# Patient Record
Sex: MALE | Race: WHITE | NOT HISPANIC OR LATINO | Employment: OTHER | URBAN - METROPOLITAN AREA
[De-identification: names, ages, dates, MRNs, and addresses within clinical notes are randomized per-mention and may not be internally consistent; named-entity substitution may affect disease eponyms.]

---

## 2017-03-17 ENCOUNTER — GENERIC CONVERSION - ENCOUNTER (OUTPATIENT)
Dept: OTHER | Facility: OTHER | Age: 67
End: 2017-03-17

## 2018-02-27 ENCOUNTER — OFFICE VISIT (OUTPATIENT)
Dept: SURGERY | Facility: CLINIC | Age: 68
End: 2018-02-27
Payer: MEDICARE

## 2018-02-27 VITALS
HEIGHT: 70 IN | HEART RATE: 78 BPM | SYSTOLIC BLOOD PRESSURE: 118 MMHG | DIASTOLIC BLOOD PRESSURE: 78 MMHG | RESPIRATION RATE: 16 BRPM | WEIGHT: 184.04 LBS | BODY MASS INDEX: 26.35 KG/M2 | TEMPERATURE: 97.7 F

## 2018-02-27 DIAGNOSIS — K40.90 LEFT INGUINAL HERNIA: Primary | ICD-10-CM

## 2018-02-27 PROCEDURE — 99203 OFFICE O/P NEW LOW 30 MIN: CPT | Performed by: SURGERY

## 2018-02-27 RX ORDER — LEVOFLOXACIN 750 MG/1
TABLET ORAL
Refills: 0 | COMMUNITY
Start: 2018-01-24 | End: 2018-03-12 | Stop reason: ALTCHOICE

## 2018-02-27 RX ORDER — SODIUM CHLORIDE, SODIUM LACTATE, POTASSIUM CHLORIDE, CALCIUM CHLORIDE 600; 310; 30; 20 MG/100ML; MG/100ML; MG/100ML; MG/100ML
125 INJECTION, SOLUTION INTRAVENOUS CONTINUOUS
Qty: 250 ML | Refills: 0 | Status: SHIPPED | OUTPATIENT
Start: 2018-02-27 | End: 2018-03-12

## 2018-02-27 RX ORDER — ALBUTEROL SULFATE 2.5 MG/3ML
SOLUTION RESPIRATORY (INHALATION)
Refills: 0 | COMMUNITY
Start: 2018-01-29 | End: 2018-03-13 | Stop reason: SDUPTHER

## 2018-02-27 RX ORDER — FLUTICASONE FUROATE AND VILANTEROL TRIFENATATE 100; 25 UG/1; UG/1
1 POWDER RESPIRATORY (INHALATION) DAILY
Refills: 0 | COMMUNITY
Start: 2018-02-14 | End: 2018-03-13 | Stop reason: SDUPTHER

## 2018-02-27 RX ORDER — PREDNISONE 10 MG/1
10 TABLET ORAL DAILY
Refills: 0 | COMMUNITY
Start: 2018-01-24 | End: 2018-03-12 | Stop reason: ALTCHOICE

## 2018-02-27 RX ORDER — OMEPRAZOLE 20 MG/1
20 TABLET, DELAYED RELEASE ORAL
COMMUNITY
End: 2018-03-12 | Stop reason: SDUPTHER

## 2018-02-27 NOTE — PROGRESS NOTES
Consult- General Surgery   Amilcar Wheeler 74 y o  male MRN: [id number]  Unit/Bed#:  Encounter: [id number]    Assessment/Plan     Assessment:  Left inguinal hernia, symptomatic  COPD on O2 at home    Plan:  In light of the symptomatology and the size of the left inguinal hernia I advised the patient to undergo open repair of left inguinal hernia under local and mac anesthesia in the near future  I discussed the operative procedure, risks, benefits and alternatives with the patient, he understood and agreed to proceed  The patient will require medical and pulmonary clearance before surgery  History of Present Illness     HPI:  I had the pleasure of seeing Amilcar Wheeler in the office today accompanied by his wife for evaluation of left inguinal hernia  The patient is a pleasant 74 y o  male who noted a bulge from the left groin for the past 3 months, increasing in size, causing discomfort with walking  Denies having any fever, chills, nausea, vomiting, diarrhea, constipation or urinary symptoms  He does not recall any single event the provoked the hernia  The patient just moved from [place] to the [facility] and he is stellate chin doctors  He uses O2 at home for COPD  Review of Systems   Constitutional: Negative for chills and fever  HENT: Negative for nosebleeds and sore throat  Eyes: Negative for pain and discharge  Respiratory: Positive for cough, shortness of breath and wheezing  Cardiovascular: Negative for chest pain and palpitations  Gastrointestinal:        As per history of present illness  Endocrine: Negative for cold intolerance and heat intolerance  Genitourinary: Negative for dysuria and hematuria  Neurological: Negative for seizures and headaches  Hematological: Negative for adenopathy  Does not bruise/bleed easily  Psychiatric/Behavioral: Negative for confusion  The patient is not nervous/anxious          Historical Information   No past medical history on file   No past surgical history on file  Social History   History   Alcohol Use No     History   Drug Use No     History   Smoking Status    Former Smoker    Years: 50 00    Types: Cigarettes    Quit date: 2/27/2013   Smokeless Tobacco    Never Used     Family History: No family history on file  Meds/Allergies   PTA meds:   Cannot display prior to admission medications because the patient has not been admitted in this contact  Allergies   Allergen Reactions    Penicillins Hives     Passed out       Objective   First Vitals:   @VSFIRST2(5,8,6,7,9,11,14,10:FIRST)@    Current Vitals:   Blood Pressure: 118/78 (02/27/18 1330)  Pulse: 78 (02/27/18 1330)  Temperature: 97 7 °F (36 5 °C) (02/27/18 1330)  Temp Source: Oral (02/27/18 1330)  Respirations: 16 (oxygen air level 3) (02/27/18 1330)  Height: 5' 10" (177 8 cm) (02/27/18 1330)  Weight - Scale: 83 5 kg (184 lb 0 6 oz) (02/27/18 1330)    [unfilled]    Invasive Devices          No matching active lines, drains, or airways          Physical Exam   Constitutional: He is oriented to person, place, and time  He appears well-developed and well-nourished  No distress  HENT:   Head: Normocephalic  Mouth/Throat: No oropharyngeal exudate  Eyes: Pupils are equal, round, and reactive to light  No scleral icterus  Neck: Normal range of motion  Neck supple  No thyromegaly present  Cardiovascular: Normal rate and regular rhythm  No murmur heard  Pulmonary/Chest:   Decreased breathing sounds both lungs, anterior wheezing and no rhonchi  Abdominal: Soft  He exhibits no mass  There is no tenderness  There is a visible left inguinal hernia, easily reducible, somewhat tender to palpation  There is no right inguinal hernia  Musculoskeletal: He exhibits no edema or tenderness  Neurological: He is alert and oriented to person, place, and time  No cranial nerve deficit  Skin: Skin is warm and dry  No erythema     Psychiatric: He has a normal mood and affect   His behavior is normal

## 2018-03-12 ENCOUNTER — OFFICE VISIT (OUTPATIENT)
Dept: INTERNAL MEDICINE CLINIC | Facility: CLINIC | Age: 68
End: 2018-03-12
Payer: MEDICARE

## 2018-03-12 VITALS
HEIGHT: 70 IN | WEIGHT: 187.4 LBS | RESPIRATION RATE: 20 BRPM | BODY MASS INDEX: 26.83 KG/M2 | SYSTOLIC BLOOD PRESSURE: 152 MMHG | DIASTOLIC BLOOD PRESSURE: 72 MMHG | HEART RATE: 78 BPM

## 2018-03-12 DIAGNOSIS — F41.9 ANXIETY: ICD-10-CM

## 2018-03-12 DIAGNOSIS — K40.90 LEFT INGUINAL HERNIA: ICD-10-CM

## 2018-03-12 DIAGNOSIS — J43.9 PULMONARY EMPHYSEMA, UNSPECIFIED EMPHYSEMA TYPE (HCC): Primary | ICD-10-CM

## 2018-03-12 DIAGNOSIS — K21.9 GASTROESOPHAGEAL REFLUX DISEASE WITHOUT ESOPHAGITIS: ICD-10-CM

## 2018-03-12 DIAGNOSIS — K63.5 POLYP OF COLON, UNSPECIFIED PART OF COLON, UNSPECIFIED TYPE: ICD-10-CM

## 2018-03-12 PROCEDURE — 99204 OFFICE O/P NEW MOD 45 MIN: CPT | Performed by: INTERNAL MEDICINE

## 2018-03-12 RX ORDER — SODIUM CHLORIDE, SODIUM LACTATE, POTASSIUM CHLORIDE, CALCIUM CHLORIDE 600; 310; 30; 20 MG/100ML; MG/100ML; MG/100ML; MG/100ML
125 INJECTION, SOLUTION INTRAVENOUS CONTINUOUS
Qty: 250 ML | Refills: 0 | Status: SHIPPED | OUTPATIENT
Start: 2018-03-12 | End: 2018-05-01 | Stop reason: ALTCHOICE

## 2018-03-12 RX ORDER — OMEPRAZOLE 20 MG/1
20 TABLET, DELAYED RELEASE ORAL DAILY
Qty: 30 TABLET | Refills: 5 | Status: SHIPPED | OUTPATIENT
Start: 2018-03-12 | End: 2018-04-26

## 2018-03-12 NOTE — PROGRESS NOTES
Assessment/Plan:     Appears stable at this point  There are some questions that will need to be answered from his old records  For now, he has appointment with Pulmonary tomorrow  Referral given to Cardiology  Continue current medications  After his surgery, we can refer to GI to consider EGD and he may possibly be due for repeat colonoscopy  Also, given the pending surgery, I told him to stay on the Zoloft for now  After the surgery is settled, we can consider tapering down to a lower dose  His clearance for the surgery is dependent on final clearance from Cardiology and Pulmonary given his underlying medical problems  Followup scheduled per orders  No problem-specific Assessment & Plan notes found for this encounter  Diagnoses and all orders for this visit:    Pulmonary emphysema, unspecified emphysema type ([facility] )  -     Ambulatory referral to Cardiology    Gastroesophageal reflux disease without esophagitis  -     omeprazole (PRILOSEC OTC) 20 MG tablet; Take 1 tablet (20 mg total) by mouth daily    Anxiety  -     sertraline (ZOLOFT) 50 mg tablet; Take 1 tablet (50 mg total) by mouth daily    Left inguinal hernia  -     lactated ringers; Infuse 125 mL/hr into a venous catheter continuous  -     enoxaparin (LOVENOX) 40 mg/0 4 mL; Inject 0 4 mL (40 mg total) under the skin on call to OR for 1 dose  -     Ambulatory referral to Cardiology    Polyp of colon, unspecified part of colon, unspecified type          Subjective:      Patient ID: Jessica Sanders is a 74 y o  male  Patient is a 82-year-old white male with COPD/emphysema who comes in today for 1st time visit  He has moved to this area to live with his daughter who was my patient  Both he and his wife have severe COPD and are on oxygen  He was most recently found to have a left inguinal hernia that is going to require surgical repair  However, he 1st needs clearance    They already have an appointment with Pulmonary tomorrow but his daughter also reports he has a history of a silent MI  He will need Cardiology clearance as well  We do not have his old records as of yet  In addition to his lung problem, he does have associated anxiety and was placed on Zoloft for this  He wanted to try stopping it but his family told him to resume  He also has a history of acid reflux and possibly an ulcer but he denies ever having EGD  He does remember having colonoscopy a few years ago and did have a colon polyp  ALLERGIES:  Allergies   Allergen Reactions    Penicillins Hives     Passed out       CURRENT MEDICATIONS:    Current Outpatient Prescriptions:     albuterol (2 5 mg/3 mL) 0 083 % nebulizer solution, inhale contents of 1 vial in nebulizer every 4 hours if needed, Disp: , Rfl: 0    BREO ELLIPTA, Inhale 1 puff daily, Disp: , Rfl: 0    omeprazole (PRILOSEC OTC) 20 MG tablet, Take 1 tablet (20 mg total) by mouth daily, Disp: 30 tablet, Rfl: 5    PROAIR  (90 Base) MCG/ACT inhaler, inhale 1 puff by mouth every 6 hours if needed for wheezing, Disp: , Rfl: 0    sertraline (ZOLOFT) 50 mg tablet, Take 1 tablet (50 mg total) by mouth daily, Disp: 30 tablet, Rfl: 5    enoxaparin (LOVENOX) 40 mg/0 4 mL, Inject 0 4 mL (40 mg total) under the skin on call to OR for 1 dose, Disp: 1 Syringe, Rfl: 0    lactated ringers, Infuse 125 mL/hr into a venous catheter continuous, Disp: 250 mL, Rfl: 0    ACTIVE PROBLEM LIST:  Patient Active Problem List   Diagnosis    Pulmonary emphysema (HCC)    Gastroesophageal reflux disease without esophagitis    Anxiety    Left inguinal hernia       PAST MEDICAL HISTORY:  Past Medical History:   Diagnosis Date    Pneumonia        PAST SURGICAL HISTORY:  Past Surgical History:   Procedure Laterality Date    CHOLECYSTECTOMY         FAMILY HISTORY:  History reviewed  No pertinent family history      SOCIAL HISTORY:  Social History     Social History    Marital status: /Civil Union     Spouse name: janet     Number of children: 11    Years of education: N/A     Occupational History    retired       Social History Main Topics    Smoking status: Former Smoker     Years: 50 00     Types: Cigarettes     Quit date: 2/27/2013    Smokeless tobacco: Never Used    Alcohol use No    Drug use: No    Sexual activity: No     Other Topics Concern    Not on file     Social History Narrative    No narrative on file       Review of Systems   Constitutional: Negative for fever  HENT: Negative for congestion  Eyes: Negative for visual disturbance  Respiratory: Positive for shortness of breath  Negative for wheezing  Cardiovascular: Negative for chest pain  Gastrointestinal: Negative for abdominal pain  Genitourinary: Negative for frequency  Musculoskeletal: Negative for arthralgias  Skin: Negative for rash  Neurological: Negative for headaches  Psychiatric/Behavioral: Negative for dysphoric mood  The patient is nervous/anxious  Objective:  Vitals:    03/12/18 1411   BP: 152/72   BP Location: Left arm   Patient Position: Sitting   Cuff Size: Adult   Pulse: 78   Resp: 20   Weight: 85 kg (187 lb 6 4 oz)   Height: 5' 10" (1 778 m)        Physical Exam   Constitutional: He is oriented to person, place, and time  He appears well-developed and well-nourished  HENT:   Head: Atraumatic  Right Ear: External ear normal    Left Ear: External ear normal    Mouth/Throat: Oropharynx is clear and moist  No oropharyngeal exudate  Eyes: Conjunctivae and EOM are normal  Pupils are equal, round, and reactive to light  Neck: Normal range of motion  Neck supple  Cardiovascular: Normal rate and regular rhythm  Pulmonary/Chest: Effort normal and breath sounds normal    Abdominal: Soft  There is no tenderness  Left inguinal hernia   Musculoskeletal: Normal range of motion  He exhibits no edema  Lymphadenopathy:     He has no cervical adenopathy     Neurological: He is alert and oriented to person, place, and time  Skin: Skin is warm and dry  No rash noted  Psychiatric: He has a normal mood and affect  RESULTS:    No results found for this or any previous visit (from the past 1008 hour(s))

## 2018-03-13 ENCOUNTER — TELEPHONE (OUTPATIENT)
Dept: CARDIOLOGY CLINIC | Facility: CLINIC | Age: 68
End: 2018-03-13

## 2018-03-13 ENCOUNTER — OFFICE VISIT (OUTPATIENT)
Dept: PULMONOLOGY | Facility: CLINIC | Age: 68
End: 2018-03-13
Payer: MEDICARE

## 2018-03-13 VITALS
OXYGEN SATURATION: 95 % | HEART RATE: 87 BPM | BODY MASS INDEX: 26.48 KG/M2 | WEIGHT: 185 LBS | SYSTOLIC BLOOD PRESSURE: 124 MMHG | DIASTOLIC BLOOD PRESSURE: 84 MMHG | HEIGHT: 70 IN

## 2018-03-13 DIAGNOSIS — J41.0 SIMPLE CHRONIC BRONCHITIS (HCC): Primary | ICD-10-CM

## 2018-03-13 DIAGNOSIS — Z87.891 FORMER SMOKER: ICD-10-CM

## 2018-03-13 DIAGNOSIS — J96.11 CHRONIC RESPIRATORY FAILURE WITH HYPOXIA (HCC): ICD-10-CM

## 2018-03-13 PROCEDURE — 99204 OFFICE O/P NEW MOD 45 MIN: CPT | Performed by: INTERNAL MEDICINE

## 2018-03-13 RX ORDER — ALBUTEROL SULFATE 2.5 MG/3ML
2.5 SOLUTION RESPIRATORY (INHALATION) EVERY 6 HOURS PRN
Qty: 1080 ML | Refills: 3 | Status: SHIPPED | OUTPATIENT
Start: 2018-03-13 | End: 2018-05-01 | Stop reason: SDUPTHER

## 2018-03-13 RX ORDER — FLUTICASONE FUROATE AND VILANTEROL TRIFENATATE 100; 25 UG/1; UG/1
1 POWDER RESPIRATORY (INHALATION) DAILY
Qty: 1 EACH | Refills: 5 | Status: SHIPPED | OUTPATIENT
Start: 2018-03-13 | End: 2018-08-03 | Stop reason: ALTCHOICE

## 2018-03-13 NOTE — TELEPHONE ENCOUNTER
PT WAS REFERRED BY DR SIMMS  PT IS NEW AND SCHED APPT ON 04/25 WITH HIS WIFE  PT NEEDS CARDIAC CLEARANCE FOR HERNIA SURGERY BY DR Jessee Austin  CAN DR GONZALES SEE PT EARLIER FOR CLEARANCE SO PT CAN SCHEDULE SURGERY

## 2018-03-13 NOTE — TELEPHONE ENCOUNTER
teresa what Dr German Walker April schedule looks like - dose he have any openings sooner than that date in April   Patient can also be added to cancellation list

## 2018-03-13 NOTE — PROGRESS NOTES
Assessment/Plan:     Diagnoses and all orders for this visit:    Simple chronic bronchitis ([address] )  -     Complete pulmonary function test; Future  -     CT lung screening program; Future  -     albuterol (2 5 mg/3 mL) 0 083 % nebulizer solution; Take 3 mL (2 5 mg total) by nebulization every 6 (six) hours as needed for wheezing  -     PROAIR  (90 Base) MCG/ACT inhaler; Inhale 2 puffs every 6 (six) hours as needed for wheezing  -     BREO ELLIPTA; Inhale 1 puff daily  -     Umeclidinium Bromide (INCRUSE ELLIPTA) 62 5 MCG/INH AEPB; Inhale 1 puff daily    Chronic respiratory failure with hypoxia (HCC)  -     Complete pulmonary function test; Future  -     CT lung screening program; Future  -     albuterol (2 5 mg/3 mL) 0 083 % nebulizer solution; Take 3 mL (2 5 mg total) by nebulization every 6 (six) hours as needed for wheezing  -     PROAIR  (90 Base) MCG/ACT inhaler; Inhale 2 puffs every 6 (six) hours as needed for wheezing  -     BREO ELLIPTA; Inhale 1 puff daily  -     Umeclidinium Bromide (INCRUSE ELLIPTA) 62 5 MCG/INH AEPB; Inhale 1 puff daily    Former smoker  -     CT lung screening program; Future      Chronic bronchitis/COPD with chronic respiratory failure in a former smoker with significant smoking history on continuous oxygen 3 L per minute for the past year  PFTs with bronchodilators lung volumes and DLCO  Discussed with the patient to continue with breo one puff daily  He was also on incruse one puff daily did not have the prescription for the past month, we'll send it in  Pro air MDI 2 puffs every 6 hourly when necessary  Also has a nebulizer machine at home with albuterol using it 2 or 3 times a day as needed only    He will continue with his oxygen 3 L per minute continuous  He will also get a CT of the chest for lung cancer screening  He is going for a hernia surgery, no set date yet, also awaiting cardiology clearance, in the meantime he will get the PFTs as well as a CT of the chest for lung cancer screening and I will give the clearance  Vaccinations up-to-date  The above plan was discussed with the patient as well as the patient's daughter at this office visit  Return in about 4 weeks (around 4/10/2018)  All questions are answered to the patient's satisfaction and understanding  He verbalizes understanding  He is encouraged to call with any further questions or concerns  Portions of the record may have been created with voice recognition software  Occasional wrong word or "sound a like" substitutions may have occurred due to the inherent limitations of voice recognition software  Read the chart carefully and recognize, using context, where substitutions have occurred  ______________________________________________________________________    Chief Complaint:   Chief Complaint   Patient presents with    COPD        Patient ID: Troy Rodriguez is a 74 y o  y o  male has a past medical history of COPD (chronic obstructive pulmonary disease) ([facility] ) and Pneumonia  3/13/2018  Patient is a 62-year-old gentleman, with 50-pack-year smoking history who quit 5 years ago, with history of COPD on bronchodilators as well as on home oxygen continuous 3 L per minute for the past year, during the last year he states he has been having some episodes of chronic bronchitis with flared up at least 2-3 in the past year, never admitted in the hospital for definitive, used to follow-up with the pulmonologist at [place], currently moved to [place] recently to live with his daughter wants to establish here  He is also planning to get a when his hernia surgery done no date set yet, also needs cardiology clearance,he states  Mother had history of lung cancer      Occupational/Exposure history:  Travel history:  Review of Systems   Constitutional: Positive for fatigue  Negative for activity change, appetite change, chills, diaphoresis, fever and unexpected weight change     HENT: Negative for congestion, dental problem, drooling, nosebleeds, postnasal drip, rhinorrhea, sinus pressure, sore throat and voice change  Eyes: Negative for discharge, itching and visual disturbance  Respiratory: Positive for cough (clear sputum, no hemoptysis), shortness of breath and wheezing  Cardiovascular: Negative for chest pain, palpitations and leg swelling  Endocrine: Negative for cold intolerance and heat intolerance  Allergic/Immunologic: Negative for food allergies and immunocompromised state  Neurological: Negative for dizziness, facial asymmetry, speech difficulty and weakness  Hematological: Negative for adenopathy  Psychiatric/Behavioral: Negative for agitation, confusion and sleep disturbance  The patient is not nervous/anxious  Social history: He reports that he quit smoking about 5 years ago  His smoking use included Cigarettes  He quit after 50 00 years of use  He has never used smokeless tobacco  He reports that he drinks about 0 6 oz of alcohol per week   He reports that he does not use drugs  Past surgical history:   Past Surgical History:   Procedure Laterality Date    CHOLECYSTECTOMY      EYE SURGERY       Family history:   Family History   Problem Relation Age of Onset    Diabetes Mother     Hypertension Mother     Hyperlipidemia Mother          There is no immunization history on file for this patient    Current Outpatient Prescriptions   Medication Sig Dispense Refill    albuterol (2 5 mg/3 mL) 0 083 % nebulizer solution Take 3 mL (2 5 mg total) by nebulization every 6 (six) hours as needed for wheezing 1080 mL 3    BREO ELLIPTA Inhale 1 puff daily 1 each 5    omeprazole (PRILOSEC OTC) 20 MG tablet Take 1 tablet (20 mg total) by mouth daily 30 tablet 5    PROAIR  (90 Base) MCG/ACT inhaler Inhale 2 puffs every 6 (six) hours as needed for wheezing 1 Inhaler 3    sertraline (ZOLOFT) 50 mg tablet Take 1 tablet (50 mg total) by mouth daily 30 tablet 5    enoxaparin (LOVENOX) 40 mg/0 4 mL Inject 0 4 mL (40 mg total) under the skin on call to OR for 1 dose 1 Syringe 0    lactated ringers Infuse 125 mL/hr into a venous catheter continuous 250 mL 0    Umeclidinium Bromide (INCRUSE ELLIPTA) 62 5 MCG/INH AEPB Inhale 1 puff daily 1 each 5     No current facility-administered medications for this visit  Allergies: Penicillins    Objective:  Vitals:    03/13/18 1203   BP: 124/84   BP Location: Left arm   Patient Position: Sitting   Pulse: 87   SpO2: 95%   Weight: 83 9 kg (185 lb)   Height: 5' 10" (1 778 m)   Oxygen Therapy  SpO2: 95 % (with 3lts of oxygen)    Wt Readings from Last 3 Encounters:   03/13/18 83 9 kg (185 lb)   03/12/18 85 kg (187 lb 6 4 oz)   02/27/18 83 5 kg (184 lb 0 6 oz)     Body mass index is 26 54 kg/m²  Physical Exam   Constitutional: He is oriented to person, place, and time  He appears well-developed and well-nourished  HENT:   Head: Normocephalic and atraumatic  Eyes: Conjunctivae are normal  Pupils are equal, round, and reactive to light  Neck: Normal range of motion  Neck supple  No JVD present  No thyromegaly present  Cardiovascular: Normal rate, regular rhythm and normal heart sounds  Exam reveals no gallop and no friction rub  No murmur heard  Pulmonary/Chest: Effort normal and breath sounds normal  No respiratory distress  He has no wheezes  He has no rales  He exhibits no tenderness  Abdominal: Soft  Bowel sounds are normal    Musculoskeletal: Normal range of motion  He exhibits no edema, tenderness or deformity  Lymphadenopathy:     He has no cervical adenopathy  Neurological: He is alert and oriented to person, place, and time  Skin: Skin is warm and dry  Psychiatric: He has a normal mood and affect  Nursing note and vitals reviewed

## 2018-03-20 ENCOUNTER — TELEPHONE (OUTPATIENT)
Dept: SURGERY | Facility: CLINIC | Age: 68
End: 2018-03-20

## 2018-03-20 ENCOUNTER — TELEPHONE (OUTPATIENT)
Dept: PULMONOLOGY | Facility: CLINIC | Age: 68
End: 2018-03-20

## 2018-03-20 ENCOUNTER — HOSPITAL ENCOUNTER (OUTPATIENT)
Dept: CT IMAGING | Facility: HOSPITAL | Age: 68
Discharge: HOME | End: 2018-03-20
Attending: INTERNAL MEDICINE
Payer: COMMERCIAL

## 2018-03-20 DIAGNOSIS — J41.0 SIMPLE CHRONIC BRONCHITIS (HCC): ICD-10-CM

## 2018-03-20 DIAGNOSIS — J96.11 CHRONIC RESPIRATORY FAILURE WITH HYPOXIA (HCC): ICD-10-CM

## 2018-03-20 DIAGNOSIS — Z87.891 FORMER SMOKER: ICD-10-CM

## 2018-03-20 NOTE — TELEPHONE ENCOUNTER
Yeimy Landry was calling regarding the surgical clearance of the hernia  He said he sent a request on 3/13/18 and never received the clearance      Please send it via fax [phone]

## 2018-03-20 NOTE — TELEPHONE ENCOUNTER
Called both pulmonology and internal medicine offices for the pt's surgical clearance, per hua at internal medicine clearance is dependent on pulm and cardiology clearances  pulm is still working on clearance per cynthia  I called the pt and lmom to inform him of this   Per internal medicine pt is still not scheduled to with cardiology

## 2018-03-23 ENCOUNTER — HOSPITAL ENCOUNTER (OUTPATIENT)
Dept: PULMONOLOGY | Facility: HOSPITAL | Age: 68
Discharge: HOME | End: 2018-03-23
Attending: INTERNAL MEDICINE
Payer: MEDICARE

## 2018-03-23 DIAGNOSIS — J96.11 CHRONIC RESPIRATORY FAILURE WITH HYPOXIA (HCC): ICD-10-CM

## 2018-03-23 DIAGNOSIS — J41.0 SIMPLE CHRONIC BRONCHITIS (HCC): ICD-10-CM

## 2018-03-23 PROCEDURE — 94760 N-INVAS EAR/PLS OXIMETRY 1: CPT

## 2018-03-23 PROCEDURE — 94060 EVALUATION OF WHEEZING: CPT | Performed by: INTERNAL MEDICINE

## 2018-03-23 PROCEDURE — 94727 GAS DIL/WSHOT DETER LNG VOL: CPT

## 2018-03-23 PROCEDURE — 94726 PLETHYSMOGRAPHY LUNG VOLUMES: CPT | Performed by: INTERNAL MEDICINE

## 2018-03-23 PROCEDURE — 94060 EVALUATION OF WHEEZING: CPT

## 2018-03-23 PROCEDURE — 94729 DIFFUSING CAPACITY: CPT | Performed by: INTERNAL MEDICINE

## 2018-03-23 PROCEDURE — 94729 DIFFUSING CAPACITY: CPT

## 2018-03-23 RX ORDER — ALBUTEROL SULFATE 2.5 MG/3ML
2.5 SOLUTION RESPIRATORY (INHALATION) ONCE AS NEEDED
Status: COMPLETED | OUTPATIENT
Start: 2018-03-23 | End: 2018-03-23

## 2018-03-23 RX ADMIN — ALBUTEROL SULFATE 2.5 MG: 2.5 SOLUTION RESPIRATORY (INHALATION) at 14:14

## 2018-04-06 ENCOUNTER — OFFICE VISIT (OUTPATIENT)
Dept: PULMONOLOGY | Facility: CLINIC | Age: 68
End: 2018-04-06
Payer: MEDICARE

## 2018-04-06 VITALS
HEART RATE: 82 BPM | HEIGHT: 70 IN | TEMPERATURE: 98.2 F | OXYGEN SATURATION: 95 % | BODY MASS INDEX: 26.92 KG/M2 | SYSTOLIC BLOOD PRESSURE: 124 MMHG | WEIGHT: 188 LBS | DIASTOLIC BLOOD PRESSURE: 84 MMHG

## 2018-04-06 DIAGNOSIS — J43.2 CENTRILOBULAR EMPHYSEMA (HCC): ICD-10-CM

## 2018-04-06 DIAGNOSIS — J41.0 SIMPLE CHRONIC BRONCHITIS (HCC): Primary | ICD-10-CM

## 2018-04-06 DIAGNOSIS — J96.11 CHRONIC RESPIRATORY FAILURE WITH HYPOXIA (HCC): ICD-10-CM

## 2018-04-06 PROCEDURE — 99214 OFFICE O/P EST MOD 30 MIN: CPT | Performed by: INTERNAL MEDICINE

## 2018-04-06 RX ORDER — LEVOFLOXACIN 500 MG/1
500 TABLET, FILM COATED ORAL EVERY 24 HOURS
Qty: 7 TABLET | Refills: 0 | Status: SHIPPED | OUTPATIENT
Start: 2018-04-06 | End: 2018-04-13

## 2018-04-06 RX ORDER — PREDNISONE 20 MG/1
TABLET ORAL
Qty: 18 TABLET | Refills: 0 | Status: SHIPPED | OUTPATIENT
Start: 2018-04-06 | End: 2018-05-01 | Stop reason: ALTCHOICE

## 2018-04-06 NOTE — PROGRESS NOTES
Assessment/Plan:   Diagnoses and all orders for this visit:    Simple chronic bronchitis (HCC)  -     levofloxacin (LEVAQUIN) 500 mg tablet; Take 1 tablet (500 mg total) by mouth every 24 hours for 7 days  -     predniSONE 20 mg tablet; Use 2 tab for 3 days and then 1 tab for 3 days and half tab for 3 days    Chronic respiratory failure with hypoxia (HCC)    Centrilobular emphysema (HCC)     chronic simple bronchitis with mild acute exacerbation  Is currently not requiring increased use of his oxygen currently at baseline requirement of 3 L  Discussed with the patient to continue with his nebulizer treatments with albuterol 4 times daily  Continue with breo one puff daily  incruse one puff daily  We will add levofloxacin 500 mg one tablet daily for 1 week  Prednisone tapering dose  Call if any worsening symptoms or go to the ER  I'll see him back in 3 weeks  Or when necessary earlier as needed  Also have reviewed his recent CT of the chest for lung cancer screening with bilateral moderate to severe diffuse emphysema with bullous disease no evidence of any lung nodules although there is a 1-2 mm lung nodule calcified, we'll repeat CT of the chest in one year lung cancer screening  PFTs with very severe obstructive ventilatory limitation with an FEV1 of 20%  With no appreciable response to the bronchodilator and moderately decreased DLCO  Return in about 3 weeks (around 4/27/2018)  All questions are answered to the patient's satisfaction and understanding  He verbalizes understanding  He is encouraged to call with any further questions or concerns  Portions of the record may have been created with voice recognition software  Occasional wrong word or "sound a like" substitutions may have occurred due to the inherent limitations of voice recognition software    Read the chart carefully and recognize, using context, where substitutions have occurred  ______________________________________________________________________    Chief Complaint:   Chief Complaint   Patient presents with    Cough    Wheezing       Patient ID: Granville Saint is a 74 y o  y o  male has a past medical history of COPD (chronic obstructive pulmonary disease) ([address] ) and Pneumonia  4/6/2018  3/13/2018  Patient is a 71-year-old gentleman, with 50-pack-year smoking history who quit 5 years ago, with history of COPD on bronchodilators as well as on home oxygen continuous 3 L per minute for the past year, during the last year he states he has been having some episodes of chronic bronchitis with flared up at least 2-3 in the past year, never admitted in the hospital for definitive, used to follow-up with the pulmonologist at [place], currently moved to [place] recently to live with his daughter wants to establish here  He is also planning to get a when his hernia surgery done no date set yet, also needs cardiology clearance,he states  Mother had history of lung cancer  For the past 3-4 days has been having increasing cough with green to brown sputum yesterday, no hemoptysis, no fevers  Having increased use of the rescue medication currently using 3-4 times his nebulizer with albuterol with all his long-acting medications  Still short of breath he states waking up in the middle of the night with shortness of breath and wheezing  Cough   Associated symptoms include shortness of breath and wheezing  Pertinent negatives include no chest pain, chills, fever, postnasal drip, rhinorrhea or sore throat  Wheezing    Associated symptoms include coughing and shortness of breath  Pertinent negatives include no chest pain, chills, fever, rhinorrhea or sore throat  Review of Systems   Constitutional: Positive for fatigue  Negative for activity change, appetite change, chills, diaphoresis, fever and unexpected weight change     HENT: Negative for congestion, dental problem, drooling, nosebleeds, postnasal drip, rhinorrhea, sinus pressure, sore throat and voice change  Eyes: Negative for discharge, itching and visual disturbance  Respiratory: Positive for cough, shortness of breath and wheezing  Cardiovascular: Negative for chest pain, palpitations and leg swelling  Endocrine: Negative for cold intolerance and heat intolerance  Allergic/Immunologic: Negative for food allergies and immunocompromised state  Neurological: Negative for dizziness, facial asymmetry, speech difficulty and weakness  Hematological: Negative for adenopathy  Psychiatric/Behavioral: Negative for agitation, confusion and sleep disturbance  The patient is not nervous/anxious  Smoking history: He reports that he quit smoking about 5 years ago  His smoking use included Cigarettes  He quit after 50 00 years of use  He has never used smokeless tobacco     The following portions of the patient's history were reviewed and updated as appropriate: allergies, current medications, past family history, past medical history, past social history, past surgical history and problem list       There is no immunization history on file for this patient    Current Outpatient Prescriptions   Medication Sig Dispense Refill    albuterol (2 5 mg/3 mL) 0 083 % nebulizer solution Take 3 mL (2 5 mg total) by nebulization every 6 (six) hours as needed for wheezing 1080 mL 3    BREO ELLIPTA Inhale 1 puff daily 1 each 5    enoxaparin (LOVENOX) 40 mg/0 4 mL Inject 0 4 mL (40 mg total) under the skin on call to OR for 1 dose 1 Syringe 0    lactated ringers Infuse 125 mL/hr into a venous catheter continuous 250 mL 0    levofloxacin (LEVAQUIN) 500 mg tablet Take 1 tablet (500 mg total) by mouth every 24 hours for 7 days 7 tablet 0    omeprazole (PRILOSEC OTC) 20 MG tablet Take 1 tablet (20 mg total) by mouth daily 30 tablet 5    predniSONE 20 mg tablet Use 2 tab for 3 days and then 1 tab for 3 days and half tab for 3 days 18 tablet 0    PROAIR  (90 Base) MCG/ACT inhaler Inhale 2 puffs every 6 (six) hours as needed for wheezing 1 Inhaler 3    sertraline (ZOLOFT) 50 mg tablet Take 1 tablet (50 mg total) by mouth daily 30 tablet 5    Umeclidinium Bromide (INCRUSE ELLIPTA) 62 5 MCG/INH AEPB Inhale 1 puff daily 1 each 5     No current facility-administered medications for this visit  Allergies: Penicillins    Objective:  Vitals:    04/06/18 1007   BP: 124/84   Pulse: 82   Temp: 98 2 °F (36 8 °C)   SpO2: 95%   Weight: 85 3 kg (188 lb)   Height: 5' 10" (1 778 m)   Oxygen Therapy  SpO2: 95 % (with 3 lts of oxygen)    Wt Readings from Last 3 Encounters:   04/06/18 85 3 kg (188 lb)   03/13/18 83 9 kg (185 lb)   03/12/18 85 kg (187 lb 6 4 oz)     Body mass index is 26 98 kg/m²  Physical Exam   Constitutional: He is oriented to person, place, and time  He appears well-developed and well-nourished  HENT:   Head: Normocephalic and atraumatic  Eyes: Conjunctivae are normal  Pupils are equal, round, and reactive to light  Neck: Normal range of motion  Neck supple  No JVD present  No thyromegaly present  Cardiovascular: Normal rate, regular rhythm and normal heart sounds  Exam reveals no gallop and no friction rub  No murmur heard  Pulmonary/Chest: Effort normal  No respiratory distress  He has wheezes (bilateral expiratory wheeze)  He has no rales  He exhibits no tenderness  Abdominal: Soft  Bowel sounds are normal    Musculoskeletal: Normal range of motion  He exhibits no edema, tenderness or deformity  Lymphadenopathy:     He has no cervical adenopathy  Neurological: He is alert and oriented to person, place, and time  Skin: Skin is warm and dry  Psychiatric: He has a normal mood and affect  Nursing note and vitals reviewed         Ct Lung Screening Program    Result Date: 3/22/2018  Narrative: CT CHEST LUNG CANCER SCREENING WITHOUT IV CONTRAST INDICATION:   J41 0: Simple chronic bronchitis J96 11: Chronic respiratory failure with hypoxia Z87 891: Personal history of nicotine dependence  COMPARISON: None  TECHNIQUE:  Unenhanced CT examination of the chest was performed utilizing a low dose protocol  Reformatted images were created in axial, sagittal, and coronal planes  Radiation dose length product (DLP) for this visit:  167 29 mGy-cm   This examination, like all CT scans performed in the [facility], was performed utilizing techniques to minimize radiation dose exposure, including the use of iterative  reconstruction and automated exposure control  FINDINGS: LUNGS:  There is moderate to severe diffuse emphysematous lung changes with extensive bullous disease at the right upper lobe  No acute infiltrate or pleural effusion  There is a 1 to 2 mm calcified pleural-based left lower lobe pulmonary nodule (series 3, image 52)   There is a 2 mm left fissural nodule (series 603, image 95) most compatible with a tiny intrapulmonary lymph node  There is mild bibasilar atelectasis  There is no tracheal or endobronchial lesion  There is linear scarring versus atelectasis at the right lung base  PLEURA:  Unremarkable  HEART/GREAT VESSELS:  Unremarkable for patient's age  MEDIASTINUM AND MYRA:  Unremarkable  CHEST WALL AND LOWER NECK: Normal  VISUALIZED STRUCTURES IN THE UPPER ABDOMEN:  The gallbladder is surgically absent  There is a 10 mm exophytic right upper pole cyst  OSSEOUS STRUCTURES:  No acute fracture or destructive osseous lesion  Impression: No lung nodule or focal consolidation  Lung-RADS:  Lung-RADS1, negative  Continued annual screening with LDCT in 12 months  Moderate to severe diffuse emphysematous lung changes with extensive bullous disease at the right upper lobe   Workstation performed: [id number]

## 2018-04-09 ENCOUNTER — TELEPHONE (OUTPATIENT)
Dept: PULMONOLOGY | Facility: CLINIC | Age: 68
End: 2018-04-09

## 2018-04-09 ENCOUNTER — TELEPHONE (OUTPATIENT)
Dept: OTHER | Facility: HOSPITAL | Age: 68
End: 2018-04-09

## 2018-04-09 DIAGNOSIS — J44.9 COPD (CHRONIC OBSTRUCTIVE PULMONARY DISEASE) WITH CHRONIC BRONCHITIS (HCC): Primary | ICD-10-CM

## 2018-04-09 RX ORDER — ALBUTEROL SULFATE 2.5 MG/3ML
2.5 SOLUTION RESPIRATORY (INHALATION) EVERY 4 HOURS PRN
Qty: 1080 ML | Refills: 3 | Status: SHIPPED | OUTPATIENT
Start: 2018-04-09 | End: 2019-04-23 | Stop reason: ALTCHOICE

## 2018-04-25 ENCOUNTER — OFFICE VISIT (OUTPATIENT)
Dept: CARDIOLOGY CLINIC | Facility: CLINIC | Age: 68
End: 2018-04-25
Payer: MEDICARE

## 2018-04-25 VITALS
DIASTOLIC BLOOD PRESSURE: 82 MMHG | SYSTOLIC BLOOD PRESSURE: 124 MMHG | HEART RATE: 87 BPM | WEIGHT: 185 LBS | BODY MASS INDEX: 26.48 KG/M2 | HEIGHT: 70 IN | OXYGEN SATURATION: 94 %

## 2018-04-25 DIAGNOSIS — K40.90 LEFT INGUINAL HERNIA: ICD-10-CM

## 2018-04-25 DIAGNOSIS — Z01.810 PREOP CARDIOVASCULAR EXAM: Primary | ICD-10-CM

## 2018-04-25 DIAGNOSIS — R94.31 ABNORMAL ECG: ICD-10-CM

## 2018-04-25 DIAGNOSIS — R06.02 SHORTNESS OF BREATH ON EXERTION: ICD-10-CM

## 2018-04-25 DIAGNOSIS — J43.9 PULMONARY EMPHYSEMA, UNSPECIFIED EMPHYSEMA TYPE (HCC): ICD-10-CM

## 2018-04-25 DIAGNOSIS — I49.3 PVCS (PREMATURE VENTRICULAR CONTRACTIONS): ICD-10-CM

## 2018-04-25 PROCEDURE — 93000 ELECTROCARDIOGRAM COMPLETE: CPT | Performed by: INTERNAL MEDICINE

## 2018-04-25 PROCEDURE — 99204 OFFICE O/P NEW MOD 45 MIN: CPT | Performed by: INTERNAL MEDICINE

## 2018-04-25 NOTE — PROGRESS NOTES
CARDIOLOGY OFFICE VISIT  [facility]  [address], [facility], [address], [place], [facility] [place]  Tel: [phone]      NAME: Angle Yen  AGE: 74 y o  SEX: male  : 1950   MRN: [id number]      Chief Complaint:  Chief Complaint   Patient presents with   [facility]      ref by Dr Jason Velasco, also need pre-op clearance         History of Present Illness:   Patient referred by Dr Jason Velasco for preop cardiac evaluation prior to left inguinal hernia surgery by Dr Michael Hough  Patient has severe COPD and is on home oxygen  Feels SOB with minimal activity  Denies chest pain,  Palpitations, lightheadedness, syncope, swelling feet, PND       Has an extensive smoking history but he quit 5 years ago     denies history of CAD, CHF, CKD, DM, TIA, CVA      Past Medical History:  Past Medical History:   Diagnosis Date    COPD (chronic obstructive pulmonary disease) ([address] )     Pneumonia          Past Surgical History:  Past Surgical History:   Procedure Laterality Date    CHOLECYSTECTOMY      EYE SURGERY           Family History:  Family History   Problem Relation Age of Onset    Diabetes Mother     Hypertension Mother     Hyperlipidemia Mother          Social History:  Social History     Social History    Marital status: /Civil Union     Spouse name: janet     Number of children: 11    Years of education: N/A     Occupational History    retired       Social History Main Topics    Smoking status: Former Smoker     Years: 50 00     Types: Cigarettes     Quit date: 2013    Smokeless tobacco: Never Used    Alcohol use 0 6 oz/week     1 Glasses of wine per week      Comment: socialy    Drug use: No    Sexual activity: No     Other Topics Concern    Not on file     Social History Narrative        Retired         Active Problems:  Patient Active Problem List   Diagnosis    Pulmonary emphysema ([address] )    Gastroesophageal reflux disease without esophagitis    Anxiety    Left inguinal hernia    Simple chronic bronchitis (HCC)    Preop cardiovascular exam    Shortness of breath on exertion    Abnormal ECG    PVCs (premature ventricular contractions)         The following portions of the patient's history were reviewed and updated as appropriate: past medical history, past surgical history, past family history,  past social history, current medications, allergies and problem list       Review of Systems:  Constitutional: Denies fever, chills, fatigue  Eyes: Denies eye redness, eye discharge, double vision  ENT: Denies hearing loss, tinnitus, sneezing, nasal discharge, sore throat   Respiratory: Denies cough, expectoration, hemoptysis  +shortness of breath  Cardiovascular: Denies chest pain, palpitations, PND, lower extremity swelling  Gastrointestinal: Denies abdominal pain, nausea, vomiting, hematemesis, diarrhea, bloody stools  Genito-Urinary: Denies dysuria, incontinence  Musculoskeletal: Denies back pain, joint pain, muscle pain  Neurologic: Denies confusion, lightheadedness, syncope, headache, focal weakness, sensory changes, seizures  Endocrine: Denies polyuria, polydipsia, temperature intolerance  Allergy and Immunology: Denies hives, insect bite sensitivity  Hematological and Lymphatic: Denies bleeding problems, swollen glands   Psychological: Denies depression, suicidal ideation, anxiety, panic  Dermatological: Denies pruritus, rash, skin lesion changes      Vitals:  Vitals:    04/25/18 0945   BP: 124/82   Pulse: 87   SpO2: 94%       Body mass index is 26 54 kg/m²  Weight (last 2 days)     Date/Time   Weight    04/25/18 0945  83 9 (185)                Physical Examination:  General: Patient is not in acute distress  Awake, alert, oriented in time, place and person  Responding to commands  Head: Normocephalic  Atraumatic  Eyes: Both pupils normal sized, round and reactive to light  Nonicteric  ENT: Normal external ear canals  Nares normal, no drainage  Lips and oral mucosa normal  Neck: Supple  JVP not raised  Trachea central  No thyromegaly  Lungs: Bilateral bronchovascular breath sounds with no crackles or rhonchi  Chest wall: No tenderness  Cardiovascular: RRR  S1 and S2 normal  No murmur, rub or gallop  Gastrointestinal: Abdomen soft, nontender  No guarding or rigidity  Liver and spleen not palpable  Bowel sounds present  Neurologic: Patient is awake, alert, oriented in time, place and person  Responding to command  Moving all extremities  Integumentary:  No skin rash  Lymphatic: No cervical lymphadenopathy  Back: Symmetric   No CVA tenderness  Extremities: No clubbing, cyanosis or edema      Medications:    Current Outpatient Prescriptions:     albuterol (2 5 mg/3 mL) 0 083 % nebulizer solution, Take 3 mL (2 5 mg total) by nebulization every 6 (six) hours as needed for wheezing, Disp: 1080 mL, Rfl: 3    albuterol (2 5 mg/3 mL) 0 083 % nebulizer solution, Take 3 mL (2 5 mg total) by nebulization every 4 (four) hours as needed for wheezing, Disp: 1080 mL, Rfl: 3    BREO ELLIPTA, Inhale 1 puff daily, Disp: 1 each, Rfl: 5    enoxaparin (LOVENOX) 40 mg/0 4 mL, Inject 0 4 mL (40 mg total) under the skin on call to OR for 1 dose, Disp: 1 Syringe, Rfl: 0    lactated ringers, Infuse 125 mL/hr into a venous catheter continuous, Disp: 250 mL, Rfl: 0    omeprazole (PRILOSEC OTC) 20 MG tablet, Take 1 tablet (20 mg total) by mouth daily, Disp: 30 tablet, Rfl: 5    predniSONE 20 mg tablet, Use 2 tab for 3 days and then 1 tab for 3 days and half tab for 3 days, Disp: 18 tablet, Rfl: 0    PROAIR  (90 Base) MCG/ACT inhaler, Inhale 2 puffs every 6 (six) hours as needed for wheezing, Disp: 1 Inhaler, Rfl: 3    sertraline (ZOLOFT) 50 mg tablet, Take 1 tablet (50 mg total) by mouth daily, Disp: 30 tablet, Rfl: 5    Umeclidinium Bromide (INCRUSE ELLIPTA) 62 5 MCG/INH AEPB, Inhale 1 puff daily, Disp: 1 each, Rfl: 5      Allergies: Allergies   Allergen Reactions    Penicillins Hives     Passed out     ECG:  Sinus rhythm with frequent PVCs    Assessment and Plan:  1  Left inguinal hernia  Surgery being planned by Dr Adriana Adams    2  Preop cardiovascular exam   patient does not ambulate much so exertional symptoms are difficult to evaluate  EKG done in the clinic showed frequent PVCs   2D echocardiogram ordered for evaluation of EF and RWMA   pharmacological nuclear stress test ordered for evaluation of underlying CAD as a cause of his dyspnea and PVCs    3  Shortness of breath on exertion   2D echocardiogram ordered for evaluation of EF and RWMA   pharmacological nuclear stress test ordered for evaluation of underlying CAD as a cause of his dyspnea and PVCs    4  PVCs (premature ventricular contractions)  ordered  - Echo complete with contrast if indicated; Future  - NM myocardial perfusion spect (rx stress and/or rest); Future    5  Abnormal ECG   EKG showed sinus rhythm with frequent PVCs    Recommend aggressive risk factor modification and therapeutic lifestyle changes  Low-salt, low-calorie, low-fat, low-cholesterol diet and to optimize weight  I will defer the ordering and monitoring of necessity lab studies to you, but I am available and happy to review and manage any of the data at your request in the future  Discussed concepts of atherosclerosis, including signs and symptoms of cardiac disease  Previous studies were reviewed  Safety measures were reviewed  Questions were entertained and answered  Patient was advised to report any problems requiring medical attention  Follow-up with PCP and appropriate specialist and lab work as discussed  Return for follow up visit as scheduled or earlier, if needed  Further recommendations will be forthcoming after the above testing is performed and results available  Thank you for allowing me to participate in the care and evaluation of your patient    Should you have any questions, please feel free to contact me  Waleska Menendez MD  4/25/2018,10:28 AM      ADDENDUM 5/8/18-  Patient has no active cardiac conditions (such as unstable cardiac syndrome, decompensated heart failure, significant arrhythmias, severe valvular disease) and no clinical risk factors (such as CAD, compensated or prior heart failure, diabetes mellitus, renal insufficiency, CVA)  Patient is a moderate cardiac risk patient going in for a hernia surgery  No further cardiac workup is needed at this time  No cardiac contraindications for surgery  PATIENT HAS SEVERE PULMONARY HYPERTENSION    WILL BENEFIT FROM PULMONARY EVALUATION PRIOR TO SURGERY

## 2018-04-26 ENCOUNTER — OFFICE VISIT (OUTPATIENT)
Dept: INTERNAL MEDICINE CLINIC | Facility: CLINIC | Age: 68
End: 2018-04-26
Payer: MEDICARE

## 2018-04-26 VITALS
DIASTOLIC BLOOD PRESSURE: 60 MMHG | BODY MASS INDEX: 26.37 KG/M2 | SYSTOLIC BLOOD PRESSURE: 122 MMHG | WEIGHT: 184.2 LBS | OXYGEN SATURATION: 97 % | HEIGHT: 70 IN | HEART RATE: 76 BPM

## 2018-04-26 DIAGNOSIS — J43.9 PULMONARY EMPHYSEMA, UNSPECIFIED EMPHYSEMA TYPE (HCC): Primary | ICD-10-CM

## 2018-04-26 DIAGNOSIS — K40.90 LEFT INGUINAL HERNIA: ICD-10-CM

## 2018-04-26 DIAGNOSIS — K21.9 GASTROESOPHAGEAL REFLUX DISEASE WITHOUT ESOPHAGITIS: ICD-10-CM

## 2018-04-26 PROCEDURE — 99214 OFFICE O/P EST MOD 30 MIN: CPT | Performed by: INTERNAL MEDICINE

## 2018-04-26 NOTE — PROGRESS NOTES
Assessment/Plan:     Lungs appear stable  Follow-up with cardiac testing for final clearance for his surgery  Will try and switch his PPI to see if that helps his GI symptoms better  May need to see Gastroenterology after his surgery if GI symptoms are not improving with the medicine switch  No other medication changes at this point  Followup scheduled per orders  No problem-specific Assessment & Plan notes found for this encounter  Diagnoses and all orders for this visit:    Pulmonary emphysema, unspecified emphysema type ([address] )    Gastroesophageal reflux disease without esophagitis  -     esomeprazole (NexIUM) 20 mg capsule; Take 1 capsule (20 mg total) by mouth daily    Left inguinal hernia          Subjective:      Patient ID: Marguerite Rosario is a 74 y o  male  Patient comes in today for follow-up with his wife  His lung status is stable  He did see Cardiology and has an echo and stress test pending for final clearance for his hernia surgery  That is still scheduled  He does report that he is getting continued GI symptoms despite taking the omeprazole  He has never seen a GI doctor for this and reports he has never had an EGD          ALLERGIES:  Allergies   Allergen Reactions    Penicillins Hives     Passed out       CURRENT MEDICATIONS:    Current Outpatient Prescriptions:     albuterol (2 5 mg/3 mL) 0 083 % nebulizer solution, Take 3 mL (2 5 mg total) by nebulization every 6 (six) hours as needed for wheezing, Disp: 1080 mL, Rfl: 3    albuterol (2 5 mg/3 mL) 0 083 % nebulizer solution, Take 3 mL (2 5 mg total) by nebulization every 4 (four) hours as needed for wheezing, Disp: 1080 mL, Rfl: 3    BREO ELLIPTA, Inhale 1 puff daily, Disp: 1 each, Rfl: 5    lactated ringers, Infuse 125 mL/hr into a venous catheter continuous, Disp: 250 mL, Rfl: 0    predniSONE 20 mg tablet, Use 2 tab for 3 days and then 1 tab for 3 days and half tab for 3 days, Disp: 18 tablet, Rfl: 0    PROAIR HFA 108 (90 Base) MCG/ACT inhaler, Inhale 2 puffs every 6 (six) hours as needed for wheezing, Disp: 1 Inhaler, Rfl: 3    sertraline (ZOLOFT) 50 mg tablet, Take 1 tablet (50 mg total) by mouth daily, Disp: 30 tablet, Rfl: 5    Umeclidinium Bromide (INCRUSE ELLIPTA) 62 5 MCG/INH AEPB, Inhale 1 puff daily, Disp: 1 each, Rfl: 5    enoxaparin (LOVENOX) 40 mg/0 4 mL, Inject 0 4 mL (40 mg total) under the skin on call to OR for 1 dose, Disp: 1 Syringe, Rfl: 0    esomeprazole (NexIUM) 20 mg capsule, Take 1 capsule (20 mg total) by mouth daily, Disp: 30 capsule, Rfl: 5    ACTIVE PROBLEM LIST:  Patient Active Problem List   Diagnosis    [address]monary [address] [address]    Gastroesophageal reflux disease without esophagitis    Anxiety    Left inguinal hernia    Simple chronic bronchitis (HCC)    Preop cardiovascular exam    Shortness of breath on exertion    Abnormal ECG    PVCs (premature ventricular contractions)       PAST MEDICAL HISTORY:  Past Medical History:   Diagnosis Date    COPD (chronic obstructive pulmonary disease) (HCC)     Pneumonia        PAST SURGICAL HISTORY:  Past Surgical History:   Procedure Laterality Date    CHOLECYSTECTOMY      EYE SURGERY         FAMILY HISTORY:  Family History   Problem Relation Age of Onset    Diabetes Mother     Hypertension Mother     Hyperlipidemia Mother        SOCIAL HISTORY:  Social History     Social History    Marital status: /Civil Union     Spouse name: janet     Number of children: 11    Years of education: N/A     Occupational History    retired       Social History Main Topics    Smoking status: Former Smoker     Years: 50 00     Types: Cigarettes     Quit date: 2/27/2013    Smokeless tobacco: Never Used    Alcohol use 0 6 oz/week     1 Glasses of wine per week      Comment: socialy    Drug use: No    Sexual activity: No     Other Topics Concern    Not on file     Social History Narrative        Retired       Review of Systems Respiratory: Negative for shortness of breath  Cardiovascular: Negative for chest pain  Gastrointestinal: Negative for abdominal pain and vomiting  Objective:  Vitals:    04/26/18 1101   BP: 122/60   BP Location: Right arm   Patient Position: Sitting   Pulse: 76   SpO2: 97%   Weight: 83 6 kg (184 lb 3 2 oz)   Height: 5' 10" (1 778 m)        Physical Exam   Constitutional: He is oriented to person, place, and time  He appears well-developed and well-nourished  Cardiovascular: Normal rate, regular rhythm and normal heart sounds  Pulmonary/Chest: Effort normal and breath sounds normal    Abdominal: Soft  Bowel sounds are normal    Musculoskeletal: He exhibits no edema  Neurological: He is alert and oriented to person, place, and time  Nursing note and vitals reviewed  RESULTS:    Recent Results (from the past 1008 hour(s))   Complete pulmonary function test    Collection Time: 03/23/18  2:27 PM   Result Value Ref Range    FEV1  liters    FVC  liters    FEV1/FVC  %    TLC  liters    DLCO  ml/mmHg sec       This note was created with voice recognition software  Phonic, grammatical and spelling errors may be present within the note as a result

## 2018-04-30 ENCOUNTER — HOSPITAL ENCOUNTER (OUTPATIENT)
Dept: NON INVASIVE DIAGNOSTICS | Facility: CLINIC | Age: 68
Discharge: HOME | End: 2018-04-30
Payer: MEDICARE

## 2018-04-30 DIAGNOSIS — I49.3 PVCS (PREMATURE VENTRICULAR CONTRACTIONS): ICD-10-CM

## 2018-04-30 DIAGNOSIS — Z01.810 PREOP CARDIOVASCULAR EXAM: ICD-10-CM

## 2018-04-30 DIAGNOSIS — R06.02 SHORTNESS OF BREATH ON EXERTION: ICD-10-CM

## 2018-04-30 PROCEDURE — A9502 TC99M TETROFOSMIN: HCPCS

## 2018-04-30 PROCEDURE — 93017 CV STRESS TEST TRACING ONLY: CPT

## 2018-04-30 PROCEDURE — 78452 HT MUSCLE IMAGE SPECT MULT: CPT

## 2018-04-30 RX ORDER — AMINOPHYLLINE DIHYDRATE 25 MG/ML
50 INJECTION, SOLUTION INTRAVENOUS ONCE
Status: CANCELLED | OUTPATIENT
Start: 2018-04-30 | End: 2018-04-30

## 2018-04-30 RX ADMIN — REGADENOSON 0.4 MG: 0.08 INJECTION, SOLUTION INTRAVENOUS at 09:29

## 2018-05-01 ENCOUNTER — OFFICE VISIT (OUTPATIENT)
Dept: PULMONOLOGY | Facility: CLINIC | Age: 68
End: 2018-05-01
Payer: MEDICARE

## 2018-05-01 ENCOUNTER — HOSPITAL ENCOUNTER (OUTPATIENT)
Dept: NON INVASIVE DIAGNOSTICS | Facility: CLINIC | Age: 68
Discharge: HOME | End: 2018-05-01
Payer: MEDICARE

## 2018-05-01 VITALS
DIASTOLIC BLOOD PRESSURE: 62 MMHG | OXYGEN SATURATION: 95 % | WEIGHT: 183 LBS | HEART RATE: 84 BPM | SYSTOLIC BLOOD PRESSURE: 110 MMHG | HEIGHT: 70 IN | BODY MASS INDEX: 26.2 KG/M2

## 2018-05-01 DIAGNOSIS — J41.0 SIMPLE CHRONIC BRONCHITIS (HCC): ICD-10-CM

## 2018-05-01 DIAGNOSIS — I49.3 PVCS (PREMATURE VENTRICULAR CONTRACTIONS): ICD-10-CM

## 2018-05-01 DIAGNOSIS — Z87.891 FORMER SMOKER: ICD-10-CM

## 2018-05-01 DIAGNOSIS — J43.2 CENTRILOBULAR EMPHYSEMA (HCC): ICD-10-CM

## 2018-05-01 DIAGNOSIS — R06.02 SHORTNESS OF BREATH: Primary | ICD-10-CM

## 2018-05-01 DIAGNOSIS — J96.11 CHRONIC RESPIRATORY FAILURE WITH HYPOXIA (HCC): ICD-10-CM

## 2018-05-01 DIAGNOSIS — R06.02 SHORTNESS OF BREATH ON EXERTION: ICD-10-CM

## 2018-05-01 DIAGNOSIS — Z01.810 PREOP CARDIOVASCULAR EXAM: ICD-10-CM

## 2018-05-01 PROCEDURE — 93306 TTE W/DOPPLER COMPLETE: CPT | Performed by: INTERNAL MEDICINE

## 2018-05-01 PROCEDURE — 99214 OFFICE O/P EST MOD 30 MIN: CPT | Performed by: INTERNAL MEDICINE

## 2018-05-01 PROCEDURE — 93306 TTE W/DOPPLER COMPLETE: CPT

## 2018-05-01 RX ORDER — ALBUTEROL SULFATE 90 UG/1
2 AEROSOL, METERED RESPIRATORY (INHALATION) EVERY 6 HOURS PRN
COMMUNITY
End: 2019-05-02

## 2018-05-02 NOTE — PROGRESS NOTES
Pl call patient to come back for surgical clearance  At [facility] a week to 10 days prior to surgery

## 2018-05-04 ENCOUNTER — TELEPHONE (OUTPATIENT)
Dept: CARDIOLOGY CLINIC | Facility: CLINIC | Age: 68
End: 2018-05-04

## 2018-05-04 NOTE — TELEPHONE ENCOUNTER
Spoke with patient's wife letting her know the echo showed pulmonary hypertension and explained what that was to her  She states she has made the appt for clearance with Dr Boone Benny

## 2018-05-07 ENCOUNTER — TELEPHONE (OUTPATIENT)
Dept: PULMONOLOGY | Facility: CLINIC | Age: 68
End: 2018-05-07

## 2018-05-07 NOTE — TELEPHONE ENCOUNTER
Pt's wife would like to know if you got info from cardiologist and if he has beed cleared ?  Please advise

## 2018-05-07 NOTE — TELEPHONE ENCOUNTER
From prior message on 5/1 - Dr Cleone Boeck stated that he needed to come in for clearance appointment for clearance, 7-10 days prior to surgery date

## 2018-05-08 ENCOUNTER — TELEPHONE (OUTPATIENT)
Dept: CARDIOLOGY CLINIC | Facility: CLINIC | Age: 68
End: 2018-05-08

## 2018-05-08 NOTE — TELEPHONE ENCOUNTER
Please advise clearance addendum? I don't see in the 4/25 note that he was cleared, you were awaiting test results first

## 2018-05-08 NOTE — TELEPHONE ENCOUNTER
PT HAD APPT ON 04/25 W/SE6  PT NEEDS CLEARANCE FOR HERNIA PROCEDURE ON 05/23 BY DR Jorge Gee  PLEASE FAX [phone] ATTN MARILYN Valenzuela

## 2018-05-08 NOTE — PROGRESS NOTES
Assessment/Plan:   Diagnoses and all orders for this visit:    Shortness of breath    Simple chronic bronchitis (HCC)    Centrilobular emphysema ([facility] [address] )    Chronic respiratory failure with hypoxia ([facility] [address] )    Former smoker    Other orders  -     albuterol (PROVENTIL HFA,VENTOLIN HFA) 90 mcg/act inhaler; Inhale 2 puffs every 6 (six) hours as needed for wheezing      Patient with severe COPD, recent PFTs with severe obstructive ventilatory limitation increased lung volumes air trapping as well as severely decreased DLCO on continuous home oxygen 3 L/m  Status post recent flare up of COPD completed antibiotic as well as prednisone  Here for mobility evaluation  Patient could walk 30 feet and had to stop for shortness of breath   Patient has chronic fatigue and cannot use a manual wheelchair  Patient would benefit from a power scooter for better mobility  He will continue with his current nebulizer treatments along visits long-acting inhalers  And his continuous use of oxygen  Vaccinations up-to-date  I have also discussed with him regarding referral to [facility] for evaluation given severe COPD they will make an appointment and we will send in all the paperwork to [facility]  Return in about 3 months (around 8/1/2018)  All questions are answered to the patient's satisfaction and understanding  He verbalizes understanding  He is encouraged to call with any further questions or concerns  Portions of the record may have been created with voice recognition software  Occasional wrong word or "sound a like" substitutions may have occurred due to the inherent limitations of voice recognition software  Read the chart carefully and recognize, using context, where substitutions have occurred  ______________________________________________________________________    Chief Complaint: No chief complaint on file        Patient ID: Yadira Forbes is a 74 y o  y o  male has a past medical history of COPD (chronic obstructive pulmonary disease) (HCC) and Pneumonia  5/1/2018  Patient is here for mobility evaluation  Patient is a 29-year-old gentleman, with 50-pack-year smoking history who quit 5 years ago, with history of COPD on bronchodilators as well as on home oxygen continuous 3 L per minute for the past year, during the last year he states he has been having some episodes of chronic bronchitis with flared up at least 2-3 in the past year, never admitted in the hospital for definitive, used to follow-up with the pulmonologist at [place], currently moved to [address] [place] [address] recently to live with his daughter   He recently had a flare up of COPD for which he was on antibiotic as well as steroids  At baseline he states has shortness of breath walking from one room to another, also states has shortness of breath when he walks to the bathroom and comes back  Currently not using the cane or a walker at home  No history of prior falls at home  Review of Systems   Constitutional: Positive for fatigue  Negative for activity change, appetite change, chills, diaphoresis, fever and unexpected weight change  HENT: Negative for congestion, dental problem, drooling, nosebleeds, postnasal drip, rhinorrhea, sinus pressure, sore throat and voice change  Eyes: Negative for discharge, itching and visual disturbance  Respiratory: Positive for cough (clear sputum, no hemoptysis), shortness of breath and wheezing  Cardiovascular: Negative for chest pain, palpitations and leg swelling  Endocrine: Negative for cold intolerance and heat intolerance  Allergic/Immunologic: Negative for food allergies and immunocompromised state  Neurological: Negative for dizziness, facial asymmetry, speech difficulty and weakness  Hematological: Negative for adenopathy  Psychiatric/Behavioral: Negative for agitation, confusion and sleep disturbance  The patient is not nervous/anxious          Smoking history: He reports that he quit smoking about 5 years ago  His smoking use included Cigarettes  He quit after 50 00 years of use  He has never used smokeless tobacco     The following portions of the patient's history were reviewed and updated as appropriate: allergies, current medications, past family history, past medical history, past social history, past surgical history and problem list       There is no immunization history on file for this patient  Current Outpatient Prescriptions   Medication Sig Dispense Refill    albuterol (2 5 mg/3 mL) 0 083 % nebulizer solution Take 3 mL (2 5 mg total) by nebulization every 4 (four) hours as needed for wheezing 1080 mL 3    albuterol (PROVENTIL HFA,VENTOLIN HFA) 90 mcg/act inhaler Inhale 2 puffs every 6 (six) hours as needed for wheezing      BREO ELLIPTA Inhale 1 puff daily 1 each 5    esomeprazole (NexIUM) 20 mg capsule Take 1 capsule (20 mg total) by mouth daily 30 capsule 5    sertraline (ZOLOFT) 50 mg tablet Take 1 tablet (50 mg total) by mouth daily 30 tablet 5    Umeclidinium Bromide (INCRUSE ELLIPTA) 62 5 MCG/INH AEPB Inhale 1 puff daily 1 each 5    enoxaparin (LOVENOX) 40 mg/0 4 mL Inject 0 4 mL (40 mg total) under the skin on call to OR for 1 dose 1 Syringe 0     No current facility-administered medications for this visit  Allergies: Penicillins    Objective:  Vitals:    05/01/18 1201 05/01/18 1202   BP: 110/62    Pulse: 84    SpO2:  95%   Weight: 83 kg (183 lb)    Height: 5' 10" (1 778 m)    Oxygen Therapy  SpO2: 95 %  Oxygen Therapy: Supplemental oxygen  O2 Delivery Method: Nasal cannula  O2 Flow Rate (L/min): 3 L/min    Wt Readings from Last 3 Encounters:   05/01/18 83 kg (183 lb)   04/26/18 83 6 kg (184 lb 3 2 oz)   04/25/18 83 9 kg (185 lb)     Body mass index is 26 26 kg/m²  Physical Exam   Constitutional: He is oriented to person, place, and time  He appears well-developed and well-nourished  HENT:   Head: Normocephalic and atraumatic     Eyes: Conjunctivae are normal  Pupils are equal, round, and reactive to light  Neck: Normal range of motion  Neck supple  No JVD present  No thyromegaly present  Cardiovascular: Normal rate, regular rhythm and normal heart sounds  Exam reveals no gallop and no friction rub  No murmur heard  Pulmonary/Chest: Effort normal  No respiratory distress  He has no wheezes  He has no rales  He exhibits no tenderness  Diminished breath sounds bilaterally no rhonchi   Abdominal: Soft  Bowel sounds are normal    Musculoskeletal: Normal range of motion  He exhibits no edema, tenderness or deformity  Upper extremity strength 3/5  Lower extremity strength 3/5  Upper extremity and lower extremity range of motion normal      Lymphadenopathy:     He has no cervical adenopathy  Neurological: He is alert and oriented to person, place, and time  Skin: Skin is warm and dry  Psychiatric: He has a normal mood and affect  Nursing note and vitals reviewed

## 2018-05-11 NOTE — PRE-PROCEDURE INSTRUCTIONS
Pre-Surgery Instructions:   Medication Instructions    albuterol (2 5 mg/3 mL) 0 083 % nebulizer solution Patient was instructed by Physician and understands  Nita MAC Patient was instructed by Physician and understands   sertraline (ZOLOFT) 50 mg tablet Patient was instructed by Physician and understands   Umeclidinium Bromide (INCRUSE ELLIPTA) 62 5 MCG/INH AEPB Patient was instructed by Physician and understands

## 2018-05-14 ENCOUNTER — APPOINTMENT (OUTPATIENT)
Dept: LAB | Facility: CLINIC | Age: 68
End: 2018-05-14
Payer: MEDICARE

## 2018-05-14 ENCOUNTER — OFFICE VISIT (OUTPATIENT)
Dept: PULMONOLOGY | Facility: CLINIC | Age: 68
End: 2018-05-14
Payer: MEDICARE

## 2018-05-14 VITALS
BODY MASS INDEX: 26.2 KG/M2 | OXYGEN SATURATION: 90 % | HEIGHT: 70 IN | DIASTOLIC BLOOD PRESSURE: 70 MMHG | HEART RATE: 76 BPM | SYSTOLIC BLOOD PRESSURE: 120 MMHG | WEIGHT: 183 LBS

## 2018-05-14 DIAGNOSIS — J41.0 SIMPLE CHRONIC BRONCHITIS (HCC): ICD-10-CM

## 2018-05-14 DIAGNOSIS — I27.20 PULMONARY HYPERTENSION (HCC): ICD-10-CM

## 2018-05-14 DIAGNOSIS — J43.2 CENTRILOBULAR EMPHYSEMA (HCC): ICD-10-CM

## 2018-05-14 DIAGNOSIS — Z01.811 PREOPERATIVE RESPIRATORY EXAMINATION: Primary | ICD-10-CM

## 2018-05-14 DIAGNOSIS — K40.90 LEFT INGUINAL HERNIA: ICD-10-CM

## 2018-05-14 DIAGNOSIS — J96.11 CHRONIC RESPIRATORY FAILURE WITH HYPOXIA (HCC): ICD-10-CM

## 2018-05-14 DIAGNOSIS — Z87.891 FORMER SMOKER: ICD-10-CM

## 2018-05-14 DIAGNOSIS — R06.02 SHORTNESS OF BREATH: ICD-10-CM

## 2018-05-14 LAB
ALBUMIN SERPL BCP-MCNC: 3.7 G/DL (ref 3.5–5)
ALP SERPL-CCNC: 87 U/L (ref 46–116)
ALT SERPL W P-5'-P-CCNC: 23 U/L (ref 12–78)
ANION GAP SERPL CALCULATED.3IONS-SCNC: 5 MMOL/L (ref 4–13)
AST SERPL W P-5'-P-CCNC: 22 U/L (ref 5–45)
BASOPHILS # BLD AUTO: 0.04 THOUSANDS/ΜL (ref 0–0.1)
BASOPHILS NFR BLD AUTO: 1 % (ref 0–1)
BILIRUB SERPL-MCNC: 0.59 MG/DL (ref 0.2–1)
BUN SERPL-MCNC: 26 MG/DL (ref 5–25)
CALCIUM SERPL-MCNC: 8.7 MG/DL (ref 8.3–10.1)
CHLORIDE SERPL-SCNC: 102 MMOL/L (ref 100–108)
CO2 SERPL-SCNC: 30 MMOL/L (ref 21–32)
CREAT SERPL-MCNC: 0.82 MG/DL (ref 0.6–1.3)
EOSINOPHIL # BLD AUTO: 0.31 THOUSAND/ΜL (ref 0–0.61)
EOSINOPHIL NFR BLD AUTO: 4 % (ref 0–6)
ERYTHROCYTE [DISTWIDTH] IN BLOOD BY AUTOMATED COUNT: 13.9 % (ref 11.6–15.1)
GFR SERPL CREATININE-BSD FRML MDRD: 93 ML/MIN/1.73SQ M
GLUCOSE P FAST SERPL-MCNC: 94 MG/DL (ref 65–99)
HCT VFR BLD AUTO: 41.7 % (ref 36.5–49.3)
HGB BLD-MCNC: 14 G/DL (ref 12–17)
LYMPHOCYTES # BLD AUTO: 2.95 THOUSANDS/ΜL (ref 0.6–4.47)
LYMPHOCYTES NFR BLD AUTO: 37 % (ref 14–44)
MCH RBC QN AUTO: 30.8 PG (ref 26.8–34.3)
MCHC RBC AUTO-ENTMCNC: 33.6 G/DL (ref 31.4–37.4)
MCV RBC AUTO: 92 FL (ref 82–98)
MONOCYTES # BLD AUTO: 0.63 THOUSAND/ΜL (ref 0.17–1.22)
MONOCYTES NFR BLD AUTO: 8 % (ref 4–12)
NEUTROPHILS # BLD AUTO: 4.08 THOUSANDS/ΜL (ref 1.85–7.62)
NEUTS SEG NFR BLD AUTO: 50 % (ref 43–75)
NRBC BLD AUTO-RTO: 0 /100 WBCS
PLATELET # BLD AUTO: 258 THOUSANDS/UL (ref 149–390)
PMV BLD AUTO: 10.4 FL (ref 8.9–12.7)
POTASSIUM SERPL-SCNC: 4.2 MMOL/L (ref 3.5–5.3)
PROT SERPL-MCNC: 7.5 G/DL (ref 6.4–8.2)
RBC # BLD AUTO: 4.54 MILLION/UL (ref 3.88–5.62)
SODIUM SERPL-SCNC: 137 MMOL/L (ref 136–145)
WBC # BLD AUTO: 8.03 THOUSAND/UL (ref 4.31–10.16)

## 2018-05-14 PROCEDURE — 85025 COMPLETE CBC W/AUTO DIFF WBC: CPT

## 2018-05-14 PROCEDURE — 36415 COLL VENOUS BLD VENIPUNCTURE: CPT

## 2018-05-14 PROCEDURE — 99214 OFFICE O/P EST MOD 30 MIN: CPT | Performed by: INTERNAL MEDICINE

## 2018-05-14 PROCEDURE — 80053 COMPREHEN METABOLIC PANEL: CPT

## 2018-05-14 NOTE — PROGRESS NOTES
Assessment/Plan:   Diagnoses and all orders for this visit:    Preoperative respiratory examination    Chronic respiratory failure with hypoxia (HCC)    Shortness of breath    Simple chronic bronchitis (HCC)    Centrilobular emphysema (HCC)    Former smoker    Pulmonary hypertension ([address] )      patient with severe COPD, with an FEV1 of 25%, increased lung volumes and residual volumes and severely decreased DLCO  On home oxygen continuous  Patient going in for a hernia surgery, under twillight anesthesia  Patient will continue with his current nebulizer treatments with albuterol 4 times daily  Continue with breo and includes daily  He will continue with his oxygen 3 liters/minute continuous  He was recently diagnosed with moderate to severe pulmonary hypertension on the echocardiogram, would need a right heart catheterization to confirm the pressures  Discussed with the patient after the hernia surgery and his recovery we can plan for right heart catheterization  There is no pulmonary contraindication for the surgery intended  He is at moderate risk for pulmonary complications for the surgery intended,   He will see me back in 3 months p r n  earlier as needed  Call with any questions at [phone], to dr Everardo Multani    Return in about 3 months (around 8/14/2018)  All questions are answered to the patient's satisfaction and understanding  He verbalizes understanding  He is encouraged to call with any further questions or concerns  Portions of the record may have been created with voice recognition software  Occasional wrong word or "sound a like" substitutions may have occurred due to the inherent limitations of voice recognition software  Read the chart carefully and recognize, using context, where substitutions have occurred  ______________________________________________________________________    Chief Complaint: No chief complaint on file        Patient ID: Viktor Anthony is a 74 y o  y o  male has a past medical history of COPD (chronic obstructive pulmonary disease) (HCC) and Pneumonia  5/14/2018  Patient is here for mobility evaluation  Patient is a 79-year-old gentleman, with 50-pack-year smoking history who quit 5 years ago, with history of COPD on bronchodilators as well as on home oxygen continuous 3 L per minute for the past year, during the last year he states he has been having some episodes of chronic bronchitis with flared up at least 2-3 in the past year, never admitted in the hospital for definitive, used to follow-up with the pulmonologist at [place], currently moved to [place] recently to live with his daughter   He recently had a flare up of COPD for which he was on antibiotic as well as steroids  His currently better with his coughing and wheezing  Also recently had an echocardiogram and was found to have moderate to severe pulmonary hypertension on the echocardiogram         Review of Systems   Constitutional: Positive for fatigue  Negative for activity change, appetite change, chills, diaphoresis, fever and unexpected weight change  HENT: Negative for congestion, dental problem, drooling, nosebleeds, postnasal drip, rhinorrhea, sinus pressure, sore throat and voice change  Eyes: Negative for discharge, itching and visual disturbance  Respiratory: Positive for cough (clear sputum, no hemoptysis), shortness of breath and wheezing  Cardiovascular: Negative for chest pain, palpitations and leg swelling  Endocrine: Negative for cold intolerance and heat intolerance  Allergic/Immunologic: Negative for food allergies and immunocompromised state  Neurological: Negative for dizziness, facial asymmetry, speech difficulty and weakness  Hematological: Negative for adenopathy  Psychiatric/Behavioral: Negative for agitation, confusion and sleep disturbance  The patient is not nervous/anxious  Smoking history: He reports that he quit smoking about 5 years ago   His smoking use included Cigarettes  He quit after 50 00 years of use  He has never used smokeless tobacco     The following portions of the patient's history were reviewed and updated as appropriate: allergies, current medications, past family history, past medical history, past social history, past surgical history and problem list       There is no immunization history on file for this patient  Current Outpatient Prescriptions   Medication Sig Dispense Refill    albuterol (2 5 mg/3 mL) 0 083 % nebulizer solution Take 3 mL (2 5 mg total) by nebulization every 4 (four) hours as needed for wheezing 1080 mL 3    albuterol (PROVENTIL HFA,VENTOLIN HFA) 90 mcg/act inhaler Inhale 2 puffs every 6 (six) hours as needed for wheezing      BREO ELLIPTA Inhale 1 puff daily 1 each 5    esomeprazole (NexIUM) 20 mg capsule Take 1 capsule (20 mg total) by mouth daily 30 capsule 5    sertraline (ZOLOFT) 50 mg tablet Take 1 tablet (50 mg total) by mouth daily 30 tablet 5    Umeclidinium Bromide (INCRUSE ELLIPTA) 62 5 MCG/INH AEPB Inhale 1 puff daily 1 each 5    enoxaparin (LOVENOX) 40 mg/0 4 mL Inject 0 4 mL (40 mg total) under the skin on call to OR for 1 dose 1 Syringe 0     No current facility-administered medications for this visit  Allergies: Penicillins    Objective:  Vitals:    05/14/18 1200   BP: 120/70   Pulse: 76   SpO2: 90%   Weight: 83 kg (183 lb)   Height: 5' 10" (1 778 m)   Oxygen Therapy  SpO2: 90 %    Wt Readings from Last 3 Encounters:   05/14/18 83 kg (183 lb)   05/01/18 83 kg (183 lb)   04/26/18 83 6 kg (184 lb 3 2 oz)     Body mass index is 26 26 kg/m²  Physical Exam   Constitutional: He is oriented to person, place, and time  He appears well-developed and well-nourished  HENT:   Head: Normocephalic and atraumatic  Eyes: Conjunctivae are normal  Pupils are equal, round, and reactive to light  Neck: Normal range of motion  Neck supple  No JVD present  No thyromegaly present     Cardiovascular: Normal rate, regular rhythm and normal heart sounds  Exam reveals no gallop and no friction rub  No murmur heard  Pulmonary/Chest: Effort normal  No respiratory distress  He has no wheezes  He has no rales  He exhibits no tenderness  Diminished breath sounds bilaterally no rhonchi  Abdominal: Soft  Bowel sounds are normal    Musculoskeletal: Normal range of motion  He exhibits no edema, tenderness or deformity  Lymphadenopathy:     He has no cervical adenopathy  Neurological: He is alert and oriented to person, place, and time  Skin: Skin is warm and dry  Psychiatric: He has a normal mood and affect  Nursing note and vitals reviewed

## 2018-05-15 ENCOUNTER — OFFICE VISIT (OUTPATIENT)
Dept: SURGERY | Facility: CLINIC | Age: 68
End: 2018-05-15
Payer: MEDICARE

## 2018-05-15 VITALS
WEIGHT: 185.04 LBS | DIASTOLIC BLOOD PRESSURE: 62 MMHG | SYSTOLIC BLOOD PRESSURE: 112 MMHG | HEIGHT: 70 IN | HEART RATE: 78 BPM | RESPIRATION RATE: 17 BRPM | BODY MASS INDEX: 26.49 KG/M2 | TEMPERATURE: 97.4 F

## 2018-05-15 DIAGNOSIS — K40.90 NON-RECURRENT UNILATERAL INGUINAL HERNIA WITHOUT OBSTRUCTION OR GANGRENE: Primary | ICD-10-CM

## 2018-05-15 PROCEDURE — 99213 OFFICE O/P EST LOW 20 MIN: CPT | Performed by: SURGERY

## 2018-05-15 RX ORDER — SODIUM CHLORIDE, SODIUM LACTATE, POTASSIUM CHLORIDE, CALCIUM CHLORIDE 600; 310; 30; 20 MG/100ML; MG/100ML; MG/100ML; MG/100ML
125 INJECTION, SOLUTION INTRAVENOUS
Status: CANCELLED | OUTPATIENT
Start: 2018-05-16 | End: 2018-05-17

## 2018-05-15 RX ORDER — HEPARIN SODIUM 5000 [USP'U]/ML
5000 INJECTION, SOLUTION INTRAVENOUS; SUBCUTANEOUS
Status: CANCELLED | OUTPATIENT
Start: 2018-05-16 | End: 2018-05-17

## 2018-05-17 ENCOUNTER — ANESTHESIA EVENT (OUTPATIENT)
Dept: PERIOP | Facility: HOSPITAL | Age: 68
End: 2018-05-17
Payer: MEDICARE

## 2018-05-23 ENCOUNTER — ANESTHESIA (OUTPATIENT)
Dept: PERIOP | Facility: HOSPITAL | Age: 68
End: 2018-05-23
Payer: MEDICARE

## 2018-05-23 ENCOUNTER — HOSPITAL ENCOUNTER (OUTPATIENT)
Facility: HOSPITAL | Age: 68
Setting detail: OUTPATIENT SURGERY
Discharge: HOME | End: 2018-05-23
Attending: SURGERY | Admitting: SURGERY
Payer: MEDICARE

## 2018-05-23 VITALS
TEMPERATURE: 97.6 F | HEART RATE: 69 BPM | SYSTOLIC BLOOD PRESSURE: 103 MMHG | OXYGEN SATURATION: 95 % | RESPIRATION RATE: 17 BRPM | HEIGHT: 70 IN | BODY MASS INDEX: 25.97 KG/M2 | DIASTOLIC BLOOD PRESSURE: 62 MMHG | WEIGHT: 181.4 LBS

## 2018-05-23 DIAGNOSIS — K40.90 LEFT INGUINAL HERNIA: ICD-10-CM

## 2018-05-23 DIAGNOSIS — K40.90 NON-RECURRENT UNILATERAL INGUINAL HERNIA WITHOUT OBSTRUCTION OR GANGRENE: ICD-10-CM

## 2018-05-23 PROCEDURE — 49505 PRP I/HERN INIT REDUC >5 YR: CPT | Performed by: SURGERY

## 2018-05-23 PROCEDURE — C1781 MESH (IMPLANTABLE): HCPCS | Performed by: SURGERY

## 2018-05-23 PROCEDURE — 49505 PRP I/HERN INIT REDUC >5 YR: CPT | Performed by: PHYSICIAN ASSISTANT

## 2018-05-23 DEVICE — BARD MESH PERFIX PLUG, LARGE
Type: IMPLANTABLE DEVICE | Site: INGUINAL | Status: FUNCTIONAL
Brand: BARD MESH PERFIX PLUG

## 2018-05-23 RX ORDER — ACETAMINOPHEN AND CODEINE PHOSPHATE 300; 30 MG/1; MG/1
1 TABLET ORAL EVERY 6 HOURS PRN
Qty: 20 TABLET | Refills: 0 | Status: SHIPPED | OUTPATIENT
Start: 2018-05-23 | End: 2018-06-02

## 2018-05-23 RX ORDER — MAGNESIUM HYDROXIDE 1200 MG/15ML
LIQUID ORAL AS NEEDED
Status: DISCONTINUED | OUTPATIENT
Start: 2018-05-23 | End: 2018-05-23 | Stop reason: HOSPADM

## 2018-05-23 RX ORDER — MIDAZOLAM HYDROCHLORIDE 1 MG/ML
INJECTION INTRAMUSCULAR; INTRAVENOUS AS NEEDED
Status: DISCONTINUED | OUTPATIENT
Start: 2018-05-23 | End: 2018-05-23 | Stop reason: SURG

## 2018-05-23 RX ORDER — OXYCODONE HYDROCHLORIDE AND ACETAMINOPHEN 5; 325 MG/1; MG/1
1 TABLET ORAL EVERY 4 HOURS PRN
Status: DISCONTINUED | OUTPATIENT
Start: 2018-05-23 | End: 2018-05-23 | Stop reason: HOSPADM

## 2018-05-23 RX ORDER — KETAMINE HYDROCHLORIDE 50 MG/ML
INJECTION, SOLUTION, CONCENTRATE INTRAMUSCULAR; INTRAVENOUS AS NEEDED
Status: DISCONTINUED | OUTPATIENT
Start: 2018-05-23 | End: 2018-05-23 | Stop reason: SURG

## 2018-05-23 RX ORDER — SODIUM CHLORIDE, SODIUM LACTATE, POTASSIUM CHLORIDE, CALCIUM CHLORIDE 600; 310; 30; 20 MG/100ML; MG/100ML; MG/100ML; MG/100ML
20 INJECTION, SOLUTION INTRAVENOUS CONTINUOUS
Status: DISCONTINUED | OUTPATIENT
Start: 2018-05-23 | End: 2018-05-23 | Stop reason: HOSPADM

## 2018-05-23 RX ORDER — SODIUM CHLORIDE, SODIUM LACTATE, POTASSIUM CHLORIDE, CALCIUM CHLORIDE 600; 310; 30; 20 MG/100ML; MG/100ML; MG/100ML; MG/100ML
125 INJECTION, SOLUTION INTRAVENOUS
Status: COMPLETED | OUTPATIENT
Start: 2018-05-23 | End: 2018-05-23

## 2018-05-23 RX ORDER — FENTANYL CITRATE/PF 50 MCG/ML
50 SYRINGE (ML) INJECTION
Status: DISCONTINUED | OUTPATIENT
Start: 2018-05-23 | End: 2018-05-23 | Stop reason: HOSPADM

## 2018-05-23 RX ORDER — DEXMEDETOMIDINE HYDROCHLORIDE 100 UG/ML
INJECTION, SOLUTION INTRAVENOUS AS NEEDED
Status: DISCONTINUED | OUTPATIENT
Start: 2018-05-23 | End: 2018-05-23 | Stop reason: SURG

## 2018-05-23 RX ORDER — CLINDAMYCIN PHOSPHATE 900 MG/50ML
900 INJECTION INTRAVENOUS
Status: COMPLETED | OUTPATIENT
Start: 2018-05-23 | End: 2018-05-23

## 2018-05-23 RX ORDER — ONDANSETRON 2 MG/ML
INJECTION INTRAMUSCULAR; INTRAVENOUS AS NEEDED
Status: DISCONTINUED | OUTPATIENT
Start: 2018-05-23 | End: 2018-05-23 | Stop reason: SURG

## 2018-05-23 RX ORDER — ONDANSETRON 2 MG/ML
4 INJECTION INTRAMUSCULAR; INTRAVENOUS EVERY 6 HOURS PRN
Status: DISCONTINUED | OUTPATIENT
Start: 2018-05-23 | End: 2018-05-23 | Stop reason: HOSPADM

## 2018-05-23 RX ORDER — PROPOFOL 10 MG/ML
INJECTION, EMULSION INTRAVENOUS AS NEEDED
Status: DISCONTINUED | OUTPATIENT
Start: 2018-05-23 | End: 2018-05-23 | Stop reason: SURG

## 2018-05-23 RX ORDER — BUPIVACAINE HYDROCHLORIDE 2.5 MG/ML
INJECTION, SOLUTION INFILTRATION; PERINEURAL AS NEEDED
Status: DISCONTINUED | OUTPATIENT
Start: 2018-05-23 | End: 2018-05-23 | Stop reason: HOSPADM

## 2018-05-23 RX ORDER — MORPHINE SULFATE 2 MG/ML
2 INJECTION, SOLUTION INTRAMUSCULAR; INTRAVENOUS EVERY 2 HOUR PRN
Status: DISCONTINUED | OUTPATIENT
Start: 2018-05-23 | End: 2018-05-23 | Stop reason: HOSPADM

## 2018-05-23 RX ORDER — LORATADINE 10 MG/1
10 TABLET ORAL DAILY
COMMUNITY

## 2018-05-23 RX ORDER — HEPARIN SODIUM 5000 [USP'U]/ML
5000 INJECTION, SOLUTION INTRAVENOUS; SUBCUTANEOUS
Status: COMPLETED | OUTPATIENT
Start: 2018-05-23 | End: 2018-05-23

## 2018-05-23 RX ORDER — PROPOFOL 10 MG/ML
INJECTION, EMULSION INTRAVENOUS CONTINUOUS PRN
Status: DISCONTINUED | OUTPATIENT
Start: 2018-05-23 | End: 2018-05-23 | Stop reason: SURG

## 2018-05-23 RX ORDER — LIDOCAINE HYDROCHLORIDE 10 MG/ML
INJECTION, SOLUTION INFILTRATION; PERINEURAL AS NEEDED
Status: DISCONTINUED | OUTPATIENT
Start: 2018-05-23 | End: 2018-05-23 | Stop reason: SURG

## 2018-05-23 RX ADMIN — PROPOFOL 40 MG: 10 INJECTION, EMULSION INTRAVENOUS at 10:17

## 2018-05-23 RX ADMIN — DEXMEDETOMIDINE 4 MCG: 100 INJECTION, SOLUTION, CONCENTRATE INTRAVENOUS at 10:28

## 2018-05-23 RX ADMIN — ONDANSETRON 4 MG: 2 INJECTION INTRAMUSCULAR; INTRAVENOUS at 10:17

## 2018-05-23 RX ADMIN — LIDOCAINE HYDROCHLORIDE 100 MG: 10 INJECTION, SOLUTION INFILTRATION; PERINEURAL at 10:17

## 2018-05-23 RX ADMIN — MIDAZOLAM HYDROCHLORIDE 1 MG: 1 INJECTION, SOLUTION INTRAMUSCULAR; INTRAVENOUS at 10:12

## 2018-05-23 RX ADMIN — KETAMINE HYDROCHLORIDE 20 MG: 50 INJECTION INTRAMUSCULAR; INTRAVENOUS at 10:18

## 2018-05-23 RX ADMIN — Medication 0.2 MCG/KG/HR: at 10:19

## 2018-05-23 RX ADMIN — IPRATROPIUM BROMIDE 0.5 MG: 0.5 SOLUTION RESPIRATORY (INHALATION) at 10:40

## 2018-05-23 RX ADMIN — HEPARIN SODIUM 5000 UNITS: 5000 INJECTION, SOLUTION INTRAVENOUS; SUBCUTANEOUS at 09:54

## 2018-05-23 RX ADMIN — SODIUM CHLORIDE, SODIUM LACTATE, POTASSIUM CHLORIDE, AND CALCIUM CHLORIDE: .6; .31; .03; .02 INJECTION, SOLUTION INTRAVENOUS at 09:45

## 2018-05-23 RX ADMIN — PROPOFOL 50 MCG/KG/MIN: 10 INJECTION, EMULSION INTRAVENOUS at 10:18

## 2018-05-23 RX ADMIN — CLINDAMYCIN PHOSPHATE 900 MG: 18 INJECTION, SOLUTION INTRAMUSCULAR; INTRAVENOUS at 10:14

## 2018-05-23 NOTE — OP NOTE
OPERATIVE REPORT  PATIENT NAME: Willy Lopez    :  1950  MRN: [id number]  Pt Location: [facility] OR ROOM 03    SURGERY DATE: 2018    Surgeon(s) and Role:     * Alex Obrien MD - Primary     * Kae Culver PA-C - Assisting    Preop Diagnosis:  Left inguinal hernia [K40 90]    Post-Op Diagnosis Codes:     * Left inguinal hernia [K40 90]    Procedure(s) (LRB):  OPEN INGUINAL HERNIA REPAIR WITH MESH (Left)    Specimen(s):  * No specimens in log *    Estimated Blood Loss:   Minimal    Drains:       Anesthesia Type:   IV Sedation with Anesthesia    Operative Indications:  Left inguinal hernia [K40 90]  74 year male who had a large left inguinal hernia, increasing in size  Patient was advised to undergo open repair with mesh  Operative Findings:  The patient had a large direct inguinal hernia  There was no left indirect inguinal hernia  Complications:   None    Procedure and Technique:  Patient was identified he was placed in the operating table in a supine position  After adequate anesthesia induction and satisfactory LMA intubation the left groin was prepped and draped in a sterile usual fashion with ChloraPrep  Time-out was called the patient was identified as well as surgical site  Curvilinear incision was made over the left groin with a scalpel, taken down through the subcutaneous tissue with cautery  Michael's fascia was opened with cautery  The external oblique fascia was identified and opened with cautery along its fibers  The external ring was also opened  At this point the canal contents were dissected until the hernia sac was identified located the floor canal   At this point all the attachments were dissected using cautery and the direct hernia was inverted using 2 0 Vicryl in a continuous fashion  At this point proceeded to identified the cord structures, dissected and the Penrose drain was passed around it  At this point a large plug mesh was placed into the internal ring  Onlay patch was placed on the floor of the canal and sutured to the Scott's ligament in an interrupted fashion overlying the symphysis pubis for approximately 2 cm  One side of the mesh was sutured to the conjoint tendon and the other side to the shelving edge of the ilioinguinal ligament with 2 0 Vicryl in a continuous fashion  The arms of the onlay patch were passed around the internal ring and closed with 2 0 Vicryl in an interrupted fashion  The wound was copiously irrigated with saline solution  The Penrose drain was removed and the cord structures were allowed to go back into the canal   The external oblique fascia was closed with 2 0 Vicryl in a continuous fashion  Michael's fascia was approximated with 3 0 Vicryl in an interrupted fashion and the skin was closed with 4 O Vicryl in a continuous subcuticular fashion  Sterile dressing were applied  At the end of the case instrument, needles, and sponges counts were correct  The patient tolerated the procedure well     I was present for the entire procedure, A qualified resident physician was not available and A physician assistant was required during the procedure for retraction tissue handling,dissection and suturing    Patient Disposition:  PACU     SIGNATURE: Vera Rivera MD  DATE: May 23, 2018  TIME: 11:10 AM

## 2018-05-23 NOTE — DISCHARGE INSTRUCTIONS
No diet restriction for this surgery  May shower every day  Remove dressings in 3 days  Remove strips in 7 days  Call office to make an appointment in 2 weeks [phone]  Call office with any issues regarding the surgery  No driving, heavy lifting or strenuous exercise for one week  Resume home medications  Apply ice to the incisions  May take Tylenol 3, regular Tylenol or ibuprofen for pain  Alternating narcotics with ibuprofen or Advil leave will have better pain control    May take Colace for constipation  If you experience urinary retention, please contact your Urologist or go to the emergency room to be treated

## 2018-05-23 NOTE — H&P (VIEW-ONLY)
Follow-up- General Surgery   Hunter Bourgeois 74 y o  male MRN: [id number]  Unit/Bed#:  Encounter: [id number]    Assessment/Plan     Assessment:  Large left inguinal hernia  COPD on oxygen at home  Plan:  I advised the patient to undergo open repair of left inguinal hernia under IV sedation  The patient was recommended to take his usual COPD medication with a sip of water the morning of surgery  I discussed the operative procedure, risks, benefits and alternatives with the patient, he understood and agreed to proceed  History of Present Illness     HPI:  I had the pleasure of seeing Hunter Bourgeois in the office today for follow-up  The patient is a pleasant 74 y o  male who was seen in my office in February of this year for symptomatic left inguinal hernia  The patient had medical and pulmonary clearance  The patient still symptomatic from left inguinal hernia with pain and discomfort and the hernia is increasing in size  Review of Systems: The rest of the review of system total of 10 were negative except for the HPI      Historical Information   Past Medical History:   Diagnosis Date    COPD [facility]chronic obstructive pulmonary disease) ([facility] [address] )     Pneumonia      Past Surgical History:   Procedure Laterality Date    CHOLECYSTECTOMY      EYE SURGERY       Social History   History   Alcohol Use No     Comment: socialy     History   Drug Use No     History   Smoking Status    Former Smoker    Years: 50 00    Types: Cigarettes    Quit date: 2/27/2013   Smokeless Tobacco    Never Used     Family History: non-contributory    Meds/Allergies   all medications and allergies reviewed  Allergies   Allergen Reactions    Penicillins Hives     Passed out     Prescriptions and Patient-Reported    BREO ELLIPTA            Dose, Route, Frequency: 1 puff, Inhalation, Daily       Umeclidinium Bromide (INCRUSE ELLIPTA) 62 5 MCG/INH AEPB            Dose, Route, Frequency: 1 puff, Inhalation, Daily       albuterol (PROVENTIL HFA,VENTOLIN HFA) 90 mcg/act inhaler            Dose, Route, Frequency: 2 puff, Inhalation, Every 6 hours PRN       albuterol (2 5 mg/3 mL) 0 083 % nebulizer solution            Dose, Route, Frequency: 2 5 mg, Nebulization, Every 4 hours PRN       esomeprazole (NexIUM) 20 mg capsule            Dose, Route, Frequency: 20 mg, Oral, Daily       enoxaparin (LOVENOX) 40 mg/0 4 mL ()            Dose, Route, Frequency: 40 mg, Subcutaneous, On call to O R        sertraline (ZOLOFT) 50 mg tablet            Dose, Route, Frequency: 50 mg, Oral, Daily            Objective     Current Vitals:   Blood Pressure: 112/62 (05/15/18 1334)  Pulse: 78 (05/15/18 1334)  Temperature: (!) 97 4 °F (36 3 °C) (05/15/18 133)  Temp Source: Oral (05/15/18 133)  Respirations: 17 (OXYGEN LEVEL 3) (05/15/18 1334)  Height: 5' 10" (177 8 cm) (05/15/18 1334)  Weight - Scale: 83 9 kg (185 lb 0 6 oz) (05/15/18 133)      Physical Exam   Constitutional: He is oriented to person, place, and time  He appears well-developed and well-nourished  No distress  HENT:   Head: Normocephalic  Mouth/Throat: No oropharyngeal exudate  Eyes: Pupils are equal, round, and reactive to light  No scleral icterus  Neck: Normal range of motion  Cardiovascular: Normal rate and regular rhythm  No murmur heard  Pulmonary/Chest: Effort normal and breath sounds normal  No respiratory distress  Abdominal: Soft  He exhibits no mass  There is no tenderness  Genitourinary:   Genitourinary Comments: Detailed examination of the right and left inguinal canal revealed the following: There is a moderate size left inguinal hernia  There is no right inguinal hernia  Musculoskeletal: He exhibits no edema or tenderness  Lymphadenopathy:     He has no cervical adenopathy  Neurological: He is alert and oriented to person, place, and time  No cranial nerve deficit  Skin: No rash noted  No erythema  Psychiatric: He has a normal mood and affect   His behavior is normal

## 2018-05-23 NOTE — ANESTHESIA POSTPROCEDURE EVALUATION
Post-Op Assessment Note      CV Status:  Stable    Post-procedure mental status: sleeping      Hydration Status:  Stable    PONV Controlled:  None    Airway Patency:  Patent    Post Op Vitals Reviewed: Yes          Staff: CRNA           BP   99/64   Temp  97 4   Pulse  74   Resp   17   SpO2   99% on 6LFM   Postop VS in PACU noted above, SV non-obstructed on 6LFM

## 2018-05-23 NOTE — ANESTHESIA PREPROCEDURE EVALUATION
SUMMARY:  -  Stress results: There was no chest pain during stress  -  ECG conclusions: The stress ECG was negative for ischemia and normal  Arrhythmia during stress: isolated premature ventricular beats  -  Perfusion imaging: There were no perfusion defects  A fixed perfusion defect in the inferior wall was not seen in the prone stress images indicating that it was likely a diaphragmatic attenuation artifact   -  Gated SPECT: The calculated left ventricular ejection fraction was 57 %  Left ventricular ejection fraction was within normal limits by visual estimate  There was no diagnostic evidence for left ventricular regional abnormality other  than paradoxical septal motion  IMPRESSIONS: Normal study after pharmacologic stress  Myocardial perfusion imaging was normal at rest and with stress  Left ventricular systolic function was normal      Prepared and signed by     Elaine Calhoun MD  Signed 05/01/2018 14:05:21  Study date:  01-May-2018     P   Indications: Dyspnea  Diagnoses: R06 09 - Other forms of dyspnea       SUMMARY     PROCEDURE INFORMATION:  This was a technically difficult study  Echocardiographic views were limited due to low windows and lung interference  LEFT VENTRICLE:  Systolic function was normal  Ejection fraction was estimated to be 60 %  There were no regional wall motion abnormalities  Doppler parameters were consistent with abnormal left ventricular relaxation (grade 1 diastolic dysfunction)  RIGHT VENTRICLE:  The size was normal   Systolic function was normal      MITRAL VALVE:  There was trace regurgitation  TRICUSPID VALVE:  There was mild regurgitation  Estimated peak PA pressure was 58 mmHg  The findings suggest severe pulmonary hypertension  IVC, HEPATIC VEINS:  The inferior vena cava was dilated  The respirophasic change in diameter was more than 50%         Review of Systems/Medical History  Patient summary reviewed  Chart reviewed  No history of anesthetic complications     Cardiovascular  EKG reviewed, Exercise tolerance (METS): >4,    Pulmonary hypertension Pulmonary  Pneumonia, COPD severe- O2 dependent , Shortness of breath,   Comment: Chronic bronchitis     GI/Hepatic    GERD ,             Endo/Other     GYN       Hematology   Musculoskeletal       Neurology   Psychology   Anxiety,              Physical Exam    Airway    Mallampati score: II  TM Distance: >3 FB  Neck ROM: full     Dental   No notable dental hx     Cardiovascular  Cardiovascular exam normal    Pulmonary  Pulmonary exam normal Breath sounds clear to auscultation,     Other Findings        Anesthesia Plan  ASA Score- 4     Anesthesia Type- IV sedation with anesthesia with ASA Monitors  Additional Monitors:   Airway Plan:     Comment: Plan for IV sedation with local anesthetic by surgeon, with LMA/GETA back up  Plan Factors- Patient instructed to abstain from smoking on day of procedure  Patient did not smoke on day of surgery  Induction- intravenous  Postoperative Plan- Plan for postoperative opioid use  Informed Consent- Anesthetic plan and risks discussed with patient  I personally reviewed this patient with the CRNA  Discussed and agreed on the Anesthesia Plan with the CRNA         Lab Results   Component Value Date    WBC 8 03 05/14/2018    HGB 14 0 05/14/2018    HCT 41 7 05/14/2018    MCV 92 05/14/2018     05/14/2018     Lab Results   Component Value Date    CALCIUM 8 7 05/14/2018     05/14/2018    K 4 2 05/14/2018    CO2 30 05/14/2018     05/14/2018    BUN 26 (H) 05/14/2018    CREATININE 0 82 05/14/2018         I, Dr Gina mSith, the attending physician, have personally seen and evaluated the patient prior to anesthetic care  I have reviewed the pre-anesthetic record, and other medical records if appropriate to the anesthetic care  If a CRNA is involved in the case, I have reviewed the CRNA assessment, if present, and agree   The patient is in a suitable condition to proceed with my formulated anesthetic plan

## 2018-05-29 ENCOUNTER — TELEPHONE (OUTPATIENT)
Dept: SURGERY | Facility: CLINIC | Age: 68
End: 2018-05-29

## 2018-05-29 NOTE — TELEPHONE ENCOUNTER
Pt's wife called stating that the pt has some swelling around the incision area and when he coughs its painful  Is this normal he wants to know  Please advise

## 2018-05-29 NOTE — TELEPHONE ENCOUNTER
I called the wife and the patient will see me in the office next week as scheduled or sooner should the need be

## 2018-05-31 ENCOUNTER — APPOINTMENT (EMERGENCY)
Dept: CT IMAGING | Facility: HOSPITAL | Age: 68
End: 2018-05-31
Payer: MEDICARE

## 2018-05-31 ENCOUNTER — HOSPITAL ENCOUNTER (EMERGENCY)
Facility: HOSPITAL | Age: 68
Discharge: HOME | End: 2018-05-31
Attending: EMERGENCY MEDICINE | Admitting: EMERGENCY MEDICINE
Payer: MEDICARE

## 2018-05-31 VITALS
WEIGHT: 185 LBS | SYSTOLIC BLOOD PRESSURE: 188 MMHG | HEART RATE: 86 BPM | BODY MASS INDEX: 26.48 KG/M2 | OXYGEN SATURATION: 96 % | RESPIRATION RATE: 20 BRPM | TEMPERATURE: 98.1 F | DIASTOLIC BLOOD PRESSURE: 85 MMHG | HEIGHT: 70 IN

## 2018-05-31 DIAGNOSIS — J44.1 ACUTE EXACERBATION OF CHRONIC OBSTRUCTIVE PULMONARY DISEASE (COPD) (HCC): Primary | ICD-10-CM

## 2018-05-31 LAB
ALBUMIN SERPL BCP-MCNC: 3.5 G/DL (ref 3.5–5)
ALP SERPL-CCNC: 74 U/L (ref 46–116)
ALT SERPL W P-5'-P-CCNC: 27 U/L (ref 12–78)
ANION GAP SERPL CALCULATED.3IONS-SCNC: 6 MMOL/L (ref 4–13)
AST SERPL W P-5'-P-CCNC: 25 U/L (ref 5–45)
ATRIAL RATE: 77 BPM
BASOPHILS # BLD AUTO: 0.05 THOUSANDS/ΜL (ref 0–0.1)
BASOPHILS NFR BLD AUTO: 1 % (ref 0–1)
BILIRUB SERPL-MCNC: 0.5 MG/DL (ref 0.2–1)
BUN SERPL-MCNC: 18 MG/DL (ref 5–25)
CALCIUM SERPL-MCNC: 8.4 MG/DL (ref 8.3–10.1)
CHLORIDE SERPL-SCNC: 101 MMOL/L (ref 100–108)
CO2 SERPL-SCNC: 29 MMOL/L (ref 21–32)
CREAT SERPL-MCNC: 0.79 MG/DL (ref 0.6–1.3)
EOSINOPHIL # BLD AUTO: 0.32 THOUSAND/ΜL (ref 0–0.61)
EOSINOPHIL NFR BLD AUTO: 3 % (ref 0–6)
ERYTHROCYTE [DISTWIDTH] IN BLOOD BY AUTOMATED COUNT: 13.6 % (ref 11.6–15.1)
GFR SERPL CREATININE-BSD FRML MDRD: 94 ML/MIN/1.73SQ M
GLUCOSE SERPL-MCNC: 107 MG/DL (ref 65–140)
HCT VFR BLD AUTO: 39.5 % (ref 36.5–49.3)
HGB BLD-MCNC: 12.9 G/DL (ref 12–17)
IMM GRANULOCYTES # BLD AUTO: 0.04 THOUSAND/UL (ref 0–0.2)
IMM GRANULOCYTES NFR BLD AUTO: 0 % (ref 0–2)
LYMPHOCYTES # BLD AUTO: 1.33 THOUSANDS/ΜL (ref 0.6–4.47)
LYMPHOCYTES NFR BLD AUTO: 13 % (ref 14–44)
MCH RBC QN AUTO: 30 PG (ref 26.8–34.3)
MCHC RBC AUTO-ENTMCNC: 32.7 G/DL (ref 31.4–37.4)
MCV RBC AUTO: 92 FL (ref 82–98)
MONOCYTES # BLD AUTO: 0.65 THOUSAND/ΜL (ref 0.17–1.22)
MONOCYTES NFR BLD AUTO: 6 % (ref 4–12)
NEUTROPHILS # BLD AUTO: 8.27 THOUSANDS/ΜL (ref 1.85–7.62)
NEUTS SEG NFR BLD AUTO: 77 % (ref 43–75)
NRBC BLD AUTO-RTO: 0 /100 WBCS
NT-PROBNP SERPL-MCNC: 52 PG/ML
P AXIS: 75 DEGREES
PLATELET # BLD AUTO: 230 THOUSANDS/UL (ref 149–390)
PMV BLD AUTO: 9.3 FL (ref 8.9–12.7)
POTASSIUM SERPL-SCNC: 4.4 MMOL/L (ref 3.5–5.3)
PR INTERVAL: 186 MS
PROT SERPL-MCNC: 7.3 G/DL (ref 6.4–8.2)
QRS AXIS: 85 DEGREES
QRSD INTERVAL: 106 MS
QT INTERVAL: 404 MS
QTC INTERVAL: 457 MS
RBC # BLD AUTO: 4.3 MILLION/UL (ref 3.88–5.62)
SODIUM SERPL-SCNC: 136 MMOL/L (ref 136–145)
T WAVE AXIS: 20 DEGREES
TROPONIN I SERPL-MCNC: <0.02 NG/ML
VENTRICULAR RATE: 77 BPM
WBC # BLD AUTO: 10.66 THOUSAND/UL (ref 4.31–10.16)

## 2018-05-31 PROCEDURE — 93010 ELECTROCARDIOGRAM REPORT: CPT | Performed by: INTERNAL MEDICINE

## 2018-05-31 PROCEDURE — 84484 ASSAY OF TROPONIN QUANT: CPT | Performed by: EMERGENCY MEDICINE

## 2018-05-31 PROCEDURE — 94640 AIRWAY INHALATION TREATMENT: CPT

## 2018-05-31 PROCEDURE — 71275 CT ANGIOGRAPHY CHEST: CPT

## 2018-05-31 PROCEDURE — 83880 ASSAY OF NATRIURETIC PEPTIDE: CPT | Performed by: EMERGENCY MEDICINE

## 2018-05-31 PROCEDURE — 93005 ELECTROCARDIOGRAM TRACING: CPT

## 2018-05-31 PROCEDURE — 96365 THER/PROPH/DIAG IV INF INIT: CPT

## 2018-05-31 PROCEDURE — 96375 TX/PRO/DX INJ NEW DRUG ADDON: CPT

## 2018-05-31 PROCEDURE — 80053 COMPREHEN METABOLIC PANEL: CPT | Performed by: EMERGENCY MEDICINE

## 2018-05-31 PROCEDURE — 99285 EMERGENCY DEPT VISIT HI MDM: CPT

## 2018-05-31 PROCEDURE — 85025 COMPLETE CBC W/AUTO DIFF WBC: CPT | Performed by: EMERGENCY MEDICINE

## 2018-05-31 PROCEDURE — 36415 COLL VENOUS BLD VENIPUNCTURE: CPT | Performed by: EMERGENCY MEDICINE

## 2018-05-31 RX ORDER — AZITHROMYCIN 250 MG/1
500 TABLET, FILM COATED ORAL ONCE
Status: COMPLETED | OUTPATIENT
Start: 2018-05-31 | End: 2018-05-31

## 2018-05-31 RX ORDER — MAGNESIUM SULFATE HEPTAHYDRATE 40 MG/ML
2 INJECTION, SOLUTION INTRAVENOUS ONCE
Status: COMPLETED | OUTPATIENT
Start: 2018-05-31 | End: 2018-05-31

## 2018-05-31 RX ORDER — METHYLPREDNISOLONE SODIUM SUCCINATE 125 MG/2ML
125 INJECTION, POWDER, LYOPHILIZED, FOR SOLUTION INTRAMUSCULAR; INTRAVENOUS ONCE
Status: COMPLETED | OUTPATIENT
Start: 2018-05-31 | End: 2018-05-31

## 2018-05-31 RX ORDER — ALBUTEROL SULFATE 2.5 MG/3ML
5 SOLUTION RESPIRATORY (INHALATION) ONCE
Status: COMPLETED | OUTPATIENT
Start: 2018-05-31 | End: 2018-05-31

## 2018-05-31 RX ORDER — ALBUTEROL SULFATE 2.5 MG/3ML
2.5 SOLUTION RESPIRATORY (INHALATION) EVERY 4 HOURS PRN
Qty: 75 ML | Refills: 0 | Status: SHIPPED | OUTPATIENT
Start: 2018-05-31 | End: 2018-06-07

## 2018-05-31 RX ORDER — PREDNISONE 20 MG/1
60 TABLET ORAL DAILY
Qty: 15 TABLET | Refills: 0 | Status: SHIPPED | OUTPATIENT
Start: 2018-05-31 | End: 2018-06-05

## 2018-05-31 RX ORDER — AZITHROMYCIN 250 MG/1
TABLET, FILM COATED ORAL
Qty: 4 TABLET | Refills: 0 | Status: SHIPPED | OUTPATIENT
Start: 2018-05-31 | End: 2018-06-02 | Stop reason: ALTCHOICE

## 2018-05-31 RX ADMIN — AZITHROMYCIN 500 MG: 250 TABLET, FILM COATED ORAL at 14:14

## 2018-05-31 RX ADMIN — ALBUTEROL SULFATE 5 MG: 2.5 SOLUTION RESPIRATORY (INHALATION) at 12:04

## 2018-05-31 RX ADMIN — IPRATROPIUM BROMIDE 0.5 MG: 0.5 SOLUTION RESPIRATORY (INHALATION) at 12:04

## 2018-05-31 RX ADMIN — METHYLPREDNISOLONE SODIUM SUCCINATE 125 MG: 125 INJECTION, POWDER, FOR SOLUTION INTRAMUSCULAR; INTRAVENOUS at 12:08

## 2018-05-31 RX ADMIN — IOHEXOL 85 ML: 350 INJECTION, SOLUTION INTRAVENOUS at 13:18

## 2018-05-31 RX ADMIN — MAGNESIUM SULFATE HEPTAHYDRATE 2 G: 40 INJECTION, SOLUTION INTRAVENOUS at 12:09

## 2018-05-31 NOTE — ED PROVIDER NOTES
History  Chief Complaint   Patient presents with    Shortness of Breath     pt with shortness of breath from COPD      74-year-old male with a history of COPD recent left inguinal hernia repair presenting with his wife with chief complaint of shortness of breath  Patient had hernia repair approximately a week ago, from that standpoint he states he is much improved wound is clean dry intact over the last 3 days he has had increasing dry cough congestion wheezing, he was at the eye doctor this morning it suddenly got worse, he feels better at rest on his home oxygen and is improved at this time  He otherwise denies fevers chills headache chest pain production or hemoptysis nausea vomiting anorexia abdominal pain change in bowels or bladder joint pain swelling rashes or other associated symptoms denies cardiac history denies other risk factors signs symptoms of DVT or PE  Complete review systems otherwise negative as noted            Prior to Admission Medications   Prescriptions Last Dose Informant Patient Reported? Taking?    BREO ELLIPTA  Self No No   Sig: Inhale 1 puff daily   Umeclidinium Bromide (INCRUSE ELLIPTA) 62 5 MCG/INH AEPB  Self No No   Sig: Inhale 1 puff daily   acetaminophen-codeine (TYLENOL #3) 300-30 mg per tablet   No No   Sig: Take 1 tablet by mouth every 6 (six) hours as needed for moderate pain for up to 10 days   albuterol (2 5 mg/3 mL) 0 083 % nebulizer solution  Self No No   Sig: Take 3 mL (2 5 mg total) by nebulization every 4 (four) hours as needed for wheezing   albuterol (PROVENTIL HFA,VENTOLIN HFA) 90 mcg/act inhaler  Self Yes No   Sig: Inhale 2 puffs every 6 (six) hours as needed for wheezing   enoxaparin (LOVENOX) 40 mg/0 4 mL   No No   Sig: Inject 0 4 mL (40 mg total) under the skin on call to OR for 1 dose   esomeprazole (NexIUM) 20 mg capsule  Self No No   Sig: Take 1 capsule (20 mg total) by mouth daily   loratadine (CLARITIN) 10 mg tablet   Yes No   Sig: Take 10 mg by mouth daily   sertraline (ZOLOFT) 50 mg tablet  Self No No   Sig: Take 1 tablet (50 mg total) by mouth daily      Facility-Administered Medications: None       Past Medical History:   Diagnosis Date    [facility]rilobular [facility]sema ([facility] )     [facility]     Hypoglycemia     On home oxygen therapy     Pneumonia     Pulmonary hypertension ([facility] )     Respiratory failure (HCC)     SOB (shortness of breath)        Past Surgical History:   Procedure Laterality Date    CATARACT EXTRACTION      CHOLECYSTECTOMY      EYE SURGERY      PA REPAIR ING HERNIA,5+Y/O,REDUCIBL Left 5/23/2018    Procedure: OPEN INGUINAL HERNIA REPAIR WITH MESH;  Surgeon: Aida Ballard MD;  Location: [facility] OR;  Service: General       Family History   Problem Relation Age of Onset    Diabetes Mother     Hypertension Mother     Hyperlipidemia Mother      I have reviewed and agree with the history as documented  Social History   Substance Use Topics    Smoking status: Former Smoker     Years: 50 00     Types: Cigarettes     Quit date: 2/27/2013    Smokeless tobacco: Never Used    Alcohol use 0 6 oz/week     1 Glasses of wine per week      Comment: socialy        Review of Systems   Constitutional: Negative for activity change, appetite change, chills and fever  HENT: Negative for rhinorrhea and sore throat  Eyes: Negative for photophobia, pain and visual disturbance  Respiratory: Positive for cough, shortness of breath and wheezing  Cardiovascular: Negative for chest pain, palpitations and leg swelling  Gastrointestinal: Negative for abdominal pain, diarrhea, nausea and vomiting  Genitourinary: Negative for dysuria, frequency and urgency  Musculoskeletal: Negative for arthralgias, back pain and myalgias  Skin: Negative for color change and rash  Neurological: Negative for dizziness, weakness, light-headedness and headaches  Hematological: Negative for adenopathy   Does not bruise/bleed easily  Psychiatric/Behavioral: Negative for agitation and behavioral problems  All other systems reviewed and are negative  Physical Exam  Physical Exam   Constitutional: He is oriented to person, place, and time  He appears well-developed and well-nourished  No distress  Very well-appearing for stated age sitting upright conversational no acute distress, wife at bedside   HENT:   Head: Normocephalic and atraumatic  Eyes: EOM are normal  Pupils are equal, round, and reactive to light  Neck: Normal range of motion  Neck supple  No tracheal deviation present  Cardiovascular: Normal rate, regular rhythm and normal heart sounds  Exam reveals no gallop and no friction rub  No murmur heard  Pulmonary/Chest: Effort normal  He has wheezes  He has no rales  Patient has normal work of breathing scattered expiratory wheezes no rales or rhonchi mild intermittent cough   Abdominal: Soft  Bowel sounds are normal  He exhibits no distension  There is no tenderness  There is no rebound and no guarding  Patient's left inguinal incision is clean dry and intact very well here without evidence of secondary infection   Musculoskeletal: Normal range of motion  He exhibits no edema or tenderness  No peripheral edema or calf tenderness   Neurological: He is alert and oriented to person, place, and time  No cranial nerve deficit  He exhibits normal muscle tone  Coordination normal    Skin: Skin is warm and dry  No rash noted  Psychiatric: He has a normal mood and affect  His behavior is normal    Nursing note and vitals reviewed        Vital Signs  ED Triage Vitals [05/31/18 1043]   Temperature Pulse Respirations Blood Pressure SpO2   98 1 °F (36 7 °C) 86 20 (!) 188/85 92 %      Temp Source Heart Rate Source Patient Position - Orthostatic VS BP Location FiO2 (%)   Oral Monitor Sitting Right arm --      Pain Score       No Pain           Vitals:    05/31/18 1043   BP: (!) 188/85   Pulse: 86   Patient Position - Orthostatic VS: Sitting       Visual Acuity      ED Medications  Medications   albuterol inhalation solution 5 mg (5 mg Nebulization Given 5/31/18 1204)   ipratropium (ATROVENT) 0 02 % inhalation solution 0 5 mg (0 5 mg Nebulization Given 5/31/18 1204)   methylPREDNISolone sodium succinate (Solu-MEDROL) injection 125 mg (125 mg Intravenous Given 5/31/18 1208)   magnesium sulfate 2 g/50 mL IVPB (premix) 2 g (0 g Intravenous Stopped 5/31/18 1251)   iohexol (OMNIPAQUE) 350 MG/ML injection (MULTI-DOSE) 85 mL (85 mL Intravenous Given 5/31/18 1318)   azithromycin (ZITHROMAX) tablet 500 mg (500 mg Oral Given 5/31/18 1414)       Diagnostic Studies  Results Reviewed     Procedure Component Value Units Date/Time    B-type natriuretic peptide [[id number]]  (Normal) Collected:  05/31/18 1203    Lab Status:  Final result Specimen:  Blood from Arm, Right Updated:  05/31/18 1237     NT-proBNP 52 pg/mL     Comprehensive metabolic panel [[id number]] Collected:  05/31/18 1203    Lab Status:  Final result Specimen:  Blood from Arm, Right Updated:  05/31/18 1234     Sodium 136 mmol/L      Potassium 4 4 mmol/L      Chloride 101 mmol/L      CO2 29 mmol/L      Anion Gap 6 mmol/L      BUN 18 mg/dL      Creatinine 0 79 mg/dL      Glucose 107 mg/dL      Calcium 8 4 mg/dL      AST 25 U/L      ALT 27 U/L      Alkaline Phosphatase 74 U/L      Total Protein 7 3 g/dL      Albumin 3 5 g/dL      Total Bilirubin 0 50 mg/dL      eGFR 94 ml/min/1 73sq m     Narrative:         National Kidney Disease Education Program recommendations are as follows:  GFR calculation is accurate only with a steady state creatinine  Chronic Kidney disease less than 60 ml/min/1 73 sq  meters  Kidney failure less than 15 ml/min/1 73 sq  meters      Troponin I [[id number]]  (Normal) Collected:  05/31/18 1203    Lab Status:  Final result Specimen:  Blood from Arm, Right Updated:  05/31/18 1234     Troponin I <0 02 ng/mL     Narrative:         [employer] Chemistry analyzer 99% cutoff is > 0 04 ng/mL in network labs    o cTnI 99% cutoff is useful only when applied to patients in the clinical setting of myocardial ischemia  o cTnI 99% cutoff should be interpreted in the context of clinical history, ECG findings and possibly cardiac imaging to establish correct diagnosis  o cTnI 99% cutoff may be suggestive but clearly not indicative of a coronary event without the clinical setting of myocardial ischemia  CBC and differential [[id number]]  (Abnormal) Collected:  05/31/18 1203    Lab Status:  Final result Specimen:  Blood from Arm, Right Updated:  05/31/18 1213     WBC 10 66 (H) Thousand/uL      RBC 4 30 Million/uL      Hemoglobin 12 9 g/dL      Hematocrit 39 5 %      MCV 92 fL      MCH 30 0 pg      MCHC 32 7 g/dL      RDW 13 6 %      MPV 9 3 fL      Platelets 516 Thousands/uL      nRBC 0 /100 WBCs      Neutrophils Relative 77 (H) %      Immat GRANS % 0 %      Lymphocytes Relative 13 (L) %      Monocytes Relative 6 %      Eosinophils Relative 3 %      Basophils Relative 1 %      Neutrophils Absolute 8 27 (H) Thousands/µL      Immature Grans Absolute 0 04 Thousand/uL      Lymphocytes Absolute 1 33 Thousands/µL      Monocytes Absolute 0 65 Thousand/µL      Eosinophils Absolute 0 32 Thousand/µL      Basophils Absolute 0 05 Thousands/µL                  CTA ED chest PE study   Final Result by Sergo Chino MD (05/31 1342)      No pulmonary embolus  Extensive centrilobular and paraseptal emphysematous changes most severe in the lung apices  Large bulla noted in the posterior right lung apex  Bibasilar atelectasis  No pulmonary mass  No focal airspace opacity to suggest pneumonia  Pulmonary    parenchymal findings are unchanged from March 20, 2018                    Workstation performed: [id number]                    Procedures  Procedures       Phone Contacts  ED Phone Contact    ED Course  ED Course as of Jun 01 2325   Thu May 31, 2018   1154 EKG reviewed 77 beats per minute normal sinus rhythm, nonspecific T-wave abnormality, no previous EKGs    1356 Patient seen and re-evaluated his oxygen at baseline his breathing is at baseline, he has normal vital signs he is improved will attempt treatment as an outpatient, very close return follow-up instructions patient and family agreeable plan                                MDM  Number of Diagnoses or Management Options  Acute exacerbation of chronic obstructive pulmonary disease (COPD) ([facility] ):   Diagnosis management comments: 42-year-old male COPD oxygen dependent with 3 days of cough congestion, worse today while at the eye doctor's, notes recent surgery for left inguinal hernia repair within in the last week, no fevers her significant production or hemoptysis no chest pain, no new leg swelling calf pain or tenderness on exam here he is afebrile normal vital signs he is clinically well appearing he has no shortness of breath currently arrest he does have some scattered wheezes abdomen is soft nontender his wound is clean dry and intact without evidence of secondary infection is no peripheral edema or calf tenderness given his advanced age, oxygen dependence recent surgery warm labs including BNP, will image chest to rule out pulmonary embolism with recent surgery, with sudden worsening of his shortness of breath, will treat symptomatically and reassess, disposition pending further evaluation recess    CritCare Time    Disposition  Final diagnoses:   Acute exacerbation of chronic obstructive pulmonary disease (COPD) ([facility] )     Time reflects when diagnosis was documented in both MDM as applicable and the Disposition within this note     Time User Action Codes Description Comment    5/31/2018  2:01 PM Ashwini Kay Add [J44 1] Acute exacerbation of chronic obstructive pulmonary disease (COPD) [facility])       ED Disposition     ED Disposition Condition Comment    Discharge  Festus Young discharge to home/self care      Condition at discharge: Good        Follow-up Information     Follow up With Specialties Details Why Contact Info Additional Information    [address] [facility] Emergency Department Emergency Medicine  If symptoms worsen [address] [facility] [address]  [phone] [place] ED, [address], [place], [address], MD Internal Medicine In 3 days  [address]  [address] [place] [address]  [phone]             Discharge Medication List as of 5/31/2018  2:03 PM      START taking these medications    Details   !! albuterol (2 5 mg/3 mL) 0 083 % nebulizer solution Take 3 mL (2 5 mg total) by nebulization every 4 (four) hours as needed for wheezing for up to 7 days, Starting Thu 5/31/2018, Until Thu 6/7/2018, Print      azithromycin (ZITHROMAX) 250 mg tablet One tab p o  daily for 4 days, Print      predniSONE 20 mg tablet Take 3 tablets (60 mg total) by mouth daily for 5 days, Starting Thu 5/31/2018, Until Tue 6/5/2018, Print       !! - Potential duplicate medications found  Please discuss with provider        CONTINUE these medications which have NOT CHANGED    Details   acetaminophen-codeine (TYLENOL #3) 300-30 mg per tablet Take 1 tablet by mouth every 6 (six) hours as needed for moderate pain for up to 10 days, Starting Wed 5/23/2018, Until Sat 6/2/2018, Print      !! albuterol (2 5 mg/3 mL) 0 083 % nebulizer solution Take 3 mL (2 5 mg total) by nebulization every 4 (four) hours as needed for wheezing, Starting Mon 4/9/2018, Normal      albuterol (PROVENTIL HFA,VENTOLIN HFA) 90 mcg/act inhaler Inhale 2 puffs every 6 (six) hours as needed for wheezing, Historical Med      BREO ELLIPTA Inhale 1 puff daily, Starting Tue 3/13/2018, Normal      enoxaparin (LOVENOX) 40 mg/0 4 mL Inject 0 4 mL (40 mg total) under the skin on call to OR for 1 dose, Starting Mon 3/12/2018, Until Wed 4/25/2018, Print      esomeprazole (NexIUM) 20 mg capsule Take 1 capsule (20 mg total) by mouth daily, Starting Thu 4/26/2018, Normal      loratadine (CLARITIN) 10 mg tablet Take 10 mg by mouth daily, Historical Med      sertraline (ZOLOFT) 50 mg tablet Take 1 tablet (50 mg total) by mouth daily, Starting Mon 3/12/2018, Normal      Umeclidinium Bromide (INCRUSE ELLIPTA) 62 5 MCG/INH AEPB Inhale 1 puff daily, Starting Tue 3/13/2018, Normal       !! - Potential duplicate medications found  Please discuss with provider  No discharge procedures on file      ED Provider  Electronically Signed by           Irene Robert DO  06/01/18 3684

## 2018-05-31 NOTE — ED NOTES
Pt and wife report, pt experienced severe SOB episode with minimal ambulation today  Normal baseline is 87-88%, uses oxygen all of the time 3L nc        Jackie Solitario, RN  05/31/18 1100

## 2018-06-07 ENCOUNTER — OFFICE VISIT (OUTPATIENT)
Dept: SURGERY | Facility: CLINIC | Age: 68
End: 2018-06-07

## 2018-06-07 VITALS
DIASTOLIC BLOOD PRESSURE: 68 MMHG | BODY MASS INDEX: 26.48 KG/M2 | HEART RATE: 82 BPM | WEIGHT: 185 LBS | HEIGHT: 70 IN | RESPIRATION RATE: 16 BRPM | TEMPERATURE: 98.3 F | SYSTOLIC BLOOD PRESSURE: 106 MMHG

## 2018-06-07 DIAGNOSIS — Z48.89 POSTOPERATIVE VISIT: Primary | ICD-10-CM

## 2018-06-07 PROCEDURE — 99024 POSTOP FOLLOW-UP VISIT: CPT | Performed by: SURGERY

## 2018-06-07 NOTE — PROGRESS NOTES
Post-Op Follow Up- General Surgery   Mesfin Jaimes 74 y o  male MRN: [id number]  Unit/Bed#:  Encounter: [id number]    Assessment/Plan     Assessment:  Status post open repair of left inguinal hernia with mesh, improving  Plan:  The patient will return to my office in 1 month for routine follow-up  History of Present Illness     HPI:  I had the pleasure of seeing Mesfin Jaimes in the office today for 1st postop follow-up after open repair of left inguinal hernia with mesh  The patient stated doing well, tolerating diet having regular bowel movement  He denies having any fever, chills, nausea, vomiting or urinary symptoms          Historical Information   Past Medical History:   Diagnosis Date    [facility] )     [facility])     Hypoglycemia     On home oxygen therapy     Pneumonia     [facility]     Respiratory failure ([facility])     SOB (shortness of breath)      Past Surgical History:   Procedure Laterality Date    CATARACT EXTRACTION      CHOLECYSTECTOMY      EYE SURGERY      CT REPAIR ING HERNIA,5+Y/O,REDUCIBL Left 5/23/2018    Procedure: OPEN INGUINAL HERNIA REPAIR WITH MESH;  Surgeon: Axel Bassett MD;  Location: [facility];  Service: General     Social History   History   Alcohol Use    0 6 oz/week    1 Glasses of wine per week     Comment: socialy     History   Drug Use No     History   Smoking Status    Former Smoker    Years: 50 00    Types: Cigarettes    Quit date: 2/27/2013   Smokeless Tobacco    Never Used     Family History: non-contributory    Meds/Allergies   all medications and allergies reviewed     Current Outpatient Prescriptions:     albuterol (2 5 mg/3 mL) 0 083 % nebulizer solution, Take 3 mL (2 5 mg total) by nebulization every 4 (four) hours as needed for wheezing, Disp: 1080 mL, Rfl: 3    albuterol (2 5 mg/3 mL) 0 083 % nebulizer solution, Take 3 mL (2 5 mg total) by nebulization every 4 (four) hours as needed for wheezing for up to 7 days, Disp: 75 mL, Rfl: 0    albuterol (PROVENTIL HFA,VENTOLIN HFA) 90 mcg/act inhaler, Inhale 2 puffs every 6 (six) hours as needed for wheezing, Disp: , Rfl:     BREO ELLIPTA, Inhale 1 puff daily, Disp: 1 each, Rfl: 5    enoxaparin (LOVENOX) 40 mg/0 4 mL, Inject 0 4 mL (40 mg total) under the skin on call to OR for 1 dose, Disp: 1 Syringe, Rfl: 0    esomeprazole (NexIUM) 20 mg capsule, Take 1 capsule (20 mg total) by mouth daily, Disp: 30 capsule, Rfl: 5    loratadine (CLARITIN) 10 mg tablet, Take 10 mg by mouth daily, Disp: , Rfl:     sertraline (ZOLOFT) 50 mg tablet, Take 1 tablet (50 mg total) by mouth daily, Disp: 30 tablet, Rfl: 5    Umeclidinium Bromide (INCRUSE ELLIPTA) 62 5 MCG/INH AEPB, Inhale 1 puff daily, Disp: 1 each, Rfl: 5  Allergies   Allergen Reactions    Penicillins Hives     Passed out       Objective     Current Vitals:   Blood Pressure: 106/68 (06/07/18 1114)  Pulse: 82 (06/07/18 1114)  Temperature: 98 3 °F (36 8 °C) (06/07/18 1114)  Temp Source: Oral (06/07/18 1114)  Respirations: 16 (oxygen on level 3) (06/07/18 1114)  Height: 5' 10" (177 8 cm) (06/07/18 1114)  Weight - Scale: 83 9 kg (185 lb) (06/07/18 1114)      Invasive Devices          No matching active lines, drains, or airways          Physical Exam:  The abdomen is soft, nondistended and nontender  Incision is well-healed without evidence of recurrence of the hernia

## 2018-06-08 ENCOUNTER — OFFICE VISIT (OUTPATIENT)
Dept: INTERNAL MEDICINE CLINIC | Facility: CLINIC | Age: 68
End: 2018-06-08
Payer: MEDICARE

## 2018-06-08 VITALS
DIASTOLIC BLOOD PRESSURE: 62 MMHG | WEIGHT: 184 LBS | OXYGEN SATURATION: 92 % | RESPIRATION RATE: 18 BRPM | BODY MASS INDEX: 26.34 KG/M2 | HEIGHT: 70 IN | SYSTOLIC BLOOD PRESSURE: 114 MMHG | HEART RATE: 84 BPM

## 2018-06-08 DIAGNOSIS — J44.1 CHRONIC OBSTRUCTIVE PULMONARY DISEASE WITH ACUTE EXACERBATION (HCC): Primary | ICD-10-CM

## 2018-06-08 DIAGNOSIS — J43.9 PULMONARY EMPHYSEMA, UNSPECIFIED EMPHYSEMA TYPE (HCC): ICD-10-CM

## 2018-06-08 PROBLEM — K40.90 LEFT INGUINAL HERNIA: Status: RESOLVED | Noted: 2018-03-12 | Resolved: 2018-06-08

## 2018-06-08 PROBLEM — Z01.810 PREOP CARDIOVASCULAR EXAM: Status: RESOLVED | Noted: 2018-04-25 | Resolved: 2018-06-08

## 2018-06-08 PROCEDURE — 99213 OFFICE O/P EST LOW 20 MIN: CPT | Performed by: PHYSICIAN ASSISTANT

## 2018-06-08 RX ORDER — PREDNISONE 10 MG/1
TABLET ORAL
Qty: 20 TABLET | Refills: 0 | Status: SHIPPED | OUTPATIENT
Start: 2018-06-08 | End: 2018-07-09 | Stop reason: ALTCHOICE

## 2018-06-08 NOTE — PROGRESS NOTES
Assessment/Plan:   Patient Instructions     Will start longer steroid taper  Continue other medications  Follow-up as scheduled  Return for Next scheduled follow up  Diagnoses and all orders for this visit:    Chronic obstructive pulmonary disease with acute exacerbation (HCC)  -     predniSONE 10 mg tablet; 4 tabs daily for 2 days then 3 tabs daily x 2 days then 2 tabs daily x 2 days the one tab daily x 2 days then stop    Pulmonary emphysema, unspecified emphysema type (HCC)          Subjective:      Patient ID: Anish Raya is a 74 y o  male  Follow-up    COPD:  Patient reports he was seen in the emergency room on 05/31/2018 for an exacerbation of COPD  Patient reports he was treated with 3 days of steroids which he had finished over week ago  He continues to complain of shortness of breath on minimal exertion, for example walking across the room  He is not having any chest pain  He continues to have a cough that is productive of yellow to green sputum  ER visit labs and imaging studies have been reviewed          ALLERGIES:  Allergies   Allergen Reactions    Penicillins Hives     Passed out       CURRENT MEDICATIONS:    Current Outpatient Prescriptions:     albuterol (2 5 mg/3 mL) 0 083 % nebulizer solution, Take 3 mL (2 5 mg total) by nebulization every 4 (four) hours as needed for wheezing, Disp: 1080 mL, Rfl: 3    albuterol (PROVENTIL HFA,VENTOLIN HFA) 90 mcg/act inhaler, Inhale 2 puffs every 6 (six) hours as needed for wheezing, Disp: , Rfl:     BREO ELLIPTA, Inhale 1 puff daily, Disp: 1 each, Rfl: 5    esomeprazole (NexIUM) 20 mg capsule, Take 1 capsule (20 mg total) by mouth daily, Disp: 30 capsule, Rfl: 5    loratadine (CLARITIN) 10 mg tablet, Take 10 mg by mouth daily, Disp: , Rfl:     sertraline (ZOLOFT) 50 mg tablet, Take 1 tablet (50 mg total) by mouth daily, Disp: 30 tablet, Rfl: 5    Umeclidinium Bromide (INCRUSE ELLIPTA) 62 5 MCG/INH AEPB, Inhale 1 puff daily, Disp: 1 each, Rfl: 5    predniSONE 10 mg tablet, 4 tabs daily for 2 days then 3 tabs daily x 2 days then 2 tabs daily x 2 days the one tab daily x 2 days then stop, Disp: 20 tablet, Rfl: 0    ACTIVE PROBLEM LIST:  Patient Active Problem List   Diagnosis    [facility]sema ([facility] )    Gastroesophageal reflux disease without esophagitis    Anxiety    Simple chronic bronchitis (HCC)    Shortness of breath on exertion    Abnormal ECG    PVCs (premature ventricular contractions)       PAST MEDICAL HISTORY:  Past Medical History:   Diagnosis Date    Centrilobular emphysema ([place] [address] )     [facility]     Hypoglycemia     On home oxygen therapy     Pneumonia     Pulmonary hypertension ([place] [address] )     Respiratory failure ([facility])     SOB (shortness of breath)        PAST SURGICAL HISTORY:  Past Surgical History:   Procedure Laterality Date    CATARACT EXTRACTION      CHOLECYSTECTOMY      EYE SURGERY      [place] REPAIR ING HERNIA,5+Y/O,REDUCIBL Left 5/23/2018    Procedure: OPEN INGUINAL HERNIA REPAIR WITH MESH;  Surgeon: Mandie Boateng MD;  Location: [facility];  Service: General       FAMILY HISTORY:  Family History   Problem Relation Age of Onset    Diabetes Mother     Hypertension Mother     Hyperlipidemia Mother        SOCIAL HISTORY:  Social History     Social History    Marital status: /Civil Union     Spouse name: janet     Number of children: 11    Years of education: N/A     Occupational History    retired       Social History Main Topics    Smoking status: Former Smoker     Years: 50 00     Types: Cigarettes     Quit date: 2/27/2013    Smokeless tobacco: Never Used    Alcohol use 0 6 oz/week     1 Glasses of wine per week      Comment: socialy    Drug use: No    Sexual activity: No     Other Topics Concern    Not on file     Social History Narrative        Retired       Review of Systems   Constitutional: Negative for activity change, chills, fatigue and fever  HENT: Negative for congestion  Eyes: Negative for discharge  Respiratory: Positive for cough, shortness of breath and wheezing  Negative for chest tightness  Cardiovascular: Negative for chest pain, palpitations and leg swelling  Gastrointestinal: Negative for abdominal pain  Genitourinary: Negative for difficulty urinating  Musculoskeletal: Negative for arthralgias and myalgias  Skin: Negative for rash  Allergic/Immunologic: Negative for immunocompromised state  Neurological: Negative for dizziness, syncope, weakness, light-headedness and headaches  Hematological: Negative for adenopathy  Does not bruise/bleed easily  Psychiatric/Behavioral: Negative for dysphoric mood  The patient is not nervous/anxious  Objective:  Vitals:    06/08/18 1102   BP: 114/62   BP Location: Left arm   Patient Position: Sitting   Cuff Size: Adult   Pulse: 84   Resp: 18   SpO2: 92%   Weight: 83 5 kg (184 lb)   Height: 5' 10" (1 778 m)        Physical Exam   Constitutional: He is oriented to person, place, and time  He appears well-developed and well-nourished  No distress  Looks comfortable, on chronic oxygen   HENT:   HEENT-unremarkable   Neck: Neck supple  No JVD present  Cardiovascular: Normal rate, regular rhythm and normal heart sounds  Pulmonary/Chest: Effort normal    Inspiratory breath sounds are clear in all fields, there is forced expiratory wheezing throughout all lung fields, prolonged expiratory phase   Abdominal: Soft  Bowel sounds are normal    Musculoskeletal: He exhibits no edema  Lymphadenopathy:     He has no cervical adenopathy  Neurological: He is alert and oriented to person, place, and time  Skin: Skin is warm and dry  No rash noted  Psychiatric: He has a normal mood and affect  His behavior is normal    Nursing note and vitals reviewed          RESULTS:    Recent Results (from the past 1008 hour(s))   Comprehensive metabolic panel Collection Time: 05/14/18 11:20 AM   Result Value Ref Range    Sodium 137 136 - 145 mmol/L    Potassium 4 2 3 5 - 5 3 mmol/L    Chloride 102 100 - 108 mmol/L    CO2 30 21 - 32 mmol/L    Anion Gap 5 4 - 13 mmol/L    BUN 26 (H) 5 - 25 mg/dL    Creatinine 0 82 0 60 - 1 30 mg/dL    Glucose, Fasting 94 65 - 99 mg/dL    Calcium 8 7 8 3 - 10 1 mg/dL    AST 22 5 - 45 U/L    ALT 23 12 - 78 U/L    Alkaline Phosphatase 87 46 - 116 U/L    Total Protein 7 5 6 4 - 8 2 g/dL    Albumin 3 7 3 5 - 5 0 g/dL    Total Bilirubin 0 59 0 20 - 1 00 mg/dL    eGFR 93 ml/min/1 73sq m   CBC and differential    Collection Time: 05/14/18 11:20 AM   Result Value Ref Range    WBC 8 03 4 31 - 10 16 Thousand/uL    RBC 4 54 3 88 - 5 62 Million/uL    Hemoglobin 14 0 12 0 - 17 0 g/dL    Hematocrit 41 7 36 5 - 49 3 %    MCV 92 82 - 98 fL    MCH 30 8 26 8 - 34 3 pg    MCHC 33 6 31 4 - 37 4 g/dL    RDW 13 9 11 6 - 15 1 %    MPV 10 4 8 9 - 12 7 fL    Platelets [phone] - 844 Thousands/uL    nRBC 0 /100 WBCs    Neutrophils Relative 50 43 - 75 %    Lymphocytes Relative 37 14 - 44 %    Monocytes Relative 8 4 - 12 %    Eosinophils Relative 4 0 - 6 %    Basophils Relative 1 0 - 1 %    Neutrophils Absolute 4 08 1 85 - 7 62 Thousands/µL    Lymphocytes Absolute 2 95 0 60 - 4 47 Thousands/µL    Monocytes Absolute 0 63 0 17 - 1 22 Thousand/µL    Eosinophils Absolute 0 31 0 00 - 0 61 Thousand/µL    Basophils Absolute 0 04 0 00 - 0 10 Thousands/µL   ECG 12 lead    Collection Time: 05/31/18 11:52 AM   Result Value Ref Range    Ventricular Rate 77 BPM    Atrial Rate 77 BPM    RI Interval 186 ms    QRSD Interval 106 ms    QT Interval 404 ms    QTC Interval 457 ms    P Axis 75 degrees    QRS Axis 85 degrees    T Wave Axis 20 degrees   CBC and differential    Collection Time: 05/31/18 12:03 PM   Result Value Ref Range    WBC 10 66 (H) 4 31 - 10 16 Thousand/uL    RBC 4 30 3 88 - 5 62 Million/uL    Hemoglobin 12 9 12 0 - 17 0 g/dL    Hematocrit 39 5 36 5 - 49 3 %    MCV 92 82 - 98 fL    MCH 30 0 26 8 - 34 3 pg    MCHC 32 7 31 4 - 37 4 g/dL    RDW 13 6 11 6 - 15 1 %    MPV 9 3 8 9 - 12 7 fL    Platelets 668 530 - 248 Thousands/uL    nRBC 0 /100 WBCs    Neutrophils Relative 77 (H) 43 - 75 %    Immat GRANS % 0 0 - 2 %    Lymphocytes Relative 13 (L) 14 - 44 %    Monocytes Relative 6 4 - 12 %    Eosinophils Relative 3 0 - 6 %    Basophils Relative 1 0 - 1 %    Neutrophils Absolute 8 27 (H) 1 85 - 7 62 Thousands/µL    Immature Grans Absolute 0 04 0 00 - 0 20 Thousand/uL    Lymphocytes Absolute 1 33 0 60 - 4 47 Thousands/µL    Monocytes Absolute 0 65 0 17 - 1 22 Thousand/µL    Eosinophils Absolute 0 32 0 00 - 0 61 Thousand/µL    Basophils Absolute 0 05 0 00 - 0 10 Thousands/µL   Comprehensive metabolic panel    Collection Time: 05/31/18 12:03 PM   Result Value Ref Range    Sodium 136 136 - 145 mmol/L    Potassium 4 4 3 5 - 5 3 mmol/L    Chloride 101 100 - 108 mmol/L    CO2 29 21 - 32 mmol/L    Anion Gap 6 4 - 13 mmol/L    BUN 18 5 - 25 mg/dL    Creatinine 0 79 0 60 - 1 30 mg/dL    Glucose 107 65 - 140 mg/dL    Calcium 8 4 8 3 - 10 1 mg/dL    AST 25 5 - 45 U/L    ALT 27 12 - 78 U/L    Alkaline Phosphatase 74 46 - 116 U/L    Total Protein 7 3 6 4 - 8 2 g/dL    Albumin 3 5 3 5 - 5 0 g/dL    Total Bilirubin 0 50 0 20 - 1 00 mg/dL    eGFR 94 ml/min/1 73sq m   Troponin I    Collection Time: 05/31/18 12:03 PM   Result Value Ref Range    Troponin I <0 02 <=0 04 ng/mL   B-type natriuretic peptide    Collection Time: 05/31/18 12:03 PM   Result Value Ref Range    NT-proBNP 52 <125 pg/mL       This note was created with voice recognition software  Phonic, grammatical and spelling errors may be present within the note as a result

## 2018-07-09 ENCOUNTER — OFFICE VISIT (OUTPATIENT)
Dept: INTERNAL MEDICINE CLINIC | Facility: CLINIC | Age: 68
End: 2018-07-09
Payer: MEDICARE

## 2018-07-09 VITALS
RESPIRATION RATE: 18 BRPM | SYSTOLIC BLOOD PRESSURE: 138 MMHG | BODY MASS INDEX: 27.52 KG/M2 | HEART RATE: 80 BPM | WEIGHT: 192.2 LBS | DIASTOLIC BLOOD PRESSURE: 78 MMHG | HEIGHT: 70 IN | TEMPERATURE: 97.6 F | OXYGEN SATURATION: 93 %

## 2018-07-09 DIAGNOSIS — J43.9 ACUTE EXACERBATION OF EMPHYSEMA (HCC): Primary | ICD-10-CM

## 2018-07-09 PROCEDURE — 99213 OFFICE O/P EST LOW 20 MIN: CPT | Performed by: INTERNAL MEDICINE

## 2018-07-09 RX ORDER — LEVOFLOXACIN 500 MG/1
500 TABLET, FILM COATED ORAL EVERY 24 HOURS
Qty: 10 TABLET | Refills: 0 | Status: SHIPPED | OUTPATIENT
Start: 2018-07-09 | End: 2018-07-19

## 2018-07-09 RX ORDER — METHYLPREDNISOLONE 4 MG/1
TABLET ORAL
Qty: 21 TABLET | Refills: 0 | Status: SHIPPED | OUTPATIENT
Start: 2018-07-09 | End: 2018-07-16

## 2018-07-09 NOTE — PROGRESS NOTES
Assessment/Plan:      Exacerbation of chronic symptoms  Will place him on antibiotics and steroids  His wife reports that he usually does well with Levaquin so I started that  Return if symptoms worsen or fail to improve  No problem-specific Assessment & Plan notes found for this encounter  Diagnoses and all orders for this visit:    Acute exacerbation of emphysema ([facility] [address] )  -     levofloxacin (LEVAQUIN) 500 mg tablet; Take 1 tablet (500 mg total) by mouth every 24 hours for 10 days  -     Methylprednisolone 4 MG TBPK; Use as directed on package          Subjective:      Patient ID: Melanie Ball is a 79 y o  male  Patient comes in today complaining of a few days of worsening breathing symptoms  He states they were at his other daughters in [place] for about 3 weeks and there was a CT there  He thought his allergies were acting up but then he started with severe wheezing  Starting to have productive cough  No fevers  Using his haler is more  More short of breath with exertion          ALLERGIES:  Allergies   Allergen Reactions    Penicillins Hives     Passed out       CURRENT MEDICATIONS:    Current Outpatient Prescriptions:     albuterol (2 5 mg/3 mL) 0 083 % nebulizer solution, Take 3 mL (2 5 mg total) by nebulization every 4 (four) hours as needed for wheezing, Disp: 1080 mL, Rfl: 3    albuterol (PROVENTIL HFA,VENTOLIN HFA) 90 mcg/act inhaler, Inhale 2 puffs every 6 (six) hours as needed for wheezing, Disp: , Rfl:     BREO ELLIPTA, Inhale 1 puff daily, Disp: 1 each, Rfl: 5    esomeprazole (NexIUM) 20 mg capsule, Take 1 capsule (20 mg total) by mouth daily, Disp: 30 capsule, Rfl: 5    loratadine (CLARITIN) 10 mg tablet, Take 10 mg by mouth daily, Disp: , Rfl:     sertraline (ZOLOFT) 50 mg tablet, Take 1 tablet (50 mg total) by mouth daily, Disp: 30 tablet, Rfl: 5    Umeclidinium Bromide (INCRUSE ELLIPTA) 62 5 MCG/INH AEPB, Inhale 1 puff daily, Disp: 1 each, Rfl: 5   levofloxacin (LEVAQUIN) 500 mg tablet, Take 1 tablet (500 mg total) by mouth every 24 hours for 10 days, Disp: 10 tablet, Rfl: 0    Methylprednisolone 4 MG TBPK, Use as directed on package, Disp: 21 tablet, Rfl: 0    ACTIVE PROBLEM LIST:  Patient Active Problem List   Diagnosis    [facility]ma ([facility] )    Gastroesophageal reflux disease without esophagitis    Anxiety    Simple chronic bronchitis (HCC)    Shortness of breath on exertion    Abnormal ECG    PVCs (premature ventricular contractions)       PAST MEDICAL HISTORY:  Past Medical History:   Diagnosis Date    Centrilobular emphysema ([place] [address] )     [facility])     Hypoglycemia     On home oxygen therapy     Pneumonia     Pulmonary hypertension ([place] [address] )     Respiratory failure ([facility])     SOB (shortness of breath)        PAST SURGICAL HISTORY:  Past Surgical History:   Procedure Laterality Date    CATARACT EXTRACTION      CHOLECYSTECTOMY      EYE SURGERY      [place] REPAIR ING HERNIA,5+Y/O,REDUCIBL Left 5/23/2018    Procedure: OPEN INGUINAL HERNIA REPAIR WITH MESH;  Surgeon: Tia Peter MD;  Location: [facility];  Service: General       FAMILY HISTORY:  Family History   Problem Relation Age of Onset    Diabetes Mother     Hypertension Mother     Hyperlipidemia Mother        SOCIAL HISTORY:  Social History     Social History    Marital status: /Civil Union     Spouse name: janet     Number of children: 11    Years of education: N/A     Occupational History    retired       Social History Main Topics    Smoking status: Former Smoker     Years: 50 00     Types: Cigarettes     Quit date: 2/27/2013    Smokeless tobacco: Never Used    Alcohol use 0 6 oz/week     1 Glasses of wine per week      Comment: socialy    Drug use: No    Sexual activity: No     Other Topics Concern    Not on file     Social History Narrative        Retired       Review of Systems   Constitutional: Negative for fever    Respiratory: Positive for shortness of breath and wheezing  Cardiovascular: Negative for chest pain  Gastrointestinal: Negative for abdominal pain  Objective:  Vitals:    07/09/18 1304   BP: 138/78   BP Location: Left arm   Patient Position: Sitting   Cuff Size: Adult   Pulse: 80   Resp: 18   Temp: 97 6 °F (36 4 °C)   TempSrc: Temporal   SpO2: 93%   Weight: 87 2 kg (192 lb 3 2 oz)   Height: 5' 10" (1 778 m)        Physical Exam   Constitutional: He is oriented to person, place, and time  He appears well-developed and well-nourished  Cardiovascular: Normal rate, regular rhythm and normal heart sounds  Pulmonary/Chest: Effort normal  No respiratory distress  He has no wheezes  He has no rales  Decreased breath sounds  Abdominal: Soft  Bowel sounds are normal    Musculoskeletal: He exhibits no edema  Neurological: He is alert and oriented to person, place, and time  Nursing note and vitals reviewed          RESULTS:    Recent Results (from the past 1008 hour(s))   ECG 12 lead    Collection Time: 05/31/18 11:52 AM   Result Value Ref Range    Ventricular Rate 77 BPM    Atrial Rate 77 BPM    KS Interval 186 ms    QRSD Interval 106 ms    QT Interval 404 ms    QTC Interval 457 ms    P Axis 75 degrees    QRS Axis 85 degrees    T Wave Axis 20 degrees   CBC and differential    Collection Time: 05/31/18 12:03 PM   Result Value Ref Range    WBC 10 66 (H) 4 31 - 10 16 Thousand/uL    RBC 4 30 3 88 - 5 62 Million/uL    Hemoglobin 12 9 12 0 - 17 0 g/dL    Hematocrit 39 5 36 5 - 49 3 %    MCV 92 82 - 98 fL    MCH 30 0 26 8 - 34 3 pg    MCHC 32 7 31 4 - 37 4 g/dL    RDW 13 6 11 6 - 15 1 %    MPV 9 3 8 9 - 12 7 fL    Platelets 523 886 - 748 Thousands/uL    nRBC 0 /100 WBCs    Neutrophils Relative 77 (H) 43 - 75 %    Immat GRANS % 0 0 - 2 %    Lymphocytes Relative 13 (L) 14 - 44 %    Monocytes Relative 6 4 - 12 %    Eosinophils Relative 3 0 - 6 %    Basophils Relative 1 0 - 1 %    Neutrophils Absolute 8 27 (H) 1 85 - 7 62 Thousands/µL    Immature Grans Absolute 0 04 0 00 - 0 20 Thousand/uL    Lymphocytes Absolute 1 33 0 60 - 4 47 Thousands/µL    Monocytes Absolute 0 65 0 17 - 1 22 Thousand/µL    Eosinophils Absolute 0 32 0 00 - 0 61 Thousand/µL    Basophils Absolute 0 05 0 00 - 0 10 Thousands/µL   Comprehensive metabolic panel    Collection Time: 05/31/18 12:03 PM   Result Value Ref Range    Sodium 136 136 - 145 mmol/L    Potassium 4 4 3 5 - 5 3 mmol/L    Chloride 101 100 - 108 mmol/L    CO2 29 21 - 32 mmol/L    Anion Gap 6 4 - 13 mmol/L    BUN 18 5 - 25 mg/dL    Creatinine 0 79 0 60 - 1 30 mg/dL    Glucose 107 65 - 140 mg/dL    Calcium 8 4 8 3 - 10 1 mg/dL    AST 25 5 - 45 U/L    ALT 27 12 - 78 U/L    Alkaline Phosphatase 74 46 - 116 U/L    Total Protein 7 3 6 4 - 8 2 g/dL    Albumin 3 5 3 5 - 5 0 g/dL    Total Bilirubin 0 50 0 20 - 1 00 mg/dL    eGFR 94 ml/min/1 73sq m   Troponin I    Collection Time: 05/31/18 12:03 PM   Result Value Ref Range    Troponin I <0 02 <=0 04 ng/mL   B-type natriuretic peptide    Collection Time: 05/31/18 12:03 PM   Result Value Ref Range    NT-proBNP 52 <125 pg/mL       This note was created with voice recognition software  Phonic, grammatical and spelling errors may be present within the note as a result

## 2018-07-17 ENCOUNTER — OFFICE VISIT (OUTPATIENT)
Dept: SURGERY | Facility: CLINIC | Age: 68
End: 2018-07-17

## 2018-07-17 VITALS
DIASTOLIC BLOOD PRESSURE: 78 MMHG | RESPIRATION RATE: 17 BRPM | HEIGHT: 70 IN | WEIGHT: 189 LBS | HEART RATE: 86 BPM | TEMPERATURE: 98.4 F | SYSTOLIC BLOOD PRESSURE: 140 MMHG | BODY MASS INDEX: 27.06 KG/M2

## 2018-07-17 DIAGNOSIS — Z48.89 POSTOPERATIVE VISIT: Primary | ICD-10-CM

## 2018-07-17 PROCEDURE — 99024 POSTOP FOLLOW-UP VISIT: CPT | Performed by: SURGERY

## 2018-07-17 NOTE — PROGRESS NOTES
Post-Op Follow Up- General Surgery   Eric Duckworth 79 y o  male MRN: [id number]  Unit/Bed#:  Encounter: [id number]    Assessment/Plan     Assessment:  The patient is status post open repair of left inguinal hernia with mesh, improved  Plan:  The patient is discharged from my care and I will be glad to see him if any problem arises in the future  History of Present Illness     HPI:  Eric Duckworth is a 79 y o  male who presents my office for 2nd postop follow-up after open repair of left inguinal hernia with mesh  He offers no complaints at this time        Historical Information   Past Medical History:   Diagnosis Date    [facility]rilobular emph[facility]sema ([facility] [address] )     COPD (chronic obstructive pulmonary disease) (HCC)     Hypoglycemia     On home oxygen therapy     Pneumonia     Pulmonary hypertension ([facility] [address] )     Respiratory failure (HCC)     SOB (shortness of breath)      Past Surgical History:   Procedure Laterality Date    CATARACT EXTRACTION      CHOLECYSTECTOMY      EYE SURGERY      [place] REPAIR ING HERNIA,5+Y/O,REDUCIBL Left 5/23/2018    Procedure: OPEN INGUINAL HERNIA REPAIR WITH MESH;  Surgeon: Roopa Liz MD;  Location: [facility];  Service: General     Social History   History   Alcohol Use    0 6 oz/week    1 Glasses of wine per week     Comment: socialy     History   Drug Use No     History   Smoking Status    Former Smoker    Years: 50 00    Types: Cigarettes    Quit date: 2/27/2013   Smokeless Tobacco    Never Used     Family History: non-contributory    Meds/Allergies   all medications and allergies reviewed     Current Outpatient Prescriptions:     albuterol (2 5 mg/3 mL) 0 083 % nebulizer solution, Take 3 mL (2 5 mg total) by nebulization every 4 (four) hours as needed for wheezing, Disp: 1080 mL, Rfl: 3    albuterol (PROVENTIL HFA,VENTOLIN HFA) 90 mcg/act inhaler, Inhale 2 puffs every 6 (six) hours as needed for wheezing, Disp: , Rfl:     BREO ELLIPTA, Inhale 1 puff daily, Disp: 1 each, Rfl: 5    esomeprazole (NexIUM) 20 mg capsule, Take 1 capsule (20 mg total) by mouth daily, Disp: 30 capsule, Rfl: 5    levofloxacin (LEVAQUIN) 500 mg tablet, Take 1 tablet (500 mg total) by mouth every 24 hours for 10 days, Disp: 10 tablet, Rfl: 0    loratadine (CLARITIN) 10 mg tablet, Take 10 mg by mouth daily, Disp: , Rfl:     sertraline (ZOLOFT) 50 mg tablet, Take 1 tablet (50 mg total) by mouth daily, Disp: 30 tablet, Rfl: 5    Umeclidinium Bromide (INCRUSE ELLIPTA) 62 5 MCG/INH AEPB, Inhale 1 puff daily, Disp: 1 each, Rfl: 5  Allergies   Allergen Reactions    Penicillins Hives     Passed out       Objective     Current Vitals:   Blood Pressure: 140/78 (07/17/18 1447)  Pulse: 86 (07/17/18 1447)  Temperature: 98 4 °F (36 9 °C) (07/17/18 1447)  Temp Source: Oral (07/17/18 1447)  Respirations: 17 (oxygen air level 3) (07/17/18 1447)  Height: 5' 10" (177 8 cm) (07/17/18 1447)  Weight - Scale: 85 7 kg (189 lb) (07/17/18 1447)      Invasive Devices          No matching active lines, drains, or airways          Physical Exam:  The abdomen is soft, nondistended and nontender  Left groin incision is well-healed without evidence of infection  There is no evidence of recurrence of the hernia  The wound is indurated specially in the medial aspect of the incision  Again there is no evidence of infection or recurrence of the hernia

## 2018-07-18 DIAGNOSIS — K21.9 GASTROESOPHAGEAL REFLUX DISEASE WITHOUT ESOPHAGITIS: ICD-10-CM

## 2018-07-18 RX ORDER — ESOMEPRAZOLE MAGNESIUM 40 MG/1
40 CAPSULE, DELAYED RELEASE ORAL DAILY
Qty: 30 CAPSULE | Refills: 5 | Status: SHIPPED | OUTPATIENT
Start: 2018-07-18 | End: 2018-07-26 | Stop reason: SDUPTHER

## 2018-07-18 NOTE — TELEPHONE ENCOUNTER
Patient was on 20mg and started two pills for a total of 40mg because he didn't feel the 20mg was working  The 40mg is working  So I just wanted to let you know the patient wants 40mg now

## 2018-07-26 ENCOUNTER — OFFICE VISIT (OUTPATIENT)
Dept: INTERNAL MEDICINE CLINIC | Facility: CLINIC | Age: 68
End: 2018-07-26
Payer: MEDICARE

## 2018-07-26 ENCOUNTER — OFFICE VISIT (OUTPATIENT)
Dept: GASTROENTEROLOGY | Facility: CLINIC | Age: 68
End: 2018-07-26
Payer: MEDICARE

## 2018-07-26 VITALS
DIASTOLIC BLOOD PRESSURE: 88 MMHG | HEIGHT: 70 IN | TEMPERATURE: 97.4 F | BODY MASS INDEX: 27.2 KG/M2 | SYSTOLIC BLOOD PRESSURE: 138 MMHG | HEART RATE: 73 BPM | WEIGHT: 190 LBS | OXYGEN SATURATION: 93 % | RESPIRATION RATE: 20 BRPM

## 2018-07-26 VITALS
DIASTOLIC BLOOD PRESSURE: 76 MMHG | HEART RATE: 85 BPM | WEIGHT: 191.13 LBS | BODY MASS INDEX: 27.42 KG/M2 | SYSTOLIC BLOOD PRESSURE: 142 MMHG

## 2018-07-26 DIAGNOSIS — J43.9 PULMONARY EMPHYSEMA, UNSPECIFIED EMPHYSEMA TYPE (HCC): Primary | ICD-10-CM

## 2018-07-26 DIAGNOSIS — K59.1 FUNCTIONAL DIARRHEA: ICD-10-CM

## 2018-07-26 DIAGNOSIS — K21.9 GASTROESOPHAGEAL REFLUX DISEASE WITHOUT ESOPHAGITIS: Primary | ICD-10-CM

## 2018-07-26 DIAGNOSIS — F41.9 ANXIETY: ICD-10-CM

## 2018-07-26 DIAGNOSIS — Z12.11 SCREENING FOR COLON CANCER: ICD-10-CM

## 2018-07-26 DIAGNOSIS — K21.9 GASTROESOPHAGEAL REFLUX DISEASE WITHOUT ESOPHAGITIS: ICD-10-CM

## 2018-07-26 DIAGNOSIS — J30.9 ALLERGIC RHINITIS, UNSPECIFIED SEASONALITY, UNSPECIFIED TRIGGER: ICD-10-CM

## 2018-07-26 DIAGNOSIS — Z13.6 SCREENING FOR CARDIOVASCULAR CONDITION: ICD-10-CM

## 2018-07-26 PROCEDURE — 99214 OFFICE O/P EST MOD 30 MIN: CPT | Performed by: INTERNAL MEDICINE

## 2018-07-26 PROCEDURE — 99204 OFFICE O/P NEW MOD 45 MIN: CPT | Performed by: INTERNAL MEDICINE

## 2018-07-26 RX ORDER — ESOMEPRAZOLE MAGNESIUM 40 MG/1
40 CAPSULE, DELAYED RELEASE ORAL
Qty: 60 CAPSULE | Refills: 2 | Status: SHIPPED | OUTPATIENT
Start: 2018-07-26 | End: 2018-08-14 | Stop reason: SDUPTHER

## 2018-07-26 NOTE — LETTER
July 26, 2018     Kerwin De La Cruz MD  [address]  [address] [address]    Patient: Jody Moreau   YOB: 1950   Date of Visit: 7/26/2018       Dear Dr Loralee Curling: Thank you for referring Jody Moreau to me for evaluation  Below are my notes for this consultation  If you have questions, please do not hesitate to call me  I look forward to following your patient along with you  Sincerely,        Adan Stuart PA-C        CC: No Recipients  Erick Mcghee  7/26/2018  9:32 PM  Sign at close encounter  [facility]  Jody Moreau 79 y o  male MRN: [id number]       CC: New patient to establish for GERD    HPI: Mr Nelly Grimm is a 79year old male with history of COPD on supplemental O2 and chronic GERD who presents today to establish with gastroenterology  He is accompanied by his daughter today  They have recently moved from [place]  Patient is here by referral of his primary care physician for long term reflux  Patient reports mostly heart burn symptoms when he drinks coffee or eats spicy foods  In the past, his daughter reports he was trialed on Protonix and Prilosec at some point  He was recently switched to Nexium once daily  He denies recent weight loss, dysphagia, odynophagia, melena or hematochezia  When asked about other lower GI symptoms, he does report diarrhea with drinking coffee or milk  Otherwise, no issues with bowel movements  Per his daughter, his last colonoscopy was 4 years ago in [place]  He had a polyp removed  He is likely due for colonoscopy next year  His daughter believes he has also had a EGD, because her father has history of ulcers  He denies NSAID use  He is not on [facility]  Review of Systems:    CONSTITUTIONAL: Denies any fever, chills, or rigors  Good appetite, and no recent weight loss  HEENT: No earache or tinnitus  Denies hearing loss or visual disturbances    CARDIOVASCULAR: No chest pain or palpitations  RESPIRATORY: Denies any cough, hemoptysis, shortness of breath or dyspnea on exertion  GASTROINTESTINAL: As noted in the History of Present Illness  GENITOURINARY: No problems with urination  Denies any hematuria or dysuria  NEUROLOGIC: No dizziness or vertigo, denies headaches  MUSCULOSKELETAL: Denies any muscle or joint pain  SKIN: Denies skin rashes or itching  ENDOCRINE: Denies excessive thirst  Denies intolerance to heat or cold  PSYCHOSOCIAL: Denies depression or anxiety  Denies any recent memory loss  Current Outpatient Prescriptions   Medication Sig Dispense Refill    albuterol (2 5 mg/3 mL) 0 083 % nebulizer solution Take 3 mL (2 5 mg total) by nebulization every 4 (four) hours as needed for wheezing 1080 mL 3    albuterol (PROVENTIL HFA,VENTOLIN HFA) 90 mcg/act inhaler Inhale 2 puffs every 6 (six) hours as needed for wheezing      BREO ELLIPTA Inhale 1 puff daily 1 each 5    esomeprazole (NexIUM) 40 MG capsule Take 1 capsule (40 mg total) by mouth 2 (two) times a day before meals for 30 days 60 capsule 2    loratadine (CLARITIN) 10 mg tablet Take 10 mg by mouth daily      sertraline (ZOLOFT) 50 mg tablet Take 1 tablet (50 mg total) by mouth daily 30 tablet 5    Umeclidinium Bromide (INCRUSE ELLIPTA) 62 5 MCG/INH AEPB Inhale 1 puff daily 1 each 5     No current facility-administered medications for this visit        Past Medical History:   Diagnosis Date    [facility]     COPD (chronic obstructive pulmonary disease) (HCC)     Hypoglycemia     On home oxygen therapy     Pneumonia     Pulmonary [facility]     Respiratory failure (HCC)     SOB (shortness of breath)      Past Surgical History:   Procedure Laterality Date    CATARACT EXTRACTION      CHOLECYSTECTOMY      EYE SURGERY      AK REPAIR ING HERNIA,5+Y/O,REDUCIBL Left 5/23/2018    Procedure: OPEN INGUINAL HERNIA REPAIR WITH MESH;  Surgeon: Jordan Frero MD;  Location: [facility];  Service: General     Social History     Social History    Marital status: /Civil Union     Spouse name: janet     Number of children: 11    Years of education: N/A     Occupational History    retired       Social History Main Topics    Smoking status: Former Smoker     Years: 50 00     Types: Cigarettes     Quit date: 2/27/2013    Smokeless tobacco: Never Used    Alcohol use 0 6 oz/week     1 Glasses of wine per week      Comment: socialy    Drug use: No    Sexual activity: No     Other Topics Concern    None     Social History Narrative        Retired     Family History   Problem Relation Age of Onset    Diabetes Mother     Hypertension Mother     Hyperlipidemia Mother             PHYSICAL EXAM:    Vitals:    07/26/18 1528   BP: 142/76   Pulse: 85   Weight: 86 7 kg (191 lb 2 oz)     General Appearance:   Alert and oriented x 3  Cooperative, and in no respiratory distress  On nasal cannula      HEENT:   Normocephalic, atraumatic, anicteric      Neck:  Supple, symmetrical, trachea midline   Lungs:   Clear to auscultation bilaterally    Heart[de-identified]   Regular rate and rhythm   Abdomen:   Soft, non-tender, non-distended; normal bowel sounds; no masses, no organomegaly    Genitalia:   Deferred    Rectal:   Deferred    Extremities:  No cyanosis, clubbing or edema    Pulses:  2+ and symmetric all extremities    Skin:  Skin color, texture, turgor normal, no rashes or lesions    Lymph nodes:  No palpable cervical or supraclavicular lymphadenopathy        Lab Results:     Results from last 6 Months  Lab Units 05/31/18  1203   WBC Thousand/uL 10 66*   HEMOGLOBIN g/dL 12 9   HEMATOCRIT % 39 5   PLATELETS Thousands/uL 230   NEUTROS PCT % 77*   LYMPHS PCT % 13*   MONOS PCT % 6   EOS PCT % 3       Results from last 6 Months  Lab Units 05/31/18  1203   SODIUM mmol/L 136   POTASSIUM mmol/L 4 4   CHLORIDE mmol/L 101   CO2 mmol/L 29   BUN mg/dL 18   CREATININE mg/dL 0 79   CALCIUM mg/dL 8 4   TOTAL PROTEIN g/dL 7 3   BILIRUBIN TOTAL mg/dL 0 50   ALK PHOS U/L 74   ALT U/L 27   AST U/L 25   GLUCOSE RANDOM mg/dL 107               Imaging Studies: I have personally reviewed pertinent imaging studies  Cta Ed Chest Pe Study    Result Date: 5/31/2018  Impression: No pulmonary embolus  Extensive centrilobular and paraseptal emphysematous changes most severe in the lung apices  Large bulla noted in the posterior right lung apex  Bibasilar atelectasis  No pulmonary mass  No focal airspace opacity to suggest pneumonia  Pulmonary parenchymal findings are unchanged from March 20, 2018  Workstation performed: [id number]       ASSESSMENT and PLAN:      1) Chronic GERD - Patient recently was switched to Nexium by his PCP  He has trailed Protonix and Prilosec at some point  He currently does not have any alarm symptoms   - Increase Nexium to BID for 8 weeks  - Consider EGD if he were to develop worsening of his symptoms   - Patient and his daughter will follow-up with Pulmonary this month, they will ask for clearance just in case EGD will be planned     2) Diarrhea - This is occasional when he drinks coffee or milk  He is due for colonoscopy next year  Likely secondary to mild IBS  - Acquire records   - Colonoscopy next year   - Limit diary and caffeine         Follow up after seeing Pulmonary

## 2018-07-26 NOTE — PROGRESS NOTES
Assessment/Plan:      Chronic problems are stable  Continue present medications  Continue diet and exercise  Ordered labs for next visit  Follow-up with GI as planned this afternoon  Return in about 4 months (around 11/26/2018)  No problem-specific Assessment & Plan notes found for this encounter  Diagnoses and all orders for this visit:    Pulmonary emphysema, unspecified emphysema type ([facility] )  -     CBC and differential; Future  -     Comprehensive metabolic panel; Future    Gastroesophageal reflux disease without esophagitis    Anxiety    Allergic rhinitis, unspecified seasonality, unspecified trigger    Screening for cardiovascular condition  -     Lipid panel; Future          Subjective:      Patient ID: Td Barraza is a 79 y o  male  Patient comes in today for follow-up with his wife  His breathing is doing okay but he starting to notice a runny nose in the morning  His wife noted that he has not been taking his allergy medicine every day  Nerves are under control  He is seeing GI this afternoon for his reflux  But he is also concerned about his prior ulcer history  He is taking his Nexium as directed          ALLERGIES:  Allergies   Allergen Reactions    Penicillins Hives     Passed out       CURRENT MEDICATIONS:    Current Outpatient Prescriptions:     albuterol (2 5 mg/3 mL) 0 083 % nebulizer solution, Take 3 mL (2 5 mg total) by nebulization every 4 (four) hours as needed for wheezing, Disp: 1080 mL, Rfl: 3    albuterol (PROVENTIL HFA,VENTOLIN HFA) 90 mcg/act inhaler, Inhale 2 puffs every 6 (six) hours as needed for wheezing, Disp: , Rfl:     BREO ELLIPTA, Inhale 1 puff daily, Disp: 1 each, Rfl: 5    esomeprazole (NexIUM) 40 MG capsule, Take 1 capsule (40 mg total) by mouth daily, Disp: 30 capsule, Rfl: 5    loratadine (CLARITIN) 10 mg tablet, Take 10 mg by mouth daily, Disp: , Rfl:     sertraline (ZOLOFT) 50 mg tablet, Take 1 tablet (50 mg total) by mouth daily, Disp: 30 tablet, Rfl: 5    Umeclidinium Bromide (INCRUSE ELLIPTA) 62 5 MCG/INH AEPB, Inhale 1 puff daily, Disp: 1 each, Rfl: 5    ACTIVE PROBLEM LIST:  Patient Active Problem List   Diagnosis    Pulmonary emphysema (HCC)    Gastroesophageal reflux disease without esophagitis    Anxiety    Simple chronic bronchitis (HCC)    Shortness of breath on exertion    Abnormal ECG    PVCs (premature ventricular contractions)    Allergic rhinitis       PAST MEDICAL HISTORY:  Past Medical History:   Diagnosis Date    Centrilobular emphysema ([address] )     COPD (chronic obstructive pulmonary disease) (HCC)     Hypoglycemia     On home oxygen therapy     Pneumonia     Pulmonary hypertension (HCC)     Respiratory failure (HCC)     SOB (shortness of breath)        PAST SURGICAL HISTORY:  Past Surgical History:   Procedure Laterality Date    CATARACT EXTRACTION      CHOLECYSTECTOMY      EYE SURGERY      GA REPAIR ING HERNIA,5+Y/O,REDUCIBL Left 5/23/2018    Procedure: OPEN INGUINAL HERNIA REPAIR WITH MESH;  Surgeon: Breezy Crawford MD;  Location: [facility] OR;  Service: General       FAMILY HISTORY:  Family History   Problem Relation Age of Onset    Diabetes Mother     Hypertension Mother     Hyperlipidemia Mother        SOCIAL HISTORY:  Social History     Social History    Marital status: /Civil Union     Spouse name: janet     Number of children: 11    Years of education: N/A     Occupational History    retired       Social History Main Topics    Smoking status: Former Smoker     Years: 50 00     Types: Cigarettes     Quit date: 2/27/2013    Smokeless tobacco: Never Used    Alcohol use 0 6 oz/week     1 Glasses of wine per week      Comment: socialy    Drug use: No    Sexual activity: No     Other Topics Concern    Not on file     Social History Narrative        Retired       Review of Systems   Respiratory: Positive for shortness of breath  Negative for wheezing      Cardiovascular: Negative for chest pain  Gastrointestinal: Negative for abdominal pain  Objective:  Vitals:    07/26/18 1035   BP: 138/88   BP Location: Left arm   Patient Position: Sitting   Cuff Size: Adult   Pulse: 73   Resp: 20   Temp: (!) 97 4 °F (36 3 °C)   TempSrc: Temporal   SpO2: 93%   Weight: 86 2 kg (190 lb)   Height: 5' 10" (1 778 m)        Physical Exam   Constitutional: He is oriented to person, place, and time  He appears well-developed and well-nourished  Cardiovascular: Normal rate, regular rhythm and normal heart sounds  Pulmonary/Chest: Effort normal  He has no wheezes  Decreased breath sounds but clear   Abdominal: Soft  Bowel sounds are normal    Musculoskeletal: He exhibits no edema  Neurological: He is alert and oriented to person, place, and time  Nursing note and vitals reviewed  RESULTS:    No results found for this or any previous visit (from the past 1008 hour(s))  This note was created with voice recognition software  Phonic, grammatical and spelling errors may be present within the note as a result

## 2018-07-26 NOTE — LETTER
July 26, 2018     No Recipients    Patient: Hunter Bourgeois   YOB: 1950   Date of Visit: 7/26/2018       Dear Dr Leif Lu Recipients: Thank you for referring Hunter Bourgeois to me for evaluation  Below are my notes for this consultation  If you have questions, please do not hesitate to call me  I look forward to following your patient along with you  Sincerely,        Felipe Cox PA-C        CC: No Recipients  Magtray Severino  7/26/2018  9:32 PM  Sign at close encounter  [facility]  Hunter Bourgeois 79 y o  male MRN: [id number]       CC: New patient to establish for GERD    HPI: Mr Radha Angel is a 79year old male with history of COPD on supplemental O2 and chronic GERD who presents today to establish with gastroenterology  He is accompanied by his daughter today  They have recently moved from [place]  Patient is here by referral of his primary care physician for long term reflux  Patient reports mostly heart burn symptoms when he drinks coffee or eats spicy foods  In the past, his daughter reports he was trialed on Protonix and Prilosec at some point  He was recently switched to Nexium once daily  He denies recent weight loss, dysphagia, odynophagia, melena or hematochezia  When asked about other lower GI symptoms, he does report diarrhea with drinking coffee or milk  Otherwise, no issues with bowel movements  Per his daughter, his last colonoscopy was 4 years ago in [place]  He had a polyp removed  He is likely due for colonoscopy next year  His daughter believes he has also had a EGD, because her father has history of ulcers  He denies NSAID use  He is not on [facility]  Review of Systems:    CONSTITUTIONAL: Denies any fever, chills, or rigors  Good appetite, and no recent weight loss  HEENT: No earache or tinnitus  Denies hearing loss or visual disturbances  CARDIOVASCULAR: No chest pain or palpitations     RESPIRATORY: Denies any cough, hemoptysis, shortness of breath or dyspnea on exertion  GASTROINTESTINAL: As noted in the History of Present Illness  GENITOURINARY: No problems with urination  Denies any hematuria or dysuria  NEUROLOGIC: No dizziness or vertigo, denies headaches  MUSCULOSKELETAL: Denies any muscle or joint pain  SKIN: Denies skin rashes or itching  ENDOCRINE: Denies excessive thirst  Denies intolerance to heat or cold  PSYCHOSOCIAL: Denies depression or anxiety  Denies any recent memory loss  Current Outpatient Prescriptions   Medication Sig Dispense Refill    albuterol (2 5 mg/3 mL) 0 083 % nebulizer solution Take 3 mL (2 5 mg total) by nebulization every 4 (four) hours as needed for wheezing 1080 mL 3    albuterol (PROVENTIL HFA,VENTOLIN HFA) 90 mcg/act inhaler Inhale 2 puffs every 6 (six) hours as needed for wheezing      BREO ELLIPTA Inhale 1 puff daily 1 each 5    esomeprazole (NexIUM) 40 MG capsule Take 1 capsule (40 mg total) by mouth 2 (two) times a day before meals for 30 days 60 capsule 2    loratadine (CLARITIN) 10 mg tablet Take 10 mg by mouth daily      sertraline (ZOLOFT) 50 mg tablet Take 1 tablet (50 mg total) by mouth daily 30 tablet 5    Umeclidinium Bromide (INCRUSE ELLIPTA) 62 5 MCG/INH AEPB Inhale 1 puff daily 1 each 5     No current facility-administered medications for this visit        Past Medical History:   Diagnosis Date    Centrilobular emphysema ([address] )     COPD (chronic obstructive pulmonary disease) (HCC)     Hypoglycemia     On home oxygen therapy     Pneumonia     Pulmonary hypertension ([address]     Respiratory failure (HCC)     SOB (shortness of breath)      Past Surgical History:   Procedure Laterality Date    CATARACT EXTRACTION      CHOLECYSTECTOMY      EYE SURGERY      AR REPAIR ING HERNIA,5+Y/O,REDUCIBL Left 5/23/2018    Procedure: OPEN INGUINAL HERNIA REPAIR WITH MESH;  Surgeon: Alex Obrien MD;  Location: [facility];  Service: General     Social History Social History    Marital status: /Civil Union     Spouse name: janet     Number of children: 11    Years of education: N/A     Occupational History    retired       Social History Main Topics    Smoking status: Former Smoker     Years: 50 00     Types: Cigarettes     Quit date: 2/27/2013    Smokeless tobacco: Never Used    Alcohol use 0 6 oz/week     1 Glasses of wine per week      Comment: socialy    Drug use: No    Sexual activity: No     Other Topics Concern    None     Social History Narrative        Retired     Family History   Problem Relation Age of Onset    Diabetes Mother     Hypertension Mother     Hyperlipidemia Mother             PHYSICAL EXAM:    Vitals:    07/26/18 1528   BP: 142/76   Pulse: 85   Weight: 86 7 kg (191 lb 2 oz)     General Appearance:   Alert and oriented x 3  Cooperative, and in no respiratory distress  On nasal cannula      HEENT:   Normocephalic, atraumatic, anicteric      Neck:  Supple, symmetrical, trachea midline   Lungs:   Clear to auscultation bilaterally    Heart[de-identified]   Regular rate and rhythm   Abdomen:   Soft, non-tender, non-distended; normal bowel sounds; no masses, no organomegaly    Genitalia:   Deferred    Rectal:   Deferred    Extremities:  No cyanosis, clubbing or edema    Pulses:  2+ and symmetric all extremities    Skin:  Skin color, texture, turgor normal, no rashes or lesions    Lymph nodes:  No palpable cervical or supraclavicular lymphadenopathy        Lab Results:     Results from last 6 Months  Lab Units 05/31/18  1203   WBC Thousand/uL 10 66*   HEMOGLOBIN g/dL 12 9   HEMATOCRIT % 39 5   PLATELETS Thousands/uL 230   NEUTROS PCT % 77*   LYMPHS PCT % 13*   MONOS PCT % 6   EOS PCT % 3       Results from last 6 Months  Lab Units 05/31/18  1203   SODIUM mmol/L 136   POTASSIUM mmol/L 4 4   CHLORIDE mmol/L 101   CO2 mmol/L 29   BUN mg/dL 18   CREATININE mg/dL 0 79   CALCIUM mg/dL 8 4   TOTAL PROTEIN g/dL 7 3   BILIRUBIN TOTAL mg/dL 0 50 ALK PHOS U/L 74   ALT U/L 27   AST U/L 25   GLUCOSE RANDOM mg/dL 107               Imaging Studies: I have personally reviewed pertinent imaging studies  Cta Ed Chest Pe Study    Result Date: 5/31/2018  Impression: No pulmonary embolus  Extensive centrilobular and paraseptal emphysematous changes most severe in the lung apices  Large bulla noted in the posterior right lung apex  Bibasilar atelectasis  No pulmonary mass  No focal airspace opacity to suggest pneumonia  Pulmonary parenchymal findings are unchanged from March 20, 2018  Workstation performed: [id number]       ASSESSMENT and PLAN:      1) Chronic GERD - Patient recently was switched to Nexium by his PCP  He has trailed Protonix and Prilosec at some point  He currently does not have any alarm symptoms   - Increase Nexium to BID for 8 weeks  - Consider EGD if he were to develop worsening of his symptoms   - Patient and his daughter will follow-up with Pulmonary this month, they will ask for clearance just in case EGD will be planned     2) Diarrhea - This is occasional when he drinks coffee or milk  He is due for colonoscopy next year  Likely secondary to mild IBS  - Acquire records   - Colonoscopy next year   - Limit diary and caffeine         Follow up after seeing Pulmonary

## 2018-07-27 NOTE — PROGRESS NOTES
[facility]  Mesfin Fiona 79 y o  male MRN: [id number]       CC: New patient to establish for GERD    HPI: Mr Fercho Little is a 79year old male with history of COPD on supplemental O2 and chronic GERD who presents today to establish with gastroenterology  He is accompanied by his daughter today  They have recently moved from [place]  Patient is here by referral of his primary care physician for long term reflux  Patient reports mostly heart burn symptoms when he drinks coffee or eats spicy foods  In the past, his daughter reports he was trialed on Protonix and Prilosec at some point  He was recently switched to Nexium once daily  He denies recent weight loss, dysphagia, odynophagia, melena or hematochezia  When asked about other lower GI symptoms, he does report diarrhea with drinking coffee or milk  Otherwise, no issues with bowel movements  Per his daughter, his last colonoscopy was 4 years ago in [place]  He had a polyp removed  He is likely due for colonoscopy next year  His daughter believes he has also had a EGD, because her father has history of ulcers  He denies NSAID use  He is not on [facility]  Review of Systems:    CONSTITUTIONAL: Denies any fever, chills, or rigors  Good appetite, and no recent weight loss  HEENT: No earache or tinnitus  Denies hearing loss or visual disturbances  CARDIOVASCULAR: No chest pain or palpitations  RESPIRATORY: Denies any cough, hemoptysis, shortness of breath or dyspnea on exertion  GASTROINTESTINAL: As noted in the History of Present Illness  GENITOURINARY: No problems with urination  Denies any hematuria or dysuria  NEUROLOGIC: No dizziness or vertigo, denies headaches  MUSCULOSKELETAL: Denies any muscle or joint pain  SKIN: Denies skin rashes or itching  ENDOCRINE: Denies excessive thirst  Denies intolerance to heat or cold  PSYCHOSOCIAL: Denies depression or anxiety  Denies any recent memory loss         Current Outpatient Prescriptions   Medication Sig Dispense Refill    albuterol (2 5 mg/3 mL) 0 083 % nebulizer solution Take 3 mL (2 5 mg total) by nebulization every 4 (four) hours as needed for wheezing 1080 mL 3    albuterol (PROVENTIL HFA,VENTOLIN HFA) 90 mcg/act inhaler Inhale 2 puffs every 6 (six) hours as needed for wheezing      BREO ELLIPTA Inhale 1 puff daily 1 each 5    esomeprazole (NexIUM) 40 MG capsule Take 1 capsule (40 mg total) by mouth 2 (two) times a day before meals for 30 days 60 capsule 2    loratadine (CLARITIN) 10 mg tablet Take 10 mg by mouth daily      sertraline (ZOLOFT) 50 mg tablet Take 1 tablet (50 mg total) by mouth daily 30 tablet 5    Umeclidinium Bromide (INCRUSE ELLIPTA) 62 5 MCG/INH AEPB Inhale 1 puff daily 1 each 5     No current facility-administered medications for this visit        Past Medical History:   Diagnosis Date    Centrilobular emphysema (HCC)     COPD (chronic obstructive pulmonary disease) (HCC)     Hypoglycemia     On home oxygen therapy     Pneumonia     Pulmonary hypertension (HCC)     Respiratory failure (HCC)     SOB (shortness of breath)      Past Surgical History:   Procedure Laterality Date    CATARACT EXTRACTION      CHOLECYSTECTOMY      EYE SURGERY      [place] REPAIR ING HERNIA,5+Y/O,REDUCIBL Left 5/23/2018    Procedure: OPEN INGUINAL HERNIA REPAIR WITH MESH;  Surgeon: Meghan Armstrong MD;  Location: [facility];  Service: General     Social History     Social History    Marital status: /Civil Union     Spouse name: janet     Number of children: 11    Years of education: N/A     Occupational History    retired       Social History Main Topics    Smoking status: Former Smoker     Years: 50 00     Types: Cigarettes     Quit date: 2/27/2013    Smokeless tobacco: Never Used    Alcohol use 0 6 oz/week     1 Glasses of wine per week      Comment: socialy    Drug use: No    Sexual activity: No     Other Topics Concern    None     Social History Narrative        Retired     Family History   Problem Relation Age of Onset    Diabetes Mother     Hypertension Mother     Hyperlipidemia Mother             PHYSICAL EXAM:    Vitals:    07/26/18 1528   BP: 142/76   Pulse: 85   Weight: 86 7 kg (191 lb 2 oz)     General Appearance:   Alert and oriented x 3  Cooperative, and in no respiratory distress  On nasal cannula  HEENT:   Normocephalic, atraumatic, anicteric      Neck:  Supple, symmetrical, trachea midline   Lungs:   Clear to auscultation bilaterally    Heart[de-identified]   Regular rate and rhythm   Abdomen:   Soft, non-tender, non-distended; normal bowel sounds; no masses, no organomegaly    Genitalia:   Deferred    Rectal:   Deferred    Extremities:  No cyanosis, clubbing or edema    Pulses:  2+ and symmetric all extremities    Skin:  Skin color, texture, turgor normal, no rashes or lesions    Lymph nodes:  No palpable cervical or supraclavicular lymphadenopathy        Lab Results:     Results from last 6 Months  Lab Units 05/31/18  1203   WBC Thousand/uL 10 66*   HEMOGLOBIN g/dL 12 9   HEMATOCRIT % 39 5   PLATELETS Thousands/uL 230   NEUTROS PCT % 77*   LYMPHS PCT % 13*   MONOS PCT % 6   EOS PCT % 3       Results from last 6 Months  Lab Units 05/31/18  1203   SODIUM mmol/L 136   POTASSIUM mmol/L 4 4   CHLORIDE mmol/L 101   CO2 mmol/L 29   BUN mg/dL 18   CREATININE mg/dL 0 79   CALCIUM mg/dL 8 4   TOTAL PROTEIN g/dL 7 3   BILIRUBIN TOTAL mg/dL 0 50   ALK PHOS U/L 74   ALT U/L 27   AST U/L 25   GLUCOSE RANDOM mg/dL 107               Imaging Studies: I have personally reviewed pertinent imaging studies  Cta Ed Chest Pe Study    Result Date: 5/31/2018  Impression: No pulmonary embolus  Extensive centrilobular and paraseptal emphysematous changes most severe in the lung apices  Large bulla noted in the posterior right lung apex  Bibasilar atelectasis  No pulmonary mass  No focal airspace opacity to suggest pneumonia    Pulmonary parenchymal findings are unchanged from March 20, 2018  Workstation performed: [id number]       ASSESSMENT and PLAN:      1) Chronic GERD - Patient recently was switched to Nexium by his PCP  He has trailed Protonix and Prilosec at some point  He currently does not have any alarm symptoms   - Increase Nexium to BID for 8 weeks  - Consider EGD if he were to develop worsening of his symptoms   - Patient and his daughter will follow-up with Pulmonary this month, they will ask for clearance just in case EGD will be planned     2) Diarrhea - This is occasional when he drinks coffee or milk  He is due for colonoscopy next year  Likely secondary to mild IBS  - Acquire records   - Colonoscopy next year   - Limit diary and caffeine         Follow up after seeing Pulmonary

## 2018-07-31 ENCOUNTER — OFFICE VISIT (OUTPATIENT)
Dept: CARDIOLOGY CLINIC | Facility: CLINIC | Age: 68
End: 2018-07-31
Payer: MEDICARE

## 2018-07-31 VITALS
WEIGHT: 189 LBS | SYSTOLIC BLOOD PRESSURE: 120 MMHG | BODY MASS INDEX: 27.06 KG/M2 | HEIGHT: 70 IN | DIASTOLIC BLOOD PRESSURE: 78 MMHG | OXYGEN SATURATION: 97 % | HEART RATE: 74 BPM

## 2018-07-31 DIAGNOSIS — I49.3 PVCS (PREMATURE VENTRICULAR CONTRACTIONS): ICD-10-CM

## 2018-07-31 DIAGNOSIS — I51.89 DIASTOLIC DYSFUNCTION: ICD-10-CM

## 2018-07-31 DIAGNOSIS — R06.02 SHORTNESS OF BREATH ON EXERTION: Primary | ICD-10-CM

## 2018-07-31 DIAGNOSIS — J43.9 PULMONARY EMPHYSEMA, UNSPECIFIED EMPHYSEMA TYPE (HCC): ICD-10-CM

## 2018-07-31 DIAGNOSIS — I27.20 PULMONARY HYPERTENSION (HCC): ICD-10-CM

## 2018-07-31 PROCEDURE — 99214 OFFICE O/P EST MOD 30 MIN: CPT | Performed by: INTERNAL MEDICINE

## 2018-07-31 NOTE — PROGRESS NOTES
CARDIOLOGY OFFICE VISIT  [facility]  [address], [facility] [place]  Tel: [phone]      NAME: Jacqueline Guerin  AGE: 79 y o  SEX: male  : 1950   MRN: [id number]      Chief Complaint:  Chief Complaint   Patient presents with    Follow-up     PVCs         History of Present Illness:   Patient comes for follow up  States his hernia surgery went well  He continues to be SOB from his severe COPD as at baseline  On home oxygen therapy  Denies chest pain / pressure, palpitations, lightheadedness, syncope, swelling feet, orthopnea, PND, claudication      Pt has an extensive smoking history but he quit 5 years ago    Recent 2D echocardiogram found severe pulmonary hypertension      Past Medical History:  Past Medical History:   Diagnosis Date    Centrilobular emphysema ([address] )     COPD (chronic obstructive pulmonary disease) (HCC)     Hypoglycemia     On home oxygen therapy     Pneumonia     Pulmonary hypertension (HCC)     Respiratory failure (HCC)     SOB (shortness of breath)          Past Surgical History:  Past Surgical History:   Procedure Laterality Date    CATARACT EXTRACTION      CHOLECYSTECTOMY      EYE SURGERY      [place] REPAIR ING HERNIA,5+Y/O,REDUCIBL Left 2018    Procedure: OPEN INGUINAL HERNIA REPAIR WITH MESH;  Surgeon: Bailey Encarnacion MD;  Location: [facility];  Service: General         Family History:  Family History   Problem Relation Age of Onset    Diabetes Mother     Hypertension Mother     Hyperlipidemia Mother          Social History:  Social History     Social History    Marital status: /Civil Union     Spouse name: janet     Number of children: 11    Years of education: N/A     Occupational History    retired       Social History Main Topics    Smoking status: Former Smoker     Years: 50 00     Types: Cigarettes     Quit date: 2013    Smokeless tobacco: Never Used  Alcohol use 0 6 oz/week     1 Glasses of wine per week      Comment: socialy    Drug use: No    Sexual activity: No     Other Topics Concern    Not on file     Social History Narrative        Retired         Active Problems:  Patient Active Problem List   Diagnosis    Pulmonary emphysema ([address] )    Gastroesophageal reflux disease without esophagitis    Anxiety    Simple chronic bronchitis (HCC)    Shortness of breath on exertion    Abnormal ECG    PVCs (premature ventricular contractions)    Allergic rhinitis    Functional diarrhea    Diastolic dysfunction    Pulmonary hypertension (HCC)         The following portions of the patient's history were reviewed and updated as appropriate: past medical history, past surgical history, past family history,  past social history, current medications, allergies and problem list       Review of Systems:  Constitutional: Denies fever, chills, fatigue  Eyes: Denies eye redness, eye discharge, double vision  ENT: Denies hearing loss, tinnitus, sneezing, nasal discharge, sore throat   Respiratory: Denies cough, expectoration, hemoptysis   +shortness of breath  Cardiovascular: Denies chest pain, palpitations, orthopnea, PND, lower extremity swelling  Gastrointestinal: Denies abdominal pain, nausea, vomiting, hematemesis, diarrhea, bloody stools  Genito-Urinary: Denies dysuria, incontinence  Musculoskeletal: Denies back pain, joint pain, muscle pain  Neurologic: Denies confusion, lightheadedness, syncope, headache, focal weakness, sensory changes, seizures  Endocrine: Denies polyuria, polydipsia, temperature intolerance  Allergy and Immunology: Denies hives, insect bite sensitivity  Hematological and Lymphatic: Denies bleeding problems, swollen glands   Psychological: Denies depression, suicidal ideation, anxiety, panic  Dermatological: Denies pruritus, rash, skin lesion changes      Vitals:  Vitals:    07/31/18 1048   BP: 120/78   Pulse: 74   SpO2: 97% Body mass index is 27 12 kg/m²  Weight (last 2 days)     Date/Time   Weight    07/31/18 1048  85 7 (189)                Physical Examination:  General: On oxygen via nasal cannula  Awake, alert, oriented in time, place and person  Responding to commands  Head: Normocephalic  Atraumatic  Eyes: Both pupils normal sized, round and reactive to light  Nonicteric  ENT: Normal external ear canals  Nares normal, no drainage  Lips and oral mucosa normal  Neck: Supple  JVP not raised  Trachea central  No thyromegaly  Lungs: Bilateral decreased air entry  Chest wall: No tenderness  Cardiovascular: RRR  S1 and S2 normal  No murmur, rub or gallop  Gastrointestinal: Abdomen soft, nontender  No guarding or rigidity  Liver and spleen not palpable  Bowel sounds present  Neurologic: Patient is awake, alert, oriented in time, place and person  Responding to command  Moving all extremities  Integumentary:  No skin rash  Lymphatic: No cervical lymphadenopathy  Back: Symmetric   No CVA tenderness  Extremities: No clubbing, cyanosis or edema      Laboratory Results:  CBC with diff:   Lab Results   Component Value Date    WBC 10 66 (H) 05/31/2018    RBC 4 30 05/31/2018    HGB 12 9 05/31/2018    HCT 39 5 05/31/2018    MCV 92 05/31/2018    MCH 30 0 05/31/2018    RDW 13 6 05/31/2018     05/31/2018       CMP:  Lab Results   Component Value Date    CREATININE 0 79 05/31/2018    BUN 18 05/31/2018     05/31/2018    K 4 4 05/31/2018     05/31/2018    CO2 29 05/31/2018    GLUCOSE 107 05/31/2018    PROT 7 3 05/31/2018    ALKPHOS 74 05/31/2018    ALT 27 05/31/2018    AST 25 05/31/2018         Lab Results   Component Value Date    TROPONINI <0 02 05/31/2018       Cardiac testing:   Results for orders placed during the hospital encounter of 05/01/18   Echo complete with contrast if indicated    Narrative [facility], [address] [facility]  [phone]    Transthoracic Echocardiogram  2D, M-mode, Doppler, and Color Doppler    Study date:  01-May-2018    Patient: Rabia Jay  MR number: [id number]  Account number: [de-identified]  : 1950  Age: 74 years  Gender: Male  Status: Outpatient  [facility]: [facility]  Height: 70 in  Weight: 184 lb  BP: 124/ 82 mmHg    Indications: Dyspnea  Diagnoses: R06 09 - Other forms of dyspnea    Sonographer:  Mock RCS  Interpreting Physician:  Gagandeep Parnell MD  Primary Physician:  Laura Camp MD  Referring Physician:  Gagandeep Parnell MD  Group:  [facility]    SUMMARY    PROCEDURE INFORMATION:  This was a technically difficult study  Echocardiographic views were limited due to low windows and lung interference  LEFT VENTRICLE:  Systolic function was normal  Ejection fraction was estimated to be 60 %  There were no regional wall motion abnormalities  Doppler parameters were consistent with abnormal left ventricular relaxation (grade 1 diastolic dysfunction)  RIGHT VENTRICLE:  The size was normal   Systolic function was normal     MITRAL VALVE:  There was trace regurgitation  TRICUSPID VALVE:  There was mild regurgitation  Estimated peak PA pressure was 58 mmHg  The findings suggest severe pulmonary hypertension  IVC, HEPATIC VEINS:  The inferior vena cava was dilated  The respirophasic change in diameter was more than 50%  HISTORY: PRIOR HISTORY: Ventricular arrhythmia  Chronic lung disease (on home oxygen)  Abnormal ECG  PROCEDURE: The study was performed in the [address] [place] [address]  This was a routine study  The transthoracic approach was used  The study included complete 2D imaging, M-mode, complete spectral Doppler, and color Doppler  The  heart rate was 84 bpm, at the start of the study  Images were obtained from the parasternal, apical, subcostal, and suprasternal notch acoustic windows   Echocardiographic views were limited due to low windows and lung interference  This  was a technically difficult study  LEFT VENTRICLE: Size was normal  Systolic function was normal  Ejection fraction was estimated to be 60 %  There were no regional wall motion abnormalities  Wall thickness was normal  No evidence of apical thrombus  DOPPLER: Doppler  parameters were consistent with abnormal left ventricular relaxation (grade 1 diastolic dysfunction)  RIGHT VENTRICLE: The size was normal  Systolic function was normal  Wall thickness was normal     LEFT ATRIUM: Size was normal     RIGHT ATRIUM: Size was normal     MITRAL VALVE: Valve structure was normal  There was normal leaflet separation  DOPPLER: The transmitral velocity was within the normal range  There was no evidence for stenosis  There was trace regurgitation  AORTIC VALVE: The valve was trileaflet  Leaflets exhibited normal thickness and normal cuspal separation  DOPPLER: Transaortic velocity was within the normal range  There was no evidence for stenosis  There was no significant  regurgitation  TRICUSPID VALVE: The valve structure was normal  There was normal leaflet separation  DOPPLER: There was mild regurgitation  Estimated peak PA pressure was 58 mmHg  The findings suggest severe pulmonary hypertension  PULMONIC VALVE: Not well visualized  DOPPLER: The transpulmonic velocity was within the normal range  There was no significant regurgitation  PERICARDIUM: There was no pericardial effusion  The pericardium was normal in appearance  AORTA: The root exhibited normal size  SYSTEMIC VEINS: IVC: The inferior vena cava was dilated  The respirophasic change in diameter was more than 50%  SYSTEM MEASUREMENT TABLES    Apical four chamber  4 chamber Left Atrium Volume Index; Planimetry; End Systole; Apical four chamber;: 19 35 cm2  Left Ventricular Ejection Fraction; Method of Disks, Single Plane;  Apical four chamber;: 63 8 %  Left Ventricular End Diastolic Volume; Method of Disks, Single Plane; Apical four chamber;: 74 9 ml  Left Ventricular End Systolic Volume; Method of Disks, Single Plane; Apical four chamber;: 27 1 ml  Right Atrium Systolic Area; Planimetry; End Systole; Apical four chamber;: 15 66 cm2  Right Ventricular Internal Diastolic Dimension; End Diastole; Apical four chamber;: 59 9 mm  TAPSE: 31 mm    Unspecified Scan Mode  Aortic Root Diameter; End Systole;: 36 4 mm  Gradient Pressure, Peak; Simplified Bernoulli; Antegrade Flow; Systole;: 5 6 mm[Hg]  Gradient pressure, average; Simplified Bernoulli; Antegrade Flow; Systole;: 3 4 mm[Hg]  Left atrial diameter; End Diastole;: 36 3 mm  Cardiac Output; Teichholz; Systole;: 3 36 L/min  Interventricular Septum Diastolic Thickness; Teichholz; End Diastole;: 11 8 mm  Left Ventricle Internal End Diastolic Dimension; Teichholz;: 40 2 mm  Left Ventricle Internal Systolic Dimension; Teichholz; End Systole;: 28 8 mm  Left Ventricle Posterior Wall Diastolic Thickness; Teichholz; End Diastole;: 10 8 mm  Left Ventricular Ejection Fraction; Teichholz;: 55 2 %  Stroke volume;  Teichholz; Systole;: 39 1 ml  Mitral Valve Area; Area by Pressure Half-Time; Systole;: 3 24 cm2  Mitral Valve E to A Ratio; Systole;: 0 75  Maximum Tricuspid valve regurgitation pressure gradient; Regurgitant Flow; Systole;: 49 6 mm[Hg]    [facility] Accredited Echocardiography Laboratory    Prepared and electronically signed by    Eveline Pleitez MD  Signed 01-May-2018 18:08:39           Medications:    Current Outpatient Prescriptions:     albuterol (2 5 mg/3 mL) 0 083 % nebulizer solution, Take 3 mL (2 5 mg total) by nebulization every 4 (four) hours as needed for wheezing, Disp: 1080 mL, Rfl: 3    albuterol (PROVENTIL HFA,VENTOLIN HFA) 90 mcg/act inhaler, Inhale 2 puffs every 6 (six) hours as needed for wheezing, Disp: , Rfl:     BREO ELLIPTA, Inhale 1 puff daily, Disp: 1 each, Rfl: 5    esomeprazole (NexIUM) 40 MG capsule, Take 1 capsule (40 mg total) by mouth 2 (two) times a day before meals for 30 days, Disp: 60 capsule, Rfl: 2    loratadine (CLARITIN) 10 mg tablet, Take 10 mg by mouth daily, Disp: , Rfl:     sertraline (ZOLOFT) 50 mg tablet, Take 1 tablet (50 mg total) by mouth daily, Disp: 30 tablet, Rfl: 5    Umeclidinium Bromide (INCRUSE ELLIPTA) 62 5 MCG/INH AEPB, Inhale 1 puff daily, Disp: 1 each, Rfl: 5      Allergies: Allergies   Allergen Reactions    Penicillins Hives     Passed out         Assessment and Plan:  1  Shortness of breath on exertion   secondary to severe pulmonary emphysema with severe pulmonary hypertension  Continue to follow up with pulmonology  Continue home oxygen  Will set up an appointment with Dr Lela Monae for his severe pulmonary hypertension    2  Pulmonary emphysema, unspecified emphysema type ([facility] )   continue inhalers and home oxygen  Follow-up with pulmonology    3  PVCs (premature ventricular contractions)   likely exacerbated by his occasional hypoxia and use of beta-1 agonist inhalers    4  Diastolic dysfunction    5  Pulmonary [facility] ([facility] )   referred to Dr Caterina Bueno    Recommend aggressive risk factor modification and therapeutic lifestyle changes  Low-salt, low-calorie, low-fat, low-cholesterol diet with regular exercise and to optimize weight  I will defer the ordering and monitoring of necessity lab studies to you, but I am available and happy to review and manage any of the data at your request in the future  Discussed concepts of atherosclerosis, including signs and symptoms of cardiac disease  Previous studies were reviewed  Safety measures were reviewed  Questions were entertained and answered  Patient was advised to report any problems requiring medical attention  Follow-up with PCP and appropriate specialist and lab work as discussed  Return for follow up visit as scheduled or earlier, if needed  Thank you for allowing me to participate in the care and evaluation of your patient    Should you have any questions, please feel free to contact me        Claudetta Reichmann, MD  2/[id number]/6271,67:24 PM

## 2018-08-03 ENCOUNTER — TELEPHONE (OUTPATIENT)
Dept: PULMONOLOGY | Facility: CLINIC | Age: 68
End: 2018-08-03

## 2018-08-03 ENCOUNTER — OFFICE VISIT (OUTPATIENT)
Dept: PULMONOLOGY | Facility: CLINIC | Age: 68
End: 2018-08-03
Payer: MEDICARE

## 2018-08-03 VITALS
WEIGHT: 187 LBS | BODY MASS INDEX: 26.77 KG/M2 | OXYGEN SATURATION: 98 % | DIASTOLIC BLOOD PRESSURE: 80 MMHG | SYSTOLIC BLOOD PRESSURE: 130 MMHG | HEIGHT: 70 IN | HEART RATE: 90 BPM

## 2018-08-03 DIAGNOSIS — J41.0 SIMPLE CHRONIC BRONCHITIS (HCC): ICD-10-CM

## 2018-08-03 DIAGNOSIS — I27.20 PULMONARY HYPERTENSION (HCC): ICD-10-CM

## 2018-08-03 DIAGNOSIS — R06.02 SHORTNESS OF BREATH: Primary | ICD-10-CM

## 2018-08-03 DIAGNOSIS — J96.11 CHRONIC RESPIRATORY FAILURE WITH HYPOXIA (HCC): ICD-10-CM

## 2018-08-03 DIAGNOSIS — J43.2 CENTRILOBULAR EMPHYSEMA (HCC): ICD-10-CM

## 2018-08-03 PROCEDURE — 99214 OFFICE O/P EST MOD 30 MIN: CPT | Performed by: INTERNAL MEDICINE

## 2018-08-03 RX ORDER — BUDESONIDE 0.5 MG/2ML
0.5 INHALANT ORAL
Status: DISCONTINUED | OUTPATIENT
Start: 2018-08-03 | End: 2018-08-03

## 2018-08-03 RX ORDER — BUDESONIDE 0.5 MG/2ML
0.5 INHALANT ORAL 2 TIMES DAILY
Qty: 120 ML | Refills: 5 | Status: ON HOLD | OUTPATIENT
Start: 2018-08-03 | End: 2018-09-19 | Stop reason: ALTCHOICE

## 2018-08-03 RX ORDER — ARFORMOTEROL TARTRATE 15 UG/2ML
15 SOLUTION RESPIRATORY (INHALATION) 2 TIMES DAILY
Qty: 120 ML | Refills: 5 | Status: SHIPPED | OUTPATIENT
Start: 2018-08-03 | End: 2018-09-10

## 2018-08-03 NOTE — PROGRESS NOTES
Assessment/Plan:   Diagnoses and all orders for this visit:    Shortness of breath    Chronic respiratory failure with hypoxia (HCC)    Centrilobular emphysema (HCC)  -     arformoterol (BROVANA) 15 mcg/2 mL nebulizer solution; Take 1 vial (15 mcg total) by nebulization 2 (two) times a day  -     Discontinue: budesonide (PULMICORT) inhalation solution 0 5 mg; Take 2 mL (0 5 mg total) by nebulization every 12 (twelve) hours   -     budesonide (PULMICORT) 0 5 mg/2 mL nebulizer solution; Take 1 vial (0 5 mg total) by nebulization 2 (two) times a day Rinse mouth after use  Simple chronic bronchitis ([facility] [address] )    Pulmonary hypertension ([facility] [address] )        Patient with severe COPD, with an FEV1 of 25%, increased lung volumes and residual volumes and severely decreased DLCO  On home oxygen continuous 3 L/m to continue  He is currently on breo and incruse  Given his poor inspiratory capacity will add long-acting bronchodilators brovana and budesonide via the nebulizer twice daily  DC breo  Albuterol via the nebulizer once daily as needed  Pulmonary hypertension moderate to severe, would need right heart catheter and for treatment  Also discussed with the patient regarding following up at [facility], wants to follow-up after changing with the medications and he wants to check how he is doing with the Brovana and budesonide  Vaccinations up-to-date  Also discussed regarding CT of the chest for lung cancer screening annually, last CT of the chest done in May 2018 with significant evidence of for emphysema, no evidence of any lung nodules  Follow-up in 3 months or when necessary earlier as needed  Return in about 3 months (around 11/3/2018)  All questions are answered to the patient's satisfaction and understanding  He verbalizes understanding  He is encouraged to call with any further questions or concerns  Portions of the record may have been created with voice recognition software    Occasional wrong word or "sound a like" substitutions may have occurred due to the inherent limitations of voice recognition software  Read the chart carefully and recognize, using context, where substitutions have occurred  Electronically Signed by Evelyn Nair MD    ______________________________________________________________________    Chief Complaint: No chief complaint on file  Patient ID: Choco Lindsey is a 79 y o  y o  male has a past medical history of Centrilobular emphysema ([facility] [address] ); COPD (chronic obstructive pulmonary disease) ([facility] [address] ); Hypoglycemia; On home oxygen therapy; Pneumonia; Pulmonary hypertension ([facility] [address] ); Respiratory failure ([facility] [address] ); and SOB (shortness of breath)  8/3/2018  Patient is a 71-year-old gentleman, with 50-pack-year smoking history who quit 5 years ago, with history of COPD on bronchodilators as well as on home oxygen continuous 3 L per minute for the past year, during the last year he states he has been having some episodes of chronic bronchitis with flared up at least 2-3 in the past year, never admitted in the hospital for definitive, used to follow-up with the pulmonologist at [place], currently moved to [place] recently to live with his daughter   He recently had a flare up of COPD for which he was on antibiotic as well as steroids  His currently better with his coughing and wheezing  Also recently had an echocardiogram and was found to have moderate to severe pulmonary hypertension on the echocardiogram     Kole Hutson states he has been using his rescue medication at least 2-3 times a day along with his maintenance medications, still having the cough and the wheezing and shortness of breath      Review of Systems   Constitutional: Positive for fatigue  Negative for activity change, appetite change, chills, diaphoresis, fever and unexpected weight change  HENT: Negative for congestion, dental problem, drooling, nosebleeds, postnasal drip, rhinorrhea, sinus pressure, sore throat and voice change  Eyes: Negative for discharge, itching and visual disturbance  Respiratory: Positive for cough (clear sputum, no hemoptysis), shortness of breath and wheezing  Cardiovascular: Negative for chest pain, palpitations and leg swelling  Endocrine: Negative for cold intolerance and heat intolerance  Allergic/Immunologic: Negative for food allergies and immunocompromised state  Neurological: Negative for dizziness, facial asymmetry, speech difficulty and weakness  Hematological: Negative for adenopathy  Psychiatric/Behavioral: Negative for agitation, confusion and sleep disturbance  The patient is not nervous/anxious  Smoking history: He reports that he quit smoking about 5 years ago  His smoking use included Cigarettes  He quit after 50 00 years of use  He has never used smokeless tobacco     The following portions of the patient's history were reviewed and updated as appropriate: allergies, current medications, past family history, past medical history, past social history, past surgical history and problem list       There is no immunization history on file for this patient    Current Outpatient Prescriptions   Medication Sig Dispense Refill    albuterol (2 5 mg/3 mL) 0 083 % nebulizer solution Take 3 mL (2 5 mg total) by nebulization every 4 (four) hours as needed for wheezing 1080 mL 3    albuterol (PROVENTIL HFA,VENTOLIN HFA) 90 mcg/act inhaler Inhale 2 puffs every 6 (six) hours as needed for wheezing      esomeprazole (NexIUM) 40 MG capsule Take 1 capsule (40 mg total) by mouth 2 (two) times a day before meals for 30 days 60 capsule 2    loratadine (CLARITIN) 10 mg tablet Take 10 mg by mouth daily      sertraline (ZOLOFT) 50 mg tablet Take 1 tablet (50 mg total) by mouth daily 30 tablet 5    Umeclidinium Bromide (INCRUSE ELLIPTA) 62 5 MCG/INH AEPB Inhale 1 puff daily 1 each 5    arformoterol (BROVANA) 15 mcg/2 mL nebulizer solution Take 1 vial (15 mcg total) by nebulization 2 (two) times a day 120 mL 5    budesonide (PULMICORT) 0 5 mg/2 mL nebulizer solution Take 1 vial (0 5 mg total) by nebulization 2 (two) times a day Rinse mouth after use  120 mL 5     No current facility-administered medications for this visit  Allergies: Penicillins    Objective:  Vitals:    08/03/18 1100   BP: 130/80   Pulse: 90   SpO2: 98%   Weight: 84 8 kg (187 lb)   Height: 5' 10" (1 778 m)   Oxygen Therapy  SpO2: 98 %  Oxygen Therapy: Supplemental oxygen  O2 Delivery Method: Nasal cannula  O2 Flow Rate (L/min): 3 L/min    Wt Readings from Last 3 Encounters:   08/03/18 84 8 kg (187 lb)   07/31/18 85 7 kg (189 lb)   07/26/18 86 7 kg (191 lb 2 oz)     Body mass index is 26 83 kg/m²  Physical Exam   Constitutional: He is oriented to person, place, and time  He appears well-developed and well-nourished  HENT:   Head: Normocephalic and atraumatic  Eyes: EOM are normal  Pupils are equal, round, and reactive to light  Neck: Normal range of motion  Neck supple  Cardiovascular: Normal rate and regular rhythm  Pulmonary/Chest: Effort normal  He has wheezes (occ expiratory rhonchi)  He has no rales  He exhibits no tenderness  Abdominal: Soft  Bowel sounds are normal    Musculoskeletal: Normal range of motion  Neurological: He is alert and oriented to person, place, and time  Skin: Skin is warm and dry  Psychiatric: He has a normal mood and affect   His behavior is normal

## 2018-08-14 ENCOUNTER — OFFICE VISIT (OUTPATIENT)
Dept: INTERNAL MEDICINE CLINIC | Facility: CLINIC | Age: 68
End: 2018-08-14
Payer: MEDICARE

## 2018-08-14 VITALS
WEIGHT: 189 LBS | BODY MASS INDEX: 27.06 KG/M2 | DIASTOLIC BLOOD PRESSURE: 62 MMHG | OXYGEN SATURATION: 94 % | HEIGHT: 70 IN | SYSTOLIC BLOOD PRESSURE: 128 MMHG | HEART RATE: 84 BPM | RESPIRATION RATE: 22 BRPM

## 2018-08-14 DIAGNOSIS — J30.9 ALLERGIC RHINITIS, UNSPECIFIED SEASONALITY, UNSPECIFIED TRIGGER: ICD-10-CM

## 2018-08-14 DIAGNOSIS — J44.1 ACUTE EXACERBATION OF CHRONIC OBSTRUCTIVE PULMONARY DISEASE (COPD) (HCC): Primary | ICD-10-CM

## 2018-08-14 DIAGNOSIS — K21.9 GASTROESOPHAGEAL REFLUX DISEASE WITHOUT ESOPHAGITIS: ICD-10-CM

## 2018-08-14 PROCEDURE — 99213 OFFICE O/P EST LOW 20 MIN: CPT | Performed by: PHYSICIAN ASSISTANT

## 2018-08-14 RX ORDER — ESOMEPRAZOLE MAGNESIUM 40 MG/1
40 CAPSULE, DELAYED RELEASE ORAL DAILY
Qty: 30 CAPSULE | Refills: 3 | Status: SHIPPED | OUTPATIENT
Start: 2018-08-14 | End: 2018-12-26 | Stop reason: HOSPADM

## 2018-08-14 RX ORDER — METHYLPREDNISOLONE 4 MG/1
TABLET ORAL
Qty: 21 TABLET | Refills: 0 | Status: SHIPPED | OUTPATIENT
Start: 2018-08-14 | End: 2018-09-10 | Stop reason: ALTCHOICE

## 2018-08-14 RX ORDER — RANITIDINE 150 MG/1
150 TABLET ORAL
Qty: 30 TABLET | Refills: 5 | Status: SHIPPED | OUTPATIENT
Start: 2018-08-14 | End: 2019-02-02 | Stop reason: SDUPTHER

## 2018-08-14 NOTE — PATIENT INSTRUCTIONS
Will start patient on Medrol pack  Patient did take all 6 tablets today when he gets them filled  Will change his Nexium to taking only in the morning, and add ranitidine 150 mg to take at bedtime  No other medication changes  Patient to let me know if sputum changes color

## 2018-08-14 NOTE — PROGRESS NOTES
Assessment/Plan:   Patient Instructions   Will start patient on Medrol pack  Patient did take all 6 tablets today when he gets them filled  Will change his Nexium to taking only in the morning, and add ranitidine 150 mg to take at bedtime  No other medication changes  Return for Next scheduled follow up  Diagnoses and all orders for this visit:    Acute exacerbation of chronic obstructive pulmonary disease (COPD) ([facility])  -     Methylprednisolone 4 MG TBPK; Use as directed on package    Gastroesophageal reflux disease without esophagitis  -     esomeprazole (NexIUM) 40 MG capsule; Take 1 capsule (40 mg total) by mouth daily for 30 days  -     ranitidine (ZANTAC) 150 mg tablet; Take 1 tablet (150 mg total) by mouth daily at bedtime    Allergic rhinitis, unspecified seasonality, unspecified trigger          Subjective:      Patient ID: Td Barraza is a 79 y o  male  Acute visit    Patient with known COPD presents today with increased shortness of breath and cough  He states he is only producing white sputum but he has noted an increase in his wheezing and shortness of breath  His baseline exercise tolerance has been reduced  He does admit to runny nose, itchy watery eyes, sneezing, itchy ears and postnasal drip  He did admit that he has been outside trying to we the garden and do outside activities  He is known also to have seasonal allergies          ALLERGIES:  Allergies   Allergen Reactions    Penicillins Hives     Passed out       CURRENT MEDICATIONS:    Current Outpatient Prescriptions:     albuterol (2 5 mg/3 mL) 0 083 % nebulizer solution, Take 3 mL (2 5 mg total) by nebulization every 4 (four) hours as needed for wheezing, Disp: 1080 mL, Rfl: 3    albuterol (PROVENTIL HFA,VENTOLIN HFA) 90 mcg/act inhaler, Inhale 2 puffs every 6 (six) hours as needed for wheezing, Disp: , Rfl:     arformoterol (BROVANA) 15 mcg/2 mL nebulizer solution, Take 1 vial (15 mcg total) by nebulization 2 (two) times a day, Disp: 120 mL, Rfl: 5    budesonide (PULMICORT) 0 5 mg/2 mL nebulizer solution, Take 1 vial (0 5 mg total) by nebulization 2 (two) times a day Rinse mouth after use , Disp: 120 mL, Rfl: 5    esomeprazole (NexIUM) 40 MG capsule, Take 1 capsule (40 mg total) by mouth daily for 30 days, Disp: 30 capsule, Rfl: 3    loratadine (CLARITIN) 10 mg tablet, Take 10 mg by mouth daily, Disp: , Rfl:     sertraline (ZOLOFT) 50 mg tablet, Take 1 tablet (50 mg total) by mouth daily, Disp: 30 tablet, Rfl: 5    Umeclidinium Bromide (INCRUSE ELLIPTA) 62 5 MCG/INH AEPB, Inhale 1 puff daily, Disp: 1 each, Rfl: 5    Methylprednisolone 4 MG TBPK, Use as directed on package, Disp: 21 tablet, Rfl: 0    ranitidine (ZANTAC) 150 mg tablet, Take 1 tablet (150 mg total) by mouth daily at bedtime, Disp: 30 tablet, Rfl: 5    ACTIVE PROBLEM LIST:  Patient Active Problem List   Diagnosis    Pulmonary emphysema (HCC)    Gastroesophageal reflux disease without esophagitis    Anxiety    Simple chronic bronchitis (HCC)    Shortness of breath on exertion    Abnormal ECG    PVCs (premature ventricular contractions)    Allergic rhinitis    Functional diarrhea    Diastolic dysfunction    Pulmonary hypertension (HCC)    Acute exacerbation of chronic obstructive pulmonary disease (COPD) (HCC)       PAST MEDICAL HISTORY:  Past Medical History:   Diagnosis Date    Centrilobular emphysema ([facility] [address] )     COPD (chronic obstructive pulmonary disease) (HCC)     Hypoglycemia     On home oxygen therapy     Pneumonia     Pulmonary hypertension ([facility] [address] )     [facility] failure ([facility])     SOB (shortness of breath)        PAST SURGICAL HISTORY:  Past Surgical History:   Procedure Laterality Date    CATARACT EXTRACTION      CHOLECYSTECTOMY      EYE SURGERY      IA REPAIR ING HERNIA,5+Y/O,REDUCIBL Left 5/23/2018    Procedure: OPEN INGUINAL HERNIA REPAIR WITH MESH;  Surgeon: Breezy Crawford MD;  Location: [facility]; Service: General       FAMILY HISTORY:  Family History   Problem Relation Age of Onset    Diabetes Mother     Hypertension Mother     Hyperlipidemia Mother        SOCIAL HISTORY:  Social History     Social History    Marital status: /Civil Union     Spouse name: janet     Number of children: 11    Years of education: N/A     Occupational History    retired       Social History Main Topics    Smoking status: Former Smoker     Years: 50 00     Types: Cigarettes     Quit date: 2/27/2013    Smokeless tobacco: Never Used    Alcohol use 0 6 oz/week     1 Glasses of wine per week      Comment: socialy    Drug use: No    Sexual activity: No     Other Topics Concern    Not on file     Social History Narrative        Retired       Review of Systems   Constitutional: Positive for fatigue  Negative for activity change, chills and fever  HENT: Positive for congestion, postnasal drip, rhinorrhea and sneezing  Negative for sore throat  Eyes: Positive for discharge, redness and itching  Respiratory: Positive for cough, shortness of breath and wheezing  Negative for chest tightness  Cardiovascular: Negative for chest pain, palpitations and leg swelling  Gastrointestinal: Negative for abdominal pain  Genitourinary: Negative for difficulty urinating  Musculoskeletal: Negative for arthralgias and myalgias  Skin: Negative for rash  Allergic/Immunologic: Negative for immunocompromised state  Neurological: Negative for dizziness, syncope, weakness, light-headedness and headaches  Hematological: Negative for adenopathy  Does not bruise/bleed easily  Psychiatric/Behavioral: Negative for dysphoric mood  The patient is not nervous/anxious            Objective:  Vitals:    08/14/18 1035   BP: 128/62   BP Location: Right arm   Patient Position: Sitting   Cuff Size: Adult   Pulse: 84   Resp: 22   SpO2: 94%   Weight: 85 7 kg (189 lb)   Height: 5' 10" (1 778 m)        Physical Exam Constitutional: He is oriented to person, place, and time  He appears well-developed and well-nourished  Patient is wearing his portable oxygen, he looks comfortable at rest   No use of accessory muscles of breathing   HENT:   HEENT-granular, injected conjunctivae, TM's with fluid behind,nasal mucosa pale and boggy with clear/white mucoid drainage, granular soft palate, posterior pharynx with clear/white post nasal drainage, allergic shiners, no sinus tenderness   Cardiovascular: Normal rate, regular rhythm and normal heart sounds  Pulmonary/Chest: Effort normal  No respiratory distress  Overall decreased breath sounds throughout, late inspiratory wheezes which are faint, faint early expiratory wheezing with prolonged expiratory phase  Patient did expectorate whitish sputum   Musculoskeletal: He exhibits no edema  Neurological: He is alert and oriented to person, place, and time  Skin: Skin is warm and dry  No rash noted  Psychiatric: He has a normal mood and affect  His behavior is normal    Nursing note and vitals reviewed  RESULTS:    No results found for this or any previous visit (from the past 1008 hour(s))  This note was created with voice recognition software  Phonic, grammatical and spelling errors may be present within the note as a result

## 2018-09-05 RX ORDER — FLUTICASONE FUROATE AND VILANTEROL TRIFENATATE 100; 25 UG/1; UG/1
POWDER RESPIRATORY (INHALATION)
Refills: 0 | COMMUNITY
Start: 2018-08-05 | End: 2018-09-10 | Stop reason: ALTCHOICE

## 2018-09-07 ENCOUNTER — TELEPHONE (OUTPATIENT)
Dept: PULMONOLOGY | Facility: CLINIC | Age: 68
End: 2018-09-07

## 2018-09-10 ENCOUNTER — OFFICE VISIT (OUTPATIENT)
Dept: GASTROENTEROLOGY | Facility: CLINIC | Age: 68
End: 2018-09-10
Payer: MEDICARE

## 2018-09-10 ENCOUNTER — OFFICE VISIT (OUTPATIENT)
Dept: INTERNAL MEDICINE CLINIC | Facility: CLINIC | Age: 68
End: 2018-09-10
Payer: MEDICARE

## 2018-09-10 VITALS
DIASTOLIC BLOOD PRESSURE: 84 MMHG | OXYGEN SATURATION: 97 % | WEIGHT: 187.6 LBS | HEART RATE: 87 BPM | BODY MASS INDEX: 26.86 KG/M2 | SYSTOLIC BLOOD PRESSURE: 134 MMHG | TEMPERATURE: 98.1 F | RESPIRATION RATE: 16 BRPM | HEIGHT: 70 IN

## 2018-09-10 VITALS
SYSTOLIC BLOOD PRESSURE: 130 MMHG | DIASTOLIC BLOOD PRESSURE: 78 MMHG | BODY MASS INDEX: 27.03 KG/M2 | WEIGHT: 188.38 LBS | HEART RATE: 85 BPM

## 2018-09-10 DIAGNOSIS — K21.9 GASTROESOPHAGEAL REFLUX DISEASE WITHOUT ESOPHAGITIS: ICD-10-CM

## 2018-09-10 DIAGNOSIS — K59.1 FUNCTIONAL DIARRHEA: ICD-10-CM

## 2018-09-10 DIAGNOSIS — Z12.11 SCREENING FOR COLON CANCER: ICD-10-CM

## 2018-09-10 DIAGNOSIS — J44.1 ACUTE EXACERBATION OF CHRONIC OBSTRUCTIVE PULMONARY DISEASE (COPD) (HCC): Primary | ICD-10-CM

## 2018-09-10 DIAGNOSIS — Z09 FOLLOW UP: Primary | ICD-10-CM

## 2018-09-10 DIAGNOSIS — J44.9 COPD (CHRONIC OBSTRUCTIVE PULMONARY DISEASE) WITH CHRONIC BRONCHITIS (HCC): Primary | ICD-10-CM

## 2018-09-10 PROCEDURE — 99213 OFFICE O/P EST LOW 20 MIN: CPT | Performed by: INTERNAL MEDICINE

## 2018-09-10 PROCEDURE — 99214 OFFICE O/P EST MOD 30 MIN: CPT | Performed by: INTERNAL MEDICINE

## 2018-09-10 RX ORDER — LEVOFLOXACIN 500 MG/1
500 TABLET, FILM COATED ORAL EVERY 24 HOURS
Qty: 10 TABLET | Refills: 0 | Status: ON HOLD | OUTPATIENT
Start: 2018-09-10 | End: 2018-09-19 | Stop reason: ALTCHOICE

## 2018-09-10 RX ORDER — CHOLESTYRAMINE 4 G/9G
1 POWDER, FOR SUSPENSION ORAL 2 TIMES DAILY WITH MEALS
Qty: 60 EACH | Refills: 2 | Status: SHIPPED | OUTPATIENT
Start: 2018-09-10 | End: 2018-12-05 | Stop reason: SDUPTHER

## 2018-09-10 RX ORDER — PREDNISONE 1 MG/1
5 TABLET ORAL DAILY
Qty: 60 TABLET | Refills: 1 | Status: SHIPPED | OUTPATIENT
Start: 2018-09-10 | End: 2018-10-25 | Stop reason: ALTCHOICE

## 2018-09-10 NOTE — PATIENT INSTRUCTIONS
Prednisone taper:    20 mg twice a day for 3 days  10 mg twice a day for 3 days  5 mg twice a day for 3 days  5 mg daily for 3 days  2 5 mg daily for 3 days (1/2 tablet)  STOP

## 2018-09-10 NOTE — PROGRESS NOTES
Follow-up Note -  [facility]  Silvia Raymond 1950 79 y o  male     ASSESSMENT @ PLAN:   He is a 79-year-old male  With continued epigastric abdominal burning and reflux despite Nexium 40 milligrams daily and ranitidine 75 milligrams p o  B i d  He does have a history a peptic ulcer disease  In addition he has irritable bowel syndrome diarrhea and reports that he has cramping bloating and distention and diarrhea after eating [employer]  He is due for polyp surveillance next year with colonoscopy    1 Will do EGD to investigate;  I went over pulmonary optimization strategies with inhalers and nebulizers prior to the procedure    2 we will continue his Nexium 40 milligrams daily in addition to ranitidine 75 milligrams p o  B i d ;  He has failed Protonix and Prilosec in the past per him    3 I am going to have him do cholestyramine 1 packet p o  B i d     Reason:  GERD and abdominal pain and diarrhea    HPI:  He is a 79-year-old male with gastroesophageal reflux disease on multiple antacids  He reports that he is no better after being switched to the Nexium  He reports that coffee beer and wine give some extreme burning in the epigastrium  He reports that this is very severe  He taking ranitidine 75 milligrams twice a day and this is helping a little bit  He also reports that he went to [employer] today and had a soda and a cheeseburger and then he had cramping and diarrhea  He has failed Prilosec and Protonix in the past   He had endoscopy in [place] before he moved here 3 years ago  He had a colonoscopy at the same time but he is due next year  He is having no melena hematochezia no weight loss  He is oxygen dependent  REVIEW OF SYSTEMS:     CONSTITUTIONAL: Denies any fever, chills, or rigors  Good appetite, and no recent weight loss  HEENT: No earache or tinnitus  Denies hearing loss or visual disturbances  CARDIOVASCULAR: No chest pain or palpitations     RESPIRATORY: Denies any cough, hemoptysis, shortness of breath or dyspnea on exertion  GASTROINTESTINAL: As noted in the History of Present Illness  GENITOURINARY: No problems with urination  Denies any hematuria or dysuria  NEUROLOGIC: No dizziness or vertigo, denies headaches  MUSCULOSKELETAL: Denies any muscle or joint pain  SKIN: Denies skin rashes or itching  ENDOCRINE: Denies excessive thirst  Denies intolerance to heat or cold  PSYCHOSOCIAL: Denies depression or anxiety  Denies any recent memory loss       Past Medical History:   Diagnosis Date    Centrilobular emphysema (HCC)     COPD (chronic obstructive pulmonary disease) (HCC)     Hypoglycemia     On home oxygen therapy     Pneumonia     Pulmonary hypertension (HCC)     Respiratory failure (HCC)     SOB (shortness of breath)       Past Surgical History:   Procedure Laterality Date    CATARACT EXTRACTION      CHOLECYSTECTOMY      hernia    EYE SURGERY      [place] REPAIR ING HERNIA,5+Y/O,REDUCIBL Left 5/23/2018    Procedure: OPEN INGUINAL HERNIA REPAIR WITH MESH;  Surgeon: Traci Valdez MD;  Location: [facility];  Service: General     Social History     Social History    Marital status: /Civil Union     Spouse name: janet     Number of children: 11    Years of education: N/A     Occupational History    retired       Social History Main Topics    Smoking status: Former Smoker     Years: 50 00     Types: Cigarettes     Quit date: 2/27/2013    Smokeless tobacco: Never Used    Alcohol use 0 6 oz/week     1 Glasses of wine per week      Comment: socialy    Drug use: No    Sexual activity: No     Other Topics Concern    Not on file     Social History Narrative        Retired     Family History   Problem Relation Age of Onset    Diabetes Mother     Hypertension Mother     Hyperlipidemia Mother      Penicillins  Current Outpatient Prescriptions   Medication Sig Dispense Refill    albuterol (2 5 mg/3 mL) 0 083 % nebulizer solution Take 3 mL (2 5 mg total) by nebulization every 4 (four) hours as needed for wheezing 1080 mL 3    albuterol (PROVENTIL HFA,VENTOLIN HFA) 90 mcg/act inhaler Inhale 2 puffs every 6 (six) hours as needed for wheezing      budesonide (PULMICORT) 0 5 mg/2 mL nebulizer solution Take 1 vial (0 5 mg total) by nebulization 2 (two) times a day Rinse mouth after use  120 mL 5    esomeprazole (NexIUM) 40 MG capsule Take 1 capsule (40 mg total) by mouth daily for 30 days 30 capsule 3    levofloxacin (LEVAQUIN) 500 mg tablet Take 1 tablet (500 mg total) by mouth every 24 hours for 10 days 10 tablet 0    loratadine (CLARITIN) 10 mg tablet Take 10 mg by mouth daily      predniSONE 5 mg tablet Take 1 tablet (5 mg total) by mouth daily Per taper 60 tablet 1    ranitidine (ZANTAC) 150 mg tablet Take 1 tablet (150 mg total) by mouth daily at bedtime 30 tablet 5    sertraline (ZOLOFT) 50 mg tablet Take 1 tablet (50 mg total) by mouth daily 30 tablet 5    roflumilast (DALIRESP) 250 MCG tablet Take 1 tablet (250 mcg total) by mouth daily 30 tablet 5    umeclidinium-vilanterol (ANORO ELLIPTA) 62 5-25 MCG/INH inhaler Inhale 1 puff daily for 30 days 1 Inhaler 5     No current facility-administered medications for this visit  Blood pressure 130/78, pulse 85, weight 85 4 kg (188 lb 6 oz)  PHYSICAL EXAM:     General Appearance:   Alert, cooperative, no distress, appears stated age    HEENT:   Normocephalic, atraumatic, anicteric      Neck:  Supple, symmetrical, trachea midline, no adenopathy;    thyroid: no enlargement/tenderness/nodules; no carotid  bruit or JVD    Lungs:   Clear to auscultation bilaterally; no rales, rhonchi or wheezing; respirations unlabored    Heart[de-identified]   S1 and S2 normal; regular rate and rhythm; no murmur, rub, or gallop     Abdomen:   Soft, non-tender, non-distended; normal bowel sounds; no masses, no organomegaly    Genitalia:   Deferred    Rectal:   Deferred    Extremities:  No cyanosis, clubbing or edema    Pulses:  2+ and symmetric all extremities    Skin:  Skin color, texture, turgor normal, no rashes or lesions    Lymph nodes:  No palpable cervical, axillary or inguinal lymphadenopathy        Lab Results   Component Value Date    WBC 10 66 (H) 05/31/2018    HGB 12 9 05/31/2018    HCT 39 5 05/31/2018    MCV 92 05/31/2018     05/31/2018     Lab Results   Component Value Date    CALCIUM 8 4 05/31/2018     05/31/2018    K 4 4 05/31/2018    CO2 29 05/31/2018     05/31/2018    BUN 18 05/31/2018    CREATININE 0 79 05/31/2018     Lab Results   Component Value Date    ALT 27 05/31/2018    AST 25 05/31/2018    ALKPHOS 74 05/31/2018     No results found for: INR, PROTIME

## 2018-09-10 NOTE — LETTER
September 12, 2018     Oj Colindres MD  [address]  [address]  [address] [place] [address]    Patient: Antony Albert   YOB: 1950   Date of Visit: 9/10/2018       Dear Dr Bandar Dinh: Thank you for referring Antony Albert to me for evaluation  Below are my notes for this consultation  If you have questions, please do not hesitate to call me  I look forward to following your patient along with you  Sincerely,        Rolo Santana MD        CC: No Recipients  Rolo aSntana MD  9/10/2018  4:00 PM  Sign at close encounter  Follow-up Note - [facility]  Antony Albert 1950 79 y o  male     ASSESSMENT @ PLAN:   He is a 62-year-old male  With continued epigastric abdominal burning and reflux despite Nexium 40 milligrams daily and ranitidine 75 milligrams p o  B i d  He does have a history a peptic ulcer disease  In addition he has irritable bowel syndrome diarrhea and reports that he has cramping bloating and distention and diarrhea after eating [facility]  He is due for polyp surveillance next year with colonoscopy    1 Will do EGD to investigate;  I went over pulmonary optimization strategies with inhalers and nebulizers prior to the procedure    2 we will continue his Nexium 40 milligrams daily in addition to ranitidine 75 milligrams p o  B i d ;  He has failed Protonix and Prilosec in the past per him    3 I am going to have him do cholestyramine 1 packet p o  B i d     Reason:  GERD and abdominal pain and diarrhea    HPI:  He is a 62-year-old male with gastroesophageal reflux disease on multiple antacids  He reports that he is no better after being switched to the Nexium  He reports that coffee beer and wine give some extreme burning in the epigastrium  He reports that this is very severe  He taking ranitidine 75 milligrams twice a day and this is helping a little bit    He also reports that he went to [facility] today and had a soda and a cheeseburger and then he had cramping and diarrhea  He has failed Prilosec and Protonix in the past   He had endoscopy in [place] before he moved here 3 years ago  He had a colonoscopy at the same time but he is due next year  He is having no melena hematochezia no weight loss  He is oxygen dependent  REVIEW OF SYSTEMS:     CONSTITUTIONAL: Denies any fever, chills, or rigors  Good appetite, and no recent weight loss  HEENT: No earache or tinnitus  Denies hearing loss or visual disturbances  CARDIOVASCULAR: No chest pain or palpitations  RESPIRATORY: Denies any cough, hemoptysis, shortness of breath or dyspnea on exertion  GASTROINTESTINAL: As noted in the History of Present Illness  GENITOURINARY: No problems with urination  Denies any hematuria or dysuria  NEUROLOGIC: No dizziness or vertigo, denies headaches  MUSCULOSKELETAL: Denies any muscle or joint pain  SKIN: Denies skin rashes or itching  ENDOCRINE: Denies excessive thirst  Denies intolerance to heat or cold  PSYCHOSOCIAL: Denies depression or anxiety  Denies any recent memory loss       Past Medical History:   Diagnosis Date    Centrilobular emphysema (HCC)     COPD (chronic obstructive pulmonary disease) (HCC)     Hypoglycemia     On home oxygen therapy     Pneumonia     Pulmonary hypertension (HCC)     Respiratory failure (HCC)     SOB (shortness of breath)       Past Surgical History:   Procedure Laterality Date    CATARACT EXTRACTION      CHOLECYSTECTOMY      hernia    EYE SURGERY      ND REPAIR ING HERNIA,5+Y/O,REDUCIBL Left 5/23/2018    Procedure: OPEN INGUINAL HERNIA REPAIR WITH MESH;  Surgeon: Edgar Wynn MD;  Location: [facility];  Service: General     Social History     Social History    Marital status: /Civil Union     Spouse name: janet     Number of children: 5    Years of education: N/A     Occupational History    retired       Social History Main Topics    Smoking status: Former Smoker     Years: 50 00     Types: Cigarettes     Quit date: 2/27/2013    Smokeless tobacco: Never Used    Alcohol use 0 6 oz/week     1 Glasses of wine per week      Comment: socialy    Drug use: No    Sexual activity: No     Other Topics Concern    Not on file     Social History Narrative        Retired     Family History   Problem Relation Age of Onset    Diabetes Mother     Hypertension Mother     Hyperlipidemia Mother      Penicillins  Current Outpatient Prescriptions   Medication Sig Dispense Refill    albuterol (2 5 mg/3 mL) 0 083 % nebulizer solution Take 3 mL (2 5 mg total) by nebulization every 4 (four) hours as needed for wheezing 1080 mL 3    albuterol (PROVENTIL HFA,VENTOLIN HFA) 90 mcg/act inhaler Inhale 2 puffs every 6 (six) hours as needed for wheezing      budesonide (PULMICORT) 0 5 mg/2 mL nebulizer solution Take 1 vial (0 5 mg total) by nebulization 2 (two) times a day Rinse mouth after use  120 mL 5    esomeprazole (NexIUM) 40 MG capsule Take 1 capsule (40 mg total) by mouth daily for 30 days 30 capsule 3    levofloxacin (LEVAQUIN) 500 mg tablet Take 1 tablet (500 mg total) by mouth every 24 hours for 10 days 10 tablet 0    loratadine (CLARITIN) 10 mg tablet Take 10 mg by mouth daily      predniSONE 5 mg tablet Take 1 tablet (5 mg total) by mouth daily Per taper 60 tablet 1    ranitidine (ZANTAC) 150 mg tablet Take 1 tablet (150 mg total) by mouth daily at bedtime 30 tablet 5    sertraline (ZOLOFT) 50 mg tablet Take 1 tablet (50 mg total) by mouth daily 30 tablet 5    roflumilast (DALIRESP) 250 MCG tablet Take 1 tablet (250 mcg total) by mouth daily 30 tablet 5    umeclidinium-vilanterol (ANORO ELLIPTA) 62 5-25 MCG/INH inhaler Inhale 1 puff daily for 30 days 1 Inhaler 5     No current facility-administered medications for this visit  Blood pressure 130/78, pulse 85, weight 85 4 kg (188 lb 6 oz)      PHYSICAL EXAM:     General Appearance:   Alert, cooperative, no distress, appears stated age    HEENT:   Normocephalic, atraumatic, anicteric      Neck:  Supple, symmetrical, trachea midline, no adenopathy;    thyroid: no enlargement/tenderness/nodules; no carotid  bruit or JVD    Lungs:   Clear to auscultation bilaterally; no rales, rhonchi or wheezing; respirations unlabored    Heart[de-identified]   S1 and S2 normal; regular rate and rhythm; no murmur, rub, or gallop     Abdomen:   Soft, non-tender, non-distended; normal bowel sounds; no masses, no organomegaly    Genitalia:   Deferred    Rectal:   Deferred    Extremities:  No cyanosis, clubbing or edema    Pulses:  2+ and symmetric all extremities    Skin:  Skin color, texture, turgor normal, no rashes or lesions    Lymph nodes:  No palpable cervical, axillary or inguinal lymphadenopathy        Lab Results   Component Value Date    WBC 10 66 (H) 05/31/2018    HGB 12 9 05/31/2018    HCT 39 5 05/31/2018    MCV 92 05/31/2018     05/31/2018     Lab Results   Component Value Date    CALCIUM 8 4 05/31/2018     05/31/2018    K 4 4 05/31/2018    CO2 29 05/31/2018     05/31/2018    BUN 18 05/31/2018    CREATININE 0 79 05/31/2018     Lab Results   Component Value Date    ALT 27 05/31/2018    AST 25 05/31/2018    ALKPHOS 74 05/31/2018     No results found for: INR, PROTIME

## 2018-09-10 NOTE — PROGRESS NOTES
Assessment/Plan:      Will place him on antibiotics and a longer prednisone taper  Clarified with him that when he was back home in [place] that he was frequently in the doctor's office with the same pattern  No Follow-up on file  No problem-specific Assessment & Plan notes found for this encounter  Diagnoses and all orders for this visit:    Acute exacerbation of chronic obstructive pulmonary disease (COPD) ([facility])  -     predniSONE 5 mg tablet; Take 1 tablet (5 mg total) by mouth daily Per taper  -     levofloxacin (LEVAQUIN) 500 mg tablet; Take 1 tablet (500 mg total) by mouth every 24 hours for 10 days          Subjective:      Patient ID: Anthony Cornejo is a 79 y o  male  Patient comes in today with his usual symptoms of worsening shortness of breath and congestion  Having trouble breathing  Using his inhaler more  States that the short course of steroids did not seem to help last time  Confirmed he is using his inhalers but stopped 1 of the newer ones, because he thought it was not working well  It also sounds like there was a cost issue  He states he did report this to his lung doctor          ALLERGIES:  Allergies   Allergen Reactions    Penicillins Hives     Passed out       CURRENT MEDICATIONS:    Current Outpatient Prescriptions:     albuterol (2 5 mg/3 mL) 0 083 % nebulizer solution, Take 3 mL (2 5 mg total) by nebulization every 4 (four) hours as needed for wheezing, Disp: 1080 mL, Rfl: 3    albuterol (PROVENTIL HFA,VENTOLIN HFA) 90 mcg/act inhaler, Inhale 2 puffs every 6 (six) hours as needed for wheezing, Disp: , Rfl:     BREO ELLIPTA 100-25 MCG/INH inhaler, , Disp: , Rfl: 0    budesonide (PULMICORT) 0 5 mg/2 mL nebulizer solution, Take 1 vial (0 5 mg total) by nebulization 2 (two) times a day Rinse mouth after use , Disp: 120 mL, Rfl: 5    esomeprazole (NexIUM) 40 MG capsule, Take 1 capsule (40 mg total) by mouth daily for 30 days, Disp: 30 capsule, Rfl: 3   loratadine (CLARITIN) 10 mg tablet, Take 10 mg by mouth daily, Disp: , Rfl:     ranitidine (ZANTAC) 150 mg tablet, Take 1 tablet (150 mg total) by mouth daily at bedtime, Disp: 30 tablet, Rfl: 5    sertraline (ZOLOFT) 50 mg tablet, Take 1 tablet (50 mg total) by mouth daily, Disp: 30 tablet, Rfl: 5    Umeclidinium Bromide (INCRUSE ELLIPTA) 62 5 MCG/INH AEPB, Inhale 1 puff daily, Disp: 1 each, Rfl: 5    levofloxacin (LEVAQUIN) 500 mg tablet, Take 1 tablet (500 mg total) by mouth every 24 hours for 10 days, Disp: 10 tablet, Rfl: 0    predniSONE 5 mg tablet, Take 1 tablet (5 mg total) by mouth daily Per taper, Disp: 60 tablet, Rfl: 1    ACTIVE PROBLEM LIST:  Patient Active Problem List   Diagnosis    Pulmonary emphysema ([facility] )    Gastroesophageal reflux disease without esophagitis    Anxiety    Simple chronic bronchitis (HCC)    Shortness of breath on exertion    Abnormal ECG    PVCs (premature ventricular contractions)    Allergic rhinitis    Functional diarrhea    Diastolic dysfunction    Pulmonary hypertension (HCC)    Acute exacerbation of chronic obstructive pulmonary disease (COPD) (HCC)       PAST MEDICAL HISTORY:  Past Medical History:   Diagnosis Date    Centrilobular emphysema ([facility] )     COPD (chronic obstructive pulmonary disease) (HCC)     Hypoglycemia     On home oxygen therapy     Pneumonia     Pulmonary hypertension ([facility] )     Respiratory failure (HCC)     SOB (shortness of breath)        PAST SURGICAL HISTORY:  Past Surgical History:   Procedure Laterality Date    CATARACT EXTRACTION      CHOLECYSTECTOMY      EYE SURGERY      RI REPAIR ING HERNIA,5+Y/O,REDUCIBL Left 5/23/2018    Procedure: OPEN INGUINAL HERNIA REPAIR WITH MESH;  Surgeon: Jordan Ferro MD;  Location: [facility];  Service: General       FAMILY HISTORY:  Family History   Problem Relation Age of Onset    Diabetes Mother     Hypertension Mother     Hyperlipidemia Mother        SOCIAL HISTORY:  Social History Social History    Marital status: /Civil Union     Spouse name: janet     Number of children: 11    Years of education: N/A     Occupational History    retired       Social History Main Topics    Smoking status: Former Smoker     Years: 50 00     Types: Cigarettes     Quit date: 2/27/2013    Smokeless tobacco: Never Used    Alcohol use 0 6 oz/week     1 Glasses of wine per week      Comment: socialy    Drug use: No    Sexual activity: No     Other Topics Concern    Not on file     Social History Narrative        Retired       Review of Systems   Constitutional: Negative for fever  Respiratory: Positive for cough, chest tightness, shortness of breath and wheezing  Objective:  Vitals:    09/10/18 1302   BP: 134/84   BP Location: Left arm   Patient Position: Sitting   Cuff Size: Adult   Pulse: 87   Resp: 16   Temp: 98 1 °F (36 7 °C)   TempSrc: Temporal   SpO2: 97%   Weight: 85 1 kg (187 lb 9 6 oz)   Height: 5' 10" (1 778 m)        Physical Exam   Constitutional: He is oriented to person, place, and time  He appears well-developed and well-nourished  Pulmonary/Chest: No respiratory distress  Decreased breath sounds  Occasional wheezes  No rhonchi  Neurological: He is alert and oriented to person, place, and time  Nursing note and vitals reviewed  RESULTS:    No results found for this or any previous visit (from the past 1008 hour(s))  This note was created with voice recognition software  Phonic, grammatical and spelling errors may be present within the note as a result

## 2018-09-18 ENCOUNTER — ANESTHESIA EVENT (OUTPATIENT)
Dept: PERIOP | Facility: HOSPITAL | Age: 68
End: 2018-09-18
Payer: MEDICARE

## 2018-09-19 ENCOUNTER — HOSPITAL ENCOUNTER (OUTPATIENT)
Facility: HOSPITAL | Age: 68
Setting detail: OUTPATIENT SURGERY
Discharge: HOME | End: 2018-09-19
Attending: INTERNAL MEDICINE | Admitting: INTERNAL MEDICINE
Payer: MEDICARE

## 2018-09-19 ENCOUNTER — ANESTHESIA (OUTPATIENT)
Dept: PERIOP | Facility: HOSPITAL | Age: 68
End: 2018-09-19
Payer: MEDICARE

## 2018-09-19 VITALS
HEART RATE: 68 BPM | OXYGEN SATURATION: 96 % | RESPIRATION RATE: 21 BRPM | SYSTOLIC BLOOD PRESSURE: 150 MMHG | DIASTOLIC BLOOD PRESSURE: 85 MMHG | TEMPERATURE: 98.4 F

## 2018-09-19 DIAGNOSIS — K21.9 GASTROESOPHAGEAL REFLUX DISEASE, ESOPHAGITIS PRESENCE NOT SPECIFIED: ICD-10-CM

## 2018-09-19 DIAGNOSIS — R06.02 SOB (SHORTNESS OF BREATH): Primary | ICD-10-CM

## 2018-09-19 DIAGNOSIS — J44.9 CHRONIC OBSTRUCTIVE PULMONARY DISEASE, UNSPECIFIED COPD TYPE (HCC): ICD-10-CM

## 2018-09-19 PROCEDURE — 88305 TISSUE EXAM BY PATHOLOGIST: CPT | Performed by: PATHOLOGY

## 2018-09-19 PROCEDURE — 43239 EGD BIOPSY SINGLE/MULTIPLE: CPT | Performed by: INTERNAL MEDICINE

## 2018-09-19 PROCEDURE — 88342 IMHCHEM/IMCYTCHM 1ST ANTB: CPT | Performed by: PATHOLOGY

## 2018-09-19 RX ORDER — KETAMINE HYDROCHLORIDE 50 MG/ML
INJECTION, SOLUTION, CONCENTRATE INTRAMUSCULAR; INTRAVENOUS AS NEEDED
Status: DISCONTINUED | OUTPATIENT
Start: 2018-09-19 | End: 2018-09-19 | Stop reason: SURG

## 2018-09-19 RX ORDER — SODIUM CHLORIDE, SODIUM LACTATE, POTASSIUM CHLORIDE, CALCIUM CHLORIDE 600; 310; 30; 20 MG/100ML; MG/100ML; MG/100ML; MG/100ML
125 INJECTION, SOLUTION INTRAVENOUS CONTINUOUS
Status: DISCONTINUED | OUTPATIENT
Start: 2018-09-19 | End: 2018-09-19 | Stop reason: HOSPADM

## 2018-09-19 RX ORDER — PROPOFOL 10 MG/ML
INJECTION, EMULSION INTRAVENOUS AS NEEDED
Status: DISCONTINUED | OUTPATIENT
Start: 2018-09-19 | End: 2018-09-19 | Stop reason: SURG

## 2018-09-19 RX ORDER — LIDOCAINE HYDROCHLORIDE 10 MG/ML
INJECTION, SOLUTION INFILTRATION; PERINEURAL AS NEEDED
Status: DISCONTINUED | OUTPATIENT
Start: 2018-09-19 | End: 2018-09-19 | Stop reason: SURG

## 2018-09-19 RX ADMIN — KETAMINE HYDROCHLORIDE 20 MG: 50 INJECTION INTRAMUSCULAR; INTRAVENOUS at 09:10

## 2018-09-19 RX ADMIN — PROPOFOL 75 MG: 10 INJECTION, EMULSION INTRAVENOUS at 09:10

## 2018-09-19 RX ADMIN — SODIUM CHLORIDE, SODIUM LACTATE, POTASSIUM CHLORIDE, AND CALCIUM CHLORIDE: .6; .31; .03; .02 INJECTION, SOLUTION INTRAVENOUS at 08:09

## 2018-09-19 RX ADMIN — LIDOCAINE HYDROCHLORIDE 100 MG: 10 INJECTION, SOLUTION INFILTRATION; PERINEURAL at 09:10

## 2018-09-19 NOTE — OP NOTE
ESOPHAGOGASTRODUODENOSCOPY    PROCEDURE: EGD/ Biopsy    INDICATIONS: GERD    POST-OP DIAGNOSIS: See the impression below    SEDATION: Monitored anesthesia care, check anesthesia records    PHYSICAL EXAM:  Vitals:    09/19/18 0815   BP: 132/82   Pulse: 77   Resp: 20   Temp: 98 5 °F (36 9 °C)   SpO2: 97%     There is no height or weight on file to calculate BMI  General: NAD  Heart: S1 & S2 normal, RRR  Lungs: CTA, No rales or rhonchi  Abdomen: Soft, nontender, nondistended, good bowel sounds    CONSENT:  Informed consent was obtained for the procedure, including sedation after explaining the risks and benefits of the procedure  Risks including but not limited to bleeding, perforation, infection, aspiration were discussed in detail  Also explained about less than 100% sensitivity with the exam and other alternatives  PREPARATION:   EKG tracing, pulse oximetry, blood pressure were monitored throughout the procedure  Patient was identified by myself both verbally and by visual inspection of ID band  DESCRIPTION:   Patient was placed in the left lateral decubitus position and was sedated with the above medication  The gastroscope was introduced in to the oropharynx and the esophagus was intubated under direct visualization  Scope was passed down the esophagus up to 2nd part of the duodenum  A careful inspection was made as the gastroscope was withdrawn, including a retroflexed view of the stomach; findings and interventions are described below  FINDINGS:    #1  Esophagus and GEJ- the esophageal body appeared normal   The Z-line appeared normal     #2  Stomach- a mild nonerosive gastritis was noted in the antrum  The cardia fundus and body appeared normal  Multiple biopsies for H pylori were obtained  #3  Duodenum- the bulb and 2nd portion of the duodenum appeared normal   Multiple biopsies were obtained           IMPRESSIONS:    Mild gastritis    RECOMMENDATIONS:   Continue Nexium 40 mg daily  Continued ranitidine at night  Await pathology  Reflux precautions    COMPLICATIONS:  None; patient tolerated the procedure well    DISPOSITION: PACU  CONDITION: Stable    Shea Mckeon MD  6/19/3707,6:85 AM

## 2018-09-19 NOTE — DISCHARGE INSTRUCTIONS
IMPRESSIONS:    Mild gastritis     RECOMMENDATIONS:   Continue Nexium 40 mg daily  Continued ranitidine at night  Await pathology  Reflux precautions

## 2018-09-19 NOTE — ANESTHESIA PREPROCEDURE EVALUATION
Review of Systems/Medical History          Cardiovascular    Pulmonary hypertension Pulmonary  Smoker ex-smoker  , Pneumonia, COPD , Shortness of breath,        GI/Hepatic    GERD ,             Endo/Other     GYN       Hematology   Musculoskeletal       Neurology   Psychology   Anxiety,              Physical Exam    Airway    Mallampati score: II         Dental   No notable dental hx     Cardiovascular      Pulmonary      Other Findings        Anesthesia Plan  ASA Score- 3     Anesthesia Type- IV sedation with anesthesia with ASA Monitors  Additional Monitors:   Airway Plan:     Comment: I, Dr Rosa Sofia, the attending physician, have personally seen and evaluated the patient prior to anesthetic care  I have reviewed the pre-anesthetic record, and other medical records if appropriate to the anesthetic care  If a CRNA is involved in the case, I have reviewed the CRNA assessment, if present, and agree  The patient is in a suitable condition to proceed with my formulated anesthetic plan        Plan Factors-    Induction- intravenous  Postoperative Plan-     Informed Consent- Anesthetic plan and risks discussed with patient  I personally reviewed this patient with the CRNA  Discussed and agreed on the Anesthesia Plan with the CRNA  Lyn Ceja

## 2018-09-19 NOTE — ANESTHESIA POSTPROCEDURE EVALUATION
Post-Op Assessment Note      CV Status:  Stable    Mental Status:  Alert and awake    Hydration Status:  Euvolemic    PONV Controlled:  Controlled    Airway Patency:  Patent    Post Op Vitals Reviewed: Yes          Staff: CRNA       Comments: vss sv nonobstructed uneventful          /80 (09/19/18 0918)    Temp 98 4 °F (36 9 °C) (09/19/18 0918)    Pulse 72 (09/19/18 0918)   Resp 16 (09/19/18 0918)    SpO2 98 % (09/19/18 0918)

## 2018-10-03 ENCOUNTER — TELEPHONE (OUTPATIENT)
Dept: SURGERY | Facility: CLINIC | Age: 68
End: 2018-10-03

## 2018-10-03 NOTE — TELEPHONE ENCOUNTER
Spouse called stating that her  is having electric shock sensation at the surgical site and that it feels like he's being stung by bee  Please advise         Cell number [phone]

## 2018-10-04 ENCOUNTER — OFFICE VISIT (OUTPATIENT)
Dept: CARDIOLOGY CLINIC | Facility: CLINIC | Age: 68
End: 2018-10-04
Payer: MEDICARE

## 2018-10-04 VITALS
DIASTOLIC BLOOD PRESSURE: 72 MMHG | OXYGEN SATURATION: 96 % | HEIGHT: 70 IN | HEART RATE: 83 BPM | SYSTOLIC BLOOD PRESSURE: 118 MMHG | BODY MASS INDEX: 26 KG/M2 | WEIGHT: 181.6 LBS

## 2018-10-04 DIAGNOSIS — I27.20 PULMONARY HYPERTENSION (HCC): Primary | ICD-10-CM

## 2018-10-04 DIAGNOSIS — J41.0 SIMPLE CHRONIC BRONCHITIS (HCC): ICD-10-CM

## 2018-10-04 DIAGNOSIS — J96.11 CHRONIC RESPIRATORY FAILURE WITH HYPOXIA (HCC): ICD-10-CM

## 2018-10-04 PROCEDURE — 99204 OFFICE O/P NEW MOD 45 MIN: CPT | Performed by: INTERNAL MEDICINE

## 2018-10-04 RX ORDER — UMECLIDINIUM 62.5 UG/1
AEROSOL, POWDER ORAL
Refills: 5 | OUTPATIENT
Start: 2018-10-04

## 2018-10-04 NOTE — TELEPHONE ENCOUNTER
Please confirm with patient which inhaler he is using  We received this refill request for Incruse  I though Dr Agustin Jacobo prescribed Anoro, he does not need both Anoro and Incruse

## 2018-10-04 NOTE — PROGRESS NOTES
Heart Failure Outpatient Consult Note - Amilcar Wheeler 79 y o  male MRN: [id number]    @ Encounter: [id number]  Assessment/Plan:    Patient Active Problem List    Diagnosis Date Noted    Gastroesophageal reflux disease 09/10/2018    Acute exacerbation of chronic obstructive pulmonary disease (COPD) ([facility] ) 21/12/3181    Diastolic dysfunction 96/70/2395    Pulmonary hypertension ([facility] ) 07/31/2018    Allergic rhinitis 07/26/2018    Functional diarrhea 07/26/2018    Shortness of breath on exertion 04/25/2018    Abnormal ECG 04/25/2018    PVCs (premature ventricular contractions) 04/25/2018    Simple chronic bronchitis (HCC)     Pulmonary emphysema (HCC) 03/12/2018    Gastroesophageal reflux disease without esophagitis 03/12/2018    Anxiety 03/12/2018     # Severe COPD on home O2: 2L on demand NC  He quit smoking in 2013  Walked patient in office  Desaturation to <88% on demand setting  Likely needs higher O2 rate and may benefit from transition to continuous  --Assessment of O2 requirement  Will defer to Dr Traci Martin  --Continue Brovana and budesonide BID  Off Breo  Continue O2 via NC  --Agree, given age, would benefit from Lung tx eval @ [facility]    # Pulmonary HTN: Most likely driven by group III lung disease as by PFTs and CT chest he has severe COPD  Unclear if PFTs stable in the time that he has reported functional decline which would be informative  It is possible that there is now a cardiac limitation, that may be Group II (less likely as no clear e/o diastolic dysfunction, and euvolemic on exam w/ minimal RF), or RV mediated  His resting RV function is normal, but there is mild dilation  Post PVC RV TAPSE is preserved as well  There is possible pulmonary vascular disease based on noninvasive assessment, but may be a function of lung destruction   Will proceed with the following:  Diag:  --Pharm Nuc 4/30/2018: LVEF 57%, no perfusion defects  --PFTs 3/23/2018: FEV1 25%, FVC 60%, FEV1/FVC 41%, TLC 125%, %, DLCO 25%  C/w severe COPD    --TTE 5/1/2018: LVEF ~60%, grade 1 diastology, normal RV function with borderline size  Trace MR and mild TR  PASP 58 mmHg  Abnormal RVOT AcT with possible mid to late RVOT notching  --CT PE Chest 5/31/2018: No clear e/o of PE  Significant paraseptal and centrilobular emphysematous changes w/ large bulla in R lung apex  To do:  --Stress echo to assess RV response to exercise  --Will consider CPET depending on results, versus directly to [address] w/ exercise  --Continue pulmonary rehab    Therapeutic:  --O2 as above    # PVCs: Unclear etiology  Possible 2/2 to inhaler therapy  --Can consider selective B1 blocker  --Will defer to Dr Lena Faulkner    RTC in 2 months    HPI: Very pleasant 80 y/o gentleman w/ PMHx as noted, specifically severe emphysema with marked hypoxia with ambulation (as noted above) who presents for evaluation of pulmonary hypertension  He has a 50 pack year smoking history and quit 5 years ago  He is currently on 2-3L on demand NC in the office as he is on his portable  He uses his rescue medication at least 2-3x/day  He was previously followed with a pulmonologist in [place]  Now moved to [place]  Follows with Dr Donna Brown and Dr Rosa Cloud  He states that over the past several years he has noted increasing MORALES  2 years ago he could walk up 2 flights of stairs and about 100 yards, now about 25-50 feet  His oxygen levels decrease with ambulation  Per patient report, his PFTs have been stable in that time, but unclear  He denies syncope, LH, Dz, orthopnea, PND, or LE edema  He also denies CP, palpitations       Past Medical History:   Diagnosis Date    Centrilobular emphysema ([facility] )     COPD (chronic obstructive pulmonary disease) (HCC)     Hypoglycemia     On home oxygen therapy     Pneumonia     Pulmonary hypertension (HCC)     Respiratory failure (HCC)     SOB (shortness of breath)      Review of Systems - 12 point ROS was done and is negative, except as noted above  Allergies   Allergen Reactions    Penicillins Hives     Passed out           Current Outpatient Prescriptions:     albuterol (2 5 mg/3 mL) 0 083 % nebulizer solution, Take 3 mL (2 5 mg total) by nebulization every 4 (four) hours as needed for wheezing, Disp: 1080 mL, Rfl: 3    albuterol (PROVENTIL HFA,VENTOLIN HFA) 90 mcg/act inhaler, Inhale 2 puffs every 6 (six) hours as needed for wheezing, Disp: , Rfl:     cholestyramine (QUESTRAN) 4 g packet, Take 1 packet (4 g total) by mouth 2 (two) times a day with meals, Disp: 60 each, Rfl: 2    esomeprazole (NexIUM) 40 MG capsule, Take 1 capsule (40 mg total) by mouth daily for 30 days, Disp: 30 capsule, Rfl: 3    loratadine (CLARITIN) 10 mg tablet, Take 10 mg by mouth daily, Disp: , Rfl:     predniSONE 5 mg tablet, Take 1 tablet (5 mg total) by mouth daily Per taper, Disp: 60 tablet, Rfl: 1    ranitidine (ZANTAC) 150 mg tablet, Take 1 tablet (150 mg total) by mouth daily at bedtime, Disp: 30 tablet, Rfl: 5    roflumilast (DALIRESP) 250 MCG tablet, Take 1 tablet (250 mcg total) by mouth daily, Disp: 30 tablet, Rfl: 5    sertraline (ZOLOFT) 50 mg tablet, Take 1 tablet (50 mg total) by mouth daily, Disp: 30 tablet, Rfl: 5    Social History     Social History    Marital status: /Civil Union     Spouse name: janet     Number of children: 11    Years of education: N/A     Occupational History    retired       Social History Main Topics    Smoking status: Former Smoker     Years: 50 00     Types: Cigarettes     Quit date: 2/27/2013    Smokeless tobacco: Never Used    Alcohol use 0 6 oz/week     1 Glasses of wine per week      Comment: socialy    Drug use: No    Sexual activity: No     Other Topics Concern    Not on file     Social History Narrative        Retired       Family History   Problem Relation Age of Onset    Diabetes Mother     Hypertension Mother     Hyperlipidemia Mother      Physical Exam:    Vitals: Blood pressure 118/72, pulse 83, height 5' 10" (1 778 m), weight 82 4 kg (181 lb 9 6 oz), SpO2 96 %  , Body mass index is 26 06 kg/m² ,   Wt Readings from Last 3 Encounters:   10/04/18 82 4 kg (181 lb 9 6 oz)   09/10/18 85 4 kg (188 lb 6 oz)   09/10/18 85 1 kg (187 lb 9 6 oz)     Vitals:    10/04/18 1039   BP: 118/72   BP Location: Left arm   Patient Position: Sitting   Cuff Size: Adult   Pulse: 83   SpO2: 96%   Weight: 82 4 kg (181 lb 9 6 oz)   Height: 5' 10" (1 778 m)       GEN: Jerel Martinez appears well, alert and oriented x 3, pleasant and cooperative   HEENT: pupils equal, round, and reactive to light; extraocular muscles intact  NECK: supple, no carotid bruits   HEART: regular rhythm, normal S1 and S2, no murmurs, clicks, gallops or rubs, JVP is flat  LUNGS: clear to auscultation bilaterally; no wheezes, rales, or rhonchi   ABDOMEN: normal bowel sounds, soft, no tenderness, no distention  EXTREMITIES: peripheral pulses normal; no clubbing, cyanosis, or edema  NEURO: no focal findings   SKIN: normal without suspicious lesions on exposed skin    Labs & Results:  Lab Results   Component Value Date    WBC 10 66 (H) 05/31/2018    HGB 12 9 05/31/2018    HCT 39 5 05/31/2018    MCV 92 05/31/2018     05/31/2018       Chemistry        Component Value Date/Time     05/31/2018 1203    K 4 4 05/31/2018 1203     05/31/2018 1203    CO2 29 05/31/2018 1203    BUN 18 05/31/2018 1203    CREATININE 0 79 05/31/2018 1203        Component Value Date/Time    CALCIUM 8 4 05/31/2018 1203    ALKPHOS 74 05/31/2018 1203    AST 25 05/31/2018 1203    ALT 27 05/31/2018 1203        EKG personally reviewed by Lesia English MD      Counseling / Coordination of Care  Total floor / unit time spent today 40 minutes  Greater than 50% of total time was spent with the patient and / or family counseling and / or coordination of care  A description of the counseling / coordination of care: 25 min        Thank you for the opportunity to participate in the care of this patient      Salo Mckenzie MD, PhD   Leslie Mendenhall

## 2018-10-04 NOTE — LETTER
October 5, 2018     Luis Haley MD  [address]  [address] [facility]  [facility] [address] [address]    Patient: Flakito Mccray   YOB: 1950   Date of Visit: 10/4/2018       Dear Dr Izzy Fishman: Thank you for referring Flakito Mccray to me for evaluation  Below are my notes for this consultation  If you have questions, please do not hesitate to call me  I look forward to following your patient along with you  Sincerely,        Reynaldo Norton MD        CC: MD Mira Cardona MD Connee Sages, MD  10/5/2018  4:02 PM  Sign at close encounter  Heart Failure Outpatient Consult Note - Flakito Mccray 79 y o  male MRN: [id number]    @ Encounter: [id number]  Assessment/Plan:    Patient Active Problem List    Diagnosis Date Noted    Gastroesophageal reflux disease 09/10/2018    Acute exacerbation of chronic obstructive pulmonary disease (COPD) ([facility] ) 15/95/8298    Diastolic dysfunction 70/65/7259    Pulmonary hypertension ([facility] ) 07/31/2018    Allergic rhinitis 07/26/2018    Functional diarrhea 07/26/2018    Shortness of breath on exertion 04/25/2018    Abnormal ECG 04/25/2018    PVCs (premature ventricular contractions) 04/25/2018    Simple chronic bronchitis ([facility] )     [facility]sema ([facility] ) 03/12/2018    Gastroesophageal reflux disease without esophagitis 03/12/2018    Anxiety 03/12/2018     # Severe COPD on home O2: 2L on demand NC  He quit smoking in 2013  Walked patient in office  Desaturation to <88% on demand setting  Likely needs higher O2 rate and may benefit from transition to continuous  --Assessment of O2 requirement  Will defer to Dr Ned Cortés  --Continue Brovana and budesonide BID  Off Breo  Continue O2 via NC  --Agree, given age, would benefit from Lung tx castillo @ [facility]    # Pulmonary HTN: Most likely driven by group III lung disease as by PFTs and CT chest he has severe COPD   Unclear if PFTs stable in the time that he has reported functional decline which would be informative  It is possible that there is now a cardiac limitation, that may be Group II (less likely as no clear e/o diastolic dysfunction, and euvolemic on exam w/ minimal RF), or RV mediated  His resting RV function is normal, but there is mild dilation  Post PVC RV TAPSE is preserved as well  There is possible pulmonary vascular disease based on noninvasive assessment, but may be a function of lung destruction  Will proceed with the following:  Diag:  --Pharm Nuc 4/30/2018: LVEF 57%, no perfusion defects  --PFTs 3/23/2018: FEV1 25%, FVC 60%, FEV1/FVC 41%, %, %, DLCO 25%  C/w severe COPD    --TTE 5/1/2018: LVEF ~60%, grade 1 diastology, normal RV function with borderline size  Trace MR and mild TR  PASP 58 mmHg  Abnormal RVOT AcT with possible mid to late RVOT notching  --CT PE Chest 5/31/2018: No clear e/o of PE  Significant paraseptal and centrilobular emphysematous changes w/ large bulla in R lung apex  To do:  --Stress echo to assess RV response to exercise  --Will consider CPET depending on results, versus directly to [address] w/ exercise  --Continue pulmonary rehab    Therapeutic:  --O2 as above    # PVCs: Unclear etiology  Possible 2/2 to inhaler therapy  --Can consider selective B1 blocker  --Will defer to Dr Stanislav Hyatt    RTC in 2 months    HPI: Very pleasant 80 y/o gentleman w/ PMHx as noted, specifically severe emphysema with marked hypoxia with ambulation (as noted above) who presents for evaluation of pulmonary hypertension  He has a 50 pack year smoking history and quit 5 years ago  He is currently on 2-3L on demand NC in the office as he is on his portable  He uses his rescue medication at least 2-3x/day  He was previously followed with a pulmonologist in [place]  Now moved to [place]  Follows with Dr Nahum Gold and Dr Hayley Conroy  He states that over the past several years he has noted increasing MORALES   2 years ago he could walk up 2 flights of stairs and about 100 yards, now about 25-50 feet  His oxygen levels decrease with ambulation  Per patient report, his PFTs have been stable in that time, but unclear  He denies syncope, LH, Dz, orthopnea, PND, or LE edema  He also denies CP, palpitations  Past Medical History:   Diagnosis Date    Centrilobular emphysema ([address] )     COPD (chronic obstructive pulmonary disease) (HCC)     Hypoglycemia     On home oxygen therapy     Pneumonia     Pulmonary hypertension (HCC)     Respiratory failure (HCC)     SOB (shortness of breath)      Review of Systems - 12 point ROS was done and is negative, except as noted above  Allergies   Allergen Reactions    Penicillins Hives     Passed out           Current Outpatient Prescriptions:     albuterol (2 5 mg/3 mL) 0 083 % nebulizer solution, Take 3 mL (2 5 mg total) by nebulization every 4 (four) hours as needed for wheezing, Disp: 1080 mL, Rfl: 3    albuterol (PROVENTIL HFA,VENTOLIN HFA) 90 mcg/act inhaler, Inhale 2 puffs every 6 (six) hours as needed for wheezing, Disp: , Rfl:     cholestyramine (QUESTRAN) 4 g packet, Take 1 packet (4 g total) by mouth 2 (two) times a day with meals, Disp: 60 each, Rfl: 2    esomeprazole (NexIUM) 40 MG capsule, Take 1 capsule (40 mg total) by mouth daily for 30 days, Disp: 30 capsule, Rfl: 3    loratadine (CLARITIN) 10 mg tablet, Take 10 mg by mouth daily, Disp: , Rfl:     predniSONE 5 mg tablet, Take 1 tablet (5 mg total) by mouth daily Per taper, Disp: 60 tablet, Rfl: 1    ranitidine (ZANTAC) 150 mg tablet, Take 1 tablet (150 mg total) by mouth daily at bedtime, Disp: 30 tablet, Rfl: 5    roflumilast (DALIRESP) 250 MCG tablet, Take 1 tablet (250 mcg total) by mouth daily, Disp: 30 tablet, Rfl: 5    sertraline (ZOLOFT) 50 mg tablet, Take 1 tablet (50 mg total) by mouth daily, Disp: 30 tablet, Rfl: 5    Social History     Social History    Marital status: /Civil Union     Spouse name: janet     Number of children: 5    Years of education: N/A Occupational History    retired       Social History Main Topics    Smoking status: Former Smoker     Years: 50 00     Types: Cigarettes     Quit date: 2/27/2013    Smokeless tobacco: Never Used    Alcohol use 0 6 oz/week     1 Glasses of wine per week      Comment: socialy    Drug use: No    Sexual activity: No     Other Topics Concern    Not on file     Social History Narrative        Retired       Family History   Problem Relation Age of Onset    Diabetes Mother     Hypertension Mother     Hyperlipidemia Mother      Physical Exam:    Vitals: Blood pressure 118/72, pulse 83, height 5' 10" (1 778 m), weight 82 4 kg (181 lb 9 6 oz), SpO2 96 %  , Body mass index is 26 06 kg/m² ,   Wt Readings from Last 3 Encounters:   10/04/18 82 4 kg (181 lb 9 6 oz)   09/10/18 85 4 kg (188 lb 6 oz)   09/10/18 85 1 kg (187 lb 9 6 oz)     Vitals:    10/04/18 1039   BP: 118/72   BP Location: Left arm   Patient Position: Sitting   Cuff Size: Adult   Pulse: 83   SpO2: 96%   Weight: 82 4 kg (181 lb 9 6 oz)   Height: 5' 10" (1 778 m)       GEN: Marline Cousins appears well, alert and oriented x 3, pleasant and cooperative   HEENT: pupils equal, round, and reactive to light; extraocular muscles intact  NECK: supple, no carotid bruits   HEART: regular rhythm, normal S1 and S2, no murmurs, clicks, gallops or rubs, JVP is flat  LUNGS: clear to auscultation bilaterally; no wheezes, rales, or rhonchi   ABDOMEN: normal bowel sounds, soft, no tenderness, no distention  EXTREMITIES: peripheral pulses normal; no clubbing, cyanosis, or edema  NEURO: no focal findings   SKIN: normal without suspicious lesions on exposed skin    Labs & Results:  Lab Results   Component Value Date    WBC 10 66 (H) 05/31/2018    HGB 12 9 05/31/2018    HCT 39 5 05/31/2018    MCV 92 05/31/2018     05/31/2018       Chemistry        Component Value Date/Time     05/31/2018 1203    K 4 4 05/31/2018 1203     05/31/2018 1203    CO2 29 05/31/2018 1203    BUN 18 05/31/2018 1203    CREATININE 0 79 05/31/2018 1203        Component Value Date/Time    CALCIUM 8 4 05/31/2018 1203    ALKPHOS 74 05/31/2018 1203    AST 25 05/31/2018 1203    ALT 27 05/31/2018 1203        EKG personally reviewed by Pearl Toribio MD      Counseling / Coordination of Care  Total floor / unit time spent today 40 minutes  Greater than 50% of total time was spent with the patient and / or family counseling and / or coordination of care  A description of the counseling / coordination of care: 25 min  Thank you for the opportunity to participate in the care of this patient      Pearl Toribio MD, PhD   Matty Gordon

## 2018-10-06 DIAGNOSIS — J96.11 CHRONIC RESPIRATORY FAILURE WITH HYPOXIA (HCC): ICD-10-CM

## 2018-10-06 DIAGNOSIS — J41.0 SIMPLE CHRONIC BRONCHITIS (HCC): ICD-10-CM

## 2018-10-08 RX ORDER — FLUTICASONE FUROATE AND VILANTEROL TRIFENATATE 100; 25 UG/1; UG/1
POWDER RESPIRATORY (INHALATION)
Refills: 5 | OUTPATIENT
Start: 2018-10-08

## 2018-10-09 DIAGNOSIS — F41.9 ANXIETY: ICD-10-CM

## 2018-10-10 ENCOUNTER — HOSPITAL ENCOUNTER (EMERGENCY)
Facility: HOSPITAL | Age: 68
Discharge: HOME | End: 2018-10-10
Attending: EMERGENCY MEDICINE
Payer: MEDICARE

## 2018-10-10 ENCOUNTER — APPOINTMENT (EMERGENCY)
Dept: RADIOLOGY | Facility: HOSPITAL | Age: 68
End: 2018-10-10
Payer: MEDICARE

## 2018-10-10 VITALS
BODY MASS INDEX: 26.05 KG/M2 | DIASTOLIC BLOOD PRESSURE: 61 MMHG | OXYGEN SATURATION: 92 % | HEIGHT: 70 IN | SYSTOLIC BLOOD PRESSURE: 119 MMHG | RESPIRATION RATE: 20 BRPM | TEMPERATURE: 98.1 F | WEIGHT: 182 LBS | HEART RATE: 86 BPM

## 2018-10-10 DIAGNOSIS — J44.1 COPD EXACERBATION (HCC): Primary | ICD-10-CM

## 2018-10-10 LAB
ALBUMIN SERPL BCP-MCNC: 3.6 G/DL (ref 3.5–5)
ALP SERPL-CCNC: 83 U/L (ref 46–116)
ALT SERPL W P-5'-P-CCNC: 130 U/L (ref 12–78)
ANION GAP SERPL CALCULATED.3IONS-SCNC: 7 MMOL/L (ref 4–13)
AST SERPL W P-5'-P-CCNC: 82 U/L (ref 5–45)
ATRIAL RATE: 78 BPM
BASOPHILS # BLD AUTO: 0.03 THOUSANDS/ΜL (ref 0–0.1)
BASOPHILS NFR BLD AUTO: 0 % (ref 0–1)
BILIRUB SERPL-MCNC: 0.5 MG/DL (ref 0.2–1)
BUN SERPL-MCNC: 24 MG/DL (ref 5–25)
CALCIUM SERPL-MCNC: 9.3 MG/DL (ref 8.3–10.1)
CHLORIDE SERPL-SCNC: 101 MMOL/L (ref 100–108)
CO2 SERPL-SCNC: 32 MMOL/L (ref 21–32)
CREAT SERPL-MCNC: 0.91 MG/DL (ref 0.6–1.3)
EOSINOPHIL # BLD AUTO: 0.06 THOUSAND/ΜL (ref 0–0.61)
EOSINOPHIL NFR BLD AUTO: 1 % (ref 0–6)
ERYTHROCYTE [DISTWIDTH] IN BLOOD BY AUTOMATED COUNT: 13.9 % (ref 11.6–15.1)
GFR SERPL CREATININE-BSD FRML MDRD: 88 ML/MIN/1.73SQ M
GLUCOSE SERPL-MCNC: 100 MG/DL (ref 65–140)
HCT VFR BLD AUTO: 40.4 % (ref 36.5–49.3)
HGB BLD-MCNC: 13 G/DL (ref 12–17)
IMM GRANULOCYTES # BLD AUTO: 0.05 THOUSAND/UL (ref 0–0.2)
IMM GRANULOCYTES NFR BLD AUTO: 1 % (ref 0–2)
LYMPHOCYTES # BLD AUTO: 2.13 THOUSANDS/ΜL (ref 0.6–4.47)
LYMPHOCYTES NFR BLD AUTO: 22 % (ref 14–44)
MCH RBC QN AUTO: 30.1 PG (ref 26.8–34.3)
MCHC RBC AUTO-ENTMCNC: 32.2 G/DL (ref 31.4–37.4)
MCV RBC AUTO: 94 FL (ref 82–98)
MONOCYTES # BLD AUTO: 0.98 THOUSAND/ΜL (ref 0.17–1.22)
MONOCYTES NFR BLD AUTO: 10 % (ref 4–12)
NEUTROPHILS # BLD AUTO: 6.34 THOUSANDS/ΜL (ref 1.85–7.62)
NEUTS SEG NFR BLD AUTO: 66 % (ref 43–75)
NRBC BLD AUTO-RTO: 0 /100 WBCS
NT-PROBNP SERPL-MCNC: 103 PG/ML
P AXIS: 85 DEGREES
PLATELET # BLD AUTO: 351 THOUSANDS/UL (ref 149–390)
PMV BLD AUTO: 9.8 FL (ref 8.9–12.7)
POTASSIUM SERPL-SCNC: 3.7 MMOL/L (ref 3.5–5.3)
PR INTERVAL: 164 MS
PROT SERPL-MCNC: 7.1 G/DL (ref 6.4–8.2)
QRS AXIS: 99 DEGREES
QRSD INTERVAL: 108 MS
QT INTERVAL: 380 MS
QTC INTERVAL: 433 MS
RBC # BLD AUTO: 4.32 MILLION/UL (ref 3.88–5.62)
SODIUM SERPL-SCNC: 140 MMOL/L (ref 136–145)
T WAVE AXIS: 73 DEGREES
TROPONIN I SERPL-MCNC: <0.02 NG/ML
VENTRICULAR RATE: 78 BPM
WBC # BLD AUTO: 9.59 THOUSAND/UL (ref 4.31–10.16)

## 2018-10-10 PROCEDURE — 83880 ASSAY OF NATRIURETIC PEPTIDE: CPT | Performed by: PHYSICIAN ASSISTANT

## 2018-10-10 PROCEDURE — 94150 VITAL CAPACITY TEST: CPT

## 2018-10-10 PROCEDURE — 94760 N-INVAS EAR/PLS OXIMETRY 1: CPT

## 2018-10-10 PROCEDURE — 93010 ELECTROCARDIOGRAM REPORT: CPT | Performed by: INTERNAL MEDICINE

## 2018-10-10 PROCEDURE — 96374 THER/PROPH/DIAG INJ IV PUSH: CPT

## 2018-10-10 PROCEDURE — 84484 ASSAY OF TROPONIN QUANT: CPT | Performed by: PHYSICIAN ASSISTANT

## 2018-10-10 PROCEDURE — 94640 AIRWAY INHALATION TREATMENT: CPT

## 2018-10-10 PROCEDURE — 99284 EMERGENCY DEPT VISIT MOD MDM: CPT

## 2018-10-10 PROCEDURE — 93005 ELECTROCARDIOGRAM TRACING: CPT

## 2018-10-10 PROCEDURE — 94644 CONT INHLJ TX 1ST HOUR: CPT

## 2018-10-10 PROCEDURE — 80053 COMPREHEN METABOLIC PANEL: CPT | Performed by: PHYSICIAN ASSISTANT

## 2018-10-10 PROCEDURE — 36415 COLL VENOUS BLD VENIPUNCTURE: CPT | Performed by: PHYSICIAN ASSISTANT

## 2018-10-10 PROCEDURE — 85025 COMPLETE CBC W/AUTO DIFF WBC: CPT | Performed by: PHYSICIAN ASSISTANT

## 2018-10-10 PROCEDURE — 71046 X-RAY EXAM CHEST 2 VIEWS: CPT

## 2018-10-10 RX ORDER — SODIUM CHLORIDE FOR INHALATION 0.9 %
12 VIAL, NEBULIZER (ML) INHALATION ONCE
Status: COMPLETED | OUTPATIENT
Start: 2018-10-10 | End: 2018-10-10

## 2018-10-10 RX ORDER — IPRATROPIUM BROMIDE AND ALBUTEROL SULFATE 2.5; .5 MG/3ML; MG/3ML
3 SOLUTION RESPIRATORY (INHALATION) ONCE
Status: COMPLETED | OUTPATIENT
Start: 2018-10-10 | End: 2018-10-10

## 2018-10-10 RX ORDER — METHYLPREDNISOLONE SODIUM SUCCINATE 125 MG/2ML
125 INJECTION, POWDER, LYOPHILIZED, FOR SOLUTION INTRAMUSCULAR; INTRAVENOUS ONCE
Status: COMPLETED | OUTPATIENT
Start: 2018-10-10 | End: 2018-10-10

## 2018-10-10 RX ORDER — AZITHROMYCIN 250 MG/1
TABLET, FILM COATED ORAL
Qty: 6 TABLET | Refills: 0 | Status: SHIPPED | OUTPATIENT
Start: 2018-10-10 | End: 2018-10-14 | Stop reason: ALTCHOICE

## 2018-10-10 RX ORDER — PREDNISONE 10 MG/1
TABLET ORAL
Qty: 38 TABLET | Refills: 0 | Status: SHIPPED | OUTPATIENT
Start: 2018-10-10 | End: 2018-10-25 | Stop reason: SDUPTHER

## 2018-10-10 RX ADMIN — METHYLPREDNISOLONE SODIUM SUCCINATE 125 MG: 125 INJECTION, POWDER, FOR SOLUTION INTRAMUSCULAR; INTRAVENOUS at 15:29

## 2018-10-10 RX ADMIN — ISODIUM CHLORIDE 12 ML: 0.03 SOLUTION RESPIRATORY (INHALATION) at 14:43

## 2018-10-10 RX ADMIN — IPRATROPIUM BROMIDE 1 MG: 0.5 SOLUTION RESPIRATORY (INHALATION) at 14:43

## 2018-10-10 RX ADMIN — IPRATROPIUM BROMIDE AND ALBUTEROL SULFATE 3 ML: .5; 3 SOLUTION RESPIRATORY (INHALATION) at 13:23

## 2018-10-10 RX ADMIN — ALBUTEROL SULFATE 10 MG: 2.5 SOLUTION RESPIRATORY (INHALATION) at 14:43

## 2018-10-10 NOTE — DISCHARGE INSTRUCTIONS
La BPCO (broncopneumopatia cronica ostruttiva)   ASSISTENZA AMBULATORIALE:   La BPCO (broncopneumopatia cronica ostruttiva)  è penelope malattia polmonare debbie rende difficile la respirazione  Normalmente è il risultato di melissa polmonari causati da ifeanyi di irritazione e infiammazione  La BPCO limita il flusso di Wells Abdoulaye  Fumo, inquinamento, genetica o un'anamnesi di infezioni polmonari possono aumentare il rischio di BPCO  I sintomi comuni includono:   · Respiro affannoso     · Tosse secca     · Accessi di tosse debbie fanno risalire il muco indu polmoni  · Respiro sibilante e sensazione di costrizione al petto  Chiamare il 911 se:   · Si avverte un senso di stordimento, si ha il respiro affannoso e dolore al petto  Rivolgersi immediatamente a un medico in [address] condizioni:   · Si è confusi, si hanno le vertigini o ci si sente svenire  · Si sentono il braccio o la gamba caldi, sensibili e doloranti  L'aspetto può diventare gonfio e rose  · Si tossisce con espettorato contenente tracce di sangue  Chiamare il medico se:   · Si ha il respiro più affannoso del solito  · È necessario assumere più farmaco del solito per controllare i sintomi  · Si tossisce o si ha il respiro affannoso più del solito  · Si espettora più muco o muco di un colore diverso o con un odore diverso  · Si aumenta di peso di più di 3 libbre in [place]  · Si ha la febbre, naso gocciolante o congestionato e mal di gola o altri sintomi del raffreddore o dell'influenza  · La pelle, le labbra o le unghie iniziano a diventare lora  · Si nota gonfiore caren gambe o caren caviglie  · Si è molto stanchi o deboli per [employer] giorno  · Si notano cambiamenti di umore o cambiamenti andie capacità di pensare o concentrarsi  · Si hanno domande o dubbi sulla propria patologia o sulla jose eduardo    Il trattamento dayron BPCO  può includere farmaci debbie aiutino a ridurre il gonfiore e l'infiammazione nei polmoni  I farmaci possono anche aiutare ad aprire le vie respiratorie o a curare l'infezione  Potrebbe essere necessaria la riabilitazione polmonare per aiutare a gestire i sintomi e migliorare la [employer]  Può essere necessaria la somministrazione di ossigeno per [facility]  Facilitare la respirazione:   · Usare la respirazione a labbra arricciate ogni volta debbie si ha la sensazione debbie manca il respiro  inalare profondamente attraverso il naso  Espirare lentamente attraverso la bocca con le labbra socchiuse per un tempo due volte superiore a quello impiegato per l'inalazione  Si può praticare questo metodo di respirazione anche mentre si eseguono piegamenti, sollevamenti, mentre si salgono le scale o si pratica esercizio fisico  Questo rallenta la respirazione e [place] a far entrare e uscire più aria nei e indu polmoni  · Non fumare ed evitare altre persone debbie fumano  La nicotina e altre sostanze possono causare melissa o irritazione ai polmoni e rendere più difficile la respirazione  Non usare sigarette elettroniche o tabacco senza fumo  Anche questi prodotti contengono nicotina  Chiedere informazioni al medico se si fuma e si ha bisogno di aiuto per [employer]  Per assistenza e ulteriori informazioni:  [employer]  gov  Phone: [phone]  Web Address: www [employer]  gov      · Fare attenzione a, ad evitare, tutto ciò debbie peggiora i sintomi  Evitare le altitudini [employer] e i posti con elevata umidità  Rimanere in un luogo chiuso o coprirsi la bocca e il naso con penelope sciarpa quando si esce e fa freddo  Rimanere in [address]  [address] presenza di amos larissa'aria sono elevati  Non usare bombolette spray come deodoranti, insetticidi e lacche per [facility]  [employer] BPCO e prevenzione dei peggioramenti:  BPCO è penelope condizione grave debbie letitia tempo peggiora   Il peggioramento significa debbie i sintomi dayron BPCO diventano improvvisamente più Harman Yost importante prevenire i peggioramenti  Il peggioramento può causare ulteriori melissa ai polmoni  La BPCO non può essere curata, ma è possibile fare qualcosa per sentirsi meglio ed evitare il peggioramento:  · Proteggersi indu germi  I germi possono penetrare nei polmoni e causare penelope infezione  L'infezione ai polmoni può provocare penelope maggiore produzione di muco e rendere più difficile la respirazione  Un'infezione può provocare anche gonfiore caren vie respiratorie, impedendo l'ingresso dell'aria  Attenendosi a quanto riportato di seguito è possibile ridurre il rischio di infezioni:     ¨ Lavare spesso le elizabeth con acqua e sapone  Portare con sé un gel germicida da usare per [employer] quando non si può disporre di acqua e sapone  ¨ Non toccare gli occhi, il naso o la bocca senza prima lavarsi le elizabeth  ¨ Coprire sempre la bocca quando si tossisce  Tossire in un [facility] o [facility] in [address] non diffondere germi con le elizabeth  ¨ Cercare di [employer] persone debbie hanno il raffreddore o l'influenza  Se si è malati, stare il più possibile lontano dagli altri  · Evelyn più liquidi  Josseline Marquezhl a lubrificare le vie respiratorie e a tossire espellendo muco  Chiedere qual è la quantità di liquidi da assumere ogni giorno e quali sono i tipi di liquidi più adatti  · Fare esercizio fisico tutti i giorni:  Svolgere attività fisiche per [employer] 20 minuti al giorno per [place] ad [employer] proprie energie e a ridurre il respiro affannoso  stabilire con il proprio medico un programma di esercizio fisico idoneo  · Chiedere informazioni steve vaccini  Il medico può consigliare di fare il normale vaccino antinfluenzale o contro la polmonite  Per [facility] persona debbie soffre di BPCO la polmonite può diventare letale  Informarsi sugli altri vaccini di demetrice si potrebbe avere [facility]  Chiedere al medico informazioni steve vaccini antinfluenzali e contro la polmonite    Tutti gli adulti dovrebbero vaccinarsi contro l'influenza ogni anno non appena il vaccino è disponibile  La vaccinazione contro la polmonite viene eseguita johansen persone adulte a partire indu 67 ifeanyi per prevenire le malattie pneumococciche, come la polmonite  Anche gli adulti di età compresa tra 19 e 64 ifeanyi ad elevato rischio di malattia pneumococcica dovrebbero sottoporsi a questa vaccinazione  Può essere necessario ripetere la vaccinazione dopo 1 o 5 ifeanyi  Riabilitazione polmonare:  il medico può consigliare un programma per favorire la gestione dei sintomi e migliorare la [employer]  Il programma può includere counseling nutrizionale e attività fisica, come passeggiate, per rafforzare i polmoni  [employer] circa le opzioni per un futuro trattamento:  chiedere informazioni sulla possibilità di redigere dichiarazioni di trattamento anticipate e testamenti biologici  Caralyn Herrera documenti aiutano a decidere e indicare per [employer] proprie scelte relative al trattamento e caren cure di fine [facility]  È consigliabile compilarli quando ci si sente bene e si può pensare con chiarezza a ciò debbie si desidera fare  Le informazioni contenute si possono poi conservare per un uso futuro in [employer] ricovero ospedaliero o di malattia grave  Presentarsi caren visite di controllo robert proprio medico, come consigliato:  potrebbe essere necessario sottoporsi ad ulteriori esami  Il medico può consigliare penelope visita con [employer]  Trascrivere eventuali domande in [employer] ricordarsele henna le visite  © 2017 [address] Information is for End User's use only and may not be sold, redistributed or otherwise used for commercial purposes  All illustrations and images included in CareNotes® are the copyrighted property of Asher HARPER  or [facility]  The above information is an  only  It is not intended as medical advice for individual conditions or treatments   Talk to your doctor, nurse or pharmacist before following any medical regimen to see if it is safe and effective for you

## 2018-10-10 NOTE — ED NOTES
Patient is resting comfortably  Pts daughter to  patient at 28        Jhoan Vela, TRAMAINE  [birth year] 1704

## 2018-10-10 NOTE — ED PROVIDER NOTES
History  Chief Complaint   Patient presents with    Cough     Patient presents with a cough for about 10 days  States he is coughing up "brown" stuff  Patient on 3L NC  for COPD     Glen Cuevas is a 79 y o  male w PMH COPD, pulmonary HTN who presents for evaluation of cough  Patient wears 3L at home for COPD  He has had a productive cough for a week and a half  He is bringing up some brown purulent material  Denies hemoptysis  No f/c  His wife is in hospital with pna and he is worried he has the same  Prior to Admission Medications   Prescriptions Last Dose Informant Patient Reported? Taking?    albuterol (2 5 mg/3 mL) 0 083 % nebulizer solution  Self No No   Sig: Take 3 mL (2 5 mg total) by nebulization every 4 (four) hours as needed for wheezing   albuterol (PROVENTIL HFA,VENTOLIN HFA) 90 mcg/act inhaler  Self Yes No   Sig: Inhale 2 puffs every 6 (six) hours as needed for wheezing   cholestyramine (QUESTRAN) 4 g packet   No No   Sig: Take 1 packet (4 g total) by mouth 2 (two) times a day with meals   esomeprazole (NexIUM) 40 MG capsule  Self No No   Sig: Take 1 capsule (40 mg total) by mouth daily for 30 days   loratadine (CLARITIN) 10 mg tablet  Self Yes No   Sig: Take 10 mg by mouth daily   predniSONE 5 mg tablet  Self No No   Sig: Take 1 tablet (5 mg total) by mouth daily Per taper   ranitidine (ZANTAC) 150 mg tablet  Self No No   Sig: Take 1 tablet (150 mg total) by mouth daily at bedtime   roflumilast (DALIRESP) 250 MCG tablet   No No   Sig: Take 1 tablet (250 mcg total) by mouth daily   sertraline (ZOLOFT) 50 mg tablet   No No   Sig: Take 1 tablet (50 mg total) by mouth daily      Facility-Administered Medications: None       Past Medical History:   Diagnosis Date    [facility]rilob[facility]se[facility]     [facility]     Hypoglycemia     On home oxygen therapy     Pneumonia     Pulmonary [facility]     Res[facility] failure ([facility])     SOB (shortness of breath)        Past Surgical History:   Procedure Laterality Date    CATARACT EXTRACTION      CHOLECYSTECTOMY      hernia    EYE SURGERY      UT ESOPHAGOGASTRODUODENOSCOPY TRANSORAL DIAGNOSTIC N/A 9/19/2018    Procedure: ESOPHAGOGASTRODUODENOSCOPY (EGD); Surgeon: Jeanne Acevedo MD;  Location: [facility]; Service: Gastroenterology    UT REPAIR Roula Gonzales 60 HERNIA,5+Y/O,REDUCIBL Left 5/23/2018    Procedure: OPEN INGUINAL HERNIA REPAIR WITH MESH;  Surgeon: Jody Mirza MD;  Location: [facility];  Service: General       Family History   Problem Relation Age of Onset    Diabetes Mother     Hypertension Mother     Hyperlipidemia Mother      I have reviewed and agree with the history as documented  Social History   Substance Use Topics    Smoking status: Former Smoker     Years: 50 00     Types: Cigarettes     Quit date: 2/27/2013    Smokeless tobacco: Never Used    Alcohol use 0 6 oz/week     1 Glasses of wine per week      Comment: socialy        Review of Systems   Constitutional: Negative for activity change, chills, diaphoresis, fatigue and fever  HENT: Negative for congestion and rhinorrhea  Eyes: Negative for pain  Respiratory: Positive for cough  Negative for chest tightness, shortness of breath and wheezing  Cardiovascular: Negative for chest pain and palpitations  Gastrointestinal: Negative for abdominal distention, constipation, diarrhea, nausea and vomiting  Genitourinary: Negative for difficulty urinating and dysuria  Musculoskeletal: Negative for arthralgias and myalgias  Neurological: Negative for dizziness, weakness, light-headedness and headaches  Psychiatric/Behavioral: The patient is not nervous/anxious  Physical Exam  Physical Exam   Constitutional: He is oriented to person, place, and time  He appears well-developed and well-nourished  No distress  HENT:   Head: Normocephalic and atraumatic     Eyes: Pupils are equal, round, and reactive to light    Neck: Normal range of motion  Neck supple  No tracheal deviation present  Cardiovascular: Normal rate, regular rhythm, normal heart sounds and intact distal pulses  Exam reveals no gallop and no friction rub  No murmur heard  Pulmonary/Chest: Effort normal  No respiratory distress  He has wheezes  He has no rales  3L oxygen   Abdominal: Soft  Bowel sounds are normal  He exhibits no distension and no mass  There is no tenderness  There is no guarding  Musculoskeletal: He exhibits no edema or deformity  Neurological: He is alert and oriented to person, place, and time  Skin: Skin is warm and dry  He is not diaphoretic  Psychiatric: He has a normal mood and affect  His behavior is normal    Nursing note and vitals reviewed        Vital Signs  ED Triage Vitals   Temperature Pulse Respirations Blood Pressure SpO2   10/10/18 1238 10/10/18 1236 10/10/18 1236 10/10/18 1236 10/10/18 1236   98 1 °F (36 7 °C) 87 22 123/74 96 %      Temp Source Heart Rate Source Patient Position - Orthostatic VS BP Location FiO2 (%)   10/10/18 1238 10/10/18 1236 10/10/18 1236 10/10/18 1236 --   Oral Monitor Sitting Left arm       Pain Score       10/10/18 1231       No Pain           Vitals:    10/10/18 1236 10/10/18 1330 10/10/18 1530   BP: 123/74 116/72 107/67   Pulse: 87 75 76   Patient Position - Orthostatic VS: Sitting Sitting Sitting       Visual Acuity      ED Medications  Medications   ipratropium-albuterol (DUO-NEB) 0 5-2 5 mg/3 mL inhalation solution 3 mL (3 mL Nebulization Given 10/10/18 1323)   methylPREDNISolone sodium succinate (Solu-MEDROL) injection 125 mg (125 mg Intravenous Given 10/10/18 1529)   albuterol inhalation solution 10 mg (10 mg Nebulization Given 10/10/18 1443)   ipratropium (ATROVENT) 0 02 % inhalation solution 1 mg (1 mg Nebulization Given 10/10/18 1443)   sodium chloride 0 9 % inhalation solution 12 mL (12 mL Nebulization Given 10/10/18 1443)       Diagnostic Studies  Results Reviewed Procedure Component Value Units Date/Time    BNP [[id number]]  (Normal) Collected:  10/10/18 1321    Lab Status:  Final result Specimen:  Blood from Arm, Right Updated:  10/10/18 1356     NT-proBNP 103 pg/mL     Comprehensive metabolic panel [[id number]]  (Abnormal) Collected:  10/10/18 1321    Lab Status:  Final result Specimen:  Blood from Arm, Right Updated:  10/10/18 1354     Sodium 140 mmol/L      Potassium 3 7 mmol/L      Chloride 101 mmol/L      CO2 32 mmol/L      ANION GAP 7 mmol/L      BUN 24 mg/dL      Creatinine 0 91 mg/dL      Glucose 100 mg/dL      Calcium 9 3 mg/dL      AST 82 (H) U/L       (H) U/L      Alkaline Phosphatase 83 U/L      Total Protein 7 1 g/dL      Albumin 3 6 g/dL      Total Bilirubin 0 50 mg/dL      eGFR 88 ml/min/1 73sq m     Narrative:         National Kidney Disease Education Program recommendations are as follows:  GFR calculation is accurate only with a steady state creatinine  Chronic Kidney disease less than 60 ml/min/1 73 sq  meters  Kidney failure less than 15 ml/min/1 73 sq  meters      Troponin I [[id number]]  (Normal) Collected:  10/10/18 1321    Lab Status:  Final result Specimen:  Blood from Arm, Right Updated:  10/10/18 1353     Troponin I <0 02 ng/mL     CBC and differential [[id number]] Collected:  10/10/18 1321    Lab Status:  Final result Specimen:  Blood from Arm, Right Updated:  10/10/18 1333     WBC 9 59 Thousand/uL      RBC 4 32 Million/uL      Hemoglobin 13 0 g/dL      Hematocrit 40 4 %      MCV 94 fL      MCH 30 1 pg      MCHC 32 2 g/dL      RDW 13 9 %      MPV 9 8 fL      Platelets 255 Thousands/uL      nRBC 0 /100 WBCs      Neutrophils Relative 66 %      Immat GRANS % 1 %      Lymphocytes Relative 22 %      Monocytes Relative 10 %      Eosinophils Relative 1 %      Basophils Relative 0 %      Neutrophils Absolute 6 34 Thousands/µL      Immature Grans Absolute 0 05 Thousand/uL      Lymphocytes Absolute 2 13 Thousands/µL      Monocytes Absolute 0 98 Thousand/µL Eosinophils Absolute 0 06 Thousand/µL      Basophils Absolute 0 03 Thousands/µL                  XR chest 2 views   ED Interpretation by Abdullahi Giraldo PA-C (10/10 1318)   Concern for L lower lobe infiltrate       Final Result by Ambreen Rangel MD (10/10 1513)      No acute cardiopulmonary disease  Severe emphysema  Workstation performed: [id number]                    Procedures  Procedures       Phone Contacts  ED Phone Contact    ED Course  ED Course as of Oct 10 1632   Wed Oct 10, 2018   1244 EKG Interpretation    Rate: 78 BPM  Rhythm: NSR w PVCs  Axis: normal  Intervals: Normal, RBBB, QTc 433ms  Q waves: no  T waves: flattened in V  ST segments:  no depression / elevation    Impression: abnormal EKG  EKG for comparison: 5/31/18 new PVCs and RBBB, otherwise no significant change   EKG interpreted by me  1412 ALT: (!) 130   1413 New from recent labs  AST (SGOT): (!) 82   1418 Pt still tight, not moving much air but no increased O2 requirements  Will give michael neb, solumedrol and reassess  MDM  Number of Diagnoses or Management Options  COPD exacerbation [facility]):   Diagnosis management comments: DDX includes but not ltd to:   COPD exacerbation, pna, bronchitis, viral uri, CHF  No chest pain or hypoxia to suggest PE     Plan is to obtain:  EKG/trop to check for ischemic changes   CXR to check for congestive changes, active pulmonary disease   CBC to check for anemia, leukocytosis, hydration status  Chemistry panel to check for lyte abnormalities, organ function   BNP to check for CHF/ congestion    Based on results:  Patient improved w michael neb, feels better  Consider possible LLL infiltrate on CXR  Will treat w azithromycin, pred taper at home  Follow up w PCP  Return parameters discussed  Pt requires f/u as an outpt  Pt expresses understanding w above treatment plan  All questions answered prior to d/c        CritCare Time    Disposition  Final diagnoses:   COPD exacerbation ([address] )     Time reflects when diagnosis was documented in both MDM as applicable and the Disposition within this note     Time User Action Codes Description Comment    10/10/2018  4:05 PM Veronica Duty Add [J44 1] COPD exacerbation [facility])       ED Disposition     ED Disposition Condition Comment    Discharge  Genoveva Berrios discharge to home/self care  Condition at discharge: Good        Follow-up Information     Follow up With Specialties Details Why Contact Info Additional Information    [address] [facility] Emergency Department Emergency Medicine  If symptoms worsen [address] [facility] [address]  [phone] [place] [facility], [address] [place] [address], Sylvia Chris MD Internal Medicine  As needed [address] [place] [address]  [phone]             Patient's Medications   Discharge Prescriptions    AZITHROMYCIN (ZITHROMAX) 250 MG TABLET    Take 2 tablets Day 1, take 1 tablet day 2 - 5  Start Date: 10/10/2018End Date: 10/14/2018       Order Dose: --       Quantity: 6 tablet    Refills: 0    PREDNISONE 10 MG TABLET    6 tabs daily for 4 days then, 4 tabs daily for 2 days then, 2 tabs daily for 2 days then, 1 tab daily 2 days  Start Date: 10/10/2018End Date: --       Order Dose: --       Quantity: 38 tablet    Refills: 0     No discharge procedures on file      ED Provider  Electronically Signed by           Colten Santamaria PA-C  10/10/18 Estella Orozco PA-C  10/10/18 9524

## 2018-10-10 NOTE — ED NOTES
D/c reviewed with pt prior to discharge  Medications discussed  Ambulatory off unit with steady gait       Oscar Saini RN  88/45/22 6691

## 2018-10-10 NOTE — ED NOTES
Lunch ordered for pt  Resting in bed, call bell within reach, VSS will continue to monitor        Tia Tinsley RN  88/46/21 8950

## 2018-10-22 ENCOUNTER — TELEPHONE (OUTPATIENT)
Dept: INTERNAL MEDICINE CLINIC | Facility: CLINIC | Age: 68
End: 2018-10-22

## 2018-10-22 NOTE — TELEPHONE ENCOUNTER
Dr Lena Park,    Patient called and stated that he is seeing pink on his sputum  He has an appointment on [address] and he's concerned on what he should do till them  Please advise?

## 2018-10-22 NOTE — TELEPHONE ENCOUNTER
That is frequently caused by a lot of coughing and the surface vessels in the lungs bleed a little bit  He can also check with his lung doctor if they want to see him  If it worsens, he should get checked in the emergency room

## 2018-10-25 ENCOUNTER — HOSPITAL ENCOUNTER (OUTPATIENT)
Dept: NON INVASIVE DIAGNOSTICS | Facility: CLINIC | Age: 68
Discharge: HOME | End: 2018-10-25
Payer: MEDICARE

## 2018-10-25 ENCOUNTER — OFFICE VISIT (OUTPATIENT)
Dept: INTERNAL MEDICINE CLINIC | Facility: CLINIC | Age: 68
End: 2018-10-25
Payer: MEDICARE

## 2018-10-25 VITALS
SYSTOLIC BLOOD PRESSURE: 108 MMHG | HEIGHT: 70 IN | OXYGEN SATURATION: 93 % | RESPIRATION RATE: 18 BRPM | WEIGHT: 179 LBS | DIASTOLIC BLOOD PRESSURE: 58 MMHG | HEART RATE: 86 BPM | BODY MASS INDEX: 25.62 KG/M2

## 2018-10-25 DIAGNOSIS — I27.20 PULMONARY HYPERTENSION (HCC): ICD-10-CM

## 2018-10-25 DIAGNOSIS — J44.1 ACUTE EXACERBATION OF CHRONIC OBSTRUCTIVE PULMONARY DISEASE (COPD) (HCC): Primary | ICD-10-CM

## 2018-10-25 DIAGNOSIS — J44.1 COPD EXACERBATION (HCC): ICD-10-CM

## 2018-10-25 PROCEDURE — 93350 STRESS TTE ONLY: CPT

## 2018-10-25 PROCEDURE — 93351 STRESS TTE COMPLETE: CPT | Performed by: INTERNAL MEDICINE

## 2018-10-25 PROCEDURE — 99214 OFFICE O/P EST MOD 30 MIN: CPT | Performed by: INTERNAL MEDICINE

## 2018-10-25 RX ORDER — CEFUROXIME AXETIL 500 MG/1
500 TABLET ORAL EVERY 12 HOURS SCHEDULED
Qty: 20 TABLET | Refills: 0 | Status: SHIPPED | OUTPATIENT
Start: 2018-10-25 | End: 2018-11-04

## 2018-10-25 RX ORDER — UMECLIDINIUM BROMIDE AND VILANTEROL TRIFENATATE 62.5; 25 UG/1; UG/1
POWDER RESPIRATORY (INHALATION)
Refills: 0 | COMMUNITY
Start: 2018-10-11 | End: 2018-12-10

## 2018-10-25 RX ORDER — PREDNISONE 10 MG/1
TABLET ORAL
Qty: 60 TABLET | Refills: 0 | Status: SHIPPED | OUTPATIENT
Start: 2018-10-25 | End: 2018-10-26 | Stop reason: SDUPTHER

## 2018-10-25 RX ORDER — PREDNISONE 10 MG/1
TABLET ORAL
Qty: 60 TABLET | Refills: 1 | Status: SHIPPED | OUTPATIENT
Start: 2018-10-25 | End: 2018-10-25 | Stop reason: SDUPTHER

## 2018-10-25 NOTE — PROGRESS NOTES
Assessment/Plan:      Will treat with antibiotics and steroids  Follow-up with Pulmonary  For any worsening, his wife will take him to the hospital     Return for Next scheduled follow up  No problem-specific Assessment & Plan notes found for this encounter  Diagnoses and all orders for this visit:    Acute exacerbation of chronic obstructive pulmonary disease (COPD) (HCC)  -     cefuroxime (CEFTIN) 500 mg tablet; Take 1 tablet (500 mg total) by mouth every 12 (twelve) hours for 10 days    COPD exacerbation (HCC)  -     predniSONE 10 mg tablet; Follow taper, reducing every 3 days  Other orders  -     ANORO ELLIPTA 62 5-25 MCG/INH inhaler;           Subjective:      Patient ID: Jose Roberto Howard is a 79 y o  male  Patient comes in today for ER follow-up with his wife  He again went to the emergency room with an exacerbation of his COPD  His wife reports that when they were living in [place], they were both admitted once a month  So they feel they are doing better here  He was treated from the ER with steroids and a Z-Pascual  He is still having thick secretions  More productive cough  Using his other medications as directed  His wife reports he usually required a 2nd round of medications but also notes that the Zithromax was no longer working as well          ALLERGIES:  Allergies   Allergen Reactions    Penicillins Hives     Passed out    Codeine        CURRENT MEDICATIONS:    Current Outpatient Prescriptions:     albuterol (2 5 mg/3 mL) 0 083 % nebulizer solution, Take 3 mL (2 5 mg total) by nebulization every 4 (four) hours as needed for wheezing, Disp: 1080 mL, Rfl: 3    albuterol (PROVENTIL HFA,VENTOLIN HFA) 90 mcg/act inhaler, Inhale 2 puffs every 6 (six) hours as needed for wheezing, Disp: , Rfl:     cholestyramine (QUESTRAN) 4 g packet, Take 1 packet (4 g total) by mouth 2 (two) times a day with meals, Disp: 60 each, Rfl: 2    loratadine (CLARITIN) 10 mg tablet, Take 10 mg by mouth daily, Disp: , Rfl:     predniSONE 10 mg tablet, Follow taper, reducing every 3 days  , Disp: 60 tablet, Rfl: 1    ranitidine (ZANTAC) 150 mg tablet, Take 1 tablet (150 mg total) by mouth daily at bedtime, Disp: 30 tablet, Rfl: 5    roflumilast (DALIRESP) 250 MCG tablet, Take 1 tablet (250 mcg total) by mouth daily, Disp: 30 tablet, Rfl: 5    sertraline (ZOLOFT) 50 mg tablet, Take 1 tablet (50 mg total) by mouth daily, Disp: 30 tablet, Rfl: 5    ANORO ELLIPTA 62 5-25 MCG/INH inhaler, , Disp: , Rfl: 0    cefuroxime (CEFTIN) 500 mg tablet, Take 1 tablet (500 mg total) by mouth every 12 (twelve) hours for 10 days, Disp: 20 tablet, Rfl: 0    esomeprazole (NexIUM) 40 MG capsule, Take 1 capsule (40 mg total) by mouth daily for 30 days, Disp: 30 capsule, Rfl: 3    ACTIVE PROBLEM LIST:  Patient Active Problem List   Diagnosis    Pulmonary emphysema (HCC)    Gastroesophageal reflux disease without esophagitis    Anxiety    Simple chronic bronchitis (HCC)    Shortness of breath on exertion    Abnormal ECG    PVCs (premature ventricular contractions)    Allergic rhinitis    Functional diarrhea    Diastolic dysfunction    Pulmonary hypertension (HCC)    Acute exacerbation of chronic obstructive pulmonary disease (COPD) (HCC)    Gastroesophageal reflux disease       PAST MEDICAL HISTORY:  Past Medical History:   Diagnosis Date    [facility]     COPD (chronic obstructive pulmonary disease) (HCC)     Hypoglycemia     On home oxygen therapy     Pneumonia     [facility]monary hypertension [facility]     [facility] failure [facility])     SOB (shortness of breath)        PAST SURGICAL HISTORY:  Past Surgical History:   Procedure Laterality Date    CATARACT EXTRACTION      CHOLECYSTECTOMY      hernia    EYE SURGERY      HI ESOPHAGOGASTRODUODENOSCOPY TRANSORAL DIAGNOSTIC N/A 9/19/2018    Procedure: ESOPHAGOGASTRODUODENOSCOPY (EGD);   Surgeon: Kristie Galicia MD;  Location: [facility]; Service: Gastroenterology    OR REPAIR ING HERNIA,5+Y/O,REDUCIBL Left 5/23/2018    Procedure: OPEN INGUINAL HERNIA REPAIR WITH MESH;  Surgeon: Shae Perla MD;  Location: [facility];  Service: General       FAMILY HISTORY:  Family History   Problem Relation Age of Onset    Diabetes Mother     Hypertension Mother     Hyperlipidemia Mother        SOCIAL HISTORY:  Social History     Social History    Marital status: /Civil Union     Spouse name: janet     Number of children: 11    Years of education: N/A     Occupational History    retired       Social History Main Topics    Smoking status: Former Smoker     Years: 50 00     Types: Cigarettes     Quit date: 2/27/2013    Smokeless tobacco: Never Used    Alcohol use 0 6 oz/week     1 Glasses of wine per week      Comment: socialy    Drug use: No    Sexual activity: No     Other Topics Concern    Not on file     Social History Narrative        Retired       Review of Systems   Respiratory: Positive for shortness of breath and wheezing  Cardiovascular: Negative for chest pain  Gastrointestinal: Negative for abdominal pain  Objective:  Vitals:    10/25/18 1026   BP: 108/58   BP Location: Left arm   Patient Position: Sitting   Cuff Size: Adult   Pulse: 86   Resp: 18   SpO2: 93%   Weight: 81 2 kg (179 lb)   Height: 5' 10" (1 778 m)        Physical Exam   Constitutional: He is oriented to person, place, and time  He appears well-developed and well-nourished  Cardiovascular: Normal rate, regular rhythm and normal heart sounds  Pulmonary/Chest: Effort normal  No respiratory distress  He has no rales  Decreased breath sounds   Abdominal: Soft  Bowel sounds are normal    Musculoskeletal: He exhibits no edema  Neurological: He is alert and oriented to person, place, and time  Nursing note and vitals reviewed          RESULTS:    Recent Results (from the past 1008 hour(s))   Tissue Exam    Collection Time: 09/19/18  9:13 AM Result Value Ref Range    Case Report       Surgical Pathology Report                         Case: [id number]                                   Authorizing Provider:  Sánchez Martin MD   Collected:           09/19/2018 0913              Ordering Location:     [address] [place] [address] Received:            09/19/2018 1209                                     Operating Room                                                               Pathologist:           Laverne Owuus MD                                                                 Specimens:   A) - Duodenum, cold bx, duodenum                                                                    B) - Stomach, cold bx, stomach                                                             Final Diagnosis       A  Duodenum,  endoscopic biopsy:  - Benign duodenal mucosa without specific histopathologic abnormality   - No villous atrophy, no intraepithelial lymphocytosis and no crypt hyperplasia to suggest malabsorptive enteropathy   - No active or chronic duodenitis  - No epithelial dysplasia and no evidence of malignancy  B   Stomach, endoscopic biopsy:  - Gastric antral mucosa with chronic, inactive gastritis and intestinal metaplasia  - Gastric oxyntic mucosa with proton pump inhibitor treatment effects  - Immunohistochemistry for Helicobacter pylori is negative  - Negative for dysplasia or carcinoma  Additional Information       All controls performed with the immunohistochemical stains reported above reacted appropriately  These tests were developed and their performance characteristics determined by [facility] or [facility]  They may not be cleared or approved by the [place]  Food and Drug Administration  The FDA has determined that such clearance or approval is not necessary  These tests are used for clinical purposes  They should not be regarded as investigational or for research   This laboratory has been approved by CLIA 88, designated as a high-complexity laboratory and is qualified to perform these tests  Gross Description       A  The specimen is received in formalin, labeled with the patient's name and hospital number, and is designated "duodenum biopsy  The specimen consists of 2 tan-pink soft tissue fragments each measuring 0 3 cm in greatest dimension  Entirely submitted  One cassette  B  The specimen is received in formalin, labeled with the patient's name and hospital number, and is designated "stomach biopsy  The specimen consists 3 tan-pink soft tissue fragments measuring 0 3 cm, 0 6 cm, and 0 6 cm in greatest dimension  Entirely submitted  One cassette  Note: The estimated total formalin fixation time based upon information provided by the submitting clinician and the standard processing schedule is under 72 hours    [facility]           Clinical Information R/o celiac disease    ECG 12 lead    Collection Time: 10/10/18 12:39 PM   Result Value Ref Range    Ventricular Rate 78 BPM    Atrial Rate 78 BPM    IA Interval 164 ms    QRSD Interval 108 ms    QT Interval 380 ms    QTC Interval 433 ms    P Axis 85 degrees    QRS Axis 99 degrees    T Wave Axis 73 degrees   CBC and differential    Collection Time: 10/10/18  1:21 PM   Result Value Ref Range    WBC 9 59 4 31 - 10 16 Thousand/uL    RBC 4 32 3 88 - 5 62 Million/uL    Hemoglobin 13 0 12 0 - 17 0 g/dL    Hematocrit 40 4 36 5 - 49 3 %    MCV 94 82 - 98 fL    MCH 30 1 26 8 - 34 3 pg    MCHC 32 2 31 4 - 37 4 g/dL    RDW 13 9 11 6 - 15 1 %    MPV 9 8 8 9 - 12 7 fL    Platelets 319 993 - 396 Thousands/uL    nRBC 0 /100 WBCs    Neutrophils Relative 66 43 - 75 %    Immat GRANS % 1 0 - 2 %    Lymphocytes Relative 22 14 - 44 %    Monocytes Relative 10 4 - 12 %    Eosinophils Relative 1 0 - 6 %    Basophils Relative 0 0 - 1 %    Neutrophils Absolute 6 34 1 85 - 7 62 Thousands/µL    Immature Grans Absolute 0 05 0 00 - 0 20 Thousand/uL    Lymphocytes Absolute 2  13 0 60 - 4 47 Thousands/µL    Monocytes Absolute 0 98 0 17 - 1 22 Thousand/µL    Eosinophils Absolute 0 06 0 00 - 0 61 Thousand/µL    Basophils Absolute 0 03 0 00 - 0 10 Thousands/µL   Comprehensive metabolic panel    Collection Time: 10/10/18  1:21 PM   Result Value Ref Range    Sodium 140 136 - 145 mmol/L    Potassium 3 7 3 5 - 5 3 mmol/L    Chloride 101 100 - 108 mmol/L    CO2 32 21 - 32 mmol/L    ANION GAP 7 4 - 13 mmol/L    BUN 24 5 - 25 mg/dL    Creatinine 0 91 0 60 - 1 30 mg/dL    Glucose 100 65 - 140 mg/dL    Calcium 9 3 8 3 - 10 1 mg/dL    AST 82 (H) 5 - 45 U/L     (H) 12 - 78 U/L    Alkaline Phosphatase 83 46 - 116 U/L    Total Protein 7 1 6 4 - 8 2 g/dL    Albumin 3 6 3 5 - 5 0 g/dL    Total Bilirubin 0 50 0 20 - 1 00 mg/dL    eGFR 88 ml/min/1 73sq m   BNP    Collection Time: 10/10/18  1:21 PM   Result Value Ref Range    NT-proBNP 103 <125 pg/mL   Troponin I    Collection Time: 10/10/18  1:21 PM   Result Value Ref Range    Troponin I <0 02 <=0 04 ng/mL       This note was created with voice recognition software  Phonic, grammatical and spelling errors may be present within the note as a result

## 2018-10-26 DIAGNOSIS — J44.1 COPD EXACERBATION (HCC): ICD-10-CM

## 2018-10-26 LAB
MAX DIASTOLIC BP: 76 MMHG
MAX HEART RATE: 130 BPM
MAX PREDICTED HEART RATE: 154 BPM
MAX. SYSTOLIC BP: 156 MMHG
PROTOCOL NAME: NORMAL
REASON FOR TERMINATION: NORMAL
TARGET HR FORMULA: NORMAL
TEST INDICATION: NORMAL
TIME IN EXERCISE PHASE: NORMAL

## 2018-10-26 RX ORDER — PREDNISONE 10 MG/1
TABLET ORAL
Qty: 60 TABLET | Refills: 0 | Status: SHIPPED | OUTPATIENT
Start: 2018-10-26 | End: 2018-11-21 | Stop reason: DRUGHIGH

## 2018-11-14 ENCOUNTER — OFFICE VISIT (OUTPATIENT)
Dept: CARDIOLOGY CLINIC | Facility: CLINIC | Age: 68
End: 2018-11-14
Payer: MEDICARE

## 2018-11-14 VITALS
HEART RATE: 71 BPM | SYSTOLIC BLOOD PRESSURE: 146 MMHG | WEIGHT: 179 LBS | OXYGEN SATURATION: 90 % | BODY MASS INDEX: 25.62 KG/M2 | HEIGHT: 70 IN | DIASTOLIC BLOOD PRESSURE: 80 MMHG

## 2018-11-14 DIAGNOSIS — J43.9 PULMONARY EMPHYSEMA, UNSPECIFIED EMPHYSEMA TYPE (HCC): ICD-10-CM

## 2018-11-14 DIAGNOSIS — I27.20 PULMONARY HYPERTENSION (HCC): ICD-10-CM

## 2018-11-14 DIAGNOSIS — R06.02 SHORTNESS OF BREATH ON EXERTION: Primary | ICD-10-CM

## 2018-11-14 DIAGNOSIS — I49.3 PVCS (PREMATURE VENTRICULAR CONTRACTIONS): ICD-10-CM

## 2018-11-14 DIAGNOSIS — I51.89 DIASTOLIC DYSFUNCTION: ICD-10-CM

## 2018-11-14 PROCEDURE — 99214 OFFICE O/P EST MOD 30 MIN: CPT | Performed by: INTERNAL MEDICINE

## 2018-11-14 NOTE — PROGRESS NOTES
CARDIOLOGY OFFICE VISIT  [facility]  [address], [facility], [address] [facility], [facility], [facility]  Tel: [phone]      NAME: Betty Yañez  AGE: 79 y o  SEX: male  : 1950   MRN: [id number]      Chief Complaint:  Chief Complaint   Patient presents with    Follow-up     4 month         History of Present Illness:   Patient comes for follow up  He continues to be SOB from his severe COPD as at baseline  On home oxygen therapy  Denies chest pain / pressure, palpitations, lightheadedness, syncope, swelling feet, orthopnea, PND, claudication  Pt has an extensive smoking history but he quit 5 years ago    Recent 2D echocardiogram found severe pulmonary hypertension  Pt saw Dr Nina Brody and is being worked up for the cause of his PHTN  Past Medical History:  Past Medical History:   Diagnosis Date    Centrilobular emphysema ([address] )     COPD (chronic obstructive pulmonary disease) ([address] )     Hypoglycemia     On home oxygen therapy     Pneumonia     Pulmonary hypertension ([address] )     Respiratory failure (HCC)     SOB (shortness of breath)          Past Surgical History:  Past Surgical History:   Procedure Laterality Date    CATARACT EXTRACTION      CHOLECYSTECTOMY      hernia    EYE SURGERY      WY ESOPHAGOGASTRODUODENOSCOPY TRANSORAL DIAGNOSTIC N/A 2018    Procedure: ESOPHAGOGASTRODUODENOSCOPY (EGD); Surgeon: Alex Cartagena MD;  Location: [facility];   Service: Gastroenterology    W[place] REPAIR [address] HERNIA,5+Y/O,REDUCIBL Left 2018    Procedure: OPEN INGUINAL HERNIA REPAIR WITH MESH;  Surgeon: Vilma Sutherland MD;  Location: [facility];  Service: General         Family History:  Family History   Problem Relation Age of Onset    Diabetes Mother     Hypertension Mother     Hyperlipidemia Mother          Social History:  Social History     Social History    Marital status: /Civil Union     Spouse name: Deonte Tang Omer of children: 5    Years of education: N/A     Occupational History    retired       Social History Main Topics    Smoking status: Former Smoker     Years: 50 00     Types: Cigarettes     Quit date: 2/27/2013    Smokeless tobacco: Never Used    Alcohol use 0 6 oz/week     1 Glasses of wine per week      Comment: socialy    Drug use: No    Sexual activity: No     Other Topics Concern    None     Social History Narrative        Retired         Active Problems:  Patient Active Problem List   Diagnosis    Pulmonary emphysema ([facility] )    Gastroesophageal reflux disease without esophagitis    Anxiety    Simple chronic bronchitis (HCC)    Shortness of breath on exertion    Abnormal ECG    PVCs (premature ventricular contractions)    Allergic rhinitis    Functional diarrhea    Diastolic dysfunction    Pulmonary hypertension ([facility] )    Acute exacerbation of chronic obstructive pulmonary disease (COPD) ([place] [address] )    Gastroesophageal reflux disease         The following portions of the patient's history were reviewed and updated as appropriate: past medical history, past surgical history, past family history,  past social history, current medications, allergies and problem list       Review of Systems:  Constitutional: Denies fever, chills, fatigue  Eyes: Denies eye redness, eye discharge, double vision  ENT: Denies hearing loss, tinnitus, sneezing, nasal discharge, sore throat   Respiratory: Denies cough, expectoration, hemoptysis   +shortness of breath  Cardiovascular: Denies chest pain, palpitations, orthopnea, PND, lower extremity swelling  Gastrointestinal: Denies abdominal pain, nausea, vomiting, hematemesis, diarrhea, bloody stools  Genito-Urinary: Denies dysuria, incontinence  Musculoskeletal: Denies back pain, joint pain, muscle pain  Neurologic: Denies confusion, lightheadedness, syncope, headache, focal weakness, sensory changes, seizures  Endocrine: Denies polyuria, polydipsia, temperature intolerance  Allergy and Immunology: Denies hives, insect bite sensitivity  Hematological and Lymphatic: Denies bleeding problems, swollen glands   Psychological: Denies depression, suicidal ideation, anxiety, panic  Dermatological: Denies pruritus, rash, skin lesion changes      Vitals:  Vitals:    11/14/18 1340   BP: 146/80   Pulse: 71   SpO2: 90%       Body mass index is 25 68 kg/m²  Weight (last 2 days)     Date/Time   Weight    11/14/18 1340  81 2 (179)                Physical Examination:  General: On oxygen via nasal cannula  Awake, alert, oriented in time, place and person  Responding to commands  Head: Normocephalic  Atraumatic  Eyes: Both pupils normal sized, round and reactive to light  Nonicteric  ENT: Normal external ear canals  Nares normal, no drainage  Lips and oral mucosa normal  Neck: Supple  JVP not raised  Trachea central  No thyromegaly  Lungs: Bilateral decreased air entry  Chest wall: No tenderness  Cardiovascular: RRR  S1 and S2 normal  No murmur, rub or gallop  Gastrointestinal: Abdomen soft, nontender  No guarding or rigidity  Liver and spleen not palpable  Bowel sounds present  Neurologic: Patient is awake, alert, oriented in time, place and person  Responding to command  Moving all extremities  Integumentary:  No skin rash  Lymphatic: No cervical lymphadenopathy  Back: Symmetric   No CVA tenderness  Extremities: No clubbing, cyanosis or edema      Laboratory Results:  CBC with diff:   Lab Results   Component Value Date    WBC 9 59 10/10/2018    RBC 4 32 10/10/2018    HGB 13 0 10/10/2018    HCT 40 4 10/10/2018    MCV 94 10/10/2018    MCH 30 1 10/10/2018    RDW 13 9 10/10/2018     10/10/2018       CMP:  Lab Results   Component Value Date    CREATININE 0 91 10/10/2018    BUN 24 10/10/2018    K 3 7 10/10/2018     10/10/2018    CO2 32 10/10/2018    ALKPHOS 83 10/10/2018     (H) 10/10/2018    AST 82 (H) 10/10/2018         Lab Results   Component Value Date    TROPONINI <0 02 10/10/2018    TROPONINI <0 02 2018       Cardiac testing:   Results for orders placed during the hospital encounter of 18   Echo complete with contrast if indicated    Narrative [address], [address] [facility]  [phone]    Transthoracic Echocardiogram  2D, M-mode, Doppler, and Color Doppler    Study date:  01-May-2018    Patient: Elian Jones  MR number: [id number]  Account number: [de-identified]  : 1950  Age: 74 years  Gender: Male  Status: Outpatient  Location: [facility]  Height: 70 in  Weight: 184 lb  BP: 124/ 82 mmHg    Indications: Dyspnea  Diagnoses: R06 09 - Other forms of dyspnea    Sonographer:  Beltran RCS  Interpreting Physician:  Suresh Caldwell MD  Primary Physician:  Jordon Tristan MD  Referring Physician:  Suresh Caldwell MD  Group:  [facility]    SUMMARY    PROCEDURE INFORMATION:  This was a technically difficult study  Echocardiographic views were limited due to low windows and lung interference  LEFT VENTRICLE:  Systolic function was normal  Ejection fraction was estimated to be 60 %  There were no regional wall motion abnormalities  Doppler parameters were consistent with abnormal left ventricular relaxation (grade 1 diastolic dysfunction)  RIGHT VENTRICLE:  The size was normal   Systolic function was normal     MITRAL VALVE:  There was trace regurgitation  TRICUSPID VALVE:  There was mild regurgitation  Estimated peak PA pressure was 58 mmHg  The findings suggest severe pulmonary hypertension  IVC, HEPATIC VEINS:  The inferior vena cava was dilated  The respirophasic change in diameter was more than 50%  HISTORY: PRIOR HISTORY: Ventricular arrhythmia  Chronic lung disease (on home oxygen)  Abnormal ECG  PROCEDURE: The study was performed in the [address] [place] [address]  This was a routine study  The transthoracic approach was used   The study included complete 2D imaging, M-mode, complete spectral Doppler, and color Doppler  The  heart rate was 84 bpm, at the start of the study  Images were obtained from the parasternal, apical, subcostal, and suprasternal notch acoustic windows  Echocardiographic views were limited due to low windows and lung interference  This  was a technically difficult study  LEFT VENTRICLE: Size was normal  Systolic function was normal  Ejection fraction was estimated to be 60 %  There were no regional wall motion abnormalities  Wall thickness was normal  No evidence of apical thrombus  DOPPLER: Doppler  parameters were consistent with abnormal left ventricular relaxation (grade 1 diastolic dysfunction)  RIGHT VENTRICLE: The size was normal  Systolic function was normal  Wall thickness was normal     LEFT ATRIUM: Size was normal     RIGHT ATRIUM: Size was normal     MITRAL VALVE: Valve structure was normal  There was normal leaflet separation  DOPPLER: The transmitral velocity was within the normal range  There was no evidence for stenosis  There was trace regurgitation  AORTIC VALVE: The valve was trileaflet  Leaflets exhibited normal thickness and normal cuspal separation  DOPPLER: Transaortic velocity was within the normal range  There was no evidence for stenosis  There was no significant  regurgitation  TRICUSPID VALVE: The valve structure was normal  There was normal leaflet separation  DOPPLER: There was mild regurgitation  Estimated peak PA pressure was 58 mmHg  The findings suggest severe pulmonary hypertension  PULMONIC VALVE: Not well visualized  DOPPLER: The transpulmonic velocity was within the normal range  There was no significant regurgitation  PERICARDIUM: There was no pericardial effusion  The pericardium was normal in appearance  AORTA: The root exhibited normal size  SYSTEMIC VEINS: IVC: The inferior vena cava was dilated  The respirophasic change in diameter was more than 50%      SYSTEM MEASUREMENT TABLES    Apical four chamber  4 chamber Left Atrium Volume Index; Planimetry; End Systole; Apical four chamber;: 19 35 cm2  Left Ventricular Ejection Fraction; Method of Disks, Single Plane; Apical four chamber;: 63 8 %  Left Ventricular End Diastolic Volume; Method of Disks, Single Plane; Apical four chamber;: 74 9 ml  Left Ventricular End Systolic Volume; Method of Disks, Single Plane; Apical four chamber;: 27 1 ml  Right Atrium Systolic Area; Planimetry; End Systole; Apical four chamber;: 15 66 cm2  Right Ventricular Internal Diastolic Dimension; End Diastole; Apical four chamber;: 59 9 mm  TAPSE: 31 mm    Unspecified Scan Mode  Aortic Root Diameter; End Systole;: 36 4 mm  Gradient Pressure, Peak; Simplified Bernoulli; Antegrade Flow; Systole;: 5 6 mm[Hg]  Gradient pressure, average; Simplified Bernoulli; Antegrade Flow; Systole;: 3 4 mm[Hg]  Left atrial diameter; End Diastole;: 36 3 mm  Cardiac Output; Teichholz; Systole;: 3 36 L/min  Interventricular Septum Diastolic Thickness; Teichholz; End Diastole;: 11 8 mm  Left Ventricle Internal End Diastolic Dimension; Teichholz;: 40 2 mm  Left Ventricle Internal Systolic Dimension; Teichholz; End Systole;: 28 8 mm  Left Ventricle Posterior Wall Diastolic Thickness; Teichholz; End Diastole;: 10 8 mm  Left Ventricular Ejection Fraction; Teichholz;: 55 2 %  Stroke volume;  Teichholz; Systole;: 39 1 ml  Mitral Valve Area; Area by Pressure Half-Time; Systole;: 3 24 cm2  Mitral Valve E to A Ratio; Systole;: 0 75  Maximum Tricuspid valve regurgitation pressure gradient; Regurgitant Flow; Systole;: 49 6 mm[Hg]    [facility]    Prepared and electronically signed by    Ana Hernandez MD  Signed 01-May-2018 18:08:39           Medications:    Current Outpatient Prescriptions:     albuterol (2 5 mg/3 mL) 0 083 % nebulizer solution, Take 3 mL (2 5 mg total) by nebulization every 4 (four) hours as needed for wheezing, Disp: 1080 mL, Rfl: 3    albuterol (PROVENTIL HFA,VENTOLIN HFA) 90 mcg/act inhaler, Inhale 2 puffs every 6 (six) hours as needed for wheezing, Disp: , Rfl:     ANORO ELLIPTA 62 5-25 MCG/INH inhaler, , Disp: , Rfl: 0    cholestyramine (QUESTRAN) 4 g packet, Take 1 packet (4 g total) by mouth 2 (two) times a day with meals, Disp: 60 each, Rfl: 2    ranitidine (ZANTAC) 150 mg tablet, Take 1 tablet (150 mg total) by mouth daily at bedtime, Disp: 30 tablet, Rfl: 5    roflumilast (DALIRESP) 250 MCG tablet, Take 1 tablet (250 mcg total) by mouth daily, Disp: 30 tablet, Rfl: 5    sertraline (ZOLOFT) 50 mg tablet, Take 1 tablet (50 mg total) by mouth daily, Disp: 30 tablet, Rfl: 5    esomeprazole (NexIUM) 40 MG capsule, Take 1 capsule (40 mg total) by mouth daily for 30 days, Disp: 30 capsule, Rfl: 3    loratadine (CLARITIN) 10 mg tablet, Take 10 mg by mouth daily, Disp: , Rfl:     predniSONE 10 mg tablet, Follow taper, reducing every 3 days  (Patient not taking: Reported on 11/14/2018 ), Disp: 60 tablet, Rfl: 0      Allergies: Allergies   Allergen Reactions    Penicillins Hives     Passed out    Codeine          Assessment and Plan:  1  Shortness of breath on exertion   secondary to severe pulmonary emphysema with severe pulmonary hypertension  Continue to follow up with pulmonology and Dr Romeo Brewer  Continue home oxygen  2  Pulmonary emphysema, unspecified emphysema type ([address] )   continue inhalers and home oxygen  Follow-up with pulmonology    3  PVCs (premature ventricular contractions)   likely exacerbated by his occasional hypoxia and use of beta agonist inhalers    4  Diastolic dysfunction    5  Pulmonary hypertension ([address] )   f/u with Dr Romeo Brewer    Recommend aggressive risk factor modification and therapeutic lifestyle changes  Low-salt, low-calorie, low-fat, low-cholesterol diet with regular exercise and to optimize weight    I will defer the ordering and monitoring of necessity lab studies to you, but I am available and happy to review and manage any of the data at your request in the future  Discussed concepts of atherosclerosis, including signs and symptoms of cardiac disease  Previous studies were reviewed  Safety measures were reviewed  Questions were entertained and answered  Patient was advised to report any problems requiring medical attention  Follow-up with PCP and appropriate specialist and lab work as discussed  Return for follow up visit as scheduled or earlier, if needed  Thank you for allowing me to participate in the care and evaluation of your patient  Should you have any questions, please feel free to contact me        Juice Alexander MD  11/14/2018,2:23 PM

## 2018-11-21 ENCOUNTER — OFFICE VISIT (OUTPATIENT)
Dept: INTERNAL MEDICINE CLINIC | Facility: CLINIC | Age: 68
End: 2018-11-21
Payer: MEDICARE

## 2018-11-21 VITALS
DIASTOLIC BLOOD PRESSURE: 60 MMHG | SYSTOLIC BLOOD PRESSURE: 128 MMHG | RESPIRATION RATE: 20 BRPM | WEIGHT: 178 LBS | HEIGHT: 70 IN | HEART RATE: 100 BPM | BODY MASS INDEX: 25.48 KG/M2 | OXYGEN SATURATION: 95 %

## 2018-11-21 DIAGNOSIS — J44.1 COPD EXACERBATION (HCC): ICD-10-CM

## 2018-11-21 DIAGNOSIS — J43.9 PULMONARY EMPHYSEMA, UNSPECIFIED EMPHYSEMA TYPE (HCC): Primary | ICD-10-CM

## 2018-11-21 PROCEDURE — 99213 OFFICE O/P EST LOW 20 MIN: CPT | Performed by: PHYSICIAN ASSISTANT

## 2018-11-21 RX ORDER — CEFUROXIME AXETIL 500 MG/1
500 TABLET ORAL EVERY 12 HOURS SCHEDULED
Qty: 20 TABLET | Refills: 0 | Status: SHIPPED | OUTPATIENT
Start: 2018-11-21 | End: 2018-12-01

## 2018-11-21 RX ORDER — PREDNISONE 10 MG/1
TABLET ORAL
Qty: 20 TABLET | Refills: 0 | Status: SHIPPED | OUTPATIENT
Start: 2018-11-21 | End: 2018-12-26 | Stop reason: HOSPADM

## 2018-11-21 NOTE — PATIENT INSTRUCTIONS
Since patient started prednisone we will continue the taper  Have also advised starting loratadine 10 mg 1 daily  Also obtaining Nasacort nasal spray 1 spray each nostril twice daily  As instructed and demonstrated  Will make prescription for antibiotic available ONLY to use if developed fever  Follow up with pulmonology as scheduled on Monday

## 2018-11-21 NOTE — PROGRESS NOTES
Assessment/Plan:      Patient Instructions   Since patient started prednisone we will continue the taper  Have also advised starting loratadine 10 mg 1 daily  Also obtaining Nasacort nasal spray 1 spray each nostril twice daily  As instructed and demonstrated  Will make prescription for antibiotic available ONLY to use if developed fever  Follow up with pulmonology as scheduled on Monday  Return for Next scheduled follow up  Diagnoses and all orders for this visit:    Pulmonary emphysema, unspecified emphysema type ([facility] )    COPD exacerbation (HCC)  -     predniSONE 10 mg tablet; 4 tabs day one and two, 3 tabs day three and four, 2 tabs day five and six, 1 tab day seven and eight then stop  -     cefuroxime (CEFTIN) 500 mg tablet; Take 1 tablet (500 mg total) by mouth every 12 (twelve) hours for 10 days          Subjective:      Patient ID: Coit Denver is a 79 y o  male  Acute visit    Patient present with his wife and his daughter who is visiting for the Thanksgiving holiday  Patient is stating that his cough has not improved since his last visit  Known severe COPD  He had been last seen on 10/25/2018  At that time treated with antibiotics and steroid taper  As usual patient states he felt good on the steroids  Patient is complaining of runny nose, postnasal drip, cough productive of either clear or white sputum  His right ear feels is congested, and when he blows his nose his right ear pops  He has been using saline nasal spray  He is complaining of increased shortness of breath  I have learned from his wife and daughter that he is not wearing his oxygen 24/7  He takes it off for many hours during the day why he is doing things  He feels he is wheezing also at this time  No documented fever or chills  No nausea, vomiting, diarrhea          ALLERGIES:  Allergies   Allergen Reactions    Penicillins Hives     Passed out    Codeine        CURRENT MEDICATIONS:    Current Outpatient Prescriptions:     albuterol (2 5 mg/3 mL) 0 083 % nebulizer solution, Take 3 mL (2 5 mg total) by nebulization every 4 (four) hours as needed for wheezing, Disp: 1080 mL, Rfl: 3    albuterol (PROVENTIL HFA,VENTOLIN HFA) 90 mcg/act inhaler, Inhale 2 puffs every 6 (six) hours as needed for wheezing, Disp: , Rfl:     ANORO ELLIPTA 62 5-25 MCG/INH inhaler, , Disp: , Rfl: 0    cholestyramine (QUESTRAN) 4 g packet, Take 1 packet (4 g total) by mouth 2 (two) times a day with meals, Disp: 60 each, Rfl: 2    loratadine (CLARITIN) 10 mg tablet, Take 10 mg by mouth daily, Disp: , Rfl:     ranitidine (ZANTAC) 150 mg tablet, Take 1 tablet (150 mg total) by mouth daily at bedtime, Disp: 30 tablet, Rfl: 5    roflumilast (DALIRESP) 250 MCG tablet, Take 1 tablet (250 mcg total) by mouth daily, Disp: 30 tablet, Rfl: 5    sertraline (ZOLOFT) 50 mg tablet, Take 1 tablet (50 mg total) by mouth daily, Disp: 30 tablet, Rfl: 5    cefuroxime (CEFTIN) 500 mg tablet, Take 1 tablet (500 mg total) by mouth every 12 (twelve) hours for 10 days, Disp: 20 tablet, Rfl: 0    esomeprazole (NexIUM) 40 MG capsule, Take 1 capsule (40 mg total) by mouth daily for 30 days, Disp: 30 capsule, Rfl: 3    predniSONE 10 mg tablet, 4 tabs day one and two, 3 tabs day three and four, 2 tabs day five and six, 1 tab day seven and eight then stop, Disp: 20 tablet, Rfl: 0    ACTIVE PROBLEM LIST:  Patient Active Problem List   Diagnosis    Pulmonary emphysema (HCC)    Gastroesophageal reflux disease without esophagitis    Anxiety    Simple chronic bronchitis (HCC)    Shortness of breath on exertion    Abnormal ECG    PVCs (premature ventricular contractions)    Allergic rhinitis    Functional diarrhea    Diastolic dysfunction    Pulmonary hypertension (HCC)    Acute exacerbation of chronic obstructive pulmonary disease (COPD) (HCC)    Gastroesophageal reflux disease       PAST MEDICAL HISTORY:  Past Medical History:   Diagnosis Date  Centrilobular emphysema (HCC)     COPD (chronic obstructive pulmonary disease) (HCC)     Hypoglycemia     On home oxygen therapy     Pneumonia     Pulmonary hypertension (HCC)     Respiratory failure (HCC)     SOB (shortness of breath)        PAST SURGICAL HISTORY:  Past Surgical History:   Procedure Laterality Date    CATARACT EXTRACTION      CHOLECYSTECTOMY      hernia    EYE SURGERY      MD ESOPHAGOGASTRODUODENOSCOPY TRANSORAL DIAGNOSTIC N/A 9/19/2018    Procedure: ESOPHAGOGASTRODUODENOSCOPY (EGD); Surgeon: Leigha Mahoney MD;  Location: [facility]; Service: Gastroenterology    MD REPAIR ING HERNIA,5+Y/O,REDUCIBL Left 5/23/2018    Procedure: OPEN INGUINAL HERNIA REPAIR WITH MESH;  Surgeon: Meenu Scott MD;  Location: [facility];  Service: General       FAMILY HISTORY:  Family History   Problem Relation Age of Onset    Diabetes Mother     Hypertension Mother     Hyperlipidemia Mother        SOCIAL HISTORY:  Social History     Social History    Marital status: /Civil Union     Spouse name: janet     Number of children: 11    Years of education: N/A     Occupational History    retired       Social History Main Topics    Smoking status: Former Smoker     Years: 50 00     Types: Cigarettes     Quit date: 2/27/2013    Smokeless tobacco: Never Used    Alcohol use 0 6 oz/week     1 Glasses of wine per week      Comment: socialy    Drug use: No    Sexual activity: No     Other Topics Concern    Not on file     Social History Narrative        Retired       Review of Systems   Constitutional: Positive for fatigue  Negative for activity change, chills and fever  HENT: Positive for congestion, ear pain, postnasal drip, rhinorrhea and sneezing  Eyes: Positive for redness and itching  Negative for discharge  Respiratory: Positive for cough, shortness of breath and wheezing  Negative for chest tightness      Cardiovascular: Negative for chest pain, palpitations and leg swelling  Gastrointestinal: Negative for abdominal pain, diarrhea, nausea and vomiting  Genitourinary: Negative for difficulty urinating  Musculoskeletal: Negative for arthralgias and myalgias  Skin: Negative for rash  Allergic/Immunologic: Negative for immunocompromised state  Neurological: Negative for dizziness, syncope, weakness, light-headedness and headaches  Hematological: Negative for adenopathy  Does not bruise/bleed easily  Psychiatric/Behavioral: Negative for dysphoric mood  The patient is not nervous/anxious  Objective:  Vitals:    11/21/18 1124   BP: 128/60   BP Location: Left arm   Patient Position: Sitting   Cuff Size: Adult   Pulse: 100   Resp: 20   SpO2: 95%   Weight: 80 7 kg (178 lb)   Height: 5' 10" (1 778 m)        Physical Exam   Constitutional: He is oriented to person, place, and time  He appears well-developed and well-nourished  No distress  Patient sitting comfortably on examining table, with very little coughing, wearing his oxygen   Eyes:   HEENT-tympanic membranes are dull, flat with distorted light reflexes, mild fluid behind the right, nasal mucosa pale boggy with clear nasal mucoid drainage, posterior pharynx with clear mucoid drainage   Neck: Neck supple  No JVD present  Cardiovascular: Normal rate, regular rhythm and normal heart sounds  Pulmonary/Chest: Effort normal and breath sounds normal    Overall breath sounds were decreased throughout, but inspiratory phase was clear, very faint expiratory wheezing with prolonged expiratory phase  Some coarseness to his breath sounds in the left base   Abdominal: Soft  Bowel sounds are normal    Musculoskeletal: He exhibits no edema  Lymphadenopathy:     He has no cervical adenopathy  Neurological: He is alert and oriented to person, place, and time  Skin: Skin is warm and dry  No rash noted  Psychiatric: He has a normal mood and affect   His behavior is normal    Nursing note and vitals reviewed  RESULTS:        This note was created with voice recognition software  Phonic, grammatical and spelling errors may be present within the note as a result

## 2018-11-26 ENCOUNTER — OFFICE VISIT (OUTPATIENT)
Dept: PULMONOLOGY | Facility: CLINIC | Age: 68
End: 2018-11-26
Payer: MEDICARE

## 2018-11-26 VITALS
HEIGHT: 70 IN | OXYGEN SATURATION: 95 % | SYSTOLIC BLOOD PRESSURE: 160 MMHG | DIASTOLIC BLOOD PRESSURE: 80 MMHG | BODY MASS INDEX: 25.48 KG/M2 | HEART RATE: 63 BPM | WEIGHT: 178 LBS

## 2018-11-26 DIAGNOSIS — I27.20 PULMONARY HYPERTENSION (HCC): ICD-10-CM

## 2018-11-26 DIAGNOSIS — J44.9 CHRONIC OBSTRUCTIVE PULMONARY DISEASE, UNSPECIFIED COPD TYPE (HCC): ICD-10-CM

## 2018-11-26 DIAGNOSIS — J41.0 SIMPLE CHRONIC BRONCHITIS (HCC): Primary | ICD-10-CM

## 2018-11-26 DIAGNOSIS — Z87.891 FORMER SMOKER: ICD-10-CM

## 2018-11-26 DIAGNOSIS — J96.11 CHRONIC RESPIRATORY FAILURE WITH HYPOXIA (HCC): ICD-10-CM

## 2018-11-26 PROCEDURE — 99214 OFFICE O/P EST MOD 30 MIN: CPT | Performed by: INTERNAL MEDICINE

## 2018-11-30 NOTE — PROGRESS NOTES
Assessment/Plan:   Diagnoses and all orders for this visit:    Simple chronic bronchitis (HCC)    Chronic respiratory failure with hypoxia (HCC)    Chronic obstructive pulmonary disease, unspecified COPD type ([facility] )    Pulmonary hypertension ([email or web address] )    Former smoker           Patient with severe COPD, with an FEV1 of 25%, increased lung volumes and residual volumes and severely decreased DLCO  On home oxygen continuous 3 L/m to continue  He is currently on anoro one puff daily  Albuterol via the nebulizer once daily as needed  Continue with daliresp 250 mg daily  Pulmonary hypertension moderate to severe, would need right heart catheter and for treatment  Also discussed with the patient regarding following up at [facility] [place], wants to follow-up at a later time  Vaccinations up-to-date  Also discussed regarding CT of the chest for lung cancer screening annually, last CT of the chest done in May 2018 with significant evidence of for emphysema, no evidence of any lung nodules  Needs repeat CT of the chest in one year from the previous CT of the chest  On home oxygen 3 L per minute continuous to continue  Follow-up in 3 months or when necessary earlier as needed  Return in about 3 months (around 2/26/2019)  All questions are answered to the patient's satisfaction and understanding  He verbalizes understanding  He is encouraged to call with any further questions or concerns  Portions of the record may have been created with voice recognition software  Occasional wrong word or "sound a like" substitutions may have occurred due to the inherent limitations of voice recognition software  Read the chart carefully and recognize, using context, where substitutions have occurred      Electronically Signed by Wisam Martinez MD    ______________________________________________________________________    Chief Complaint:   Chief Complaint   Patient presents with    Shortness of Breath     fup       Patient ID: Eunice Dowd is a 79 y o  y o  male has a past medical history of Centrilobular emphysema ([facility] [address] [address] ); COPD (chronic obstructive pulmonary disease) ([facility] [address] ); Hypoglycemia; On home oxygen therapy; Pneumonia; Pulmonary hypertension ([facility] [address] ); Respiratory failure ([facility] [address] ); and SOB (shortness of breath)  11/26/2018  Patient presents today for follow-up visit  Patient is a 80-year-old gentleman, with 50-pack-year smoking history who quit 5 years ago, with history of COPD on bronchodilators as well as on home oxygen continuous 3 L per minute for the past year, during the last year he states he has been having some episodes of chronic bronchitis with flared up at least 2-3 in the past year, never admitted in the hospital  Also recently diagnosed with moderate to severe pulmonary hypertension          Review of Systems   Constitutional: Positive for fatigue  Negative for activity change, appetite change, chills, diaphoresis, fever and unexpected weight change  HENT: Negative for congestion, dental problem, drooling, nosebleeds, postnasal drip, rhinorrhea, sinus pressure, sore throat and voice change  Eyes: Negative for discharge, itching and visual disturbance  Respiratory: Positive for cough (clear sputum, no hemoptysis), shortness of breath and wheezing  Cardiovascular: Negative for chest pain, palpitations and leg swelling  Endocrine: Negative for cold intolerance and heat intolerance  Allergic/Immunologic: Negative for food allergies and immunocompromised state  Neurological: Negative for dizziness, facial asymmetry, speech difficulty and weakness  Hematological: Negative for adenopathy  Psychiatric/Behavioral: Negative for agitation, confusion and sleep disturbance  The patient is not nervous/anxious  Smoking history: He reports that he quit smoking about 5 years ago  His smoking use included Cigarettes  He quit after 50 00 years of use   He has never used smokeless tobacco     The following portions of the patient's history were reviewed and updated as appropriate: allergies, current medications, past family history, past medical history, past social history, past surgical history and problem list       There is no immunization history on file for this patient  Current Outpatient Prescriptions   Medication Sig Dispense Refill    albuterol (2 5 mg/3 mL) 0 083 % nebulizer solution Take 3 mL (2 5 mg total) by nebulization every 4 (four) hours as needed for wheezing 1080 mL 3    albuterol (PROVENTIL HFA,VENTOLIN HFA) 90 mcg/act inhaler Inhale 2 puffs every 6 (six) hours as needed for wheezing      ANORO ELLIPTA 62 5-25 MCG/INH inhaler   0    cholestyramine (QUESTRAN) 4 g packet Take 1 packet (4 g total) by mouth 2 (two) times a day with meals 60 each 2    esomeprazole (NexIUM) 40 MG capsule Take 1 capsule (40 mg total) by mouth daily for 30 days 30 capsule 3    loratadine (CLARITIN) 10 mg tablet Take 10 mg by mouth daily      predniSONE 10 mg tablet 4 tabs day one and two, 3 tabs day three and four, 2 tabs day five and six, 1 tab day seven and eight then stop 20 tablet 0    ranitidine (ZANTAC) 150 mg tablet Take 1 tablet (150 mg total) by mouth daily at bedtime 30 tablet 5    roflumilast (DALIRESP) 250 MCG tablet Take 1 tablet (250 mcg total) by mouth daily 30 tablet 5    sertraline (ZOLOFT) 50 mg tablet Take 1 tablet (50 mg total) by mouth daily 30 tablet 5    cefuroxime (CEFTIN) 500 mg tablet Take 1 tablet (500 mg total) by mouth every 12 (twelve) hours for 10 days (Patient not taking: Reported on 11/26/2018 ) 20 tablet 0     No current facility-administered medications for this visit  Allergies: Penicillins and Codeine    Objective:  Vitals:    11/26/18 1115   BP: 160/80   Pulse: 63   SpO2: 95%   Weight: 80 7 kg (178 lb)   Height: 5' 10" (1 778 m)   Oxygen Therapy  SpO2: 95 % (on 3 liters o2)      Wt Readings from Last 3 Encounters:   11/26/18 80 7 kg (178 lb)   11/21/18 80 7 kg (178 lb)   11/14/18 81 2 kg (179 lb)     Body mass index is 25 54 kg/m²  Physical Exam   Constitutional: He is oriented to person, place, and time  He appears well-developed and well-nourished  HENT:   Head: Normocephalic and atraumatic  Eyes: Pupils are equal, round, and reactive to light  EOM are normal    Neck: Normal range of motion  Neck supple  Cardiovascular: Normal rate and regular rhythm  Pulmonary/Chest: Effort normal  He has wheezes (occ expiratory rhonchi)  He has no rales  He exhibits no tenderness  Abdominal: Soft  Bowel sounds are normal    Musculoskeletal: Normal range of motion  Neurological: He is alert and oriented to person, place, and time  Skin: Skin is warm and dry  Psychiatric: He has a normal mood and affect   His behavior is normal

## 2018-12-05 DIAGNOSIS — K59.1 FUNCTIONAL DIARRHEA: ICD-10-CM

## 2018-12-05 RX ORDER — CHOLESTYRAMINE 4 G/9G
1 POWDER, FOR SUSPENSION ORAL 2 TIMES DAILY WITH MEALS
Qty: 60 EACH | Refills: 2 | Status: SHIPPED | OUTPATIENT
Start: 2018-12-05 | End: 2019-01-09 | Stop reason: SDUPTHER

## 2018-12-10 ENCOUNTER — TELEPHONE (OUTPATIENT)
Dept: PULMONOLOGY | Facility: CLINIC | Age: 68
End: 2018-12-10

## 2018-12-10 DIAGNOSIS — J44.9 CHRONIC OBSTRUCTIVE PULMONARY DISEASE, UNSPECIFIED COPD TYPE (HCC): Primary | ICD-10-CM

## 2018-12-10 RX ORDER — FLUTICASONE FUROATE AND VILANTEROL 100; 25 UG/1; UG/1
1 POWDER RESPIRATORY (INHALATION) DAILY
Qty: 1 INHALER | Refills: 3 | Status: SHIPPED | OUTPATIENT
Start: 2018-12-10 | End: 2019-05-13 | Stop reason: SDUPTHER

## 2018-12-13 ENCOUNTER — OFFICE VISIT (OUTPATIENT)
Dept: CARDIOLOGY CLINIC | Facility: CLINIC | Age: 68
End: 2018-12-13
Payer: MEDICARE

## 2018-12-13 VITALS
DIASTOLIC BLOOD PRESSURE: 68 MMHG | HEIGHT: 70 IN | HEART RATE: 78 BPM | OXYGEN SATURATION: 99 % | SYSTOLIC BLOOD PRESSURE: 142 MMHG | BODY MASS INDEX: 25.91 KG/M2 | WEIGHT: 181 LBS

## 2018-12-13 DIAGNOSIS — I27.20 PULMONARY HYPERTENSION (HCC): Primary | ICD-10-CM

## 2018-12-13 PROCEDURE — 99214 OFFICE O/P EST MOD 30 MIN: CPT | Performed by: INTERNAL MEDICINE

## 2018-12-13 NOTE — PROGRESS NOTES
Heart Failure Outpatient Consult Note - Alexandria Nunez 79 y o  male MRN: [id number]    @ Encounter: [id number]  Assessment/Plan:    Patient Active Problem List    Diagnosis Date Noted    Gastroesophageal reflux disease 09/10/2018    Acute exacerbation of chronic obstructive pulmonary disease (COPD) ([facility] ) 36/88/5340    Diastolic dysfunction 46/27/8756    [facility] ) 07/31/2018    Allergic rhinitis 07/26/2018    Functional diarrhea 07/26/2018    Shortness of breath on exertion 04/25/2018    Abnormal ECG 04/25/2018    PVCs (premature ventricular contractions) 04/25/2018    Simple chronic bronchitis (HCC)     Pulmonary emphysema (HCC) 03/12/2018    Gastroesophageal reflux disease without esophagitis 03/12/2018    Anxiety 03/12/2018     # Severe COPD on home O2: 2L on demand NC  He quit smoking in 2013  Walked patient in office  Desaturation to <88% on demand setting  Likely needs higher O2 rate and may benefit from transition to continuous  --Assessment of O2 requirement  Will defer to Dr Carlos Jones  --Continue Brovana and budesonide BID  Off Breo  Continue O2 via NC  --Agree, given age, would benefit from Lung tx eval @ [facility]    # Pulmonary HTN: Most likely driven by group III lung disease as by PFTs and CT chest he has severe COPD  Unclear if PFTs stable in the time that he has reported functional decline which would be informative  It is possible that there is now a cardiac limitation, that may be Group II (less likely as no clear e/o diastolic dysfunction, and euvolemic on exam w/ minimal RF), or RV mediated  His resting RV function is normal, but there is mild dilation  Post PVC RV TAPSE is preserved as well  There is possible pulmonary vascular disease based on noninvasive assessment, but may be a function of lung destruction   Will proceed with the following:  Diag:  --Pharm Nuc 4/30/2018: LVEF 57%, no perfusion defects  --PFTs 3/23/2018: FEV1 25%, FVC 60%, FEV1/FVC 41%, TLC 125%, %, DLCO 25%  C/w severe COPD    --TTE 5/1/2018: LVEF ~60%, grade 1 diastology, normal RV function with borderline size  Trace MR and mild TR  PASP 58 mmHg  Abnormal RVOT AcT with possible mid to late RVOT notching  --CT PE Chest 5/31/2018: No clear e/o of PE  Significant paraseptal and centrilobular emphysematous changes w/ large bullae in R lung apex  --Stress echo 10/25/18: Imaging suboptimal and no PASP measurements  Possible RV dilation and IVS changes with exercise  To do:  --RHC w/ exercise  --Unable to do pulmonary rehab 2/2 to insurance for now    Therapeutic:  --O2 as above    # PVCs: Unclear etiology  Possible 2/2 to inhaler therapy  --Can consider selective B1 blocker  --Will defer to Dr Stanislav Hyatt    RTC in 3 months    HPI: Very pleasant 78 y/o gentleman w/ PMHx as noted, specifically severe emphysema with marked hypoxia with ambulation (as noted above) who presents for evaluation of pulmonary hypertension  He has a 50 pack year smoking history and quit 5 years ago  He is currently on 2-3L on demand NC in the office as he is on his portable  He uses his rescue medication at least 2-3x/day  He was previously followed with a pulmonologist in [place]  Now moved to [place]  Follows with Dr Nahum Gold and Dr Hayley Conroy  He states that over the past several years he has noted increasing MORALES  2 years ago he could walk up 2 flights of stairs and about 100 yards, now about 25-50 feet  His oxygen levels decrease with ambulation  Per patient report, his PFTs have been stable in that time, but unclear  He denies syncope, LH, Dz, orthopnea, PND, or LE edema  He also denies CP, palpitations  Today, 12/13/18, he returns for f/u  Feels same as last visit  Stress echo completed       Past Medical History:   Diagnosis Date    Centrilobular emphysema ([facility] [address] )     COPD (chronic obstructive pulmonary disease) ([facility] [facility] )     Hypoglycemia     On home oxygen therapy     Pneumonia     [facility]     Respiratory failure (HCC)     SOB (shortness of breath)      Review of Systems - 12 point ROS was done and is negative, except as noted above       Allergies   Allergen Reactions    Penicillins Hives     Passed out    Codeine        Current Outpatient Prescriptions:     albuterol (2 5 mg/3 mL) 0 083 % nebulizer solution, Take 3 mL (2 5 mg total) by nebulization every 4 (four) hours as needed for wheezing, Disp: 1080 mL, Rfl: 3    albuterol (PROVENTIL HFA,VENTOLIN HFA) 90 mcg/act inhaler, Inhale 2 puffs every 6 (six) hours as needed for wheezing, Disp: , Rfl:     cholestyramine (QUESTRAN) 4 g packet, Take 1 packet (4 g total) by mouth 2 (two) times a day with meals, Disp: 60 each, Rfl: 2    esomeprazole (NexIUM) 40 MG capsule, Take 1 capsule (40 mg total) by mouth daily for 30 days, Disp: 30 capsule, Rfl: 3    fluticasone-vilanterol (BREO ELLIPTA) 100-25 mcg/inh inhaler, Inhale 1 puff daily Rinse mouth after use , Disp: 1 Inhaler, Rfl: 3    loratadine (CLARITIN) 10 mg tablet, Take 10 mg by mouth daily, Disp: , Rfl:     roflumilast (DALIRESP) 250 MCG tablet, Take 1 tablet (250 mcg total) by mouth daily, Disp: 30 tablet, Rfl: 5    sertraline (ZOLOFT) 50 mg tablet, Take 1 tablet (50 mg total) by mouth daily, Disp: 30 tablet, Rfl: 5    tiotropium (SPIRIVA RESPIMAT) 2 5 MCG/ACT AERS inhaler, Inhale 2 puffs daily, Disp: 1 Inhaler, Rfl: 3    predniSONE 10 mg tablet, 4 tabs day one and two, 3 tabs day three and four, 2 tabs day five and six, 1 tab day seven and eight then stop (Patient not taking: Reported on 12/13/2018 ), Disp: 20 tablet, Rfl: 0    ranitidine (ZANTAC) 150 mg tablet, Take 1 tablet (150 mg total) by mouth daily at bedtime (Patient not taking: Reported on 12/13/2018 ), Disp: 30 tablet, Rfl: 5    Social History     Social History    Marital status: /Civil Union     Spouse name: janet     Number of children: 5    Years of education: N/A     Occupational History    retired       Social History Main Topics    Smoking status: Former Smoker     Years: 50 00     Types: Cigarettes     Quit date: 2/27/2013    Smokeless tobacco: Never Used    Alcohol use 0 6 oz/week     1 Glasses of wine per week      Comment: socialy    Drug use: No    Sexual activity: No     Other Topics Concern    Not on file     Social History Narrative        Retired       Family History   Problem Relation Age of Onset    Diabetes Mother     Hypertension Mother     Hyperlipidemia Mother      Physical Exam:    Vitals: Blood pressure 142/68, pulse 78, height 5' 10" (1 778 m), weight 82 1 kg (181 lb), SpO2 99 %  , Body mass index is 25 97 kg/m² ,   Wt Readings from Last 3 Encounters:   12/13/18 82 1 kg (181 lb)   11/26/18 80 7 kg (178 lb)   11/21/18 80 7 kg (178 lb)     Vitals:    12/13/18 1044   BP: 142/68   BP Location: Left arm   Patient Position: Sitting   Cuff Size: Adult   Pulse: 78   SpO2: 99%   Weight: 82 1 kg (181 lb)   Height: 5' 10" (1 778 m)       GEN: Petros Morales appears well, alert and oriented x 3, pleasant and cooperative   HEENT: pupils equal, round, and reactive to light; extraocular muscles intact  NECK: supple, no carotid bruits   HEART: regular rhythm, normal S1 and S2, no murmurs, clicks, gallops or rubs, JVP is    LUNGS: clear to auscultation bilaterally; no wheezes, rales, or rhonchi   ABDOMEN: normal bowel sounds, soft, no tenderness, no distention  EXTREMITIES: peripheral pulses normal; no clubbing, cyanosis, or edema  NEURO: no focal findings   SKIN: normal without suspicious lesions on exposed skin    Labs & Results:  Lab Results   Component Value Date    WBC 9 59 10/10/2018    HGB 13 0 10/10/2018    HCT 40 4 10/10/2018    MCV 94 10/10/2018     10/10/2018       Chemistry        Component Value Date/Time    K 3 7 10/10/2018 1321     10/10/2018 1321    CO2 32 10/10/2018 1321    BUN 24 10/10/2018 1321    CREATININE 0 91 10/10/2018 1321        Component Value Date/Time CALCIUM 9 3 10/10/2018 1321    ALKPHOS 83 10/10/2018 1321    AST 82 (H) 10/10/2018 1321     (H) 10/10/2018 1321        EKG personally reviewed by Yanely Garcia MD      Counseling / Coordination of Care  Total floor / unit time spent today 40 minutes  Greater than 50% of total time was spent with the patient and / or family counseling and / or coordination of care  A description of the counseling / coordination of care: 25 min  Thank you for the opportunity to participate in the care of this patient      Yanely Garcia MD, PhD   Lisa Sorto

## 2018-12-17 ENCOUNTER — APPOINTMENT (EMERGENCY)
Dept: RADIOLOGY | Facility: HOSPITAL | Age: 68
DRG: 193 | End: 2018-12-17
Payer: MEDICARE

## 2018-12-17 ENCOUNTER — HOSPITAL ENCOUNTER (EMERGENCY)
Facility: HOSPITAL | Age: 68
Discharge: HOME | DRG: 193 | End: 2018-12-17
Attending: EMERGENCY MEDICINE | Admitting: EMERGENCY MEDICINE
Payer: MEDICARE

## 2018-12-17 ENCOUNTER — TELEPHONE (OUTPATIENT)
Dept: INTERNAL MEDICINE CLINIC | Facility: CLINIC | Age: 68
End: 2018-12-17

## 2018-12-17 VITALS
HEART RATE: 96 BPM | OXYGEN SATURATION: 96 % | DIASTOLIC BLOOD PRESSURE: 66 MMHG | SYSTOLIC BLOOD PRESSURE: 126 MMHG | RESPIRATION RATE: 20 BRPM | TEMPERATURE: 100.9 F

## 2018-12-17 DIAGNOSIS — J44.1 COPD EXACERBATION (HCC): Primary | ICD-10-CM

## 2018-12-17 DIAGNOSIS — R50.9 FEVER: ICD-10-CM

## 2018-12-17 DIAGNOSIS — J06.9 URI (UPPER RESPIRATORY INFECTION): ICD-10-CM

## 2018-12-17 LAB
ALBUMIN SERPL BCP-MCNC: 3.6 G/DL (ref 3.5–5)
ALP SERPL-CCNC: 77 U/L (ref 46–116)
ALT SERPL W P-5'-P-CCNC: 37 U/L (ref 12–78)
ANION GAP SERPL CALCULATED.3IONS-SCNC: 7 MMOL/L (ref 4–13)
APTT PPP: 32 SECONDS (ref 26–38)
AST SERPL W P-5'-P-CCNC: 25 U/L (ref 5–45)
ATRIAL RATE: 84 BPM
BASOPHILS # BLD AUTO: 0.05 THOUSANDS/ΜL (ref 0–0.1)
BASOPHILS NFR BLD AUTO: 1 % (ref 0–1)
BILIRUB SERPL-MCNC: 1.5 MG/DL (ref 0.2–1)
BUN SERPL-MCNC: 15 MG/DL (ref 5–25)
CALCIUM SERPL-MCNC: 8.8 MG/DL (ref 8.3–10.1)
CHLORIDE SERPL-SCNC: 100 MMOL/L (ref 100–108)
CO2 SERPL-SCNC: 31 MMOL/L (ref 21–32)
CREAT SERPL-MCNC: 0.82 MG/DL (ref 0.6–1.3)
EOSINOPHIL # BLD AUTO: 0.23 THOUSAND/ΜL (ref 0–0.61)
EOSINOPHIL NFR BLD AUTO: 3 % (ref 0–6)
ERYTHROCYTE [DISTWIDTH] IN BLOOD BY AUTOMATED COUNT: 14.9 % (ref 11.6–15.1)
GFR SERPL CREATININE-BSD FRML MDRD: 92 ML/MIN/1.73SQ M
GLUCOSE SERPL-MCNC: 96 MG/DL (ref 65–140)
HCT VFR BLD AUTO: 39.6 % (ref 36.5–49.3)
HGB BLD-MCNC: 13.1 G/DL (ref 12–17)
IMM GRANULOCYTES # BLD AUTO: 0.03 THOUSAND/UL (ref 0–0.2)
IMM GRANULOCYTES NFR BLD AUTO: 0 % (ref 0–2)
INR PPP: 0.97 (ref 0.86–1.17)
LACTATE SERPL-SCNC: 0.9 MMOL/L (ref 0.5–2)
LYMPHOCYTES # BLD AUTO: 1.17 THOUSANDS/ΜL (ref 0.6–4.47)
LYMPHOCYTES NFR BLD AUTO: 15 % (ref 14–44)
MCH RBC QN AUTO: 29.9 PG (ref 26.8–34.3)
MCHC RBC AUTO-ENTMCNC: 33.1 G/DL (ref 31.4–37.4)
MCV RBC AUTO: 90 FL (ref 82–98)
MONOCYTES # BLD AUTO: 0.93 THOUSAND/ΜL (ref 0.17–1.22)
MONOCYTES NFR BLD AUTO: 12 % (ref 4–12)
NEUTROPHILS # BLD AUTO: 5.38 THOUSANDS/ΜL (ref 1.85–7.62)
NEUTS SEG NFR BLD AUTO: 69 % (ref 43–75)
NRBC BLD AUTO-RTO: 0 /100 WBCS
P AXIS: 79 DEGREES
PLATELET # BLD AUTO: 175 THOUSANDS/UL (ref 149–390)
PMV BLD AUTO: 9.5 FL (ref 8.9–12.7)
POTASSIUM SERPL-SCNC: 4 MMOL/L (ref 3.5–5.3)
PR INTERVAL: 170 MS
PROCALCITONIN SERPL-MCNC: <0.05 NG/ML
PROT SERPL-MCNC: 7.2 G/DL (ref 6.4–8.2)
PROTHROMBIN TIME: 12.8 SECONDS (ref 11.8–14.2)
QRS AXIS: 94 DEGREES
QRSD INTERVAL: 110 MS
QT INTERVAL: 376 MS
QTC INTERVAL: 444 MS
RBC # BLD AUTO: 4.38 MILLION/UL (ref 3.88–5.62)
SODIUM SERPL-SCNC: 138 MMOL/L (ref 136–145)
T WAVE AXIS: 57 DEGREES
TROPONIN I SERPL-MCNC: <0.02 NG/ML
VENTRICULAR RATE: 84 BPM
WBC # BLD AUTO: 7.79 THOUSAND/UL (ref 4.31–10.16)

## 2018-12-17 PROCEDURE — 71046 X-RAY EXAM CHEST 2 VIEWS: CPT

## 2018-12-17 PROCEDURE — 94640 AIRWAY INHALATION TREATMENT: CPT

## 2018-12-17 PROCEDURE — 85730 THROMBOPLASTIN TIME PARTIAL: CPT | Performed by: EMERGENCY MEDICINE

## 2018-12-17 PROCEDURE — 83605 ASSAY OF LACTIC ACID: CPT | Performed by: EMERGENCY MEDICINE

## 2018-12-17 PROCEDURE — 87040 BLOOD CULTURE FOR BACTERIA: CPT | Performed by: EMERGENCY MEDICINE

## 2018-12-17 PROCEDURE — 85610 PROTHROMBIN TIME: CPT | Performed by: EMERGENCY MEDICINE

## 2018-12-17 PROCEDURE — 96374 THER/PROPH/DIAG INJ IV PUSH: CPT

## 2018-12-17 PROCEDURE — 84145 PROCALCITONIN (PCT): CPT | Performed by: EMERGENCY MEDICINE

## 2018-12-17 PROCEDURE — 80053 COMPREHEN METABOLIC PANEL: CPT | Performed by: EMERGENCY MEDICINE

## 2018-12-17 PROCEDURE — 96361 HYDRATE IV INFUSION ADD-ON: CPT

## 2018-12-17 PROCEDURE — 93005 ELECTROCARDIOGRAM TRACING: CPT

## 2018-12-17 PROCEDURE — 36415 COLL VENOUS BLD VENIPUNCTURE: CPT | Performed by: EMERGENCY MEDICINE

## 2018-12-17 PROCEDURE — 99285 EMERGENCY DEPT VISIT HI MDM: CPT

## 2018-12-17 PROCEDURE — 84484 ASSAY OF TROPONIN QUANT: CPT | Performed by: EMERGENCY MEDICINE

## 2018-12-17 PROCEDURE — 85025 COMPLETE CBC W/AUTO DIFF WBC: CPT | Performed by: EMERGENCY MEDICINE

## 2018-12-17 PROCEDURE — 93010 ELECTROCARDIOGRAM REPORT: CPT | Performed by: INTERNAL MEDICINE

## 2018-12-17 RX ORDER — METHYLPREDNISOLONE SODIUM SUCCINATE 125 MG/2ML
125 INJECTION, POWDER, LYOPHILIZED, FOR SOLUTION INTRAMUSCULAR; INTRAVENOUS ONCE
Status: COMPLETED | OUTPATIENT
Start: 2018-12-17 | End: 2018-12-17

## 2018-12-17 RX ORDER — ALBUTEROL SULFATE 2.5 MG/3ML
5 SOLUTION RESPIRATORY (INHALATION) ONCE
Status: COMPLETED | OUTPATIENT
Start: 2018-12-17 | End: 2018-12-17

## 2018-12-17 RX ORDER — ACETAMINOPHEN 325 MG/1
650 TABLET ORAL ONCE
Status: COMPLETED | OUTPATIENT
Start: 2018-12-17 | End: 2018-12-17

## 2018-12-17 RX ORDER — AZITHROMYCIN 250 MG/1
500 TABLET, FILM COATED ORAL ONCE
Status: COMPLETED | OUTPATIENT
Start: 2018-12-17 | End: 2018-12-17

## 2018-12-17 RX ORDER — AZITHROMYCIN 250 MG/1
250 TABLET, FILM COATED ORAL EVERY 24 HOURS
Qty: 4 TABLET | Refills: 0 | Status: SHIPPED | OUTPATIENT
Start: 2018-12-17 | End: 2018-12-26 | Stop reason: HOSPADM

## 2018-12-17 RX ORDER — PREDNISONE 20 MG/1
40 TABLET ORAL DAILY
Qty: 10 TABLET | Refills: 0 | Status: SHIPPED | OUTPATIENT
Start: 2018-12-17 | End: 2018-12-26 | Stop reason: HOSPADM

## 2018-12-17 RX ADMIN — IPRATROPIUM BROMIDE 0.5 MG: 0.5 SOLUTION RESPIRATORY (INHALATION) at 16:29

## 2018-12-17 RX ADMIN — METHYLPREDNISOLONE SODIUM SUCCINATE 125 MG: 125 INJECTION, POWDER, FOR SOLUTION INTRAMUSCULAR; INTRAVENOUS at 16:29

## 2018-12-17 RX ADMIN — ALBUTEROL SULFATE 5 MG: 2.5 SOLUTION RESPIRATORY (INHALATION) at 16:29

## 2018-12-17 RX ADMIN — ACETAMINOPHEN 650 MG: 325 TABLET, FILM COATED ORAL at 16:14

## 2018-12-17 RX ADMIN — SODIUM CHLORIDE 1000 ML: 0.9 INJECTION, SOLUTION INTRAVENOUS at 16:29

## 2018-12-17 RX ADMIN — AZITHROMYCIN 500 MG: 250 TABLET, FILM COATED ORAL at 17:38

## 2018-12-17 NOTE — ED PROVIDER NOTES
History  Chief Complaint   Patient presents with    Shortness of Breath     pt c/o increased SOB with evertion, pt stated his wife is admitted with the flu, Pt stated he has had a productive cough for 1 week and yellow sputum     80-year-old male presenting for evaluation of about 1 week of URI symptoms  Patient reports feeling warm, did not know he had a fever, he does have a fever here of 100 9, no antipyretics given prior to arrival   Patient reports having a cough, productive of sputum  He does note that yesterday he noticed some brown colored sputum  Change in amount/quality of sputum  Reports having worsening shortness of breath  Has history of COPD- uses 3 L NC at baseline, has not needed to increase this  Reports that the last time that he was on steroids was about 2-3 weeks ago  Reports using his inhalers at home  Multiple sick contacts including his children/grandchildren and his wife is currently hospitalized with the flu  Denies any headache, abdominal pain, nausea vomiting, changes or, urinary complaints  History of pulmonary hypertension, COPD  A/P:  80-year-old male with cough/SOB, will get septic workup, symptomatic treatment with DuoNeb/steroids/Tylenol            Prior to Admission Medications   Prescriptions Last Dose Informant Patient Reported? Taking?    albuterol (2 5 mg/3 mL) 0 083 % nebulizer solution  Self No No   Sig: Take 3 mL (2 5 mg total) by nebulization every 4 (four) hours as needed for wheezing   albuterol (PROVENTIL HFA,VENTOLIN HFA) 90 mcg/act inhaler  Self Yes No   Sig: Inhale 2 puffs every 6 (six) hours as needed for wheezing   cholestyramine (QUESTRAN) 4 g packet  Self No No   Sig: Take 1 packet (4 g total) by mouth 2 (two) times a day with meals   esomeprazole (NexIUM) 40 MG capsule  Self No No   Sig: Take 1 capsule (40 mg total) by mouth daily for 30 days   fluticasone-vilanterol (BREO ELLIPTA) 100-25 mcg/inh inhaler  Self No No   Sig: Inhale 1 puff daily Rinse mouth after use    loratadine (CLARITIN) 10 mg tablet  Self Yes No   Sig: Take 10 mg by mouth daily   predniSONE 10 mg tablet  Self No No   Si tabs day one and two, 3 tabs day three and four, 2 tabs day five and six, 1 tab day seven and eight then stop   Patient not taking: Reported on 2018    ranitidine (ZANTAC) 150 mg tablet  Self No No   Sig: Take 1 tablet (150 mg total) by mouth daily at bedtime   Patient not taking: Reported on 2018    roflumilast (DALIRESP) 250 MCG tablet  Self No No   Sig: Take 1 tablet (250 mcg total) by mouth daily   sertraline (ZOLOFT) 50 mg tablet  Self No No   Sig: Take 1 tablet (50 mg total) by mouth daily   tiotropium (SPIRIVA RESPIMAT) 2 5 MCG/ACT AERS inhaler  Self No No   Sig: Inhale 2 puffs daily      Facility-Administered Medications: None       Past Medical History:   Diagnosis Date    [facility]rilobular emph[facility]sema [facility] [address] [facility]     [facility] obstructive [facility] disease) [facility])     Hypoglycemia     On home oxygen therapy     Pneumonia     Pulmonary hypertension ([facility] [address] [facility]     Respiratory failure ([facility])     SOB (shortness of breath)        Past Surgical History:   Procedure Laterality Date    CATARACT EXTRACTION      CHOLECYSTECTOMY      hernia    EYE SURGERY      ID ESOPHAGOGASTRODUODENOSCOPY TRANSORAL DIAGNOSTIC N/A 2018    Procedure: ESOPHAGOGASTRODUODENOSCOPY (EGD); Surgeon: Erin Horton MD;  Location: [facility]; Service: Gastroenterology    ID REPAIR [address] HERNIA,5+Y/O,REDUCIBL Left 2018    Procedure: OPEN INGUINAL HERNIA REPAIR WITH MESH;  Surgeon: Dyan Ga MD;  Location: [facility];  Service: General       Family History   Problem Relation Age of Onset    Diabetes Mother     Hypertension Mother     Hyperlipidemia Mother      I have reviewed and agree with the history as documented      Social History   Substance Use Topics    Smoking status: Former Smoker     Years: 50 00     Types: Cigarettes     Quit date: 2013    Smokeless tobacco: Never Used    Alcohol use 0 6 oz/week     1 Glasses of wine per week      Comment: socialy        Review of Systems   Constitutional: Positive for fever  Negative for chills and unexpected weight change  HENT: Negative for ear pain, rhinorrhea and sore throat  Eyes: Negative for pain and visual disturbance  Respiratory: Positive for cough and shortness of breath  Cardiovascular: Negative for chest pain and leg swelling  Gastrointestinal: Negative for abdominal pain, constipation, diarrhea, nausea and vomiting  Endocrine: Negative for polydipsia, polyphagia and polyuria  Genitourinary: Negative for dysuria, frequency, hematuria and urgency  Musculoskeletal: Negative for back pain, myalgias and neck pain  Skin: Negative for color change and rash  Allergic/Immunologic: Negative for environmental allergies and immunocompromised state  Neurological: Negative for dizziness, weakness, light-headedness, numbness and headaches  Hematological: Negative for adenopathy  Does not bruise/bleed easily  Psychiatric/Behavioral: Negative for agitation and confusion  All other systems reviewed and are negative  Physical Exam  Physical Exam   Constitutional: He is oriented to person, place, and time  He appears well-developed and well-nourished  HENT:   Head: Normocephalic and atraumatic  Nose: Nose normal    Mouth/Throat: Oropharynx is clear and moist    Eyes: Conjunctivae and EOM are normal    Neck: Normal range of motion  Neck supple  Cardiovascular: Normal rate, regular rhythm, normal heart sounds and intact distal pulses  Pulmonary/Chest: Effort normal  No stridor  No respiratory distress  He has wheezes  He has no rales  He exhibits no tenderness  Diffuse mild expiratory wheezing, speaking in full sentences, no respiratory distress, no accessory muscle use, cough noted   Abdominal: Soft  He exhibits no distension  There is no tenderness  There is no rebound and no guarding  Musculoskeletal: He exhibits no edema or deformity  Neurological: He is alert and oriented to person, place, and time  He exhibits normal muscle tone  Coordination normal    Skin: Skin is warm and dry  No rash noted  Psychiatric: He has a normal mood and affect  Judgment and thought content normal    Nursing note and vitals reviewed  Vital Signs  ED Triage Vitals [12/17/18 1526]   Temperature Pulse Respirations Blood Pressure SpO2   (!) 100 9 °F (38 3 °C) 100 (!) 26 (!) 178/84 95 %      Temp Source Heart Rate Source Patient Position - Orthostatic VS BP Location FiO2 (%)   Oral Monitor Sitting Left arm --      Pain Score       No Pain           Vitals:    12/17/18 1526 12/17/18 1742   BP: (!) 178/84 126/66   Pulse: 100 96   Patient Position - Orthostatic VS: Sitting        Visual Acuity      ED Medications  Medications   acetaminophen (TYLENOL) tablet 650 mg (650 mg Oral Given 12/17/18 1614)   sodium chloride 0 9 % bolus 1,000 mL (0 mL Intravenous Stopped 12/17/18 1753)   albuterol inhalation solution 5 mg (5 mg Nebulization Given 12/17/18 1629)   ipratropium (ATROVENT) 0 02 % inhalation solution 0 5 mg (0 5 mg Nebulization Given 12/17/18 1629)   methylPREDNISolone sodium succinate (Solu-MEDROL) injection 125 mg (125 mg Intravenous Given 12/17/18 1629)   azithromycin (ZITHROMAX) tablet 500 mg (500 mg Oral Given 12/17/18 1738)       Diagnostic Studies  Results Reviewed     Procedure Component Value Units Date/Time    Blood culture #1 [[id number]] Collected:  12/17/18 1626    Lab Status:  Preliminary result Specimen:  Blood from Arm, Right Updated:  12/18/18 2302     Blood Culture No Growth at 24 hrs  Blood culture #2 [[id number]] Collected:  12/17/18 1627    Lab Status:  Preliminary result Specimen:  Blood from Arm, Left Updated:  12/18/18 2302     Blood Culture No Growth at 24 hrs      Procalcitonin [[id number]]  (Normal) Collected:  12/17/18 1626    Lab Status:  Final result Specimen:  Blood from Arm, Left Updated:  12/17/18 2337     Procalcitonin <0 05 ng/ml     Lactic Acid x2 [[id number]]  (Normal) Collected:  12/17/18 1627    Lab Status:  Final result Specimen:  Blood from Arm, Left Updated:  12/17/18 1702     LACTIC ACID 0 9 mmol/L     Narrative:         Result may be elevated if tourniquet was used during collection  Troponin I [[id number]]  (Normal) Collected:  12/17/18 1626    Lab Status:  Final result Specimen:  Blood from Arm, Left Updated:  12/17/18 1702     Troponin I <0 02 ng/mL     Comprehensive metabolic panel [[id number]]  (Abnormal) Collected:  12/17/18 1626    Lab Status:  Final result Specimen:  Blood from Arm, Left Updated:  12/17/18 1659     Sodium 138 mmol/L      Potassium 4 0 mmol/L      Chloride 100 mmol/L      CO2 31 mmol/L      ANION GAP 7 mmol/L      BUN 15 mg/dL      Creatinine 0 82 mg/dL      Glucose 96 mg/dL      Calcium 8 8 mg/dL      AST 25 U/L      ALT 37 U/L      Alkaline Phosphatase 77 U/L      Total Protein 7 2 g/dL      Albumin 3 6 g/dL      Total Bilirubin 1 50 (H) mg/dL      eGFR 92 ml/min/1 73sq m     Narrative:         National Kidney Disease Education Program recommendations are as follows:  GFR calculation is accurate only with a steady state creatinine  Chronic Kidney disease less than 60 ml/min/1 73 sq  meters  Kidney failure less than 15 ml/min/1 73 sq  meters      Protime-INR [[id number]]  (Normal) Collected:  12/17/18 1626    Lab Status:  Final result Specimen:  Blood from Arm, Left Updated:  12/17/18 1651     Protime 12 8 seconds      INR 0 97    APTT [[id number]]  (Normal) Collected:  12/17/18 1626    Lab Status:  Final result Specimen:  Blood from Arm, Left Updated:  12/17/18 1651     PTT 32 seconds     CBC and differential [[id number]] Collected:  12/17/18 1627    Lab Status:  Final result Specimen:  Blood from Arm, Left Updated:  12/17/18 1641     WBC 7 79 Thousand/uL      RBC 4 38 Million/uL      Hemoglobin 13 1 g/dL      Hematocrit 39 6 %      MCV 90 fL      MCH 29 9 pg      MCHC 33 1 g/dL      RDW 14 9 %      MPV 9 5 fL      Platelets 721 Thousands/uL      nRBC 0 /100 WBCs      Neutrophils Relative 69 %      Immat GRANS % 0 %      Lymphocytes Relative 15 %      Monocytes Relative 12 %      Eosinophils Relative 3 %      Basophils Relative 1 %      Neutrophils Absolute 5 38 Thousands/µL      Immature Grans Absolute 0 03 Thousand/uL      Lymphocytes Absolute 1 17 Thousands/µL      Monocytes Absolute 0 93 Thousand/µL      Eosinophils Absolute 0 23 Thousand/µL      Basophils Absolute 0 05 Thousands/µL                  XR chest 2 views   Final Result by Al Caldwell MD (12/17 1830)      Stable severe COPD  No acute cardiopulmonary disease  Workstation performed: [id number]                    Procedures  ECG 12 Lead Documentation  Date/Time: 12/17/2018 4:30 PM  Performed by: Arias Prakash  Authorized by: Oliver STRINGER     Indications / Diagnosis:  SOB/cough  ECG reviewed by me, the ED Provider: yes    Patient location:  ED  Previous ECG:     Previous ECG:  Compared to current    Comparison ECG info:  10/10/18  Rate:     ECG rate:  84  Rhythm:     Rhythm: sinus rhythm    Ectopy:     Ectopy: none    QRS:     QRS axis:  Normal  Conduction:     Conduction: abnormal      Abnormal conduction: incomplete RBBB    ST segments:     ST segments:  Non-specific  T waves:     T waves: normal             Phone Contacts  ED Phone Contact    ED Course  ED Course as of Dec 19 1042   Mon Dec 17, 2018   1555 Temperature: (!) 100 9 °F (38 3 °C)   1556 Respirations: (!) 26   1556 Pulse: 100   1741 Patient reports feeling better  Lungs are clear on repeat auscultation  Feels comfortable with being discharged with steroids and azithromycin    Discussed with patient to follow up with PCP, return precautions discussed, lives at home with his daughter            Identification of Seniors at Risk      Most Recent Value   (ISAR) Identification of Seniors at Risk   Before the illness or injury that brought you to the Emergency, did you need someone to help you on a regular basis? 0 Filed at: 12/17/2018 1527   In the last 24 hours, have you needed more help than usual?  0 Filed at: 12/17/2018 1527   Have you been hospitalized for one or more nights during the past 6 months? 0 Filed at: 12/17/2018 1527   In general, do you see well? 1 Filed at: 12/17/2018 1527   In general, do you have serious problems with your memory? 0 Filed at: 12/17/2018 1527   Do you take more than three different medications every day?  0 Filed at: 12/17/2018 1527   ISAR Score  1 Filed at: 12/17/2018 1527                    Initial Sepsis Screening     Row Name 12/17/18 1555                Is the patient's history suggestive of a new or worsening infection? (!)  Yes (Proceed)  -[address]        Suspected source of infection pneumonia  -KH        Are two or more of the following signs & symptoms of infection both present and new to the patient? (!)  Yes (Proceed)  -KH        Indicate SIRS criteria Hyperthemia > 38 3C (100 9F); Tachypnea > 20 resp per min; Tachycardia > 90 bpm  -KH        If the answer is yes to both questions, suspicion of sepsis is present          If severe sepsis is present AND tissue hypoperfusion perists in the hour after fluid resuscitation or lactate > 4, the patient meets criteria for SEPTIC SHOCK          Are any of the following organ dysfunction criteria present within 6 hours of suspected infection and SIRS criteria that are NOT considered to be chronic conditions?         Organ dysfunction          Date of presentation of severe sepsis          Time of presentation of severe sepsis          Tissue hypoperfusion persists in the hour after crystalloid fluid administration, evidenced, by either:          Was hypotension present within one hour of the conclusion of crystalloid fluid administration?           Date of presentation of septic shock          Time of presentation of septic shock            User Key (r) = Recorded By, (t) = Taken By, (c) = Cosigned By    [address] Name Provider Type    [facility],  Physician                  MDM  Number of Diagnoses or Management Options  COPD exacerbation [facility]):   Fever:   URI (upper respiratory infection):   Diagnosis management comments: 80 yo M with COPD exacerbation 2/2 URI/possible flu- sx improved with duoneb/steroids  Pt reported feeling better and wanting to go home  Lungs clear on repeat exam  Discharged on Azithromycin and steroids  He reports that he lives at home with his daughter and will return with any worsening sx, otherwise PCP f/u  Pt comfortable with plan       Amount and/or Complexity of Data Reviewed  Clinical lab tests: ordered and reviewed  Tests in the radiology section of CPT®: reviewed and ordered  Tests in the medicine section of CPT®: ordered and reviewed  Review and summarize past medical records: yes      CritCare Time    Disposition  Final diagnoses:   COPD exacerbation ([address] )   URI (upper respiratory infection)   Fever     Time reflects when diagnosis was documented in both MDM as applicable and the Disposition within this note     Time User Action Codes Description Comment    12/17/2018  5:43 PM Rennis Angst A Add [J44 1] COPD exacerbation ([address] )     12/17/2018  5:44 PM Rennis Angst A Add [J06 9] URI (upper respiratory infection)     12/17/2018  5:44 PM Rennis Angst A Add [R50 9] Fever       ED Disposition     ED Disposition Condition Comment    Discharge  Emeli Jackson discharge to home/self care  Condition at discharge: Stable        Follow-up Information     Follow up With Specialties Details Why Contact Isidra De Leon MD Internal Medicine   [address] [place] [address]  [phone]          Please take steroids and antibiotics as prescribed  Use your breathing treatments at home as needed  Follow up with PCP   Return to ED if new or worsening symptoms          Discharge Medication List as of 12/17/2018 5:46 PM      START taking these medications    Details   azithromycin (ZITHROMAX) 250 mg tablet Take 1 tablet (250 mg total) by mouth every 24 hours for 4 days, Starting Mon 12/17/2018, Until Fri 12/21/2018, Print      !! predniSONE 20 mg tablet Take 2 tablets (40 mg total) by mouth daily for 5 days, Starting Mon 12/17/2018, Until Sat 12/22/2018, Print       !! - Potential duplicate medications found  Please discuss with provider        CONTINUE these medications which have NOT CHANGED    Details   albuterol (2 5 mg/3 mL) 0 083 % nebulizer solution Take 3 mL (2 5 mg total) by nebulization every 4 (four) hours as needed for wheezing, Starting Mon 4/9/2018, Normal      albuterol (PROVENTIL HFA,VENTOLIN HFA) 90 mcg/act inhaler Inhale 2 puffs every 6 (six) hours as needed for wheezing, Historical Med      cholestyramine (QUESTRAN) 4 g packet Take 1 packet (4 g total) by mouth 2 (two) times a day with meals, Starting Wed 12/5/2018, Normal      esomeprazole (NexIUM) 40 MG capsule Take 1 capsule (40 mg total) by mouth daily for 30 days, Starting Tue 8/14/2018, Until Thu 12/13/2018, Normal      fluticasone-vilanterol (BREO ELLIPTA) 100-25 mcg/inh inhaler Inhale 1 puff daily Rinse mouth after use , Starting Mon 12/10/2018, Normal      loratadine (CLARITIN) 10 mg tablet Take 10 mg by mouth daily, Historical Med      !! predniSONE 10 mg tablet 4 tabs day one and two, 3 tabs day three and four, 2 tabs day five and six, 1 tab day seven and eight then stop, Normal      ranitidine (ZANTAC) 150 mg tablet Take 1 tablet (150 mg total) by mouth daily at bedtime, Starting Tue 8/14/2018, Normal      roflumilast (DALIRESP) 250 MCG tablet Take 1 tablet (250 mcg total) by mouth daily, Starting Mon 9/10/2018, Normal      sertraline (ZOLOFT) 50 mg tablet Take 1 tablet (50 mg total) by mouth daily, Starting Tue 10/9/2018, Normal      tiotropium (SPIRIVA RESPIMAT) 2 5 MCG/ACT AERS inhaler Inhale 2 puffs daily, Starting Mon 12/10/2018, Normal !! - Potential duplicate medications found  Please discuss with provider  No discharge procedures on file      ED Provider  Electronically Signed by           Faheem Teague DO  12/19/18 5519

## 2018-12-17 NOTE — DISCHARGE INSTRUCTIONS
La BPCO (broncopneumopatia cronica ostruttiva)   INFORMAZIONI GENERALI   La broncopneumopatia cronica ostruttiva (BPCO) è penelope malattia polmonare debbie rende difficoltosa la respirazione  Normalmente è il risultato di melissa polmonari causati da ifeanyi di irritazione e infiammazione nei polmoni  DOPO LE DIMISSIONI DALL'OSPEDALE:   Chiamare il 911 se:   · Si avverte un senso di stordimento, si ha il respiro affannoso e dolore al petto  Rivolgersi immediatamente a un medico in [address] condizioni:   · Si è confusi, si hanno le vertigini o ci si sente svenire  · Si sentono il braccio o la gamba caldi, sensibili e doloranti  L'aspetto può diventare gonfio e rose  · Si tossisce con espettorato contenente tracce di sangue  Chiamare il medico se:   · Si ha il respiro più affannoso del solito  · È necessario assumere più farmaco del solito per controllare i sintomi  · Si tossisce o si ha il respiro affannoso più del solito  · Si espettora più muco o muco di un colore diverso o con un odore diverso  · Si aumenta di peso di più di 3 libbre in [place]  · Si ha la febbre, naso gocciolante o congestionato e mal di gola o altri sintomi del raffreddore o dell'influenza  · La pelle, le labbra o le unghie iniziano a diventare lora  · Si nota gonfiore caren gambe o caren caviglie  · Si è molto stanchi o deboli per [employer] giorno  · Si notano cambiamenti di umore o cambiamenti andie capacità di pensare o concentrarsi  · Si hanno domande o dubbi sulla propria patologia o sulla jose eduardo  Reese Marlow   · Farmaci:  possono essere utilizzati per aprire [employer], ridurre il gonfiore e l'infiammazione nei polmoni o per [employer]  Può essere necessario assumere 2 o più farmaci  Un farmaco ad azione breve allevia i sintomi rapidamente  I farmaci ad azione prolungata eron in [facility] o prevenire i sintomi   Chiedere ulteriori Jose Prudent steve farmaci prescritti e johansen come usarli in [address] [facility]  · Assumere i farmaci conformemente a quanto prescritto robert medico   Contattare il medico letitia wilbur in demetrice si ritenga debbie i farmaci non stiano funzionando o stiano causando degli effetti collaterali  Se si è allergici a qualche farmaco, informare il medico  Mantenere un elenco dei farmaci, vitamine e prodotti di erboristeria debbie si stanno assumendo  Includere le quantità e indicare quando e perché si stanno assumendo  Portare l'elenco o i flaconi di compresse caren visite di controllo  Portare con sé l'elenco Izella Michael in [address] [facility]  Facilitare la respirazione:   · Usare la respirazione a labbra arricciate ogni volta debbie si ha la sensazione debbie manca il respiro  inalare profondamente attraverso il naso  Espirare lentamente attraverso la bocca con le labbra socchiuse per un tempo due volte superiore a quello impiegato per l'inalazione  Si può praticare questo metodo di respirazione anche mentre si eseguono piegamenti, sollevamenti, mentre si salgono le scale o si pratica esercizio fisico  Questo rallenta la respirazione e Naeem Manuel a far entrare e uscire più aria nei e indu polmoni  · Non fumare ed evitare altre persone debbie fumano  La nicotina e altre sostanze possono causare melissa o irritazione ai polmoni e rendere più difficile la respirazione  Non usare sigarette elettroniche o tabacco senza fumo  Anche questi prodotti contengono nicotina  Chiedere informazioni al medico se si fuma e si ha bisogno di aiuto per [employer]  Per assistenza e ulteriori informazioni:  [facility]  gov  Phone: [phone]  Web Address: www smokefree  gov      · Fare attenzione a, ad evitare, tutto ciò debbie peggiora i sintomi  Evitare le altitudini [facility] e i posti con elevata umidità  Rimanere in un luogo chiuso o coprirsi la bocca e il naso con penelope sciarpa quando si esce e fa freddo   Rimanere in [address] pollini larissa'aria sono elevati  Non usare bombolette spray come deodoranti, insetticidi e lacche per capelli  [employer] BPCO e prevenzione dei peggioramenti:  BPCO è penelope condizione grave debbie letitia tempo peggiora  Il peggioramento significa debbie i sintomi dayron BPCO diventano improvvisamente più gravi  È importante prevenire i peggioramenti  Il peggioramento può causare ulteriori melissa ai polmoni  La BPCO non può essere curata, ma è possibile fare qualcosa per sentirsi meglio ed evitare il peggioramento:  · Proteggersi indu germi  I germi possono penetrare nei polmoni e causare penelope infezione  L'infezione ai polmoni può provocare penelope maggiore produzione di muco e rendere più difficile la respirazione  Un'infezione può provocare anche gonfiore caren vie respiratorie, impedendo l'ingresso dell'aria  Attenendosi a quanto riportato di seguito è possibile ridurre il rischio di infezioni:     ¨ Lavare spesso le elizabeth con acqua e sapone  Portare con sé un gel germicida da usare per [employer] quando non si può disporre di acqua e sapone  ¨ Non toccare gli occhi, il naso o la bocca senza prima lavarsi le elizabeth  ¨ Coprire sempre la bocca quando si tossisce  Tossire in un [facility] carta o [facility] in [address] non diffondere germi con le elizabeth  ¨ Cercare di [employer] persone debbie hanno il raffreddore o l'influenza  Se si è malati, stare il più possibile lontano dagli altri  · Evelyn più liquidi  Aretta Zen a lubrificare le vie respiratorie e a tossire espellendo muco  Chiedere qual è la quantità di liquidi da assumere ogni giorno e quali sono i tipi di liquidi più adatti  · Fare esercizio fisico tutti i giorni:  Svolgere attività fisiche per [employer] 20 minuti al giorno per Leila ad [employer] proprie energie e a ridurre il respiro affannoso   Camminare o andare in [address] deion per fare esercizio fisico  stabilire con il proprio medico un programma di esercizio fisico idoneo  · Chiedere informazioni steve vaccini  Il medico può consigliare di fare il normale vaccino antinfluenzale o contro la polmonite  Per Sigmund Mosher persona debbie soffre di BPCO la polmonite può diventare letale  Informarsi sugli altri vaccini di demetrice si potrebbe avere bisogno  Chiedere al medico informazioni steve vaccini antinfluenzali e contro la polmonite  Tutti gli adulti dovrebbero vaccinarsi contro l'influenza ogni anno non appena il vaccino è disponibile  La vaccinazione contro la polmonite viene eseguita johansen persone adulte a partire indu 67 ifeanyi per prevenire le malattie pneumococciche, come la polmonite  Anche gli adulti di età compresa tra 19 e 64 ifeanyi ad elevato rischio di malattia pneumococcica dovrebbero sottoporsi a questa vaccinazione  Può essere necessario ripetere la vaccinazione dopo 1 o 5 ifeanyi  Riabilitazione polmonare:  il medico può consigliare un programma per favorire la gestione dei sintomi e migliorare la [employer]  Il programma può includere counseling nutrizionale e attività fisica per rafforzare i polmoni  [facility] circa le opzioni per un futuro trattamento:  chiedere informazioni sulla possibilità di redigere dichiarazioni di trattamento anticipate e testamenti biologici  Gisele Mortensen documenti aiutano a decidere e indicare per [employer] proprie scelte relative al trattamento e caren cure di rosa Mendez  È consigliabile compilarli quando ci si sente bene e si può pensare con chiarezza a ciò debbie si desidera fare  Le informazioni contenute si possono poi conservare per un uso futuro in [employer] ricovero ospedaliero o di malattia grave  Presentarsi caren visite di controllo robert proprio medico, come consigliato:  potrebbe essere necessario sottoporsi ad ulteriori esami  Il medico può consigliare penelope visita con [employer]  Trascrivere eventuali domande in [employer] ricordarsetremaine henna le visite     © 2017 [address] Information is for End User's use only and may not be sold, redistributed or otherwise used for commercial purposes  All illustrations and images included in CareNotes® are the copyrighted property of A D A M , Inc  or Harry Hernandez  The above information is an  only  It is not intended as medical advice for individual conditions or treatments  Talk to your doctor, nurse or pharmacist before following any medical regimen to see if it is safe and effective for you  [employer] respiratorie superiori   ASSISTENZA AMBULATORIALE:   [address] vie respiratorie superiori  è anche chiamata raffreddore comune  Può interessare naso, gola, orecchie e brannon paranasali  Segni e sintomi comuni includono:  I sintomi del raffreddore normalmente peggiorano nei primi 3-5 giorni  I sintomi sono i seguenti:  · Naso gocciolante o congestionato    · Starnuti e tosse    · Mal di gola o raucedine    · Occhi tobias, umidi e doloranti    · Affaticamento     · Faviolaividray e Oswaldo Valenzuela    · Mal di allan, dolori in tutto il corpo o dolori muscolari  Rivolgersi immediatamente a un medico in [address] condizioni:   · Si avverte dolore al petto o si hanno difficoltà respiratorie  Chiamare il medico se:   · Si ha la febbre oltre 102ºF (39°C)  · Il mal di gola peggiora o si jack thakkar o Jodee in [facility]  · I sintomi peggiorano dopo 3-5 giorni o il raffreddore non migliora dopo 14 giorni  · Si hanno arrossamenti cutanei in tutto il corpo  · Si hanno dei rigonfiamenti grandi e morbidi sul collo  · Si hanno perdite dense, trev o gialle robert naso  · Si espettora muco denso, giallo, codie o sanguinolento  · Si [facility] 24 ore e non si riesce a tenere i liquidi nello stomaco     · Si ha un forte mal d'orecchio  · Si hanno domande o dubbi sulla propria patologia o sulla jose eduardo  Trattamento del raffreddore:  Non esiste penelope Mohinder Median per il raffreddore   I raffreddori [address] virus e non migliorano con gli antibiotici  La maggior parte delle persone si sente lennyo in 7-14 giorni  La tosse può continuare per 2-3 settimane  I seguenti accorgimenti possono aiutare ad alleviare i sintomi:  · Decongestionanti nasali:  contribuiscono a ridurre la congestione nasale e Bernard Holstein a respirare più facilmente  Le pillole decongestionanti possono provocare agitazione o insonnia  Non usare spray decongestionanti per [employer] giorno  · Antitussigeni:  Bernard Holstein a ridurre la tosse  Chiedere al medico quale tipo di farmaco per la tosse genesis più adatto  · I farmaci anti-infiammatori non steroidei (FANS) , come l'ibuprofene, aiutano a ridurre il gonfiore, il dolore e la febbre  In alcuni soggetti i FANS possono provocare sanguinamento dello stomaco o problemi renali  Se si assume un farmaco anticoagulante, chiedere sempre al medico se è sicuro assumere farmaci FANS  Leggere sempre i foglietti illustrativi e attenersi Artist Cheeks riportate  · L'acetaminofene  riduce il dolore e la febbre  È disponibile senza ricetta medica  Chiedere la quantità di farmaco da usare e la frequenza di assunzione  Seguire le indicazioni  Leggere le etichette di tutti gli altri farmaci debbie si utilizzano per assicurarsi debbie non contengano acetaminofene o rivolgersi al medico o al farmacista  Se non viene assunto correttamente l'acetaminofene può causare melissa al fegato  Non assumere più di 4 grammi (4 000 milligrammi) in totale di acetaminofene al giorno  Gestione del raffreddore:   · riposare quanto più possibile  Iniziare a riprendere le varie attività in [place]  · Janine più liquidi come consigliato  I Suzzanna Salk a fluidificare e a sciogliere il muco, in [address] l'espettorazione  I liquidi aiuteranno anche a prevenire la disidratazione  I liquidi debbie aiutano a prevenire la disidratazione sono, ad esempio, acqua, succo di frutta e brodo  Non janine bevande debbie conteono [facility]  La caffeina può aumentare il rischio di disidratazione  [address] assumere ogni giorno  · [place] sollievo al mal di gola  I gargarismi con acqua calda e sale permettono di alleviare il mal di [place]  Preparare l'acqua salata sciogliendo ¼ di cucchiaino [employer] in Josephine Peek tazza d'acqua calda  Si possono anche tenere in bocca caramelle o pastiglie per la gola o usare dee spray per il mal di gola  · Usare un umidificatore o un vaporizzatore  Usare un umidificatore a vapore freddo o un vaporizzatore per aumentare l'umidità dell'aria in casa  Questo renderà più agevole la respirazione e contribuirà a far diminuire la tosse  · Le gocce nasali saline, utilizzate come consigliato,  [place] ad [employer] congestione  · Applicare vaselina attorno laina parte [address] [facility] [address] narici  Ciò può diminuire l'irritazione debbie si avverte quando si soffia il naso  · Non fumare  La nicotina e altre sostanze chimiche presenti will sigarette e nei sigari possono far peggiorare i sintomi  Inoltre possono causare infezioni come la bronchite o la polmonite  Chiedere informazioni al medico se si fuma e si ha bisogno di aiuto per [employer]  La nicotina è presente anche will sigarette elettroniche o letitia tabacco senza fumo  Prima di utilizzare questi prodotti consultare il medico   Prevenzione dayron trasmissione del raffreddore ad altre persone:   · Cercare di evitare la vicinanza con altre persone henna i primi 2 o 3 giorni di raffreddore quando questo si diffonde più facilmente  · Non condividere alimenti o bevande  · Non condividere gli asciugamani con gli altri [employer]  · Lavarsi spesso le elizabeth, specialmente dopo debbie ci si è soffiati il naso  Voltare le spalle caren persone presenti e coprirsi la bocca e il naso con un fazzoletto di carta quando si deve starnutire o tossire         Presentarsi caren visite di controllo robert proprio medico, come consigliato:  Trascrivere bettye parkinson in [employer] visite  © 2017 [address] Information is for End User's use only and may not be sold, redistributed or otherwise used for commercial purposes  All illustrations and images included in CareNotes® are the copyrighted property of A D A M , Inc  or Harry Hernandez  The above information is an  only  It is not intended as medical advice for individual conditions or treatments  Talk to your doctor, nurse or pharmacist before following any medical regimen to see if it is safe and effective for you

## 2018-12-17 NOTE — TELEPHONE ENCOUNTER
Pt's daughter called stating that he has a cold and is feeling sick  She would like to know if you can send a antibiotic for his cold and if you can please refill his predniSONE 10mg tablet

## 2018-12-19 ENCOUNTER — HOSPITAL ENCOUNTER (INPATIENT)
Facility: HOSPITAL | Age: 68
LOS: 7 days | Discharge: HOME HEALTH | DRG: 193 | End: 2018-12-26
Attending: EMERGENCY MEDICINE | Admitting: ANESTHESIOLOGY
Payer: MEDICARE

## 2018-12-19 ENCOUNTER — APPOINTMENT (EMERGENCY)
Dept: RADIOLOGY | Facility: HOSPITAL | Age: 68
DRG: 193 | End: 2018-12-19
Payer: MEDICARE

## 2018-12-19 DIAGNOSIS — K21.9 GASTROESOPHAGEAL REFLUX DISEASE WITHOUT ESOPHAGITIS: ICD-10-CM

## 2018-12-19 DIAGNOSIS — J44.1 COPD EXACERBATION (HCC): Primary | ICD-10-CM

## 2018-12-19 DIAGNOSIS — R09.02 HYPOXIA: ICD-10-CM

## 2018-12-19 DIAGNOSIS — R06.03 ACUTE RESPIRATORY DISTRESS: ICD-10-CM

## 2018-12-19 DIAGNOSIS — J44.1 ACUTE EXACERBATION OF CHRONIC OBSTRUCTIVE PULMONARY DISEASE (COPD) (HCC): ICD-10-CM

## 2018-12-19 PROBLEM — J96.21 ACUTE ON CHRONIC RESPIRATORY FAILURE WITH HYPOXIA AND HYPERCAPNIA (HCC): Status: ACTIVE | Noted: 2018-12-19

## 2018-12-19 PROBLEM — J96.22 ACUTE ON CHRONIC RESPIRATORY FAILURE WITH HYPOXIA AND HYPERCAPNIA (HCC): Status: ACTIVE | Noted: 2018-12-19

## 2018-12-19 LAB
ALBUMIN SERPL BCP-MCNC: 3.7 G/DL (ref 3.5–5)
ALP SERPL-CCNC: 78 U/L (ref 46–116)
ALT SERPL W P-5'-P-CCNC: 42 U/L (ref 12–78)
ANION GAP SERPL CALCULATED.3IONS-SCNC: 8 MMOL/L (ref 4–13)
AST SERPL W P-5'-P-CCNC: 31 U/L (ref 5–45)
BASE EX.OXY STD BLDV CALC-SCNC: 97.2 % (ref 60–80)
BASE EX.OXY STD BLDV CALC-SCNC: 97.4 % (ref 60–80)
BASE EXCESS BLDV CALC-SCNC: -1.9 MMOL/L
BASE EXCESS BLDV CALC-SCNC: -3.1 MMOL/L
BASOPHILS # BLD AUTO: 0.04 THOUSANDS/ΜL (ref 0–0.1)
BASOPHILS NFR BLD AUTO: 0 % (ref 0–1)
BILIRUB SERPL-MCNC: 0.6 MG/DL (ref 0.2–1)
BUN SERPL-MCNC: 21 MG/DL (ref 5–25)
CALCIUM SERPL-MCNC: 8.5 MG/DL (ref 8.3–10.1)
CHLORIDE SERPL-SCNC: 104 MMOL/L (ref 100–108)
CO2 SERPL-SCNC: 31 MMOL/L (ref 21–32)
CREAT SERPL-MCNC: 0.92 MG/DL (ref 0.6–1.3)
EOSINOPHIL # BLD AUTO: 0.02 THOUSAND/ΜL (ref 0–0.61)
EOSINOPHIL NFR BLD AUTO: 0 % (ref 0–6)
ERYTHROCYTE [DISTWIDTH] IN BLOOD BY AUTOMATED COUNT: 15 % (ref 11.6–15.1)
GFR SERPL CREATININE-BSD FRML MDRD: 86 ML/MIN/1.73SQ M
GLUCOSE SERPL-MCNC: 132 MG/DL (ref 65–140)
GLUCOSE SERPL-MCNC: 158 MG/DL (ref 65–140)
GLUCOSE SERPL-MCNC: 201 MG/DL (ref 65–140)
HCO3 BLDV-SCNC: 23.7 MMOL/L (ref 24–30)
HCO3 BLDV-SCNC: 28.8 MMOL/L (ref 24–30)
HCT VFR BLD AUTO: 38.8 % (ref 36.5–49.3)
HGB BLD-MCNC: 12.5 G/DL (ref 12–17)
IMM GRANULOCYTES # BLD AUTO: 0.06 THOUSAND/UL (ref 0–0.2)
IMM GRANULOCYTES NFR BLD AUTO: 0 % (ref 0–2)
LACTATE SERPL-SCNC: 1.4 MMOL/L (ref 0.5–2)
LYMPHOCYTES # BLD AUTO: 4.16 THOUSANDS/ΜL (ref 0.6–4.47)
LYMPHOCYTES NFR BLD AUTO: 26 % (ref 14–44)
MCH RBC QN AUTO: 30.1 PG (ref 26.8–34.3)
MCHC RBC AUTO-ENTMCNC: 32.2 G/DL (ref 31.4–37.4)
MCV RBC AUTO: 94 FL (ref 82–98)
MONOCYTES # BLD AUTO: 1.54 THOUSAND/ΜL (ref 0.17–1.22)
MONOCYTES NFR BLD AUTO: 10 % (ref 4–12)
NEUTROPHILS # BLD AUTO: 10.12 THOUSANDS/ΜL (ref 1.85–7.62)
NEUTS SEG NFR BLD AUTO: 64 % (ref 43–75)
NRBC BLD AUTO-RTO: 0 /100 WBCS
O2 CT BLDV-SCNC: 17.5 ML/DL
O2 CT BLDV-SCNC: 18.9 ML/DL
PCO2 BLDV: 43.8 MM HG (ref 42–50)
PCO2 BLDV: 93.2 MM HG (ref 42–50)
PH BLDV: 7.11 [PH] (ref 7.3–7.4)
PH BLDV: 7.35 [PH] (ref 7.3–7.4)
PLATELET # BLD AUTO: 182 THOUSANDS/UL (ref 149–390)
PLATELET # BLD AUTO: 241 THOUSANDS/UL (ref 149–390)
PMV BLD AUTO: 9.6 FL (ref 8.9–12.7)
PMV BLD AUTO: 9.7 FL (ref 8.9–12.7)
PO2 BLDV: 113 MM HG (ref 35–45)
PO2 BLDV: 197.2 MM HG (ref 35–45)
POTASSIUM SERPL-SCNC: 4.1 MMOL/L (ref 3.5–5.3)
PROCALCITONIN SERPL-MCNC: 0.11 NG/ML
PROCALCITONIN SERPL-MCNC: <0.05 NG/ML
PROT SERPL-MCNC: 7.4 G/DL (ref 6.4–8.2)
RBC # BLD AUTO: 4.15 MILLION/UL (ref 3.88–5.62)
SODIUM SERPL-SCNC: 143 MMOL/L (ref 136–145)
TROPONIN I SERPL-MCNC: 0.02 NG/ML
TROPONIN I SERPL-MCNC: 0.02 NG/ML
TROPONIN I SERPL-MCNC: <0.02 NG/ML
TROPONIN I SERPL-MCNC: <0.02 NG/ML
WBC # BLD AUTO: 15.94 THOUSAND/UL (ref 4.31–10.16)

## 2018-12-19 PROCEDURE — 84484 ASSAY OF TROPONIN QUANT: CPT | Performed by: NURSE PRACTITIONER

## 2018-12-19 PROCEDURE — 84484 ASSAY OF TROPONIN QUANT: CPT | Performed by: EMERGENCY MEDICINE

## 2018-12-19 PROCEDURE — 99291 CRITICAL CARE FIRST HOUR: CPT

## 2018-12-19 PROCEDURE — 71045 X-RAY EXAM CHEST 1 VIEW: CPT

## 2018-12-19 PROCEDURE — 94660 CPAP INITIATION&MGMT: CPT

## 2018-12-19 PROCEDURE — 82805 BLOOD GASES W/O2 SATURATION: CPT | Performed by: EMERGENCY MEDICINE

## 2018-12-19 PROCEDURE — 85049 AUTOMATED PLATELET COUNT: CPT | Performed by: NURSE PRACTITIONER

## 2018-12-19 PROCEDURE — 83605 ASSAY OF LACTIC ACID: CPT | Performed by: EMERGENCY MEDICINE

## 2018-12-19 PROCEDURE — 93005 ELECTROCARDIOGRAM TRACING: CPT

## 2018-12-19 PROCEDURE — 82948 REAGENT STRIP/BLOOD GLUCOSE: CPT

## 2018-12-19 PROCEDURE — 87633 RESP VIRUS 12-25 TARGETS: CPT | Performed by: NURSE PRACTITIONER

## 2018-12-19 PROCEDURE — 84145 PROCALCITONIN (PCT): CPT | Performed by: EMERGENCY MEDICINE

## 2018-12-19 PROCEDURE — C9113 INJ PANTOPRAZOLE SODIUM, VIA: HCPCS | Performed by: NURSE PRACTITIONER

## 2018-12-19 PROCEDURE — 99291 CRITICAL CARE FIRST HOUR: CPT | Performed by: NURSE PRACTITIONER

## 2018-12-19 PROCEDURE — 87040 BLOOD CULTURE FOR BACTERIA: CPT | Performed by: EMERGENCY MEDICINE

## 2018-12-19 PROCEDURE — 96365 THER/PROPH/DIAG IV INF INIT: CPT

## 2018-12-19 PROCEDURE — 84145 PROCALCITONIN (PCT): CPT | Performed by: NURSE PRACTITIONER

## 2018-12-19 PROCEDURE — 36415 COLL VENOUS BLD VENIPUNCTURE: CPT | Performed by: EMERGENCY MEDICINE

## 2018-12-19 PROCEDURE — 94640 AIRWAY INHALATION TREATMENT: CPT

## 2018-12-19 PROCEDURE — 87631 RESP VIRUS 3-5 TARGETS: CPT | Performed by: EMERGENCY MEDICINE

## 2018-12-19 PROCEDURE — 85025 COMPLETE CBC W/AUTO DIFF WBC: CPT | Performed by: EMERGENCY MEDICINE

## 2018-12-19 PROCEDURE — 80053 COMPREHEN METABOLIC PANEL: CPT | Performed by: EMERGENCY MEDICINE

## 2018-12-19 PROCEDURE — 94760 N-INVAS EAR/PLS OXIMETRY 1: CPT

## 2018-12-19 RX ORDER — LORATADINE 10 MG/1
10 TABLET ORAL DAILY
Status: DISCONTINUED | OUTPATIENT
Start: 2018-12-19 | End: 2018-12-26 | Stop reason: HOSPADM

## 2018-12-19 RX ORDER — OSELTAMIVIR PHOSPHATE 75 MG/1
75 CAPSULE ORAL EVERY 12 HOURS SCHEDULED
Status: COMPLETED | OUTPATIENT
Start: 2018-12-19 | End: 2018-12-23

## 2018-12-19 RX ORDER — LEVALBUTEROL 1.25 MG/.5ML
1.25 SOLUTION, CONCENTRATE RESPIRATORY (INHALATION) EVERY 6 HOURS
Status: DISCONTINUED | OUTPATIENT
Start: 2018-12-19 | End: 2018-12-20

## 2018-12-19 RX ORDER — SODIUM CHLORIDE FOR INHALATION 0.9 %
3 VIAL, NEBULIZER (ML) INHALATION ONCE
Status: DISCONTINUED | OUTPATIENT
Start: 2018-12-19 | End: 2018-12-19

## 2018-12-19 RX ORDER — SODIUM CHLORIDE, SODIUM GLUCONATE, SODIUM ACETATE, POTASSIUM CHLORIDE, MAGNESIUM CHLORIDE, SODIUM PHOSPHATE, DIBASIC, AND POTASSIUM PHOSPHATE .53; .5; .37; .037; .03; .012; .00082 G/100ML; G/100ML; G/100ML; G/100ML; G/100ML; G/100ML; G/100ML
75 INJECTION, SOLUTION INTRAVENOUS CONTINUOUS
Status: DISCONTINUED | OUTPATIENT
Start: 2018-12-19 | End: 2018-12-22

## 2018-12-19 RX ORDER — MAGNESIUM SULFATE HEPTAHYDRATE 40 MG/ML
2 INJECTION, SOLUTION INTRAVENOUS ONCE
Status: COMPLETED | OUTPATIENT
Start: 2018-12-19 | End: 2018-12-19

## 2018-12-19 RX ORDER — PANTOPRAZOLE SODIUM 40 MG/1
40 INJECTION, POWDER, FOR SOLUTION INTRAVENOUS
Status: DISCONTINUED | OUTPATIENT
Start: 2018-12-19 | End: 2018-12-26

## 2018-12-19 RX ORDER — CHLORHEXIDINE GLUCONATE 0.12 MG/ML
15 RINSE ORAL EVERY 12 HOURS SCHEDULED
Status: DISCONTINUED | OUTPATIENT
Start: 2018-12-19 | End: 2018-12-26 | Stop reason: HOSPADM

## 2018-12-19 RX ORDER — 0.9 % SODIUM CHLORIDE 0.9 %
3 VIAL (ML) INJECTION AS NEEDED
Status: DISCONTINUED | OUTPATIENT
Start: 2018-12-19 | End: 2018-12-26 | Stop reason: HOSPADM

## 2018-12-19 RX ORDER — FLUTICASONE FUROATE AND VILANTEROL 100; 25 UG/1; UG/1
1 POWDER RESPIRATORY (INHALATION) DAILY
Status: DISCONTINUED | OUTPATIENT
Start: 2018-12-19 | End: 2018-12-26 | Stop reason: HOSPADM

## 2018-12-19 RX ORDER — HEPARIN SODIUM 5000 [USP'U]/ML
5000 INJECTION, SOLUTION INTRAVENOUS; SUBCUTANEOUS EVERY 8 HOURS SCHEDULED
Status: DISCONTINUED | OUTPATIENT
Start: 2018-12-19 | End: 2018-12-26 | Stop reason: HOSPADM

## 2018-12-19 RX ORDER — METHYLPREDNISOLONE SODIUM SUCCINATE 125 MG/2ML
60 INJECTION, POWDER, LYOPHILIZED, FOR SOLUTION INTRAMUSCULAR; INTRAVENOUS EVERY 8 HOURS SCHEDULED
Status: DISCONTINUED | OUTPATIENT
Start: 2018-12-19 | End: 2018-12-23

## 2018-12-19 RX ADMIN — LEVALBUTEROL HYDROCHLORIDE 1.25 MG: 1.25 SOLUTION, CONCENTRATE RESPIRATORY (INHALATION) at 08:48

## 2018-12-19 RX ADMIN — LORATADINE 10 MG: 10 TABLET ORAL at 10:00

## 2018-12-19 RX ADMIN — CHLORHEXIDINE GLUCONATE 0.12% ORAL RINSE 15 ML: 1.2 LIQUID ORAL at 20:54

## 2018-12-19 RX ADMIN — HEPARIN SODIUM 5000 UNITS: 5000 INJECTION, SOLUTION INTRAVENOUS; SUBCUTANEOUS at 08:30

## 2018-12-19 RX ADMIN — OSELTAMIVIR PHOSPHATE 75 MG: 75 CAPSULE ORAL at 14:00

## 2018-12-19 RX ADMIN — LEVALBUTEROL HYDROCHLORIDE 1.25 MG: 1.25 SOLUTION, CONCENTRATE RESPIRATORY (INHALATION) at 19:20

## 2018-12-19 RX ADMIN — SERTRALINE HYDROCHLORIDE 50 MG: 50 TABLET ORAL at 10:00

## 2018-12-19 RX ADMIN — LEVALBUTEROL HYDROCHLORIDE 1.25 MG: 1.25 SOLUTION, CONCENTRATE RESPIRATORY (INHALATION) at 13:16

## 2018-12-19 RX ADMIN — PANTOPRAZOLE SODIUM 40 MG: 40 INJECTION, POWDER, FOR SOLUTION INTRAVENOUS at 12:00

## 2018-12-19 RX ADMIN — OSELTAMIVIR PHOSPHATE 75 MG: 75 CAPSULE ORAL at 20:55

## 2018-12-19 RX ADMIN — AZITHROMYCIN MONOHYDRATE 500 MG: 500 INJECTION, POWDER, LYOPHILIZED, FOR SOLUTION INTRAVENOUS at 11:35

## 2018-12-19 RX ADMIN — FLUTICASONE FUROATE AND VILANTEROL TRIFENATATE 1 PUFF: 100; 25 POWDER RESPIRATORY (INHALATION) at 10:00

## 2018-12-19 RX ADMIN — METHYLPREDNISOLONE SODIUM SUCCINATE 60 MG: 125 INJECTION, POWDER, FOR SOLUTION INTRAMUSCULAR; INTRAVENOUS at 14:19

## 2018-12-19 RX ADMIN — SODIUM CHLORIDE, SODIUM GLUCONATE, SODIUM ACETATE, POTASSIUM CHLORIDE, MAGNESIUM CHLORIDE, SODIUM PHOSPHATE, DIBASIC, AND POTASSIUM PHOSPHATE 50 ML/HR: .53; .5; .37; .037; .03; .012; .00082 INJECTION, SOLUTION INTRAVENOUS at 09:18

## 2018-12-19 RX ADMIN — IPRATROPIUM BROMIDE 0.5 MG: 0.5 SOLUTION RESPIRATORY (INHALATION) at 19:20

## 2018-12-19 RX ADMIN — METHYLPREDNISOLONE SODIUM SUCCINATE 60 MG: 125 INJECTION, POWDER, FOR SOLUTION INTRAMUSCULAR; INTRAVENOUS at 22:15

## 2018-12-19 RX ADMIN — ROFLUMILAST 250 MCG: 250 TABLET ORAL at 10:00

## 2018-12-19 RX ADMIN — IPRATROPIUM BROMIDE 0.5 MG: 0.5 SOLUTION RESPIRATORY (INHALATION) at 08:48

## 2018-12-19 RX ADMIN — HEPARIN SODIUM 5000 UNITS: 5000 INJECTION, SOLUTION INTRAVENOUS; SUBCUTANEOUS at 14:19

## 2018-12-19 RX ADMIN — IPRATROPIUM BROMIDE 0.5 MG: 0.5 SOLUTION RESPIRATORY (INHALATION) at 13:16

## 2018-12-19 RX ADMIN — MAGNESIUM SULFATE IN WATER 2 G: 40 INJECTION, SOLUTION INTRAVENOUS at 04:38

## 2018-12-19 RX ADMIN — HEPARIN SODIUM 5000 UNITS: 5000 INJECTION, SOLUTION INTRAVENOUS; SUBCUTANEOUS at 22:15

## 2018-12-19 RX ADMIN — METHYLPREDNISOLONE SODIUM SUCCINATE 60 MG: 125 INJECTION, POWDER, FOR SOLUTION INTRAMUSCULAR; INTRAVENOUS at 09:22

## 2018-12-19 RX ADMIN — CHLORHEXIDINE GLUCONATE 0.12% ORAL RINSE 15 ML: 1.2 LIQUID ORAL at 10:00

## 2018-12-19 NOTE — ED NOTES
Dr Lily Caldera stated to hold off on nebulized medications  Respiratory made aware       Nemo Davidson, RN  12/19/18 0249

## 2018-12-19 NOTE — RESPIRATORY THERAPY NOTE
Pt taken off of BiPAP by ED  to see if he would be able be moved to [facility]  Pt failed the trial and was placed back on BiPAP by Jakub Gregg   BiPAP check completed and heart neb ordered DC'd

## 2018-12-19 NOTE — CONSULTS
Consultation - Pulmonary Medicine   Glen Cuevas 79 y o  male MRN: [id number]  Unit/Bed#:  Encounter: [id number]      Assessment:  1  Acute on chronic hypoxemic respiratory failure  2  Acute hypercapnic respiratory failure  3  COPD exacerbation  4  Acute tracheobronchitis vs influenza    Plan:   Patient with acute hypoxic and hypercapnic respiratory failure on admission requiring BiPAP  Was trialed off BiPAP, needed to be replaced due to increased work of breathing  Currently breathing comfortably on the BiPAP with improved acidosis and improved hypercapnia on ABG  Attempt to wean off BiPAP today, he uses 3 L oxygen at baseline  Titrate to keep O2 sats > 89%  Would consider adding Tamiflu, influenza/RSV is pending  His wife is also hospitalized with influenza A, several sick contacts at home  Continue Solu-Medrol at the current dose, can decrease tomorrow if he continues to improve  Continue Breo as well as around the clock nebulizer treatments  Continue Daliresp  Receiving azithromycin for his COPD exacerbation/tracheobronchitis  He is afebrile, does have a leukocytosis but was on steroids outpatient which were started on 12/17  Procalcitonin was normal on 12/17, influenza and RSV are pending, blood cultures pending  History of Present Illness   Physician Requesting Consult: Marah Root DO  Reason for Consult / Principal Problem: COPD exacerbation  Hx and PE limited by: None  HPI: Glen Cuevas is a 79y o  year old male with 50 pack-year smoking history, quit 5 years ago with COPD on bronchodilators and home oxygen who presents with complaint of worsening shortness of breath and dry cough  He presented to the ED on 12/17 for similar symptoms, was discharged home with Z-Pascual and prednisone  Symptoms continued to worsen so he presented back to the ED  His wife is currently hospitalized for influenza, several sick contacts at home    He was placed on BiPAP and is currently feeling significantly improved  Consults    Review of Systems   Constitutional: Positive for diaphoresis  HENT: Negative  Respiratory: Positive for cough and shortness of breath  Cardiovascular: Negative  Gastrointestinal: Negative  Genitourinary: Negative  Musculoskeletal: Negative  Skin: Negative  Allergic/Immunologic: Negative  Neurological: Negative  Psychiatric/Behavioral: Negative  Historical Information   Past Medical History:   Diagnosis Date    Centrilobular emphysema ([facility] )     COPD (chronic obstructive pulmonary disease) ([facility] )     Hypoglycemia     On home oxygen therapy     Pneumonia     Pulmonary hypertension ([facility] )     Respiratory failure (HCC)     SOB (shortness of breath)      Past Surgical History:   Procedure Laterality Date    CATARACT EXTRACTION      CHOLECYSTECTOMY      hernia    EYE SURGERY      WI ESOPHAGOGASTRODUODENOSCOPY TRANSORAL DIAGNOSTIC N/A 9/19/2018    Procedure: ESOPHAGOGASTRODUODENOSCOPY (EGD); Surgeon: Ashwini García MD;  Location: [facility];   Service: Gastroenterology    WI REPAIR [address] HERNIA,5+Y/O,REDUCIBL Left 5/23/2018    Procedure: OPEN INGUINAL HERNIA REPAIR WITH MESH;  Surgeon: Alicia Pina MD;  Location: [facility];  Service: General     Social History   History   Alcohol Use    0 6 oz/week    1 Glasses of wine per week     Comment: socialy     History   Drug Use No     History   Smoking Status    Former Smoker    Years: 50 00    Types: Cigarettes    Quit date: 2/27/2013   Smokeless Tobacco    Never Used     Occupational History:     Family History: non-contributory    Meds/Allergies   all current active meds have been reviewed, pertinent pulmonary meds have been reviewed and current meds:   Current Facility-Administered Medications   Medication Dose Route Frequency    azithromycin (ZITHROMAX) 500 mg in sodium chloride 0 9% 250mL IVPB 500 mg  500 mg Intravenous Q24H    chlorhexidine (PERIDEX) 0 12 % oral rinse 15 mL  15 mL Swish & Spit Q12H [address]    fluticasone-vilanterol (BREO ELLIPTA) 100-25 mcg/inh inhaler 1 puff  1 puff Inhalation Daily    heparin (porcine) subcutaneous injection 5,000 Units  5,000 Units Subcutaneous Q8H [address]    insulin lispro (HumaLOG) 100 units/mL subcutaneous injection 1-6 Units  1-6 Units Subcutaneous Q6H [address]    ipratropium (ATROVENT) 0 02 % inhalation solution 0 5 mg  0 5 mg Nebulization Q6H    levalbuterol (XOPENEX) inhalation solution 1 25 mg  1 25 mg Nebulization Q6H    loratadine (CLARITIN) tablet 10 mg  10 mg Oral Daily    methylPREDNISolone sodium succinate (Solu-MEDROL) injection 60 mg  60 mg Intravenous Q8H [address]    multi-electrolyte (ISOLYTE-S PH 7 4 equivalent) IV solution  50 mL/hr Intravenous Continuous    pantoprazole (PROTONIX) injection 40 mg  40 mg Intravenous Q24H NII    roflumilast (DALIRESP) tablet 250 mcg  250 mcg Oral Daily    sertraline (ZOLOFT) tablet 50 mg  50 mg Oral Daily    sodium chloride (PF) 0 9 % injection 3 mL  3 mL Intravenous PRN       Allergies   Allergen Reactions    Penicillins Hives     Passed out    Codeine        Objective   Vitals: Blood pressure 115/71, pulse 87, temperature 98 7 °F (37 1 °C), temperature source Axillary, resp  rate 19, height 5' 10" (1 778 m), weight 79 6 kg (175 lb 7 8 oz), SpO2 97 %  ,Body mass index is 25 18 kg/m²  No intake or output data in the 24 hours ending 12/19/18 1115  Invasive Devices     Peripheral Intravenous Line            Peripheral IV 10/10/18 Right Antecubital 69 days    Peripheral IV 12/19/18 Left Hand less than 1 day    Peripheral IV 12/19/18 Right Antecubital less than 1 day                Physical Exam   Constitutional: He is oriented to person, place, and time  He appears well-developed and well-nourished  No distress  HENT:   Mouth/Throat: Oropharynx is clear and moist    Eyes: Pupils are equal, round, and reactive to light  Cardiovascular: Normal rate, regular rhythm and normal heart sounds  No murmur heard  Pulmonary/Chest: Effort normal  No accessory muscle usage  No respiratory distress  He has decreased breath sounds (diminished air entry, prolonged expiratory phase)  He has no wheezes  He has no rhonchi  He has no rales  Abdominal: Soft  There is no tenderness  Musculoskeletal: Normal range of motion  He exhibits no edema  Neurological: He is alert and oriented to person, place, and time  Skin: Skin is warm and dry  No rash noted  Psychiatric: He has a normal mood and affect  Lab Results:   I have personally reviewed pertinent lab results  , CBC:   Lab Results   Component Value Date    WBC 15 94 (H) 12/19/2018    HGB 12 5 12/19/2018    HCT 38 8 12/19/2018    MCV 94 12/19/2018     12/19/2018    MCH 30 1 12/19/2018    MCHC 32 2 12/19/2018    RDW 15 0 12/19/2018    MPV 9 7 12/19/2018    NRBC 0 12/19/2018   , CMP:   Lab Results   Component Value Date    SODIUM 143 12/19/2018    K 4 1 12/19/2018     12/19/2018    CO2 31 12/19/2018    BUN 21 12/19/2018    CREATININE 0 92 12/19/2018    CALCIUM 8 5 12/19/2018    AST 31 12/19/2018    ALT 42 12/19/2018    ALKPHOS 78 12/19/2018    EGFR 86 12/19/2018     Imaging Studies: I have personally reviewed pertinent reports  and I have personally reviewed pertinent films in PACS  EKG, Pathology, and Other Studies: I have personally reviewed pertinent reports      VTE Prophylaxis: Sequential compression device (Venodyne)  and Heparin    Code Status: Level 1 - Full Code  Advance Directive and Living Will:      Power of :    POLST:

## 2018-12-19 NOTE — RESPIRATORY THERAPY NOTE
Called to ED for patient with SOB, pt was on a non rebreather  Pt was placed on BiPAP 14/6 and 100% O2 with a large facemask  Will continue with BiPAP at this time

## 2018-12-19 NOTE — UTILIZATION REVIEW
Initial Clinical Review    Admission: Date/Time/Statement: inpatient  12/19/18 @ 0720     Orders Placed This Encounter   Procedures    Inpatient Admission (expected length of stay for this patient is greater than two midnights)     Standing Status:   Standing     Number of Occurrences:   1     Order Specific Question:   Admitting Physician     Answer:   Bridget Rivers     Order Specific Question:   Level of Care     Answer:   Level 2 Stepdown / HOT [14]     Order Specific Question:   Estimated length of stay     Answer:   More than 2 Midnights     Order Specific Question:   Certification     Answer:   I certify that inpatient services are medically necessary for this patient for a duration of greater than two midnights  See H&P and MD Progress Notes for additional information about the patient's course of treatment  ED: Date/Time/Mode of Arrival:   ED Arrival Information     Expected Arrival Acuity Means of Arrival Escorted By Service Admission Type    - 12/19/2018 04:26 [facility] [address] Emergency    Arrival Complaint    SOB          Chief Complaint:   Chief Complaint   Patient presents with    Shortness of Breath     pt arrived by EMS with c/o SOB  pt has hx of COPD  pt received 2 breathing tx's pre hospital  pt using accessory muscles, tachypneic, SOB at this time  History of Illness: 80 yo m to ED from home by EMS after seen in ED on 12/17 due to fever and URI sx x 1 week, multiple sick contacts, wife currently in hospital for flu  Started on zmax and dc home  Returned to ED 12/19 due to worsening resp distress  Unable to complete sentences, started on bipap for hypercarbic resp failure  trialed off bipap, but again dyspneic and restarted bipap with admit to ICU       ED Vital Signs:   ED Triage Vitals   Temperature Pulse Respirations Blood Pressure SpO2   12/19/18 0714 12/19/18 0428 12/19/18 0428 12/19/18 0431 12/19/18 0428   98 4 °F (36 9 °C) 98 (!) 28 142/93 96 %      Temp Source Heart Rate Source Patient Position - Orthostatic VS BP Location FiO2 (%)   12/19/18 0837 12/19/18 0428 12/19/18 0837 12/19/18 0431 12/19/18 0837   Axillary Monitor Lying Left arm 40      Pain Score       12/19/18 0900       No Pain        Wt Readings from Last 1 Encounters:   12/19/18 79 6 kg (175 lb 7 8 oz)       Vital Signs (abnormal): RR 30, 26, 20, 34, 22, 24, 25  , 117, 111, 105, 102    Abnormal Labs/Diagnostic Test Results:   12/19/18 0456     pH, Uli 7 300 - 7 400 7 108     pCO2, Uli 42 0 - 50 0 mm Hg 93 2     pO2, Uli 35 0 - 45 0 mm Hg 197 2     HCO3, Uli 24 - 30 mmol/L 28 8    Base Excess, Uli mmol/L -3 1    O2 Content, Uli ml/dL 18 9    O2 HGB, VENOUS 60 0 - 80 0 % 97 4        12/19/18 0615    pH, Uli 7 300 - 7 400 7 352    pCO2, Uli 42 0 - 50 0 mm Hg 43 8    pO2, Uli 35 0 - 45 0 mm Hg 113 0     HCO3, Uli 24 - 30 mmol/L 23 7     Base Excess, Uli mmol/L -1 9    O2 Content, Uli ml/dL 17 5    O2 HGB, VENOUS 60 0 - 80 0 % 97 2         t bili 1 5   Wbc 7 79, 15 94   Gluc 201   Trop wnl  procalc negative  CXR done as outpt 12/17: Stable severe COPD   No acute cardiopulmonary disease  PCXR 12/19: Mild right basilar atelectasis versus scarring  Blunting of the costophrenic angle suggesting small effusions  ED Treatment:   Medication Administration from 12/19/2018 0426 to 12/19/2018 0841       Date/Time Order Dose Route Action Action by Comments     12/19/2018 0438 magnesium sulfate 2 g/50 mL IVPB (premix) 2 g 2 g Intravenous New Jacqueline Duarte RN      12/19/2018 0830 heparin (porcine) subcutaneous injection 5,000 Units 5,000 Units Subcutaneous Given Travon Claire RN           Past Medical/Surgical History:    Active Ambulatory Problems     Diagnosis Date Noted    Pulmonary emphysema ([address] ) 03/12/2018    Gastroesophageal reflux disease without esophagitis 03/12/2018    Anxiety 03/12/2018    Simple chronic bronchitis (HCC)     Shortness of breath on exertion 04/25/2018    Abnormal ECG 04/25/2018    PVCs (premature ventricular contractions) 04/25/2018    Allergic rhinitis 07/26/2018    Functional diarrhea 12/67/8744    Diastolic dysfunction 49/95/0613    Pulmonary hypertension ([place] [address] ) 07/31/2018    Acute exacerbation of chronic obstructive pulmonary disease (COPD) ([place] [address] ) 08/14/2018    Gastroesophageal reflux disease 09/10/2018       Past Medical History:   Diagnosis Date    Centrilobular emphysema ([place] [address] )     COPD (chronic obstructive pulmonary disease) ([place] [address] )     Hypoglycemia     On home oxygen therapy     Pneumonia     Pulmonary hypertension ([place] [address] )     Respiratory failure (HCC)     SOB (shortness of breath)        Admitting Diagnosis: Acute respiratory distress [R06 03]  SOB (shortness of breath) [R06 02]  Hypoxia [R09 02]  Gastroesophageal reflux disease without esophagitis [K21 9]  COPD exacerbation (HCC) [J44 1]    Age/Sex: 79 y o  male    Assessment/Plan: 78 yo m to ED by EMS from home admitted to ICU due to acute hypoxic and hypercarbic resp failure, copd w/exac, tracheobronchitis vs  influenza/viral URI, and hyperglycemia-no hx DM  Was unable to complete full sentences on arrival, placed on bipap, weaned from bipap, but had to restart following recurrent dyspnea  Requires ATC nebs, IV steroids and pulm consult  R/o flu, as wife currently admitted due to the flu  Given critical illness, patient length of stay will require greater than two midnights      Admission Orders:  Scheduled Meds:   Current Facility-Administered Medications:  azithromycin 500 mg Intravenous Q24H   chlorhexidine 15 mL Swish [employer] Spit Q12H [address]   fluticasone-vilanterol 1 puff Inhalation Daily   heparin (porcine) 5,000 Units Subcutaneous Q8H [address]   insulin lispro 1-6 Units Subcutaneous Q6H [address]   ipratropium 0 5 mg Nebulization Q6H   levalbuterol 1 25 mg Nebulization Q6H   loratadine 10 mg Oral Daily   methylPREDNISolone sodium succinate 60 mg Intravenous Q8H [address]   multi-electrolyte 50 mL/hr Intravenous Continuous   oseltamivir 75 mg Oral Q12H NII   pantoprazole 40 mg Intravenous Q24H [address]   roflumilast 250 mcg Oral Daily   sertraline 50 mg Oral Daily   sodium chloride (PF) 3 mL Intravenous PRN     Gluc checks q6h  I/O q2h  Neuro checks q4h  Daily wt  Fall prec  Dysphagia eval  Turn q2h  Up w/assist  scd's  Cbc, cmp, mg, phos, pt, inr, ion ca, procalc in am  A1c, resp pathogen profile, flu a/b/rsv x 1  Trop R2R  NPO  Cons pulm  Cons nutrition  Cons case mgmt  bipap  Cons PT/OT      Per pulm 12/19: attempting to wean bipap today, keep sat<89%, consider adding tamiflu, flu/rsv pending  Continue IV steroids and ATC nebs

## 2018-12-19 NOTE — RESPIRATORY THERAPY NOTE
Pt was off bipap and on 6L NC  Sat up on the edge of the bed and became short of breath    Put pt back on bipap

## 2018-12-19 NOTE — ED PROVIDER NOTES
History  Chief Complaint   Patient presents with    Shortness of Breath     pt arrived by EMS with c/o SOB  pt has hx of COPD  pt received 2 breathing tx's pre hospital  pt using accessory muscles, tachypneic, SOB at this time  68-year-old male presents in severe respiratory distress  Patient unable to answer any questions due to severe respiratory distress and waxing consciousness  Patient placed emergently on BiPAP and evaluated  Patient was evaluated immediately upon arrival to the emergency room due to severe respiratory distress  Patient has history of COPD and per his daughter has had a few days of upper respiratory infection symptoms  Previously seen in the emergency room 2 days ago without etiology identified  Patient was doing well with conservative measures until shortly before coming to the emergency room when he became acutely ill and dyspneic  Per the daughter, patient's wife is admitted with influenza  Impression and plan:  Acute respiratory distress with a broad differential   Based on patient's history and physical, likely secondary to COPD the exacerbation, which is likely secondary to influenza considering patient's history  Will evaluate with broad infectious evaluation to evaluate for potential lower respiratory infection  Will treat patient symptomatically with magnesium  Patient received methylprednisolone by EMS and multiple courses of nebulized medications  Patient significantly tachycardic upon arrival with minimal wheezing at present, more significant respiratory distress  Will place patient on BiPAP and monitor  Patient is outside the window for antiretrovirals  Reassessment:  Patient reassessed multiple times while in the emergency room  Patient improved significantly on BiPAP with weaning of his FiO2 to maintain appropriate pulse oximetry    Patient VBG notably significantly hypercarbic and patient's settings titrated to improved patient's respiratory function  Patient's VBG was repeated with significant improvement in his acidosis and hypercarbia that return to normal   Patient was trialed off BiPAP on nasal cannula, maintaining pulse oximetry greater than 88%  Patient became tachypneic and dyspneic after several minutes and BiPAP was replaced  Considering patient has failed attempted treatment off BiPAP, patient was admitted to the ICU for continued monitoring and evaluation  History provided by:  Patient  Shortness of Breath   Severity:  Moderate  Onset quality:  Sudden  Timing:  Constant  Progression:  Worsening  Relieved by:  None tried  Worsened by:  Nothing  Ineffective treatments:  None tried  Associated symptoms: cough and wheezing    Associated symptoms: no abdominal pain, no chest pain, no claudication, no diaphoresis, no fever, no headaches, no hemoptysis, no neck pain, no PND, no rash, no sputum production, no syncope, no swollen glands and no vomiting        Prior to Admission Medications   Prescriptions Last Dose Informant Patient Reported? Taking?    albuterol (2 5 mg/3 mL) 0 083 % nebulizer solution  Self No No   Sig: Take 3 mL (2 5 mg total) by nebulization every 4 (four) hours as needed for wheezing   albuterol (PROVENTIL HFA,VENTOLIN HFA) 90 mcg/act inhaler  Self Yes No   Sig: Inhale 2 puffs every 6 (six) hours as needed for wheezing   azithromycin (ZITHROMAX) 250 mg tablet   No No   Sig: Take 1 tablet (250 mg total) by mouth every 24 hours for 4 days   cholestyramine (QUESTRAN) 4 g packet  Self No No   Sig: Take 1 packet (4 g total) by mouth 2 (two) times a day with meals   esomeprazole (NexIUM) 40 MG capsule  Self No No   Sig: Take 1 capsule (40 mg total) by mouth daily for 30 days   fluticasone-vilanterol (BREO ELLIPTA) 100-25 mcg/inh inhaler  Self No No   Sig: Inhale 1 puff daily Rinse mouth after use    loratadine (CLARITIN) 10 mg tablet  Self Yes No   Sig: Take 10 mg by mouth daily   predniSONE 10 mg tablet  Self No No   Si tabs day one and two, 3 tabs day three and four, 2 tabs day five and six, 1 tab day seven and eight then stop   Patient not taking: Reported on 12/13/2018    predniSONE 20 mg tablet   No No   Sig: Take 2 tablets (40 mg total) by mouth daily for 5 days   ranitidine (ZANTAC) 150 mg tablet  Self No No   Sig: Take 1 tablet (150 mg total) by mouth daily at bedtime   Patient not taking: Reported on 12/13/2018    roflumilast (DALIRESP) 250 MCG tablet  Self No No   Sig: Take 1 tablet (250 mcg total) by mouth daily   sertraline (ZOLOFT) 50 mg tablet  Self No No   Sig: Take 1 tablet (50 mg total) by mouth daily   tiotropium (SPIRIVA RESPIMAT) 2 5 MCG/ACT AERS inhaler  Self No No   Sig: Inhale 2 puffs daily      Facility-Administered Medications: None       Past Medical History:   Diagnosis Date    Centrilobular emphysema ([facility] )     [facility]     Hypoglycemia     On home oxygen therapy     Pneumonia     [facility]     Respiratory failure ([facility])     SOB (shortness of breath)        Past Surgical History:   Procedure Laterality Date    CATARACT EXTRACTION      CHOLECYSTECTOMY      hernia    EYE SURGERY      UT ESOPHAGOGASTRODUODENOSCOPY TRANSORAL DIAGNOSTIC N/A 9/19/2018    Procedure: ESOPHAGOGASTRODUODENOSCOPY (EGD); Surgeon: Erin Horton MD;  Location: [facility]; Service: Gastroenterology    UT REPAIR [address] HERNIA,5+Y/O,REDUCIBL Left 5/23/2018    Procedure: OPEN INGUINAL HERNIA REPAIR WITH MESH;  Surgeon: Dyan Ga MD;  Location: [facility];  Service: General       Family History   Problem Relation Age of Onset    Diabetes Mother     Hypertension Mother     Hyperlipidemia Mother      I have reviewed and agree with the history as documented      Social History   Substance Use Topics    Smoking status: Former Smoker     Years: 50 00     Types: Cigarettes     Quit date: 2/27/2013    Smokeless tobacco: Never Used    Alcohol use 0 6 oz/week 1 Glasses of wine per week      Comment: socialy        Review of Systems   Unable to perform ROS: Severe respiratory distress (information from patient's daugher, limited information from patient due to hypercarbia and respiratory distress)   Constitutional: Negative for diaphoresis and fever  Respiratory: Positive for cough, shortness of breath and wheezing  Negative for hemoptysis and sputum production  Cardiovascular: Negative for chest pain, claudication, syncope and PND  Gastrointestinal: Negative for abdominal pain and vomiting  Musculoskeletal: Negative for neck pain  Skin: Negative for rash  Neurological: Negative for headaches  Physical Exam  Physical Exam   Constitutional: He appears well-developed and well-nourished  No distress  HENT:   Head: Normocephalic and atraumatic  Mouth/Throat: Oropharynx is clear and moist    Eyes: Pupils are equal, round, and reactive to light  EOM are normal    Neck: Normal range of motion  Neck supple  Cardiovascular: Regular rhythm  Tachycardic  Pulmonary/Chest: He is in respiratory distress  He has wheezes  He has no rales  Abdominal: Soft  Bowel sounds are normal  He exhibits no distension  There is no tenderness  Musculoskeletal: He exhibits no edema or tenderness  Neurological: He is alert  Cannot answer questions appropriately  Skin: Skin is warm and dry  He is not diaphoretic  Psychiatric: He has a normal mood and affect  Vitals reviewed        Vital Signs  ED Triage Vitals   Temperature Pulse Respirations Blood Pressure SpO2   12/19/18 0714 12/19/18 0428 12/19/18 0428 12/19/18 0431 12/19/18 0428   98 4 °F (36 9 °C) 98 (!) 28 142/93 96 %      Temp Source Heart Rate Source Patient Position - Orthostatic VS BP Location FiO2 (%)   12/19/18 0837 12/19/18 0428 12/19/18 0837 12/19/18 0431 12/19/18 0837   Axillary Monitor Lying Left arm 40      Pain Score       12/19/18 0900       No Pain           Vitals:    12/20/18 1200 12/20/18 1205 12/20/18 1300 12/20/18 1400   BP: 111/67 111/67 113/72 117/68   Pulse: 74 74 102 78   Patient Position - Orthostatic VS:  Lying         Visual Acuity  Visual Acuity      Most Recent Value   L Pupil Size (mm)  2   R Pupil Size (mm)  2   L Pupil Shape  Round   R Pupil Shape  Round          ED Medications  Medications   sodium chloride (PF) 0 9 % injection 3 mL (not administered)   fluticasone-vilanterol (BREO ELLIPTA) 100-25 mcg/inh inhaler 1 puff (1 puff Inhalation Given 12/20/18 0951)   loratadine (CLARITIN) tablet 10 mg (10 mg Oral Given 12/20/18 0950)   roflumilast (DALIRESP) tablet 250 mcg (250 mcg Oral Given 12/20/18 1044)   sertraline (ZOLOFT) tablet 50 mg (50 mg Oral Given 12/20/18 0950)   chlorhexidine (PERIDEX) 0 12 % oral rinse 15 mL (15 mL Swish & Spit Given 12/20/18 0950)   multi-electrolyte (ISOLYTE-S PH 7 4 equivalent) IV solution (50 mL/hr Intravenous New Bag 12/20/18 0612)   heparin (porcine) subcutaneous injection 5,000 Units (5,000 Units Subcutaneous Given 12/20/18 1405)   methylPREDNISolone sodium succinate (Solu-MEDROL) injection 60 mg (60 mg Intravenous Given 12/20/18 1405)   azithromycin (ZITHROMAX) 500 mg in sodium chloride 0 9% 250mL IVPB 500 mg (500 mg Intravenous New Bag 12/20/18 0941)   pantoprazole (PROTONIX) injection 40 mg (40 mg Intravenous Given 12/20/18 0950)   insulin lispro (HumaLOG) 100 units/mL subcutaneous injection 1-6 Units (1 Units Subcutaneous Not Given 12/20/18 1221)   oseltamivir (TAMIFLU) capsule 75 mg (75 mg Oral Given 12/20/18 0950)   senna (SENOKOT) tablet 8 6 mg (not administered)   docusate sodium (COLACE) capsule 100 mg (100 mg Oral Given 12/20/18 0950)   ipratropium-albuterol (DUO-NEB) 0 5-2 5 mg/3 mL inhalation solution 3 mL (3 mL Nebulization Given 12/20/18 1526)    EMS REPLENISHMENT MED ( Does not apply Given to EMS 12/19/18 5830)   magnesium sulfate 2 g/50 mL IVPB (premix) 2 g (0 g Intravenous Stopped 12/19/18 0844)       Diagnostic Studies  Results Reviewed     Procedure Component Value Units Date/Time    Procalcitonin [[id number]]  (Normal) Collected:  12/20/18 0620    Lab Status:  Final result Specimen:  Blood from Arm, Right Updated:  12/20/18 1317     Procalcitonin 0 14 ng/ml     Blood culture #1 [[id number]] Collected:  12/19/18 0454    Lab Status:  Preliminary result Specimen:  Blood from Arm, Left Updated:  12/20/18 0901     Blood Culture No Growth at 24 hrs  Blood culture #2 [[id number]] Collected:  12/19/18 0436    Lab Status:  Preliminary result Specimen:  Blood from Arm, Right Updated:  12/20/18 0901     Blood Culture No Growth at 24 hrs  Comprehensive metabolic panel [[id number]]  (Abnormal) Collected:  12/20/18 0620    Lab Status:  Final result Specimen:  Blood from Arm, Right Updated:  12/20/18 4448     Sodium 140 mmol/L      Potassium 4 2 mmol/L      Chloride 103 mmol/L      CO2 29 mmol/L      ANION GAP 8 mmol/L      BUN 34 (H) mg/dL      Creatinine 0 67 mg/dL      Glucose 105 mg/dL      Calcium 8 4 mg/dL      AST 38 U/L      ALT 51 U/L      Alkaline Phosphatase 62 U/L      Total Protein 6 6 g/dL      Albumin 3 1 (L) g/dL      Total Bilirubin 0 80 mg/dL      eGFR 100 ml/min/1 73sq m     Narrative:         National Kidney Disease Education Program recommendations are as follows:  GFR calculation is accurate only with a steady state creatinine  Chronic Kidney disease less than 60 ml/min/1 73 sq  meters  Kidney failure less than 15 ml/min/1 73 sq  meters      Magnesium [[id number]]  (Abnormal) Collected:  12/20/18 0620    Lab Status:  Final result Specimen:  Blood from Arm, Right Updated:  12/20/18 0714     Magnesium 2 7 (H) mg/dL     Phosphorus [[id number]]  (Normal) Collected:  12/20/18 0620    Lab Status:  Final result Specimen:  Blood from Arm, Right Updated:  12/20/18 0714     Phosphorus 3 1 mg/dL     Protime-INR [[id number]]  (Normal) Collected:  12/20/18 0620    Lab Status:  Final result Specimen:  Blood from Arm, Right Updated:  12/20/18 8499 Protime 13 2 seconds      INR 1 01    Calcium, ionized [[id number]] Updated:  12/20/18 0644    Lab Status:  No result Specimen:  Blood from Arm, Right     CBC and differential [[id number]]  (Abnormal) Collected:  12/20/18 0620    Lab Status:  Final result Specimen:  Blood from Arm, Right Updated:  12/20/18 0640     WBC 7 10 Thousand/uL      RBC 3 72 (L) Million/uL      Hemoglobin 11 1 (L) g/dL      Hematocrit 34 6 (L) %      MCV 93 fL      MCH 29 8 pg      MCHC 32 1 g/dL      RDW 15 5 (H) %      MPV 9 8 fL      Platelets 453 Thousands/uL      nRBC 0 /100 WBCs      Neutrophils Relative 72 %      Immat GRANS % 0 %      Lymphocytes Relative 13 (L) %      Monocytes Relative 15 (H) %      Eosinophils Relative 0 %      Basophils Relative 0 %      Neutrophils Absolute 5 07 Thousands/µL      Immature Grans Absolute 0 03 Thousand/uL      Lymphocytes Absolute 0 95 Thousands/µL      Monocytes Absolute 1 04 Thousand/µL      Eosinophils Absolute 0 00 Thousand/µL      Basophils Absolute 0 01 Thousands/µL     Influenza A/B and RSV by PCR [[id number]]  (Abnormal) Collected:  12/19/18 1033    Lab Status:  Final result Specimen:  Nasopharyngeal from Nasopharyngeal Swab Updated:  12/20/18 0444     INFLU A PCR Detected (A)     INFLU B PCR None Detected     RSV PCR None Detected    Troponin I [[id number]]  (Normal) Collected:  12/19/18 1722    Lab Status:  Final result Specimen:  Blood from Arm, Right Updated:  12/19/18 1754     Troponin I <0 02 ng/mL     Troponin I [[id number]]  (Normal) Collected:  12/19/18 1212    Lab Status:  Final result Specimen:  Blood from Arm, Left Updated:  12/19/18 1254     Troponin I 0 02 ng/mL     Procalcitonin [[id number]]  (Normal) Collected:  12/19/18 0928    Lab Status:  Final result Specimen:  Blood from Arm, Right Updated:  12/19/18 1244     Procalcitonin 0 11 ng/ml     Procalcitonin [[id number]]  (Normal) Collected:  12/19/18 0436    Lab Status:  Final result Specimen:  Blood from Arm, Right Updated: 12/19/18 1149     Procalcitonin <0 05 ng/ml     Troponin I [[id number]]  (Normal) Collected:  12/19/18 0928    Lab Status:  Final result Specimen:  Blood from Arm, Right Updated:  12/19/18 1004     Troponin I 0 02 ng/mL     Platelet count [[id number]]  (Normal) Collected:  12/19/18 0928    Lab Status:  Final result Specimen:  Blood from Arm, Right Updated:  12/19/18 0936     Platelets 660 Thousands/uL      MPV 9 7 fL     Blood gas, venous [[id number]]  (Abnormal) Collected:  12/19/18 0615    Lab Status:  Final result Specimen:  Blood from Arm, Right Updated:  12/19/18 0620     pH, Uli 7 352     pCO2, Uli 43 8 mm Hg      pO2, Uli 113 0 (H) mm Hg      HCO3, Uli 23 7 (L) mmol/L      Base Excess, Uli -1 9 mmol/L      O2 Content, Uli 17 5 ml/dL      O2 HGB, VENOUS 97 2 (H) %     Lactic acid, plasma [[id number]]  (Normal) Collected:  12/19/18 0451    Lab Status:  Final result Specimen:  Blood from Arm, Right Updated:  12/19/18 0518     LACTIC ACID 1 4 mmol/L     Narrative:         Result may be elevated if tourniquet was used during collection  Comprehensive metabolic panel [[id number]]  (Abnormal) Collected:  12/19/18 0435    Lab Status:  Final result Specimen:  Blood from Arm, Right Updated:  12/19/18 1120     Sodium 143 mmol/L      Potassium 4 1 mmol/L      Chloride 104 mmol/L      CO2 31 mmol/L      ANION GAP 8 mmol/L      BUN 21 mg/dL      Creatinine 0 92 mg/dL      Glucose 201 (H) mg/dL      Calcium 8 5 mg/dL      AST 31 U/L      ALT 42 U/L      Alkaline Phosphatase 78 U/L      Total Protein 7 4 g/dL      Albumin 3 7 g/dL      Total Bilirubin 0 60 mg/dL      eGFR 86 ml/min/1 73sq m     Narrative:         National Kidney Disease Education Program recommendations are as follows:  GFR calculation is accurate only with a steady state creatinine  Chronic Kidney disease less than 60 ml/min/1 73 sq  meters  Kidney failure less than 15 ml/min/1 73 sq  meters      Blood gas, venous [[id number]]  (Abnormal) Collected:  12/19/18 2738    Lab Status:  Final result Specimen:  Blood from Arm, Right Updated:  12/19/18 0502     pH, Uli 7 108 (L)     pCO2, Uli 93 2 (H) mm Hg      pO2, Uli 197 2 (H) mm Hg      HCO3, Uli 28 8 mmol/L      Base Excess, Uli -3 1 mmol/L      O2 Content, Uli 18 9 ml/dL      O2 HGB, VENOUS 97 4 (H) %     Troponin I [[id number]]  (Normal) Collected:  12/19/18 0435    Lab Status:  Final result Specimen:  Blood from Arm, Right Updated:  12/19/18 0501     Troponin I <0 02 ng/mL     CBC and differential [[id number]]  (Abnormal) Collected:  12/19/18 0435    Lab Status:  Final result Specimen:  Blood from Arm, Right Updated:  12/19/18 0441     WBC 15 94 (H) Thousand/uL      RBC 4 15 Million/uL      Hemoglobin 12 5 g/dL      Hematocrit 38 8 %      MCV 94 fL      MCH 30 1 pg      MCHC 32 2 g/dL      RDW 15 0 %      MPV 9 6 fL      Platelets 340 Thousands/uL      nRBC 0 /100 WBCs      Neutrophils Relative 64 %      Immat GRANS % 0 %      Lymphocytes Relative 26 %      Monocytes Relative 10 %      Eosinophils Relative 0 %      Basophils Relative 0 %      Neutrophils Absolute 10 12 (H) Thousands/µL      Immature Grans Absolute 0 06 Thousand/uL      Lymphocytes Absolute 4 16 Thousands/µL      Monocytes Absolute 1 54 (H) Thousand/µL      Eosinophils Absolute 0 02 Thousand/µL      Basophils Absolute 0 04 Thousands/µL                  XR chest 1 view portable   ED Interpretation by Ry Mensah MD (12/19 7964)   Similar to prior xray, no obvious infiltrate, stable atelectasis R base      Final Result by Ky Brower DO (12/19 1025)      Mild right basilar atelectasis versus scarring  Blunting of the costophrenic angle suggesting small effusions  Workstation performed: [id number]                    Procedures  Procedures       Phone Contacts  ED Phone Contact    ED Course  ED Course as of Dec 20 1619   Wed Dec 19, 2018   5864 Patient improving with BiPAP, able to answer 1-2 word statements      2880 Patient on corticosteroids  WBC: (!) 15 94   0539 EKG demonstrates sinus tachycardia rate of 106  There is no acute ST segment changes  0630 Patient taken off BiPAP, significantly improved work of breathing  Transitioned to nasal cannula and monitoring  MDM  The patient presented with a condition in which there was a high probability of imminent or life-threatening deterioration, and critical care services (excluding separately billable procedures) totalled 30-74 minutes          Disposition  Final diagnoses:   COPD exacerbation ([facility] )   Acute respiratory distress   Hypoxia     Time reflects when diagnosis was documented in both MDM as applicable and the Disposition within this note     Time User Action Codes Description Comment    12/19/2018  7:20 AM Beulah Leaven Add [J44 1] COPD exacerbation ([facility] )     12/19/2018  7:20 AM Beulah Leaven Add [R06 03] Acute respiratory distress     12/19/2018  7:20 AM Beulah Leaven Add [R09 02] Hypoxia     12/19/2018  7:22 AM Mclaughlin Frankel Add [K21 9] Gastroesophageal reflux disease without esophagitis       ED Disposition     ED Disposition Condition Comment    Admit  Case was discussed with ICU and the patient's admission status was agreed to be Admission Status: inpatient status to the service of Dr Gabo Recinos          Follow-up Information    None         Current Discharge Medication List      CONTINUE these medications which have NOT CHANGED    Details   albuterol (2 5 mg/3 mL) 0 083 % nebulizer solution Take 3 mL (2 5 mg total) by nebulization every 4 (four) hours as needed for wheezing  Qty: 1080 mL, Refills: 3    Associated Diagnoses: COPD (chronic obstructive pulmonary disease) with chronic bronchitis (HCC)      albuterol (PROVENTIL HFA,VENTOLIN HFA) 90 mcg/act inhaler Inhale 2 puffs every 6 (six) hours as needed for wheezing      azithromycin (ZITHROMAX) 250 mg tablet Take 1 tablet (250 mg total) by mouth every 24 hours for 4 days  Qty: 4 tablet, Refills: 0    Associated Diagnoses: COPD exacerbation (HCC)      cholestyramine (QUESTRAN) 4 g packet Take 1 packet (4 g total) by mouth 2 (two) times a day with meals  Qty: 60 each, Refills: 2    Associated Diagnoses: Functional diarrhea      esomeprazole (NexIUM) 40 MG capsule Take 1 capsule (40 mg total) by mouth daily for 30 days  Qty: 30 capsule, Refills: 3    Associated Diagnoses: Gastroesophageal reflux disease without esophagitis      fluticasone-vilanterol (BREO ELLIPTA) 100-25 mcg/inh inhaler Inhale 1 puff daily Rinse mouth after use  Qty: 1 Inhaler, Refills: 3    Associated Diagnoses: Chronic obstructive pulmonary disease, unspecified COPD type ([facility])      loratadine (CLARITIN) 10 mg tablet Take 10 mg by mouth daily      ! ! predniSONE 10 mg tablet 4 tabs day one and two, 3 tabs day three and four, 2 tabs day five and six, 1 tab day seven and eight then stop  Qty: 20 tablet, Refills: 0    Associated Diagnoses: COPD exacerbation ([facility] )      ! ! predniSONE 20 mg tablet Take 2 tablets (40 mg total) by mouth daily for 5 days  Qty: 10 tablet, Refills: 0    Associated Diagnoses: COPD exacerbation (HCC)      ranitidine (ZANTAC) 150 mg tablet Take 1 tablet (150 mg total) by mouth daily at bedtime  Qty: 30 tablet, Refills: 5    Associated Diagnoses: Gastroesophageal reflux disease without esophagitis      roflumilast (DALIRESP) 250 MCG tablet Take 1 tablet (250 mcg total) by mouth daily  Qty: 30 tablet, Refills: 5    Associated Diagnoses: COPD (chronic obstructive pulmonary disease) with chronic bronchitis (HCC)      sertraline (ZOLOFT) 50 mg tablet Take 1 tablet (50 mg total) by mouth daily  Qty: 30 tablet, Refills: 5    Associated Diagnoses: Anxiety      tiotropium (SPIRIVA RESPIMAT) 2 5 MCG/ACT AERS inhaler Inhale 2 puffs daily  Qty: 1 Inhaler, Refills: 3    Comments: PLEASE FILL THIS SCRIPT, NOT THE 1 25 MCG  Associated Diagnoses: Chronic obstructive pulmonary disease, unspecified COPD type ([facility] )       ! ! - Potential duplicate medications found  Please discuss with provider  No discharge procedures on file      ED Provider  Electronically Signed by           Cecilio Scales MD  12/20/18 8776

## 2018-12-20 PROBLEM — J10.1 INFLUENZA A: Status: ACTIVE | Noted: 2018-12-20

## 2018-12-20 LAB
ADENOVIRUS: NOT DETECTED
ALBUMIN SERPL BCP-MCNC: 3.1 G/DL (ref 3.5–5)
ALP SERPL-CCNC: 62 U/L (ref 46–116)
ALT SERPL W P-5'-P-CCNC: 51 U/L (ref 12–78)
ANION GAP SERPL CALCULATED.3IONS-SCNC: 8 MMOL/L (ref 4–13)
AST SERPL W P-5'-P-CCNC: 38 U/L (ref 5–45)
ATRIAL RATE: 106 BPM
BASOPHILS # BLD AUTO: 0.01 THOUSANDS/ΜL (ref 0–0.1)
BASOPHILS NFR BLD AUTO: 0 % (ref 0–1)
BILIRUB SERPL-MCNC: 0.8 MG/DL (ref 0.2–1)
BUN SERPL-MCNC: 34 MG/DL (ref 5–25)
C PNEUM DNA SPEC QL NAA+PROBE: NOT DETECTED
CALCIUM SERPL-MCNC: 8.4 MG/DL (ref 8.3–10.1)
CHLORIDE SERPL-SCNC: 103 MMOL/L (ref 100–108)
CO2 SERPL-SCNC: 29 MMOL/L (ref 21–32)
CREAT SERPL-MCNC: 0.67 MG/DL (ref 0.6–1.3)
EOSINOPHIL # BLD AUTO: 0 THOUSAND/ΜL (ref 0–0.61)
EOSINOPHIL NFR BLD AUTO: 0 % (ref 0–6)
ERYTHROCYTE [DISTWIDTH] IN BLOOD BY AUTOMATED COUNT: 15.5 % (ref 11.6–15.1)
EST. AVERAGE GLUCOSE BLD GHB EST-MCNC: 134 MG/DL
FLUAV AG SPEC QL: DETECTED
FLUAV H1 RNA SPEC QL NAA+PROBE: NOT DETECTED
FLUAV H3 RNA SPEC QL NAA+PROBE: NOT DETECTED
FLUAV RNA SPEC QL NAA+PROBE: NOT DETECTED
FLUBV AG SPEC QL: ABNORMAL
FLUBV RNA SPEC QL NAA+PROBE: NOT DETECTED
GFR SERPL CREATININE-BSD FRML MDRD: 100 ML/MIN/1.73SQ M
GLUCOSE SERPL-MCNC: 103 MG/DL (ref 65–140)
GLUCOSE SERPL-MCNC: 105 MG/DL (ref 65–140)
GLUCOSE SERPL-MCNC: 113 MG/DL (ref 65–140)
GLUCOSE SERPL-MCNC: 118 MG/DL (ref 65–140)
GLUCOSE SERPL-MCNC: 122 MG/DL (ref 65–140)
HBA1C MFR BLD: 6.3 % (ref 4.2–6.3)
HBOV DNA SPEC QL NAA+PROBE: NOT DETECTED
HCOV 229E RNA SPEC QL NAA+PROBE: NOT DETECTED
HCOV HKU1 RNA SPEC QL NAA+PROBE: NOT DETECTED
HCOV NL63 RNA SPEC QL NAA+PROBE: NOT DETECTED
HCOV OC43 RNA SPEC QL NAA+PROBE: NOT DETECTED
HCT VFR BLD AUTO: 34.6 % (ref 36.5–49.3)
HGB BLD-MCNC: 11.1 G/DL (ref 12–17)
HPIV1 RNA SPEC QL NAA+PROBE: NOT DETECTED
HPIV2 RNA SPEC QL NAA+PROBE: NOT DETECTED
HPIV3 RNA SPEC QL NAA+PROBE: NOT DETECTED
HPIV4 RNA SPEC QL NAA+PROBE: NOT DETECTED
IMM GRANULOCYTES # BLD AUTO: 0.03 THOUSAND/UL (ref 0–0.2)
IMM GRANULOCYTES NFR BLD AUTO: 0 % (ref 0–2)
INR PPP: 1.01 (ref 0.86–1.17)
LYMPHOCYTES # BLD AUTO: 0.95 THOUSANDS/ΜL (ref 0.6–4.47)
LYMPHOCYTES NFR BLD AUTO: 13 % (ref 14–44)
M PNEUMO DNA SPEC QL NAA+PROBE: NOT DETECTED
MAGNESIUM SERPL-MCNC: 2.7 MG/DL (ref 1.6–2.6)
MCH RBC QN AUTO: 29.8 PG (ref 26.8–34.3)
MCHC RBC AUTO-ENTMCNC: 32.1 G/DL (ref 31.4–37.4)
MCV RBC AUTO: 93 FL (ref 82–98)
METAPNEUMOVIRUS: NOT DETECTED
MONOCYTES # BLD AUTO: 1.04 THOUSAND/ΜL (ref 0.17–1.22)
MONOCYTES NFR BLD AUTO: 15 % (ref 4–12)
NEUTROPHILS # BLD AUTO: 5.07 THOUSANDS/ΜL (ref 1.85–7.62)
NEUTS SEG NFR BLD AUTO: 72 % (ref 43–75)
NRBC BLD AUTO-RTO: 0 /100 WBCS
P AXIS: 87 DEGREES
PHOSPHATE SERPL-MCNC: 3.1 MG/DL (ref 2.3–4.1)
PLATELET # BLD AUTO: 161 THOUSANDS/UL (ref 149–390)
PMV BLD AUTO: 9.8 FL (ref 8.9–12.7)
POTASSIUM SERPL-SCNC: 4.2 MMOL/L (ref 3.5–5.3)
PR INTERVAL: 152 MS
PROCALCITONIN SERPL-MCNC: 0.14 NG/ML
PROT SERPL-MCNC: 6.6 G/DL (ref 6.4–8.2)
PROTHROMBIN TIME: 13.2 SECONDS (ref 11.8–14.2)
QRS AXIS: 95 DEGREES
QRSD INTERVAL: 102 MS
QT INTERVAL: 312 MS
QTC INTERVAL: 414 MS
RBC # BLD AUTO: 3.72 MILLION/UL (ref 3.88–5.62)
RHINOVIRUS RNA SPEC QL NAA+PROBE: DETECTED
RSV A RNA SPEC QL NAA+PROBE: NOT DETECTED
RSV B RNA SPEC QL NAA+PROBE: ABNORMAL
RSV B RNA SPEC QL NAA+PROBE: NOT DETECTED
SODIUM SERPL-SCNC: 140 MMOL/L (ref 136–145)
T WAVE AXIS: 66 DEGREES
VENTRICULAR RATE: 106 BPM
WBC # BLD AUTO: 7.1 THOUSAND/UL (ref 4.31–10.16)

## 2018-12-20 PROCEDURE — 94640 AIRWAY INHALATION TREATMENT: CPT

## 2018-12-20 PROCEDURE — 82948 REAGENT STRIP/BLOOD GLUCOSE: CPT

## 2018-12-20 PROCEDURE — 97166 OT EVAL MOD COMPLEX 45 MIN: CPT

## 2018-12-20 PROCEDURE — G8978 MOBILITY CURRENT STATUS: HCPCS

## 2018-12-20 PROCEDURE — 94760 N-INVAS EAR/PLS OXIMETRY 1: CPT

## 2018-12-20 PROCEDURE — 84100 ASSAY OF PHOSPHORUS: CPT | Performed by: NURSE PRACTITIONER

## 2018-12-20 PROCEDURE — C9113 INJ PANTOPRAZOLE SODIUM, VIA: HCPCS | Performed by: NURSE PRACTITIONER

## 2018-12-20 PROCEDURE — 99232 SBSQ HOSP IP/OBS MODERATE 35: CPT | Performed by: PHYSICIAN ASSISTANT

## 2018-12-20 PROCEDURE — G8979 MOBILITY GOAL STATUS: HCPCS

## 2018-12-20 PROCEDURE — 85025 COMPLETE CBC W/AUTO DIFF WBC: CPT | Performed by: NURSE PRACTITIONER

## 2018-12-20 PROCEDURE — 94660 CPAP INITIATION&MGMT: CPT

## 2018-12-20 PROCEDURE — 84145 PROCALCITONIN (PCT): CPT | Performed by: NURSE PRACTITIONER

## 2018-12-20 PROCEDURE — 93010 ELECTROCARDIOGRAM REPORT: CPT | Performed by: INTERNAL MEDICINE

## 2018-12-20 PROCEDURE — 83735 ASSAY OF MAGNESIUM: CPT | Performed by: NURSE PRACTITIONER

## 2018-12-20 PROCEDURE — 85610 PROTHROMBIN TIME: CPT | Performed by: NURSE PRACTITIONER

## 2018-12-20 PROCEDURE — 97163 PT EVAL HIGH COMPLEX 45 MIN: CPT

## 2018-12-20 PROCEDURE — G8988 SELF CARE GOAL STATUS: HCPCS

## 2018-12-20 PROCEDURE — G8987 SELF CARE CURRENT STATUS: HCPCS

## 2018-12-20 PROCEDURE — 80053 COMPREHEN METABOLIC PANEL: CPT | Performed by: NURSE PRACTITIONER

## 2018-12-20 PROCEDURE — 83036 HEMOGLOBIN GLYCOSYLATED A1C: CPT | Performed by: NURSE PRACTITIONER

## 2018-12-20 PROCEDURE — 99291 CRITICAL CARE FIRST HOUR: CPT | Performed by: ANESTHESIOLOGY

## 2018-12-20 RX ORDER — SENNOSIDES 8.6 MG
1 TABLET ORAL
Status: DISCONTINUED | OUTPATIENT
Start: 2018-12-20 | End: 2018-12-26 | Stop reason: HOSPADM

## 2018-12-20 RX ORDER — IPRATROPIUM BROMIDE AND ALBUTEROL SULFATE 2.5; .5 MG/3ML; MG/3ML
3 SOLUTION RESPIRATORY (INHALATION)
Status: DISCONTINUED | OUTPATIENT
Start: 2018-12-20 | End: 2018-12-23

## 2018-12-20 RX ORDER — LEVALBUTEROL 1.25 MG/.5ML
1.25 SOLUTION, CONCENTRATE RESPIRATORY (INHALATION)
Status: DISCONTINUED | OUTPATIENT
Start: 2018-12-20 | End: 2018-12-20

## 2018-12-20 RX ORDER — DOCUSATE SODIUM 100 MG/1
100 CAPSULE, LIQUID FILLED ORAL 2 TIMES DAILY
Status: DISCONTINUED | OUTPATIENT
Start: 2018-12-20 | End: 2018-12-26 | Stop reason: HOSPADM

## 2018-12-20 RX ADMIN — LEVALBUTEROL HYDROCHLORIDE 1.25 MG: 1.25 SOLUTION, CONCENTRATE RESPIRATORY (INHALATION) at 12:11

## 2018-12-20 RX ADMIN — HEPARIN SODIUM 5000 UNITS: 5000 INJECTION, SOLUTION INTRAVENOUS; SUBCUTANEOUS at 06:12

## 2018-12-20 RX ADMIN — IPRATROPIUM BROMIDE 0.5 MG: 0.5 SOLUTION RESPIRATORY (INHALATION) at 12:11

## 2018-12-20 RX ADMIN — IPRATROPIUM BROMIDE 0.5 MG: 0.5 SOLUTION RESPIRATORY (INHALATION) at 08:32

## 2018-12-20 RX ADMIN — HEPARIN SODIUM 5000 UNITS: 5000 INJECTION, SOLUTION INTRAVENOUS; SUBCUTANEOUS at 14:05

## 2018-12-20 RX ADMIN — SERTRALINE HYDROCHLORIDE 50 MG: 50 TABLET ORAL at 09:50

## 2018-12-20 RX ADMIN — LEVALBUTEROL HYDROCHLORIDE 1.25 MG: 1.25 SOLUTION, CONCENTRATE RESPIRATORY (INHALATION) at 01:35

## 2018-12-20 RX ADMIN — OSELTAMIVIR PHOSPHATE 75 MG: 75 CAPSULE ORAL at 21:57

## 2018-12-20 RX ADMIN — LORATADINE 10 MG: 10 TABLET ORAL at 09:50

## 2018-12-20 RX ADMIN — SENNOSIDES 8.6 MG: 8.6 TABLET, FILM COATED ORAL at 21:57

## 2018-12-20 RX ADMIN — CHLORHEXIDINE GLUCONATE 0.12% ORAL RINSE 15 ML: 1.2 LIQUID ORAL at 21:57

## 2018-12-20 RX ADMIN — ROFLUMILAST 250 MCG: 250 TABLET ORAL at 10:44

## 2018-12-20 RX ADMIN — FLUTICASONE FUROATE AND VILANTEROL TRIFENATATE 1 PUFF: 100; 25 POWDER RESPIRATORY (INHALATION) at 09:51

## 2018-12-20 RX ADMIN — LEVALBUTEROL HYDROCHLORIDE 1.25 MG: 1.25 SOLUTION, CONCENTRATE RESPIRATORY (INHALATION) at 08:32

## 2018-12-20 RX ADMIN — IPRATROPIUM BROMIDE AND ALBUTEROL SULFATE 3 ML: 2.5; .5 SOLUTION RESPIRATORY (INHALATION) at 20:47

## 2018-12-20 RX ADMIN — IPRATROPIUM BROMIDE 0.5 MG: 0.5 SOLUTION RESPIRATORY (INHALATION) at 01:35

## 2018-12-20 RX ADMIN — METHYLPREDNISOLONE SODIUM SUCCINATE 60 MG: 125 INJECTION, POWDER, FOR SOLUTION INTRAMUSCULAR; INTRAVENOUS at 21:57

## 2018-12-20 RX ADMIN — PANTOPRAZOLE SODIUM 40 MG: 40 INJECTION, POWDER, FOR SOLUTION INTRAVENOUS at 09:50

## 2018-12-20 RX ADMIN — IPRATROPIUM BROMIDE AND ALBUTEROL SULFATE 3 ML: 2.5; .5 SOLUTION RESPIRATORY (INHALATION) at 15:26

## 2018-12-20 RX ADMIN — AZITHROMYCIN MONOHYDRATE 500 MG: 500 INJECTION, POWDER, LYOPHILIZED, FOR SOLUTION INTRAVENOUS at 09:41

## 2018-12-20 RX ADMIN — HEPARIN SODIUM 5000 UNITS: 5000 INJECTION, SOLUTION INTRAVENOUS; SUBCUTANEOUS at 09:50

## 2018-12-20 RX ADMIN — CHLORHEXIDINE GLUCONATE 0.12% ORAL RINSE 15 ML: 1.2 LIQUID ORAL at 09:50

## 2018-12-20 RX ADMIN — METHYLPREDNISOLONE SODIUM SUCCINATE 60 MG: 125 INJECTION, POWDER, FOR SOLUTION INTRAMUSCULAR; INTRAVENOUS at 14:05

## 2018-12-20 RX ADMIN — SODIUM CHLORIDE, SODIUM GLUCONATE, SODIUM ACETATE, POTASSIUM CHLORIDE, MAGNESIUM CHLORIDE, SODIUM PHOSPHATE, DIBASIC, AND POTASSIUM PHOSPHATE 50 ML/HR: .53; .5; .37; .037; .03; .012; .00082 INJECTION, SOLUTION INTRAVENOUS at 06:12

## 2018-12-20 RX ADMIN — HEPARIN SODIUM 5000 UNITS: 5000 INJECTION, SOLUTION INTRAVENOUS; SUBCUTANEOUS at 21:56

## 2018-12-20 RX ADMIN — DOCUSATE SODIUM 100 MG: 100 CAPSULE, LIQUID FILLED ORAL at 09:50

## 2018-12-20 RX ADMIN — METHYLPREDNISOLONE SODIUM SUCCINATE 60 MG: 125 INJECTION, POWDER, FOR SOLUTION INTRAMUSCULAR; INTRAVENOUS at 06:13

## 2018-12-20 RX ADMIN — DOCUSATE SODIUM 100 MG: 100 CAPSULE, LIQUID FILLED ORAL at 18:43

## 2018-12-20 RX ADMIN — OSELTAMIVIR PHOSPHATE 75 MG: 75 CAPSULE ORAL at 09:50

## 2018-12-20 NOTE — PROGRESS NOTES
Progress Note - Critical Care   Raisa Singleton 79 y o  male MRN: [id number]  Unit/Bed#:  Encounter: [id number]    Assessment:   Principal Problem:    Acute on chronic respiratory failure with hypoxia and hypercapnia (HCC)  Active Problems:    Acute exacerbation of chronic obstructive pulmonary disease (COPD) (HCC)    Influenza A    Severe pulmonary artery hypertension  Resolved Problems:    * No resolved hospital problems  *    Plan:   Neuro:   · Encephalopathy in setting of hypercapnia-improved  · Continue home Zoloft; 50 mg daily  · Delirium precautions  Regulate sleep-wake cycles  Daily CAM ICU  CV:   · Close hemodynamic monitoring  · Severe PAH; close monitoring of fluid balance  Lung:   · Acute on chronic hypoxic/hypercarbic respiratory failure; wean BiPAP as tolerated  Maintain O2 saturations greater than 88%  · Continue home Breo/Atrovent Xopenex q 6 hours  · Solu-Medrol 60 mg q 8 hours  · Influenza A:  Droplet precautions  Tamiflu day 2/5  · Encourage good incentive spirometry/pulmonary toilet  GI:   · GERD:  Protonix daily  · Ppx: senna/colace  FEN:   · Gentle IV fluid hydration  · Monitor electrolytes  Replete as warranted  Mg> 2 0, K > 4 0  · Advance diet; as respiratory status allows  :   · Strict I&Os  · Monitor daily BUN/creatinine  ID:   · Influenza A: Tamiflu day 2/5  · Follow up blood cultures  · Trend fever curve and WBC count  Heme:   · H&H and platelets stable  Continue trend monitor  · Ppx vte sqh  Endo:   · Steroid induced hyperglycemia; continue sliding scale insulin coverage  Msk/Skin:   · Frequent offloading repositioning to avoid skin breakdown  · PT/OT  Disposition:   · Monitor off of BiPAP likely able to transition to Med surge today     ______________________________________________________________________  Chief Complaint: "I got the flu!"      HPI/24hr events:  Patient requiring BiPAP secondary to increased work of breathing    Influenza swab positive for influenza A  New acute issues overnight     ______________________________________________________________________  Physical Exam:     Physical Exam   Constitutional: He is oriented to person, place, and time  Chronically ill, cachectic appearing elderly gentleman   HENT:   Head: Normocephalic and atraumatic  Neck: Normal range of motion  Neck supple  Cardiovascular: Normal rate and regular rhythm  No murmur heard  Pulmonary/Chest:   Barrel chest  Decreased breath sounds bilaterally  Faint expiratory wheezes the bilateral posterior anterior lung fields  Abdominal: Soft  Bowel sounds are normal  He exhibits no distension  There is no tenderness  There is no rebound and no guarding  Musculoskeletal: Normal range of motion  He exhibits no edema  Neurological: He is alert and oriented to person, place, and time  No cranial nerve deficit  Skin: Skin is warm and dry  Vitals reviewed  ______________________________________________________________________  Temperature:   Temp (24hrs), Av 5 °F (36 9 °C), Min:98 4 °F (36 9 °C), Max:98 7 °F (37 1 °C)    Current Temperature: 98 5 °F (36 9 °C)    Vitals:    18 2300 18 2321 18 0135 18 0326   BP: 124/80      BP Location:       Pulse: 77      Resp: (!) 46      Temp: 98 5 °F (36 9 °C)      TempSrc: Oral      SpO2: 97% 98% 96% 97%   Weight:       Height:                  Weights:   IBW: 73 kg    Body mass index is 25 18 kg/m²    Weight (last 2 days)     Date/Time   Weight    18 0837  79 6 (175 49)    18 0428  83 4 (183 86)            Height: 5' 10" (177 8 cm)    Intake and Outputs:  I/O        0701 -  0700 701 -  0700    I V  (mL/kg)  1045 (13 1)    Total Intake(mL/kg)  1045 (13 1)    Urine (mL/kg/hr)  400 (0 2)    Total Output   400    Net   +645              Nutrition:        Diet Orders            Start     Ordered    18 0723  Diet NPO  Diet effective now     Question Answer Comment   Diet Type NPO    RD to adjust diet per protocol? No        12/19/18 0722        Labs:     Results from last 7 days  Lab Units 12/20/18  0620 12/19/18  0928 12/19/18  0435 12/17/18  1627   WBC Thousand/uL 7 10  --  15 94* 7 79   HEMOGLOBIN g/dL 11 1*  --  12 5 13 1   HEMATOCRIT % 34 6*  --  38 8 39 6   PLATELETS Thousands/uL 161 182 241 175   NEUTROS PCT % 72  --  64 69   MONOS PCT % 15*  --  10 12        Results from last 7 days  Lab Units 12/19/18  0435 12/17/18  1626   POTASSIUM mmol/L 4 1 4 0   CHLORIDE mmol/L 104 100   CO2 mmol/L 31 31   BUN mg/dL 21 15   CREATININE mg/dL 0 92 0 82   CALCIUM mg/dL 8 5 8 8   ALK PHOS U/L 78 77   ALT U/L 42 37   AST U/L 31 25                Results from last 7 days  Lab Units 12/20/18  0620 12/17/18  1626   INR  1 01 0 97   PTT seconds  --  32       Results from last 7 days  Lab Units 12/19/18  0451   LACTIC ACID mmol/L 1 4       0  Lab Value Date/Time   TROPONINI <0 02 12/19/2018 1722   TROPONINI 0 02 12/19/2018 1212   TROPONINI 0 02 12/19/2018 0928   TROPONINI <0 02 12/19/2018 0435   TROPONINI <0 02 12/17/2018 1626   TROPONINI <0 02 10/10/2018 1321   TROPONINI <0 02 05/31/2018 1203     Imaging:  I have personally reviewed pertinent reports  and I have personally reviewed pertinent films in PACS  EKG:   Micro:  Blood Culture:   Lab Results   Component Value Date    BLOODCX No Growth at 48 hrs  12/17/2018    BLOODCX No Growth at 48 hrs  12/17/2018     Urine Culture: No results found for: URINECX  Sputum Culture: No components found for: SPUTUMCX  Wound Culure: No results found for: [facility]     Lab Results   Component Value Date    BLOODCX No Growth at 48 hrs  12/17/2018    BLOODCX No Growth at 48 hrs  12/17/2018     Allergies:    Allergies   Allergen Reactions    Penicillins Hives     Passed out    Codeine      Medications:   Scheduled Meds:    Current Facility-Administered Medications:  azithromycin 500 mg Intravenous Q24H CORNELIA High    chlorhexidine 15 mL Swish & Spit Q12H [facility], [address]    fluticasone-vilanterol 1 puff Inhalation Daily CORNELIA Garcias    heparin (porcine) 5,000 Units Subcutaneous [facility] CORNELIA Garcias    insulin lispro 1-6 Units Subcutaneous Q6H [address], CRNP    ipratropium 0 5 mg Nebulization Q6H CORNELIA Garcias    levalbuterol 1 25 mg Nebulization Q6H CORNELIA Garcias    loratadine 10 mg Oral Daily CORNELIA Garcias    methylPREDNISolone sodium succinate 60 mg Intravenous Q8H [address], CRNP    multi-electrolyte 50 mL/hr Intravenous Continuous CORNELIA Garcias Last Rate: 50 mL/hr (12/20/18 0612)   oseltamivir 75 mg Oral Q12H [address] CORNELIA Garcias    pantoprazole 40 mg Intravenous Q24H [address] CORNELIA Garcias    roflumilast 250 mcg Oral Daily CORNELIA Garcias    sertraline 50 mg Oral Daily CORNELIA Garcias    sodium chloride (PF) 3 mL Intravenous PRN Anu Mazariegos MD      Continuous Infusions:    multi-electrolyte 50 mL/hr Last Rate: 50 mL/hr (12/20/18 0612)     PRN Meds:    sodium chloride (PF) 3 mL PRN     VTE Pharmacologic Prophylaxis: Heparin  VTE Mechanical Prophylaxis: sequential compression device  Invasive lines and devices: Invasive Devices     Peripheral Intravenous Line            Peripheral IV 10/10/18 Right Antecubital 70 days    Peripheral IV 12/19/18 Left Hand 1 day    Peripheral IV 12/19/18 Right Antecubital 1 day                   Counseling / Coordination of Care  Total Critical Care time spent 30 minutes excluding procedures, teaching and family updates  Code Status: Level 1 - Full Code    Portions of the record may have been created with voice recognition software  Occasional wrong word or "sound a like" substitutions may have occurred due to the inherent limitations of voice recognition software  Read the chart carefully and recognize, using context, where substitutions have occurred      Perez aNir PA-C

## 2018-12-20 NOTE — OCCUPATIONAL THERAPY NOTE
Occupational Therapy Evaluation      Gracie Turner    12/20/2018    Patient Active Problem List   Diagnosis    Pulmonary emphysema (HCC)    Gastroesophageal reflux disease without esophagitis    Anxiety    Simple chronic bronchitis (HCC)    Shortness of breath on exertion    Abnormal ECG    PVCs (premature ventricular contractions)    Allergic rhinitis    Functional diarrhea    Diastolic dysfunction    Pulmonary hypertension ([address] )    Acute exacerbation of chronic obstructive [facility] disease (COPD) [facility] [address] [facility]    Gastroesophageal reflux disease    Acute on chronic respiratory failure with hypoxia and hypercapnia ([address] )    Influenza A       Past Medical History:   Diagnosis Date    Centrilobular emphysema ([facility] [address] )     COPD (chronic obstructive pulmonary disease) ([facility] [address] )     Hypoglycemia     On home oxygen therapy     Pneumonia     Pulmonary hypertension ([facility] [address] [facility]     Respiratory failure ([facility] [address] )     SOB (shortness of breath)        Past Surgical History:   Procedure Laterality Date    CATARACT EXTRACTION      CHOLECYSTECTOMY      hernia    EYE SURGERY      UT ESOPHAGOGASTRODUODENOSCOPY TRANSORAL DIAGNOSTIC N/A 9/19/2018    Procedure: ESOPHAGOGASTRODUODENOSCOPY (EGD); Surgeon: Manley Babinski, MD;  Location: [facility]; Service: Gastroenterology    UT REPAIR [address] HERNIA,5+Y/O,REDUCIBL Left 5/23/2018    Procedure: OPEN INGUINAL HERNIA REPAIR WITH MESH;  Surgeon: Eboni Dobson MD;  Location: [facility];  Service: General      12/20/18 0913   Note Type   Note type Eval/Treat   Restrictions/Precautions   Weight Bearing Precautions Per Order No   Other Precautions Droplet precautions; Fall Risk;O2;Multiple lines;Telemetry  (3L o2 at home/ here: 3L O2 + Bi-Pap)   Pain Assessment   Pain Assessment No/denies pain   Pain Score No Pain   Home Living   Type of Home House   Home Layout Two level;Stairs to enter with rails;Bed/bath upstairs  (2STE)   Bathroom Shower/Tub Tub/shower unit  (curtain)   Bathroom Toilet Standard   Bathroom Equipment (reports no DME PTA)   [address] (reports no DME PTA)   Prior Function   Level of [place] Independent with ADLs and functional mobility  ((+) )   Lives With Spouse;Daughter   Receives Help From Family   ADL Assistance Independent   IADLs Independent   Falls in the last 6 months 0   Vocational Retired  ([employer])   Lifestyle   Autonomy Pt reports PLOF was Ind with ADLs, IADLs, and driving  Reciprocal Relationships dtr and wife   Psychosocial   Psychosocial (WDL) WDL   ADL   Eating Assistance 6  Modified independent   Grooming Assistance 6  Modified Independent   UB Bathing Assistance 6  Modified Independent   LB Bathing Assistance 4  Minimal Assistance   LB Bathing Deficit Increased time to complete;Supervision/safety;Setup;Steadying   UB Dressing Assistance 4  Minimal Assistance   UB Dressing Deficit Increased time to complete;Supervision/safety;Setup;Steadying   LB Dressing Assistance 4  Minimal Assistance   LB Dressing Deficit Increased time to complete;Supervision/safety;Steadying;Setup   Toileting Assistance  4  Minimal Assistance   Toileting Deficit Increased time to complete;Supervison/safety;Setup;Steadying   Functional Assistance 4  Minimal Assistance   Functional Deficit Increased time to complete;Supervision/safety;Setup;Steadying   Bed Mobility   Supine to Sit 5  Supervision   Additional items HOB elevated; Increased time required;Verbal cues   Sit to Supine 5  Supervision   Additional Comments bp 126/75 and 90% SAO2 on 3L prior to bed mobility   88% SAO2 after bed mobility   Transfers   Sit to Stand 4  Minimal assistance   Additional items Assist x 1;Verbal cues   Stand to Sit 4  Minimal assistance   Additional items Assist x 1;Verbal cues   Balance   Static Sitting Good   Dynamic Sitting Good   Static Standing Fair +   Dynamic Standing Fair   Ambulatory Fair   Activity Tolerance   Activity Tolerance Patient tolerated treatment well   Nurse Made Aware yes, spoke with pt's RN, Ramesh Harris, who stated pt was appropriate for OT and made aware of outcomes   RUE Assessment   RUE Assessment WFL   LUE Assessment   LUE Assessment WFL   Hand Function   Gross Motor Coordination Functional   Fine Motor Coordination Functional   Sensation   Light Touch No apparent deficits   Sharp/Dull No apparent deficits   Stereognosis No apparent deficits   Proprioception   Proprioception No apparent deficits   Vision-Basic Assessment   Current Vision Wears glasses only for reading   Visual History (corrected cataracts)   Vision - Complex Assessment   Ocular Range of Motion [facility]   Perception   Inattention/Neglect Appears intact   Cognition   Overall Cognitive Status WFL   Arousal/Participation Alert; Cooperative   Attention Within functional limits   Orientation Level Oriented X4   Memory Within functional limits   Following Commands Follows all commands and directions without difficulty   Comments Pt was agreeable to OT eval   Assessment   Limitation Decreased ADL status; Decreased endurance;Decreased high-level ADLs; Decreased self-care trans   Prognosis Good   Assessment Pt is a 79 y o  male seen for OT evaluation s/p admit [facility] on 12/19/2018 w/ Acute on chronic respiratory failure with hypoxia and hypercapnia ([facility] [address] )  Comorbidities affecting pt's functional performance at time of assessment include:anxiety, COPD, HTN, pulmonary emphysema, and GERD   Orders placed for OT evaluation and treatment and "up with A" order  Performed at least two patient identifiers during session including name and wristband  Personal factors affecting pt at time of IE include:steps to enter environment, limited home support, difficulty performing ADLS, difficulty performing IADLS , decreased initiation and engagement , financial barriers, health management  and environment  Prior to admission, pt reports Ind with ADLs, Ind with IADLs, and (+) driving  Upon evaluation: Pt requires min A with UB ADLs, min A with LB ADLs, min A with xfers and min A  with functional mobility 2* the following deficits impacting occupational performance: weakness, decreased strength, decreased balance, decreased tolerance, impaired attention, impaired sequencing, decreased coping skills, decreased mobilty and environmental deficits  Pt to benefit from continued skilled OT tx while in the hospital to address deficits as defined above and maximize level of functional independence w ADL's and functional mobility  Occupational Performance areas to address include: bathing/shower, toilet hygiene, dressing, medication management, health maintenance, functional mobility, community mobility, and home management  From OT standpoint, recommendation at time of d/c would be home with OT vs family support (pending progress and improvements)  Goals   Patient Goals to go home   Plan   Treatment Interventions ADL retraining;Functional transfer training; Endurance training;Patient/family training;Equipment evaluation/education; Compensatory technique education;Continued evaluation;Cardiac education; Energy conservation; Activityengagement   Goal Expiration Date 01/02/19   OT Frequency 3-5x/wk   Recommendation   OT Discharge Recommendation Other (Comment)  (home OT vs home with family support)   OT - OK to Discharge No   Barthel Index   Feeding 10   Bathing 0   Grooming Score 5   Dressing Score 5   Bladder Score 10   Bowels Score 10   Toilet Use Score 5   Transfers (Bed/Chair) Score 10   Mobility (Level Surface) Score 0   Stairs Score 0   Barthel Index Score 55   Modified [place] Scale   Modified [place]mouth Scale 3     In 5-7 days:  1 - Patient will verbalize and demonstrate use of energy conservation/ deep breathing technique and work simplification skills during functional activity with no verbal cues      2 - Patient will verbalize and demonstrate good body mechanics and joint protection techniques during ADLs/ IADLs with no verbal cues     3 - Patient will increase OOB/ sitting tolerance to 2-4 hours per day for increased participation in self care and leisure tasks with no s/s of exertion  4 - Patient will identify s/s of exertion during ADL and functional mobility with 0-1 verbal cues  5 - Patient will verbalize/ demonstrate compensatory strategies to recover from exertion with 0-1 verbal cues  6 - Patient will increase standing tolerance time to 15-17 minutes with no UE support to complete AM ADLS with MI at sink  7 - Patient will increase sitting tolerance at edge of bed to 45 minutes to complete UB ADLs @ MI level  8 - Patient  will demonstrate 100% carryover of cardiac/sternal precautions during functional mobility ADLs/IADLs       9 - Patient/ Family  will demonstrate competency with [address], [place] OTR/L

## 2018-12-20 NOTE — RESPIRATORY THERAPY NOTE
12/20/18 1214   Non-Invasive Information   Interface Face mask   Non-Invasive Ventilation Mode BiPAP   SpO2 96 %   $ Pulse Oximetry Spot Check Charge Completed   Non-Invasive Settings   IPAP (cm) 12 cm  (lowered for high vt  )   EPAP (cm) 6 cm   Rate (Set) 8   FiO2 (%) 30   Pressure Support (cm H2O) 8   Rise Time 2   Inspiratory Time (Set) 0 9   Temperature (Set) 31   Non-Invasive Readings   Total Rate 17   MV (Mech) 13 2   Peak Pressure (Obs) 13   Spontaneous Vt (mL) 508   Heater Temperature (Obs) 31 6   Leak (lpm) 23   Skin Intervention Skin intact   Non-Invasive Alarms   Insp Pressure High (cm H20) 25   Insp Pressure Low (cm H20) 6   MV Low (L/min) 3   Vt High (mL) 1500   Vt Low (mL) 200   High Resp Rate (BPM) 40 BPM   Low Resp Rate (BPM) 8 BPM

## 2018-12-20 NOTE — RESPIRATORY THERAPY NOTE
12/20/18 0835   Non-Invasive Information   Interface Face mask   Non-Invasive Ventilation Mode BiPAP   SpO2 96 %   $ Pulse Oximetry Spot Check Charge Completed   Non-Invasive Settings   IPAP (cm) 14 cm   EPAP (cm) 6 cm   Rate (Set) 8   FiO2 (%) 30   Pressure Support (cm H2O) 8   Rise Time 2   Inspiratory Time (Set) 0 9   Temperature (Set) 31   Non-Invasive Readings   Total Rate 19   MV (Mech) 17 1   Peak Pressure (Obs) 16   Spontaneous Vt (mL) 756   Heater Temperature (Obs) 31 5   Leak (lpm) 41   Non-Invasive Alarms   Insp Pressure High (cm H20) 25   Insp Pressure Low (cm H20) 6   MV Low (L/min) 3   Vt High (mL) 1500   Vt Low (mL) 200   High Resp Rate (BPM) 40 BPM   Low Resp Rate (BPM) 8 BPM

## 2018-12-20 NOTE — PLAN OF CARE
Problem: PHYSICAL THERAPY ADULT  Goal: Performs mobility at highest level of function for planned discharge setting  See evaluation for individualized goals  Treatment/Interventions: Functional transfer training, LE strengthening/ROM, Elevations, Therapeutic exercise, Endurance training, Patient/family training, Bed mobility, Gait training, Spoke to nursing, OT          See flowsheet documentation for full assessment, interventions and recommendations  Prognosis: Good  Problem List: Decreased strength, Decreased endurance, Impaired balance, Decreased mobility  Assessment: pt is a 67y/o m who presents to [facility] c influenza  pt currently in step down for further medical management  PMH significant for GERD, anxiety, PVCs, + pulmonary emphysema  at baseline, pt (I) c functional mobility s AD  resides c spouse + dtr in 2 story home c 2 ALIS + full fight of stairs to 2nd floor  pt's wife currently also hospitalized c influenza  pt currently requires min (A)x1 for OOB mobility tasks 2* deficits in strength, balance, gait quality, + activity tolerance noted in PT exam above  Barthel Index 60/100  initiated OOB mobility, ambulating 5' at bedside s (A)  p ambulation trial, pt became wheezy c significant SOB  SaO2 dropped from 92% on 3L O2 at rest>78% on 3L O2 c mobility tasks  pt c increased work of breathing requiring BiPap at end of session  pt resting comfortably at end of session on BiPap c SaO2 92%  would benefit from skilled PT to maximize functional mobility + return home safely  upon d/c, anticipate return home c family support once medically cleared by MD  PT eval of high complexity 2* unstable med status c pt requiring ongoing medical management 2* influenza  pt cont to require BiPap + becomes significantly SOB c mobility c SaO2 dropping to 78%     Barriers to Discharge: Inaccessible home environment     Recommendation: Home with family support     PT - OK to Discharge: No    See flowsheet documentation for full assessment

## 2018-12-20 NOTE — RESPIRATORY THERAPY NOTE
12/20/18 1528   Non-Invasive Information   Interface Face mask   Non-Invasive Ventilation Mode BiPAP   SpO2 93 %   $ Pulse Oximetry Spot Check Charge Completed   Non-Invasive Settings   IPAP (cm) 12 cm   EPAP (cm) 6 cm   Rate (Set) 8   FiO2 (%) 30   Pressure Support (cm H2O) 8   Rise Time 2   Inspiratory Time (Set) 0 9   Temperature (Set) 31   Non-Invasive Readings   Total Rate 18   MV (Mech) 11 6   Peak Pressure (Obs) 13   Spontaneous Vt (mL) 638   Heater Temperature (Obs) 30 7   Leak (lpm) 20   Skin Intervention Skin intact   Non-Invasive Alarms   Insp Pressure High (cm H20) 25   Insp Pressure Low (cm H20) 6   MV Low (L/min) 3   Vt High (mL) 1500   Vt Low (mL) 200   High Resp Rate (BPM) 40 BPM   Low Resp Rate (BPM) 8 BPM

## 2018-12-20 NOTE — RESPIRATORY THERAPY NOTE
Transitioned patient from bipap to [place]  Patient tolerating well, weaned to [facility]  Spo2 remained at 94%  PT, OT with patient during transition

## 2018-12-20 NOTE — PROGRESS NOTES
Progress Note - Pulmonary   Sabina Martínez 79 y o  male MRN: [id number]  Unit/Bed#:  Encounter: [id number]    Assessment:  1  Acute on chronic hypoxemic respiratory failure  2  Acute hypercapnic respiratory failure  3  COPD exacerbation  4  Influenza A    Plan:  Patient with acute hypoxic and hypercapnic respiratory failure due to influenza, COPD exacerbation  He had been doing well off the BiPAP on 3 L nasal cannula, was working with occupational therapy and had increased work of breathing and hypoxia, was placed back on the BiPAP  Continue to work on titrating off the BiPAP on to his baseline 3 L nasal cannula  Titrate to keep O2 sats greater than 89%  Continues on Tamiflu day 2/5 for positive influenza a  Continue Solu-Medrol at the current dose today  Continue Breo and around the clock nebulizer treatments as well as Daliresp  Continue azithromycin for acute tracheobronchitis  Will continue to follow  Subjective: The patient resting in the bed on the BiPAP, breathing comfortably  He feels well with the BiPAP on, had been doing okay off BiPAP at rest     Objective:     Vitals: Blood pressure 126/75, pulse 72, temperature 98 2 °F (36 8 °C), temperature source Axillary, resp  rate (!) 37, height 5' 10" (1 778 m), weight 79 6 kg (175 lb 7 8 oz), SpO2 94 %  ,Body mass index is 25 18 kg/m²        Intake/Output Summary (Last 24 hours) at 12/20/18 1111  Last data filed at 12/20/18 0900   Gross per 24 hour   Intake             1045 ml   Output              700 ml   Net              345 ml       Invasive Devices     Peripheral Intravenous Line            Peripheral IV 10/10/18 Right Antecubital 70 days    Peripheral IV 12/19/18 Left Hand 1 day    Peripheral IV 12/19/18 Right Antecubital 1 day                Physical Exam: /75 (BP Location: Left arm)   Pulse 72   Temp 98 2 °F (36 8 °C) (Axillary)   Resp (!) 37   Ht 5' 10" (1 778 m)   Wt 79 6 kg (175 lb 7 8 oz)   SpO2 94%   BMI 25 18 kg/m²   General appearance: alert and oriented, in no acute distress  Eyes: PERRL  Lungs: Diminished breath sounds bilaterally, expiratory wheeze L>R  Heart: regular rate and rhythm and S1, S2 normal  Abdomen: normal findings: soft, non-tender  Extremities: No edema  Skin: Warm and dry  Neurologic: Mental status: Alert, oriented, thought content appropriate     Labs: I have personally reviewed pertinent lab results  , CBC:   Lab Results   Component Value Date    WBC 7 10 12/20/2018    HGB 11 1 (L) 12/20/2018    HCT 34 6 (L) 12/20/2018    MCV 93 12/20/2018     12/20/2018    MCH 29 8 12/20/2018    MCHC 32 1 12/20/2018    RDW 15 5 (H) 12/20/2018    MPV 9 8 12/20/2018    NRBC 0 12/20/2018   , CMP:   Lab Results   Component Value Date    SODIUM 140 12/20/2018    K 4 2 12/20/2018     12/20/2018    CO2 29 12/20/2018    BUN 34 (H) 12/20/2018    CREATININE 0 67 12/20/2018    CALCIUM 8 4 12/20/2018    AST 38 12/20/2018    ALT 51 12/20/2018    ALKPHOS 62 12/20/2018    EGFR 100 12/20/2018     Imaging and other studies: I have personally reviewed pertinent reports     and I have personally reviewed pertinent films in PACS

## 2018-12-20 NOTE — PLAN OF CARE
Problem: OCCUPATIONAL THERAPY ADULT  Goal: Performs self-care activities at highest level of function for planned discharge setting  See evaluation for individualized goals  Treatment Interventions: ADL retraining, Functional transfer training, Endurance training, Patient/family training, Equipment evaluation/education, Compensatory technique education, Continued evaluation, Cardiac education, Energy conservation, Activityengagement          See flowsheet documentation for full assessment, interventions and recommendations  Limitation: Decreased ADL status, Decreased endurance, Decreased high-level ADLs, Decreased self-care trans  Prognosis: Good  Assessment: Pt is a 79 y o  male seen for OT evaluation s/p admit to [facility] on 12/19/2018 w/ Acute on chronic respiratory failure with hypoxia and hypercapnia ([facility] [address] )  Comorbidities affecting pt's functional performance at time of assessment include:anxiety, COPD, HTN, pulmonary emphysema, and GERD   Orders placed for OT evaluation and treatment and "up with A" order  Performed at least two patient identifiers during session including name and wristband  Personal factors affecting pt at time of IE include:steps to enter environment, limited home support, difficulty performing ADLS, difficulty performing IADLS , decreased initiation and engagement , financial barriers, health management  and environment  Prior to admission, pt reports Ind with ADLs, Ind with IADLs, and (+) driving  Upon evaluation: Pt requires min A with UB ADLs, min A with LB ADLs, min A with xfers and min A  with functional mobility 2* the following deficits impacting occupational performance: weakness, decreased strength, decreased balance, decreased tolerance, impaired attention, impaired sequencing, decreased coping skills, decreased mobilty and environmental deficits   Pt to benefit from continued skilled OT tx while in the hospital to address deficits as defined above and maximize level of functional independence w ADL's and functional mobility  Occupational Performance areas to address include: bathing/shower, toilet hygiene, dressing, medication management, health maintenance, functional mobility, community mobility, and home management  From OT standpoint, recommendation at time of d/c would be home with OT vs family support (pending progress and improvements)         OT Discharge Recommendation: Other (Comment) (home OT vs home with family support)  OT - OK to Discharge: No

## 2018-12-20 NOTE — PHYSICAL THERAPY NOTE
PT Evaluation (30min)  (9:00-9:30)    Past Medical History:   Diagnosis Date    Centrilobular emphysema ([facility] [address] )     COPD (chronic obstructive pulmonary disease) (HCC)     Hypoglycemia     On home oxygen therapy     Pneumonia     Pulmonary hypertension (HCC)     Respiratory failure (HCC)     SOB (shortness of breath)       12/20/18 0930   Note Type   Note type Eval only   Pain Assessment   Pain Assessment No/denies pain   Home Living   Type of [address] Two level;Bed/bath upstairs;Stairs to enter with rails  (2 ALIS)   Bathroom Shower/Tub Tub/shower unit   Bathroom Toilet Standard   Bathroom Accessibility Accessible   Home Equipment Other (Comment)  (home O2 (3L cont))   Prior Function   Level of [place] Independent with ADLs and functional mobility   Lives With Spouse;Daughter  (spouse currently hospitalized c influenza)   Receives Help From Family   ADL Assistance Independent   IADLs Independent   Falls in the last 6 months 0   Vocational Retired   Comments (+) drives   Restrictions/Precautions   Other Precautions Droplet precautions;Multiple lines;Telemetry;O2;Fall Risk  (3L O2 + Bi-Pap)   General   Additional Pertinent History pt presents to [facility] c influenza  currently in step down for further medical management  requires Bi-Pap  PT consulted for mobility + d/c planning  up c (A)  Family/Caregiver Present No   Cognition   Orientation Level Oriented X4   RUE Assessment   RUE Assessment WFL   LUE Assessment   LUE Assessment WFL   RLE Assessment   RLE Assessment WFL  (4/5)   LLE Assessment   LLE Assessment WFL  (4/5)   Coordination   Sensation WFL   Bed Mobility   Supine to Sit 5  Supervision   Additional items HOB elevated; Increased time required;Verbal cues   Sit to Supine 5  Supervision   Transfers   Sit to Stand 4  Minimal assistance   Additional items Assist x 1;Verbal cues   Stand to Sit 4  Minimal assistance   Additional items Assist x 1;Verbal cues   Ambulation/Elevation   Gait pattern Decreased foot clearance; Inconsistent luís   Gait Assistance 4  Minimal assist   Additional items Assist x 1;Verbal cues   Assistive Device None   Distance 5' at bedside 2* lines   Balance   Static Sitting Good   Dynamic Sitting Good   Static Standing Fair +   Dynamic Standing Fair   Ambulatory Fair   Endurance Deficit   Endurance Deficit Yes   Endurance Deficit Description pt became significantly SOB + wheezy c OOB mobility on 3L O2  SaO2 desated from 92% on 3L O2>78% on 3L O2 c ambulation at bedside  pt required BiPap at end of session to stabilize SaO2 to 92%  Activity Tolerance   Activity Tolerance Patient limited by fatigue;Treatment limited secondary to medical complications (Comment)  (wheezing, SaO2 desat requiring BiPap)   Nurse Made Aware Marielena Cross   Assessment   Prognosis Good   Problem List Decreased strength;Decreased endurance; Impaired balance;Decreased mobility   Assessment pt is a 67y/o m who presents to [facility] c influenza  pt currently in step down for further medical management  PMH significant for GERD, anxiety, PVCs, + pulmonary emphysema  at baseline, pt (I) c functional mobility s AD  resides c spouse + dtr in 2 story home c 2 ALIS + full fight of stairs to 2nd floor  pt's wife currently also hospitalized c influenza  pt currently requires min (A)x1 for OOB mobility tasks 2* deficits in strength, balance, gait quality, + activity tolerance noted in PT exam above  Barthel Index 60/100  initiated OOB mobility, ambulating 5' at bedside s (A)  p ambulation trial, pt became wheezy c significant SOB  SaO2 dropped from 92% on 3L O2 at rest>78% on 3L O2 c mobility tasks  pt c increased work of breathing requiring BiPap at end of session  pt resting comfortably at end of session on BiPap c SaO2 92%  would benefit from skilled PT to maximize functional mobility + return home safely   upon d/c, anticipate return home c family support once medically cleared by MD  PT eval of high complexity 2* unstable med status c pt requiring ongoing medical management 2* influenza  pt cont to require BiPap + becomes significantly SOB c mobility c SaO2 dropping to 78%  Barriers to Discharge Inaccessible home environment   Goals   Patient Goals "to go home"  [facility] Expiration Date 12/30/18   Short Term Goal #1 1  increase strength 1/2 grade to improve overall functional mobility, 2  perform bed mobility mod (I) to decrease caregiver burden, 3  perform transfers mod (I) to safely perform ADLs, 4  ambulate 300' (I) c SaO2 >92% to safely navigate home environment, 5  negotiate 12 stairs mod (I) to safely access 2nd floor of home c SaO2 >92%   Plan   Treatment/Interventions Functional transfer training;LE strengthening/ROM; Elevations; Therapeutic exercise; Endurance training;Patient/family training;Bed mobility;Gait training;Spoke to nursing;OT   PT Frequency 5x/wk   Recommendation   Recommendation Home with family support   PT - OK to Discharge No   Barthel Index   Feeding 10   Bathing 0   Grooming Score 5   Dressing Score 10   Bladder Score 10   Bowels Score 10   Toilet Use Score 5   Transfers (Bed/Chair) Score 10   Mobility (Level Surface) Score 0   Stairs Score 0   Barthel Index Score 60     Regan Foley, PT

## 2018-12-20 NOTE — RESPIRATORY THERAPY NOTE
Pt remove from BiPAP to NC 3 lpm for a rest  BS dim w sl end exp wheezes  Pt appears comfortable at this time   sats 94-95% with 3 l NC

## 2018-12-21 ENCOUNTER — APPOINTMENT (INPATIENT)
Dept: RADIOLOGY | Facility: HOSPITAL | Age: 68
DRG: 193 | End: 2018-12-21
Payer: MEDICARE

## 2018-12-21 LAB
ANION GAP SERPL CALCULATED.3IONS-SCNC: 7 MMOL/L (ref 4–13)
BASE EX.OXY STD BLDV CALC-SCNC: 92.7 % (ref 60–80)
BASE EX.OXY STD BLDV CALC-SCNC: 96.9 % (ref 60–80)
BASE EXCESS BLDV CALC-SCNC: 1.9 MMOL/L
BASE EXCESS BLDV CALC-SCNC: 3 MMOL/L
BASOPHILS # BLD MANUAL: 0 THOUSAND/UL (ref 0–0.1)
BASOPHILS NFR MAR MANUAL: 0 % (ref 0–1)
BUN SERPL-MCNC: 29 MG/DL (ref 5–25)
CALCIUM SERPL-MCNC: 8.6 MG/DL (ref 8.3–10.1)
CHLORIDE SERPL-SCNC: 102 MMOL/L (ref 100–108)
CO2 SERPL-SCNC: 32 MMOL/L (ref 21–32)
CREAT SERPL-MCNC: 0.7 MG/DL (ref 0.6–1.3)
EOSINOPHIL # BLD MANUAL: 0 THOUSAND/UL (ref 0–0.4)
EOSINOPHIL NFR BLD MANUAL: 0 % (ref 0–6)
ERYTHROCYTE [DISTWIDTH] IN BLOOD BY AUTOMATED COUNT: 15.1 % (ref 11.6–15.1)
GFR SERPL CREATININE-BSD FRML MDRD: 98 ML/MIN/1.73SQ M
GLUCOSE SERPL-MCNC: 109 MG/DL (ref 65–140)
GLUCOSE SERPL-MCNC: 120 MG/DL (ref 65–140)
GLUCOSE SERPL-MCNC: 121 MG/DL (ref 65–140)
GLUCOSE SERPL-MCNC: 187 MG/DL (ref 65–140)
HCO3 BLDV-SCNC: 26.5 MMOL/L (ref 24–30)
HCO3 BLDV-SCNC: 27 MMOL/L (ref 24–30)
HCT VFR BLD AUTO: 35.4 % (ref 36.5–49.3)
HGB BLD-MCNC: 11.4 G/DL (ref 12–17)
LYMPHOCYTES # BLD AUTO: 0.97 THOUSAND/UL (ref 0.6–4.47)
LYMPHOCYTES # BLD AUTO: 16 % (ref 14–44)
MAGNESIUM SERPL-MCNC: 2.6 MG/DL (ref 1.6–2.6)
MCH RBC QN AUTO: 29.5 PG (ref 26.8–34.3)
MCHC RBC AUTO-ENTMCNC: 32.2 G/DL (ref 31.4–37.4)
MCV RBC AUTO: 92 FL (ref 82–98)
MONOCYTES # BLD AUTO: 0.12 THOUSAND/UL (ref 0–1.22)
MONOCYTES NFR BLD: 2 % (ref 4–12)
NEUTROPHILS # BLD MANUAL: 4.78 THOUSAND/UL (ref 1.85–7.62)
NEUTS BAND NFR BLD MANUAL: 3 % (ref 0–8)
NEUTS SEG NFR BLD AUTO: 76 % (ref 43–75)
NRBC BLD AUTO-RTO: 0 /100 WBCS
O2 CT BLDV-SCNC: 16.2 ML/DL
O2 CT BLDV-SCNC: 16.9 ML/DL
PCO2 BLDV: 38.9 MM HG (ref 42–50)
PCO2 BLDV: 41.6 MM HG (ref 42–50)
PH BLDV: 7.42 [PH] (ref 7.3–7.4)
PH BLDV: 7.46 [PH] (ref 7.3–7.4)
PLATELET # BLD AUTO: 169 THOUSANDS/UL (ref 149–390)
PLATELET BLD QL SMEAR: ADEQUATE
PMV BLD AUTO: 9.9 FL (ref 8.9–12.7)
PO2 BLDV: 110.8 MM HG (ref 35–45)
PO2 BLDV: 65.9 MM HG (ref 35–45)
POTASSIUM SERPL-SCNC: 4.1 MMOL/L (ref 3.5–5.3)
RBC # BLD AUTO: 3.87 MILLION/UL (ref 3.88–5.62)
SODIUM SERPL-SCNC: 141 MMOL/L (ref 136–145)
TOTAL CELLS COUNTED SPEC: 100
VARIANT LYMPHS # BLD AUTO: 3 %
WBC # BLD AUTO: 6.05 THOUSAND/UL (ref 4.31–10.16)

## 2018-12-21 PROCEDURE — 94660 CPAP INITIATION&MGMT: CPT

## 2018-12-21 PROCEDURE — 82805 BLOOD GASES W/O2 SATURATION: CPT | Performed by: PHYSICIAN ASSISTANT

## 2018-12-21 PROCEDURE — 71045 X-RAY EXAM CHEST 1 VIEW: CPT

## 2018-12-21 PROCEDURE — C9113 INJ PANTOPRAZOLE SODIUM, VIA: HCPCS | Performed by: NURSE PRACTITIONER

## 2018-12-21 PROCEDURE — 85027 COMPLETE CBC AUTOMATED: CPT | Performed by: PHYSICIAN ASSISTANT

## 2018-12-21 PROCEDURE — 83735 ASSAY OF MAGNESIUM: CPT | Performed by: PHYSICIAN ASSISTANT

## 2018-12-21 PROCEDURE — 94640 AIRWAY INHALATION TREATMENT: CPT

## 2018-12-21 PROCEDURE — 99232 SBSQ HOSP IP/OBS MODERATE 35: CPT | Performed by: ANESTHESIOLOGY

## 2018-12-21 PROCEDURE — 85007 BL SMEAR W/DIFF WBC COUNT: CPT | Performed by: PHYSICIAN ASSISTANT

## 2018-12-21 PROCEDURE — 82948 REAGENT STRIP/BLOOD GLUCOSE: CPT

## 2018-12-21 PROCEDURE — 80048 BASIC METABOLIC PNL TOTAL CA: CPT | Performed by: PHYSICIAN ASSISTANT

## 2018-12-21 PROCEDURE — 94760 N-INVAS EAR/PLS OXIMETRY 1: CPT

## 2018-12-21 RX ADMIN — METHYLPREDNISOLONE SODIUM SUCCINATE 60 MG: 125 INJECTION, POWDER, FOR SOLUTION INTRAMUSCULAR; INTRAVENOUS at 15:00

## 2018-12-21 RX ADMIN — ROFLUMILAST 250 MCG: 250 TABLET ORAL at 09:33

## 2018-12-21 RX ADMIN — FLUTICASONE FUROATE AND VILANTEROL TRIFENATATE 1 PUFF: 100; 25 POWDER RESPIRATORY (INHALATION) at 09:33

## 2018-12-21 RX ADMIN — CHLORHEXIDINE GLUCONATE 0.12% ORAL RINSE 15 ML: 1.2 LIQUID ORAL at 20:42

## 2018-12-21 RX ADMIN — AZITHROMYCIN MONOHYDRATE 500 MG: 500 INJECTION, POWDER, LYOPHILIZED, FOR SOLUTION INTRAVENOUS at 09:00

## 2018-12-21 RX ADMIN — IPRATROPIUM BROMIDE AND ALBUTEROL SULFATE 3 ML: 2.5; .5 SOLUTION RESPIRATORY (INHALATION) at 11:38

## 2018-12-21 RX ADMIN — METHYLPREDNISOLONE SODIUM SUCCINATE 60 MG: 125 INJECTION, POWDER, FOR SOLUTION INTRAMUSCULAR; INTRAVENOUS at 21:02

## 2018-12-21 RX ADMIN — INSULIN LISPRO 1 UNITS: 100 INJECTION, SOLUTION INTRAVENOUS; SUBCUTANEOUS at 18:03

## 2018-12-21 RX ADMIN — IPRATROPIUM BROMIDE AND ALBUTEROL SULFATE 3 ML: 2.5; .5 SOLUTION RESPIRATORY (INHALATION) at 20:37

## 2018-12-21 RX ADMIN — IPRATROPIUM BROMIDE AND ALBUTEROL SULFATE 3 ML: 2.5; .5 SOLUTION RESPIRATORY (INHALATION) at 08:29

## 2018-12-21 RX ADMIN — HEPARIN SODIUM 5000 UNITS: 5000 INJECTION, SOLUTION INTRAVENOUS; SUBCUTANEOUS at 21:01

## 2018-12-21 RX ADMIN — OSELTAMIVIR PHOSPHATE 75 MG: 75 CAPSULE ORAL at 20:43

## 2018-12-21 RX ADMIN — HEPARIN SODIUM 5000 UNITS: 5000 INJECTION, SOLUTION INTRAVENOUS; SUBCUTANEOUS at 05:35

## 2018-12-21 RX ADMIN — LORATADINE 10 MG: 10 TABLET ORAL at 09:33

## 2018-12-21 RX ADMIN — IPRATROPIUM BROMIDE AND ALBUTEROL SULFATE 3 ML: 2.5; .5 SOLUTION RESPIRATORY (INHALATION) at 00:09

## 2018-12-21 RX ADMIN — DOCUSATE SODIUM 100 MG: 100 CAPSULE, LIQUID FILLED ORAL at 09:33

## 2018-12-21 RX ADMIN — CHLORHEXIDINE GLUCONATE 0.12% ORAL RINSE 15 ML: 1.2 LIQUID ORAL at 09:33

## 2018-12-21 RX ADMIN — SENNOSIDES 8.6 MG: 8.6 TABLET, FILM COATED ORAL at 21:02

## 2018-12-21 RX ADMIN — SERTRALINE HYDROCHLORIDE 50 MG: 50 TABLET ORAL at 09:33

## 2018-12-21 RX ADMIN — METHYLPREDNISOLONE SODIUM SUCCINATE 60 MG: 125 INJECTION, POWDER, FOR SOLUTION INTRAMUSCULAR; INTRAVENOUS at 05:35

## 2018-12-21 RX ADMIN — IPRATROPIUM BROMIDE AND ALBUTEROL SULFATE 3 ML: 2.5; .5 SOLUTION RESPIRATORY (INHALATION) at 16:29

## 2018-12-21 RX ADMIN — SODIUM CHLORIDE, SODIUM GLUCONATE, SODIUM ACETATE, POTASSIUM CHLORIDE, MAGNESIUM CHLORIDE, SODIUM PHOSPHATE, DIBASIC, AND POTASSIUM PHOSPHATE 50 ML/HR: .53; .5; .37; .037; .03; .012; .00082 INJECTION, SOLUTION INTRAVENOUS at 04:27

## 2018-12-21 RX ADMIN — PANTOPRAZOLE SODIUM 40 MG: 40 INJECTION, POWDER, FOR SOLUTION INTRAVENOUS at 09:33

## 2018-12-21 RX ADMIN — SODIUM CHLORIDE, SODIUM GLUCONATE, SODIUM ACETATE, POTASSIUM CHLORIDE, MAGNESIUM CHLORIDE, SODIUM PHOSPHATE, DIBASIC, AND POTASSIUM PHOSPHATE 75 ML/HR: .53; .5; .37; .037; .03; .012; .00082 INJECTION, SOLUTION INTRAVENOUS at 23:46

## 2018-12-21 RX ADMIN — OSELTAMIVIR PHOSPHATE 75 MG: 75 CAPSULE ORAL at 09:33

## 2018-12-21 RX ADMIN — HEPARIN SODIUM 5000 UNITS: 5000 INJECTION, SOLUTION INTRAVENOUS; SUBCUTANEOUS at 15:00

## 2018-12-21 RX ADMIN — IPRATROPIUM BROMIDE AND ALBUTEROL SULFATE 3 ML: 2.5; .5 SOLUTION RESPIRATORY (INHALATION) at 03:54

## 2018-12-21 NOTE — PROGRESS NOTES
Progress Note - Pulmonary   Laila Koch 79 y o  male MRN: [id number]  Unit/Bed#:  Encounter: [id number]    Assessment:  Acute on chronic hypoxemic respiratory failure  Acute hypercapnic respiratory failure  Severe COPD with acute exacerbation  Influenza A     Plan:  Patient with hypoxic and hypercapnic respiratory failure due to influenza, COPD exacerbation  CXR today (12/21)- Improved right basilar atelectasis and small effusions   VBG improved today-pH 7 459, pCO2 38 9; previous (12/19)- pH 7 352, pCO2 43 8  He was brought off the BiPAP today and is now on HF NC  Will continue to wean from high levels of oxygen therapy as tolerated  Continues on Tamiflu day 3/5 for positive influenza a  Continue Solu-Medrol 60 mg Q 8 H  Continue Breo, nebs, Daliresp, IS   Continue azithromycin for acute tracheobronchitis      Subjective:   Patient states he is feeling better today, he is glad to be off the BiPAP  He is having less difficulty breathing  He notes that his issues with hypoxia seem to occur with activity, but is currently comfortable while resting in bed on the HF NC  He is hungry and would like to eat  He is looking forward to his family visiting today  Objective:     Vitals: Blood pressure 128/72, pulse 70, temperature (!) 97 4 °F (36 3 °C), temperature source Oral, resp  rate (!) 25, height 5' 10" (1 778 m), weight 79 kg (174 lb 2 6 oz), SpO2 94 %  ,Body mass index is 24 99 kg/m²        Intake/Output Summary (Last 24 hours) at 12/21/18 1136  Last data filed at 12/21/18 0601   Gross per 24 hour   Intake              800 ml   Output             1650 ml   Net             -850 ml       Invasive Devices     Peripheral Intravenous Line            Peripheral IV 10/10/18 Right Antecubital 71 days    Peripheral IV 12/20/18 Left Forearm less than 1 day                Physical Exam: /72 (BP Location: Left arm)   Pulse 65   Temp (!) 97 4 °F (36 3 °C) (Oral)   Resp (!) 24   Ht 5' 10" (1 778 m) Wt 79 kg (174 lb 2 6 oz)   SpO2 96%   BMI 24 99 kg/m²   General appearance: alert and oriented, in no acute distress  Head: Normocephalic, without obvious abnormality, atraumatic  Eyes: PERRLA  No scleral icterus  No eye discharge bilaterally  Nose: Nares normal  Septum midline  Mucosa normal  No drainage or sinus tenderness  Neck: no adenopathy, no JVD, supple, symmetrical, trachea midline and no stridor  Lungs: Diminished breath sounds bilaterally  Decreased air movement  Faint expiratory wheezes present bilaterally  He is resting in bed comfortably on HF NC  Heart: regular rate and rhythm, S1, S2 normal, no murmur, click, rub or gallop  Abdomen: soft, non-tender; bowel sounds normal; no masses,  no organomegaly  Extremities: extremities normal, warm and well-perfused; no cyanosis, clubbing, or edema  Pulses: 2+ and symmetric  Skin: Skin color, texture, turgor normal  No rashes or lesions  Neurologic: Grossly normal     Labs: I have personally reviewed pertinent lab results  Imaging and other studies: I have personally reviewed pertinent reports

## 2018-12-21 NOTE — PROGRESS NOTES
Progress Note - Critical Care   Raisa Singleton 79 y o  male MRN: [id number]  Unit/Bed#:  Encounter: [id number]    Attending Physician: Corie Sever, DO      ______________________________________________________________________  Assessment and Plan:   Principal Problem:    Acute on chronic respiratory failure with hypoxia and hypercapnia (HCC)  Active Problems:    Acute exacerbation of chronic obstructive pulmonary disease (COPD) ([facility] [address] )    Influenza A  Resolved Problems:    * No resolved hospital problems  *        Neuro:   -Mental status continue to improve  -[facility] ICU  -Delirium precautions    CV:   -Telemetry monitoring  -Blood pressure is stable,monitor closely      Pulm:   -acute on chronic hypoxic/hypercapnic respiratory failure most likely due to influenza A, COPD exacerbation  Initially placed on BiPAP  -wean trial of BiPAP last night  Placed on high-flow  -demented O2 saturations greater than 88%  -check venous blood gas this a m   -continue home nebulized medication q 6 hours  -Solu-Medrol 60 mg q 8 hours  -procalcitonin within normal limits  -continue azithromycin for now  -Tamiflu day 3/5  -continue droplet precautions  -incentive spirometry  -repeat portable chest x-ray today    GI:   -NPO while on bipap  Will advance diet today if able to wean of bipap  -Continue Daily protonix    :   -Monitor I/O  -Monitor BUN/Creatinine    F/E/N:   -Gentle IV fluids  -Monitor Electrolytes, replace as needed  -Will advance diet if able to wean off bipap    ID:   -Influenza A- Positive  -Tamiflu day 3/5  -blood cultures pending  -Monitor WBC count    Heme:     -H&H, platelets stable  -Monitor CBC  -VTE prophylaxis, SQ heparin    Endo:   -possible steroid-induced hyperglycemia  -hemoglobin A1c pending  -monitor glucose level     Msk/Skin:   -frequent turning to avoid skin breakdown  -OT/PT evaluation    Disposition: Continue Stepdown level 2 care  Monitor off BiPAP    Possible transition to med surg     Code Status: Level 1 - Full Code    Counseling / Coordination of Care  Total Critical Care time spent 30 minutes excluding procedures, teaching and family updates  ______________________________________________________________________        24 Hour Events:  Reports that he feels better  Was weaned off BiPAP and placed on high-flow overnight  Tolerated well was able to maintain O2 saturations greater than 88%  Still remain a  tachypneic, vital signs otherwise stable  Repeat chest x-ray scheduled for this this morning  Review of Systems  ______________________________________________________________________    Physical Exam: Physical Examination: General appearance - oriented to person, place, and time  Mental status - alert, oriented to person, place, and time  Chest - clear to auscultation, no wheezes, rales or rhonchi, symmetric air entry  Heart - normal rate, regular rhythm, normal S1, S2, no murmurs, rubs, clicks or gallops  Abdomen - soft, nontender, nondistended, no masses or organomegaly  Neurological - alert, oriented, normal speech, no focal findings or movement disorder noted  Musculoskeletal - no joint tenderness, deformity or swelling  Extremities - peripheral pulses normal, no pedal edema, no clubbing or cyanosis  Skin - normal coloration and turgor, no rashes, no suspicious skin lesions noted      ______________________________________________________________________  Vitals:    18 1600 18 2047 18 2300 18 0010   BP: 112/62  122/70    BP Location:   Left arm    Pulse: 74  68    Resp: (!) 27  (!) 36    Temp: 98 4 °F (36 9 °C)  98 2 °F (36 8 °C)    TempSrc: Axillary  Oral    SpO2: 96% 96% 97% 95%   Weight:       Height:           Temperature:   Temp (24hrs), Av 3 °F (36 8 °C), Min:98 2 °F (36 8 °C), Max:98 4 °F (36 9 °C)    Current Temperature: 98 2 °F (36 8 °C)  Weights:   IBW: 73 kg    Body mass index is 25 18 kg/m²    Weight (last 2 days)     Date/Time Weight    12/19/18 0837  79 6 (175 49)    12/19/18 0428  83 4 (183 86)            Hemodynamic Monitoring:  N/A     Non-Invasive/Invasive Ventilation Settings:  Respiratory    Lab Data (Last 4 hours)    None         O2/Vent Data (Last 4 hours)      12/21 0010          Non-Invasive Ventilation Mode HFNC                 No results found for: PHART, UHJ4VMK, PO2ART, QTT9WZJ, C8ZUPGGG, BEART, SOURCE  SpO2: SpO2: 96 %  Intake and Outputs:  I/O       12/19 0701 - 12/20 0700 12/20 0701 - 12/21 0700    I V  (mL/kg) 1045 (13 1) 590 (7 4)    IV Piggyback  250    Total Intake(mL/kg) 1045 (13 1) 840 (10 6)    Urine (mL/kg/hr) 400 (0 2) 1375 (0 7)    Total Output 400 1375    Net +645 -535                Nutrition:        Diet Orders            Start     Ordered    12/19/18 0723  Diet NPO  Diet effective now     Question Answer Comment   Diet Type NPO    RD to adjust diet per protocol?  No        12/19/18 0722          Labs:     Results from last 7 days  Lab Units 12/20/18  0620 12/19/18  0928 12/19/18  0435 12/17/18  1627   WBC Thousand/uL 7 10  --  15 94* 7 79   HEMOGLOBIN g/dL 11 1*  --  12 5 13 1   HEMATOCRIT % 34 6*  --  38 8 39 6   PLATELETS Thousands/uL 161 182 241 175   NEUTROS PCT % 72  --  64 69   MONOS PCT % 15*  --  10 12       Results from last 7 days  Lab Units 12/20/18  0620 12/19/18  0435 12/17/18  1626   SODIUM mmol/L 140 143 138   POTASSIUM mmol/L 4 2 4 1 4 0   CHLORIDE mmol/L 103 104 100   CO2 mmol/L 29 31 31   ANION GAP mmol/L 8 8 7   BUN mg/dL 34* 21 15   CREATININE mg/dL 0 67 0 92 0 82   CALCIUM mg/dL 8 4 8 5 8 8   ALT U/L 51 42 37   AST U/L 38 31 25   ALK PHOS U/L 62 78 77   ALBUMIN g/dL 3 1* 3 7 3 6   TOTAL BILIRUBIN mg/dL 0 80 0 60 1 50*       Results from last 7 days  Lab Units 12/20/18  0620   MAGNESIUM mg/dL 2 7*   PHOSPHORUS mg/dL 3 1        Results from last 7 days  Lab Units 12/20/18  0620 12/17/18  1626   INR  1 01 0 97   PTT seconds  --  32       Results from last 7 days  Lab Units 12/19/18  1722 12/19/18  1212 12/19/18  0928 12/19/18  0435 12/17/18  1626   TROPONIN I ng/mL <0 02 0 02 0 02 <0 02 <0 02       Results from last 7 days  Lab Units 12/19/18  0451 12/17/18  1627   LACTIC ACID mmol/L 1 4 0 9     ABG:No results found for: PHART, AFL5AHH, PO2ART, QOX0BSR, K0NYKNAC, BEART, SOURCE  VBG:  Results from last 7 days  Lab Units 12/21/18  0023   PH CARROLL  7 422*   PCO2 CARROLL mm Hg 41 6*   PO2 CARROLL mm Hg 110 8*   HCO3 CARROLL mmol/L 26 5   BASE EXC CARROLL mmol/L 1 9       Results from last 7 days  Lab Units 12/20/18  0620 12/19/18  0928 12/19/18  0436 12/17/18  1626   PROCALCITONIN ng/ml 0 14 0 11 <0 05 <0 05     No results found for: [facility]   Imaging: No new Imaging  EKG: Sinus Rhythm at rate of 64    Micro:    Results from last 7 days  Lab Units 12/19/18  1033 12/19/18  0454 12/19/18  0436 12/17/18  1627 12/17/18  1626   BLOOD CULTURE   --  No Growth at 24 hrs  No Growth at 24 hrs  No Growth at 72 hrs  No Growth at 72 hrs  INFLUENZA B PCR  None Detected  --   --   --   --    RSV PCR  None Detected  --   --   --   --      Allergies:    Allergies   Allergen Reactions    Penicillins Hives     Passed out    Codeine      Medications:   Scheduled Meds:  Current Facility-Administered Medications:  azithromycin 500 mg Intravenous Q24H Domonique Valdez PA-C    chlorhexidine 15 mL Swish & Spit Q12H [address] Doc Double, CRNP    docusate sodium 100 mg Oral BID Domonique Valdez PA-C    fluticasone-vilanterol 1 puff Inhalation Daily Doc Double, CRNP    heparin (porcine) 5,000 Units Subcutaneous [facility] Doc Double, CRNP    insulin lispro 1-6 Units Subcutaneous Q6H [address], CRNP    ipratropium-albuterol 3 mL Nebulization Q4H Domonique Valdez PA-C    loratadine 10 mg Oral Daily Doc Double, CRNP    methylPREDNISolone sodium succinate 60 mg Intravenous [facility] Doc Double, CRNP    multi-electrolyte 50 mL/hr Intravenous Continuous Doc Double, CRNP Last Rate: 50 mL/hr (12/20/18 0612)   oseltamivir 75 mg Oral Q12H [address] Zeeshan Alexis PA-C    pantoprazole 40 mg Intravenous Q24H [address] CORNELIA [place]    roflumilast 250 mcg Oral Daily CORNELIA [facility]    senna 1 tablet Oral HS Zeeshan Alexis PA-C    sertraline 50 mg Oral Daily CORNELIA [facility]    sodium chloride (PF) 3 mL Intravenous PRN Courtney Garcia MD      Continuous Infusions:  multi-electrolyte 50 mL/hr Last Rate: 50 mL/hr (12/20/18 0612)     PRN Meds:    sodium chloride (PF) 3 mL PRN     VTE Pharmacologic Prophylaxis: Heparin  VTE Mechanical Prophylaxis: sequential compression device  Invasive lines and devices: Invasive Devices     Peripheral Intravenous Line            Peripheral IV 10/10/18 Right Antecubital 71 days    Peripheral IV 12/20/18 Left Forearm less than 1 day                     Portions of the record may have been created with voice recognition software  Occasional wrong word or "sound a like" substitutions may have occurred due to the inherent limitations of voice recognition software  Read the chart carefully and recognize, using context, where substitutions have occurred      Willis Moreland PA-C

## 2018-12-21 NOTE — RESPIRATORY THERAPY NOTE
patient remains on HF NC FiO2 and flow slowly weaned throughout the day in attempt to wean to regular nasal cannula   Patient is LOS # 2 s/p being admitted for acute on chronic respiratory failure requiring high levels of oxygen therapy to maintain adequate ventilation and oxygenation  Aerosol treatments given in line with HF NC as ordered  Patient states he is breathing is slightly improved  Plan: continue current support until patient more stable and able to wean to nasal cannula

## 2018-12-21 NOTE — RESPIRATORY THERAPY NOTE
patient remains on HFNC patient is LOS # 2 s/p being admitted for acute on chronic respiratory failure reruiring high levels of oxygen therapy to maintain adequate ventilation and oxygenation  patient taken off BiPAP earlier today placed on HF NC aerosol treatments given in line with HF NC   patient resting without great apparent respiratory distress  Plan: will attempt to wean from high levels of oxygen therapy as tolerated

## 2018-12-22 PROBLEM — J40 TRACHEOBRONCHITIS: Status: ACTIVE | Noted: 2018-12-22

## 2018-12-22 LAB
ANION GAP SERPL CALCULATED.3IONS-SCNC: 8 MMOL/L (ref 4–13)
BACTERIA BLD CULT: NORMAL
BACTERIA BLD CULT: NORMAL
BASOPHILS # BLD MANUAL: 0 THOUSAND/UL (ref 0–0.1)
BASOPHILS NFR MAR MANUAL: 0 % (ref 0–1)
BUN SERPL-MCNC: 25 MG/DL (ref 5–25)
CA-I BLD-SCNC: 1.04 MMOL/L (ref 1.12–1.32)
CALCIUM SERPL-MCNC: 8.5 MG/DL (ref 8.3–10.1)
CHLORIDE SERPL-SCNC: 103 MMOL/L (ref 100–108)
CO2 SERPL-SCNC: 30 MMOL/L (ref 21–32)
CREAT SERPL-MCNC: 0.7 MG/DL (ref 0.6–1.3)
EOSINOPHIL # BLD MANUAL: 0 THOUSAND/UL (ref 0–0.4)
EOSINOPHIL NFR BLD MANUAL: 0 % (ref 0–6)
ERYTHROCYTE [DISTWIDTH] IN BLOOD BY AUTOMATED COUNT: 15.1 % (ref 11.6–15.1)
GFR SERPL CREATININE-BSD FRML MDRD: 98 ML/MIN/1.73SQ M
GLUCOSE SERPL-MCNC: 103 MG/DL (ref 65–140)
GLUCOSE SERPL-MCNC: 107 MG/DL (ref 65–140)
GLUCOSE SERPL-MCNC: 122 MG/DL (ref 65–140)
GLUCOSE SERPL-MCNC: 127 MG/DL (ref 65–140)
GLUCOSE SERPL-MCNC: 128 MG/DL (ref 65–140)
GLUCOSE SERPL-MCNC: 134 MG/DL (ref 65–140)
HCT VFR BLD AUTO: 35.9 % (ref 36.5–49.3)
HGB BLD-MCNC: 11.6 G/DL (ref 12–17)
LYMPHOCYTES # BLD AUTO: 0.86 THOUSAND/UL (ref 0.6–4.47)
LYMPHOCYTES # BLD AUTO: 11 % (ref 14–44)
MAGNESIUM SERPL-MCNC: 2.6 MG/DL (ref 1.6–2.6)
MCH RBC QN AUTO: 29.9 PG (ref 26.8–34.3)
MCHC RBC AUTO-ENTMCNC: 32.3 G/DL (ref 31.4–37.4)
MCV RBC AUTO: 93 FL (ref 82–98)
MONOCYTES # BLD AUTO: 0.31 THOUSAND/UL (ref 0–1.22)
MONOCYTES NFR BLD: 4 % (ref 4–12)
NEUTROPHILS # BLD MANUAL: 6.42 THOUSAND/UL (ref 1.85–7.62)
NEUTS BAND NFR BLD MANUAL: 1 % (ref 0–8)
NEUTS SEG NFR BLD AUTO: 81 % (ref 43–75)
NRBC BLD AUTO-RTO: 0 /100 WBCS
PLATELET # BLD AUTO: 184 THOUSANDS/UL (ref 149–390)
PLATELET BLD QL SMEAR: ADEQUATE
PMV BLD AUTO: 9.9 FL (ref 8.9–12.7)
POTASSIUM SERPL-SCNC: 3.9 MMOL/L (ref 3.5–5.3)
RBC # BLD AUTO: 3.88 MILLION/UL (ref 3.88–5.62)
SODIUM SERPL-SCNC: 141 MMOL/L (ref 136–145)
TOTAL CELLS COUNTED SPEC: 100
VARIANT LYMPHS # BLD AUTO: 3 %
WBC # BLD AUTO: 7.83 THOUSAND/UL (ref 4.31–10.16)

## 2018-12-22 PROCEDURE — 99232 SBSQ HOSP IP/OBS MODERATE 35: CPT | Performed by: PHYSICIAN ASSISTANT

## 2018-12-22 PROCEDURE — 94760 N-INVAS EAR/PLS OXIMETRY 1: CPT

## 2018-12-22 PROCEDURE — 94640 AIRWAY INHALATION TREATMENT: CPT

## 2018-12-22 PROCEDURE — 82948 REAGENT STRIP/BLOOD GLUCOSE: CPT

## 2018-12-22 PROCEDURE — C9113 INJ PANTOPRAZOLE SODIUM, VIA: HCPCS | Performed by: NURSE PRACTITIONER

## 2018-12-22 PROCEDURE — 82330 ASSAY OF CALCIUM: CPT | Performed by: PHYSICIAN ASSISTANT

## 2018-12-22 PROCEDURE — 85007 BL SMEAR W/DIFF WBC COUNT: CPT | Performed by: PHYSICIAN ASSISTANT

## 2018-12-22 PROCEDURE — 85027 COMPLETE CBC AUTOMATED: CPT | Performed by: PHYSICIAN ASSISTANT

## 2018-12-22 PROCEDURE — 80048 BASIC METABOLIC PNL TOTAL CA: CPT | Performed by: PHYSICIAN ASSISTANT

## 2018-12-22 PROCEDURE — 83735 ASSAY OF MAGNESIUM: CPT | Performed by: PHYSICIAN ASSISTANT

## 2018-12-22 PROCEDURE — 94660 CPAP INITIATION&MGMT: CPT

## 2018-12-22 RX ORDER — CHOLESTYRAMINE LIGHT 4 G/5.7G
4 POWDER, FOR SUSPENSION ORAL 2 TIMES DAILY
Status: DISCONTINUED | OUTPATIENT
Start: 2018-12-22 | End: 2018-12-26 | Stop reason: HOSPADM

## 2018-12-22 RX ADMIN — FLUTICASONE FUROATE AND VILANTEROL TRIFENATATE 1 PUFF: 100; 25 POWDER RESPIRATORY (INHALATION) at 08:32

## 2018-12-22 RX ADMIN — DOCUSATE SODIUM 100 MG: 100 CAPSULE, LIQUID FILLED ORAL at 08:32

## 2018-12-22 RX ADMIN — PANTOPRAZOLE SODIUM 40 MG: 40 INJECTION, POWDER, FOR SOLUTION INTRAVENOUS at 08:32

## 2018-12-22 RX ADMIN — HEPARIN SODIUM 5000 UNITS: 5000 INJECTION, SOLUTION INTRAVENOUS; SUBCUTANEOUS at 21:49

## 2018-12-22 RX ADMIN — IPRATROPIUM BROMIDE AND ALBUTEROL SULFATE 3 ML: 2.5; .5 SOLUTION RESPIRATORY (INHALATION) at 04:38

## 2018-12-22 RX ADMIN — CHLORHEXIDINE GLUCONATE 0.12% ORAL RINSE 15 ML: 1.2 LIQUID ORAL at 21:46

## 2018-12-22 RX ADMIN — HEPARIN SODIUM 5000 UNITS: 5000 INJECTION, SOLUTION INTRAVENOUS; SUBCUTANEOUS at 14:17

## 2018-12-22 RX ADMIN — METHYLPREDNISOLONE SODIUM SUCCINATE 60 MG: 125 INJECTION, POWDER, FOR SOLUTION INTRAMUSCULAR; INTRAVENOUS at 05:20

## 2018-12-22 RX ADMIN — CHOLESTYRAMINE 4 G: 4 POWDER, FOR SUSPENSION ORAL at 17:49

## 2018-12-22 RX ADMIN — CHLORHEXIDINE GLUCONATE 0.12% ORAL RINSE 15 ML: 1.2 LIQUID ORAL at 08:32

## 2018-12-22 RX ADMIN — LORATADINE 10 MG: 10 TABLET ORAL at 08:32

## 2018-12-22 RX ADMIN — IPRATROPIUM BROMIDE AND ALBUTEROL SULFATE 3 ML: 2.5; .5 SOLUTION RESPIRATORY (INHALATION) at 08:16

## 2018-12-22 RX ADMIN — ROFLUMILAST 250 MCG: 250 TABLET ORAL at 08:33

## 2018-12-22 RX ADMIN — SERTRALINE HYDROCHLORIDE 50 MG: 50 TABLET ORAL at 08:32

## 2018-12-22 RX ADMIN — HEPARIN SODIUM 5000 UNITS: 5000 INJECTION, SOLUTION INTRAVENOUS; SUBCUTANEOUS at 05:20

## 2018-12-22 RX ADMIN — IPRATROPIUM BROMIDE AND ALBUTEROL SULFATE 3 ML: 2.5; .5 SOLUTION RESPIRATORY (INHALATION) at 16:51

## 2018-12-22 RX ADMIN — IPRATROPIUM BROMIDE AND ALBUTEROL SULFATE 3 ML: 2.5; .5 SOLUTION RESPIRATORY (INHALATION) at 00:31

## 2018-12-22 RX ADMIN — CHOLESTYRAMINE 4 G: 4 POWDER, FOR SUSPENSION ORAL at 08:31

## 2018-12-22 RX ADMIN — AZITHROMYCIN MONOHYDRATE 500 MG: 500 INJECTION, POWDER, LYOPHILIZED, FOR SOLUTION INTRAVENOUS at 08:38

## 2018-12-22 RX ADMIN — METHYLPREDNISOLONE SODIUM SUCCINATE 60 MG: 125 INJECTION, POWDER, FOR SOLUTION INTRAMUSCULAR; INTRAVENOUS at 21:48

## 2018-12-22 RX ADMIN — IPRATROPIUM BROMIDE AND ALBUTEROL SULFATE 3 ML: 2.5; .5 SOLUTION RESPIRATORY (INHALATION) at 12:54

## 2018-12-22 RX ADMIN — METHYLPREDNISOLONE SODIUM SUCCINATE 60 MG: 125 INJECTION, POWDER, FOR SOLUTION INTRAMUSCULAR; INTRAVENOUS at 14:18

## 2018-12-22 RX ADMIN — OSELTAMIVIR PHOSPHATE 75 MG: 75 CAPSULE ORAL at 08:32

## 2018-12-22 RX ADMIN — OSELTAMIVIR PHOSPHATE 75 MG: 75 CAPSULE ORAL at 21:47

## 2018-12-22 RX ADMIN — IPRATROPIUM BROMIDE AND ALBUTEROL SULFATE 3 ML: 2.5; .5 SOLUTION RESPIRATORY (INHALATION) at 19:08

## 2018-12-22 NOTE — PROGRESS NOTES
Progress Note - ICU Transfer to Step down/med  surg  Patrick Gerardo 79 y o  male MRN: [id number]    Unit/Bed#:  Encounter: [id number]    Code Status: Level 1 - Full Code  POA:    POLST:      Reason for ICU adm:   Patrick Gerardo is a 79 y o  male who presents with a past medical history for COPD and centrilobar emphysema on chronic oxygen therapy and pulmonary HTN  He presented for evaluation this past Monday for 1 week worth of URI symptoms  He was noted to have a fever at this time  He has had multiple sick contacts including his wife that is currently hospitalized for influenza  He was started on azithromycin and discharged home  He returned to the emergency department today with worsening respiratory distress  Upon arrival, he was unable to complete sentences  He was placed on BIPAP for hypercarbic respiratory failure  He significantly improved  He was trialed off BIPAP after improvement in his symptoms and CO2 levels  However, he became dyspneic again  Active problems:   Patient Active Problem List   Diagnosis    Pulmonary emphysema (HCC)    Gastroesophageal reflux disease without esophagitis    Anxiety    Simple chronic bronchitis (HCC)    Shortness of breath on exertion    Abnormal ECG    PVCs (premature ventricular contractions)    Allergic rhinitis    Functional diarrhea    Diastolic dysfunction    Pulmonary hypertension (HCC)    Acute exacerbation of chronic obstructive pulmonary disease (COPD) ([address] )    Gastroesophageal reflux disease    Acute on chronic respiratory failure with hypoxia and hypercapnia ([address] )    Influenza A    Tracheobronchitis       Consultants:   Pulmonology     History of Present Illness/Summary of clinical course:   He was admitted to the [address] [place] [address] service  He remained on BIPAP and was started on Tamiflu given the high risk for influenza  He is completing a course of azithromycin    He was titrated off the BIPAP to high flow nasal cannula and now to traditional nasal cannula during the day  He will need nocturnal BIPAP  He will complete a course of tamiflu and azithromycin  He is currently not yet at his baseline oxygen settings which is 3 L N/C  Pulmonology is following the patient  Recent or scheduled procedures:   XR chest portable ICU   Final Result      Improved right basilar atelectasis and small effusions  Workstation performed: [id number]         XR chest 1 view portable   ED Interpretation   Similar to prior xray, no obvious infiltrate, stable atelectasis R base      Final Result      Mild right basilar atelectasis versus scarring  Blunting of the costophrenic angle suggesting small effusions  Workstation performed: [id number]             Outstanding/pending diagnostics: none       Mobilization Plan: OOB as tolerated    Nutrition Plan: cardiac     Discharge Plan:    Patient should be ready for discharge to home when at    Initial PT/OT/ST Recommendations: OOB as tolerated   Initial /Plan: will need evaluation for HS BIPAP      Specific Diagnosis Plan:    Sepsis: Source: influenza, Antibiotics: tamiflu , Length:  5 days     Need for Infectious Disease consult: no     Spoke with Dr Franchesca Nolen at [phone] regarding transfer  Portions of the record may have been created with voice recognition software  Occasional wrong word or "sound a like" substitutions may have occurred due to the inherent limitations of voice recognition software  Read the chart carefully and recognize, using context, where substitutions have occurred

## 2018-12-22 NOTE — PROGRESS NOTES
Progress Note - Critical Care   Flakito Mccray 79 y o  male MRN: [id number]  Unit/Bed#:  Encounter: [id number]    Attending Physician: Leslie Hernández, DO      ______________________________________________________________________  Assessment and Plan:   Principal Problem:    Acute on chronic respiratory failure with hypoxia and hypercapnia (HCC)  Active Problems:    Acute exacerbation of chronic obstructive pulmonary disease (COPD) ([facility] )    Influenza A    Tracheobronchitis  Resolved Problems:    * No resolved hospital problems  *        Neuro:   -[facility] ICU  -delirium precautions  -sleep hygiene    CV:   -continue telemetry monitoring  -blood pressure is stable  -will continue to monitor closely    Pulm:   -acute on chronic hypoxic hypercapnic respiratory failure most likely due to influenza a COPD exacerbation, tracheobronchitis  -continues to tolerate high-flow nasal cannula maintaining O2 saturations greater than 88%  -continue nebulized medication q 6 hours  -Solu-Medrol 60 mg q 8 hours  -continue droplet precaution  -continue azithromycin currently day 3/5  -Incentive spirometry  -repeat chest x-ray yesterday shows improved right basilar atelectasis and small effusions   -continue Tamiflu    Currently day 4/5  -procalcitonin within normal limits  -appreciate Pulmonary consult      GI:   -diet advanced to clear liquid  -continue daily Protonix    :   -monitor I/O  -monitor BUN/Creatinine    F/E/N:   -continue gentle IV fluids  -monitor electrolytes, replace as needed  -diet-clear liquids    ID:   -influenza a positive  -Tamiflu day 4/5  -blood cultures pending no growth at 48 hrs  -acute tracheobronchitis, will continue azithromycin in the presence of a COPD exacerbation    Heme:   -hemoglobin and hematocrit stable  -monitor CBC  -VT prophylaxis-SQ heparin    Endo:   -hemoglobin A1c at 6 3  -continue to monitor glucose levels     Msk/Skin:   -frequent turning to avoid skin breakdown    Disposition: Continue stepdown level care     Code Status: Level 1 - Full Code    Counseling / Coordination of Care  Total Critical Care time spent 30 minutes excluding procedures, teaching and family updates  _____________________________________________________________________    24 Hour Events:  No specific complaints  Continues to improve with O2 via high-flow nasal cannula  BiPAP not initiated overnight, SpO2 maintained above 80% on high-flow nasal cannula  Tamiflu continued currently day 4/5  Seen by Pulmonary yesterday recommendations to continue azithromycin for acute tracheobronchitis, continue nebulizer treatments with long-acting inhalers Solu-Medrol 60 mg q 8 hours  Repeat chest x-ray yesterday showed improved right basilar atelectasis and small effusion     Review of Systems  _____________________________________________________________________    Physical Exam: Physical Examination: General appearance - oriented to person, place, and time  Mental status - alert, oriented to person, place, and time  Chest - clear to auscultation, no wheezes, rales or rhonchi, symmetric air entry  Heart - normal rate, regular rhythm, normal S1, S2, no murmurs, rubs, clicks or gallops  Abdomen - soft, nontender, nondistended, no masses or organomegaly  Neurological - alert, oriented, normal speech, no focal findings or movement disorder noted  Musculoskeletal - no joint tenderness, deformity or swelling  Extremities - peripheral pulses normal, no pedal edema, no clubbing or cyanosis  Skin - normal coloration and turgor, no rashes, no suspicious skin lesions noted      ______________________________________________________________________  Vitals:    12/21/18 2000 12/21/18 2037 12/21/18 2300 12/22/18 0031   BP: 149/73  133/69    BP Location: Left arm  Left arm    Pulse: 64  62    Resp: 22  (!) 23    Temp:   98 1 °F (36 7 °C)    TempSrc: Oral  Oral    SpO2: 97% 96% 94% 95%   Weight:       Height:           Temperature:   Temp (24hrs), Av 8 °F (36 6 °C), Min:97 4 °F (36 3 °C), Max:98 4 °F (36 9 °C)    Current Temperature: 98 1 °F (36 7 °C)  Weights:   IBW: 73 kg    Body mass index is 24 99 kg/m²  Weight (last 2 days)     Date/Time   Weight    18 0600  79 (174 16)            Hemodynamic Monitoring:  N/A     Non-Invasive/Invasive Ventilation Settings:  Respiratory    Lab Data (Last 4 hours)    None         O2/Vent Data (Last 4 hours)       0031          Non-Invasive Ventilation Mode HFNC                 No results found for: PHART, UMU8TME, PO2ART, BFE4NSG, T1VZGCWY, BEART, SOURCE  SpO2: SpO2: 95 %  Intake and Outputs:  I/O        07 -  0700 701 -  0700    P  O   240    I V  (mL/kg) 990 (12 5) 1036 7 (13 1)    IV Piggyback 250     Total Intake(mL/kg) 1240 (15 7) 1276 7 (16 2)    Urine (mL/kg/hr) 2075 (1 1) 1450 (0 8)    Total Output 2075 1450    Net -835 -173 3          Unmeasured Stool Occurrence  2 x          Nutrition:        Diet Orders            Start     Ordered    18 1219  Diet Clear Liquid  Diet effective now     Question Answer Comment   Diet Type Clear Liquid    RD to adjust diet per protocol?  No        18 1219          Labs:     Results from last 7 days  Lab Units 18  0436 18  0620 18  0928 18  0435 18  1627   WBC Thousand/uL 6 05 7 10  --  15 94* 7 79   HEMOGLOBIN g/dL 11 4* 11 1*  --  12 5 13 1   HEMATOCRIT % 35 4* 34 6*  --  38 8 39 6   PLATELETS Thousands/uL 169 161 182 241 175   NEUTROS PCT %  --  72  --  64 69   BANDS PCT % 3  --   --   --   --    MONOS PCT %  --  15*  --  10 12   MONO PCT % 2*  --   --   --   --        Results from last 7 days  Lab Units 18  0436 18  0620 18  0435 12/17/18  1626   SODIUM mmol/L 141 140 143 138   POTASSIUM mmol/L 4 1 4 2 4 1 4 0   CHLORIDE mmol/L 102 103 104 100   CO2 mmol/L 32 29 31 31   ANION GAP mmol/L 7 8 8 7   BUN mg/dL 29* 34* 21 15   CREATININE mg/dL 0 70 0 67 0 92 0 82   CALCIUM mg/dL 8 6 8 4 8 5 8 8   ALT U/L  --  51 42 37   AST U/L  --  38 31 25   ALK PHOS U/L  --  62 78 77   ALBUMIN g/dL  --  3 1* 3 7 3 6   TOTAL BILIRUBIN mg/dL  --  0 80 0 60 1 50*       Results from last 7 days  Lab Units 12/21/18  0436 12/20/18  0620   MAGNESIUM mg/dL 2 6 2 7*   PHOSPHORUS mg/dL  --  3 1        Results from last 7 days  Lab Units 12/20/18  0620 12/17/18  1626   INR  1 01 0 97   PTT seconds  --  32       Results from last 7 days  Lab Units 12/19/18  1722 12/19/18  1212 12/19/18  0928 12/19/18  0435 12/17/18  1626   TROPONIN I ng/mL <0 02 0 02 0 02 <0 02 <0 02       Results from last 7 days  Lab Units 12/19/18  0451 12/17/18  1627   LACTIC ACID mmol/L 1 4 0 9     ABG:No results found for: PHART, MII5KOI, PO2ART, JTH2EHU, Y7GAGQYH, BEART, SOURCE  VBG:  Results from last 7 days  Lab Units 12/21/18  0436   PH CARROLL  7 459*   PCO2 CARROLL mm Hg 38 9*   PO2 CARROLL mm Hg 65 9*   HCO3 CARROLL mmol/L 27 0   BASE EXC CARROLL mmol/L 3 0       Results from last 7 days  Lab Units 12/20/18  0620 12/19/18  0928 12/19/18  0436 12/17/18  1626   PROCALCITONIN ng/ml 0 14 0 11 <0 05 <0 05     No results found for: [facility]   Imaging:   XR chest portable ICU   Final Result      Improved right basilar atelectasis and small effusions  Workstation performed: [id number]         XR chest 1 view portable   ED Interpretation   Similar to prior xray, no obvious infiltrate, stable atelectasis R base      Final Result      Mild right basilar atelectasis versus scarring  Blunting of the costophrenic angle suggesting small effusions  Workstation performed: [id number]           EKG: Sinus rhythm at rate of 67 bpm      Results from last 7 days  Lab Units 12/19/18  1033 12/19/18  0454 12/19/18  0436 12/17/18  1627 12/17/18  1626   BLOOD CULTURE   --  No Growth at 48 hrs  No Growth at 48 hrs  No Growth After 4 Days  No Growth After 4 Days     INFLUENZA B PCR  None Detected  --   --   --   --    RSV PCR  None Detected  --   -- --   --      Allergies: Allergies   Allergen Reactions    Penicillins Hives     Passed out    Codeine      Medications:   Scheduled Meds:  Current Facility-Administered Medications:  azithromycin 500 mg Intravenous Q24H Dona OFELIA Roberto    chlorhexidine 15 mL Swish & Spit Q12H [facility] CORNELIA Campos    cholestyramine sugar free 4 g Oral BID Ward Serrano PA-C    docusate sodium 100 mg Oral BID Dondra Felisa, OFELIA    fluticasone-vilanterol 1 puff Inhalation Daily CORNELIA Campos    heparin (porcine) 5,000 Units Subcutaneous [facility] CORNELIA Campos    insulin lispro 1-6 Units Subcutaneous Q6H CORNELIA Kearney    ipratropium-albuterol 3 mL Nebulization Q4H Aracelis Roberto PA-C    loratadine 10 mg Oral Daily CORNELIA Campos    methylPREDNISolone sodium succinate 60 mg Intravenous Q8H [facility] CORNELIA Campos    multi-electrolyte 75 mL/hr Intravenous Continuous Jayson Gusman Last Rate: 75 mL/hr (12/21/18 2346)   oseltamivir 75 mg Oral Q12H [facility] Aracelis Roberto PA-C    pantoprazole 40 mg Intravenous Q24H [facility] CORNELIA Campos    roflumilast 250 mcg Oral Daily CORNELIA Campos    senna 1 tablet Oral HS Aracelis Roberto PA-C    sertraline 50 mg Oral Daily CORNELIA Campos    sodium chloride (PF) 3 mL Intravenous PRN Franklin Rockwell MD      Continuous Infusions:  multi-electrolyte 75 mL/hr Last Rate: 75 mL/hr (12/21/18 2346)     PRN Meds:    sodium chloride (PF) 3 mL PRN     VTE Pharmacologic Prophylaxis: Heparin  VTE Mechanical Prophylaxis: sequential compression device  Invasive lines and devices: Invasive Devices     Peripheral Intravenous Line            Peripheral IV 10/10/18 Right Antecubital 72 days    Peripheral IV 12/20/18 Left Forearm 1 day                     Portions of the record may have been created with voice recognition software  Occasional wrong word or "sound a like" substitutions may have occurred due to the inherent limitations of voice recognition software    Read the chart carefully and recognize, using context, where substitutions have occurred      Reddy Carpenter PA-C

## 2018-12-23 LAB
GLUCOSE SERPL-MCNC: 106 MG/DL (ref 65–140)
GLUCOSE SERPL-MCNC: 111 MG/DL (ref 65–140)
GLUCOSE SERPL-MCNC: 121 MG/DL (ref 65–140)
GLUCOSE SERPL-MCNC: 127 MG/DL (ref 65–140)

## 2018-12-23 PROCEDURE — 94760 N-INVAS EAR/PLS OXIMETRY 1: CPT

## 2018-12-23 PROCEDURE — 82948 REAGENT STRIP/BLOOD GLUCOSE: CPT

## 2018-12-23 PROCEDURE — C9113 INJ PANTOPRAZOLE SODIUM, VIA: HCPCS | Performed by: NURSE PRACTITIONER

## 2018-12-23 PROCEDURE — 94640 AIRWAY INHALATION TREATMENT: CPT

## 2018-12-23 PROCEDURE — 99232 SBSQ HOSP IP/OBS MODERATE 35: CPT | Performed by: FAMILY MEDICINE

## 2018-12-23 RX ORDER — IPRATROPIUM BROMIDE AND ALBUTEROL SULFATE 2.5; .5 MG/3ML; MG/3ML
3 SOLUTION RESPIRATORY (INHALATION)
Status: DISCONTINUED | OUTPATIENT
Start: 2018-12-24 | End: 2018-12-26

## 2018-12-23 RX ORDER — METHYLPREDNISOLONE SODIUM SUCCINATE 125 MG/2ML
60 INJECTION, POWDER, LYOPHILIZED, FOR SOLUTION INTRAMUSCULAR; INTRAVENOUS EVERY 12 HOURS SCHEDULED
Status: DISCONTINUED | OUTPATIENT
Start: 2018-12-23 | End: 2018-12-24

## 2018-12-23 RX ADMIN — LORATADINE 10 MG: 10 TABLET ORAL at 08:28

## 2018-12-23 RX ADMIN — CHOLESTYRAMINE 4 G: 4 POWDER, FOR SUSPENSION ORAL at 08:28

## 2018-12-23 RX ADMIN — ROFLUMILAST 250 MCG: 250 TABLET ORAL at 10:16

## 2018-12-23 RX ADMIN — IPRATROPIUM BROMIDE AND ALBUTEROL SULFATE 3 ML: 2.5; .5 SOLUTION RESPIRATORY (INHALATION) at 04:20

## 2018-12-23 RX ADMIN — SERTRALINE HYDROCHLORIDE 50 MG: 50 TABLET ORAL at 08:28

## 2018-12-23 RX ADMIN — OSELTAMIVIR PHOSPHATE 75 MG: 75 CAPSULE ORAL at 08:28

## 2018-12-23 RX ADMIN — AZITHROMYCIN MONOHYDRATE 500 MG: 500 INJECTION, POWDER, LYOPHILIZED, FOR SOLUTION INTRAVENOUS at 10:16

## 2018-12-23 RX ADMIN — PANTOPRAZOLE SODIUM 40 MG: 40 INJECTION, POWDER, FOR SOLUTION INTRAVENOUS at 08:28

## 2018-12-23 RX ADMIN — IPRATROPIUM BROMIDE AND ALBUTEROL SULFATE 3 ML: 2.5; .5 SOLUTION RESPIRATORY (INHALATION) at 07:20

## 2018-12-23 RX ADMIN — IPRATROPIUM BROMIDE AND ALBUTEROL SULFATE 3 ML: 2.5; .5 SOLUTION RESPIRATORY (INHALATION) at 15:09

## 2018-12-23 RX ADMIN — DOCUSATE SODIUM 100 MG: 100 CAPSULE, LIQUID FILLED ORAL at 08:28

## 2018-12-23 RX ADMIN — CHLORHEXIDINE GLUCONATE 0.12% ORAL RINSE 15 ML: 1.2 LIQUID ORAL at 21:30

## 2018-12-23 RX ADMIN — METHYLPREDNISOLONE SODIUM SUCCINATE 60 MG: 125 INJECTION, POWDER, FOR SOLUTION INTRAMUSCULAR; INTRAVENOUS at 06:19

## 2018-12-23 RX ADMIN — IPRATROPIUM BROMIDE AND ALBUTEROL SULFATE 3 ML: 2.5; .5 SOLUTION RESPIRATORY (INHALATION) at 19:17

## 2018-12-23 RX ADMIN — IPRATROPIUM BROMIDE AND ALBUTEROL SULFATE 3 ML: 2.5; .5 SOLUTION RESPIRATORY (INHALATION) at 00:23

## 2018-12-23 RX ADMIN — DOCUSATE SODIUM 100 MG: 100 CAPSULE, LIQUID FILLED ORAL at 17:23

## 2018-12-23 RX ADMIN — IPRATROPIUM BROMIDE AND ALBUTEROL SULFATE 3 ML: 2.5; .5 SOLUTION RESPIRATORY (INHALATION) at 11:12

## 2018-12-23 RX ADMIN — CHOLESTYRAMINE 4 G: 4 POWDER, FOR SUSPENSION ORAL at 17:23

## 2018-12-23 RX ADMIN — HEPARIN SODIUM 5000 UNITS: 5000 INJECTION, SOLUTION INTRAVENOUS; SUBCUTANEOUS at 21:33

## 2018-12-23 RX ADMIN — FLUTICASONE FUROATE AND VILANTEROL TRIFENATATE 1 PUFF: 100; 25 POWDER RESPIRATORY (INHALATION) at 10:16

## 2018-12-23 RX ADMIN — OSELTAMIVIR PHOSPHATE 75 MG: 75 CAPSULE ORAL at 21:30

## 2018-12-23 RX ADMIN — HEPARIN SODIUM 5000 UNITS: 5000 INJECTION, SOLUTION INTRAVENOUS; SUBCUTANEOUS at 06:19

## 2018-12-23 RX ADMIN — CHLORHEXIDINE GLUCONATE 0.12% ORAL RINSE 15 ML: 1.2 LIQUID ORAL at 08:28

## 2018-12-23 RX ADMIN — METHYLPREDNISOLONE SODIUM SUCCINATE 60 MG: 125 INJECTION, POWDER, FOR SOLUTION INTRAMUSCULAR; INTRAVENOUS at 21:33

## 2018-12-23 NOTE — PROGRESS NOTES
[address] [facility] Internal Medicine Progress Note  Patient: Jerel Martinez 79 y o  male   MRN: [id number]  PCP: Tiffany Walsh MD  Unit/Bed#: -01 Encounter: [id number]  Date Of Visit: 18    Assessment:    Principal Problem:    Acute on chronic respiratory failure with hypoxia and hypercapnia (HCC)  Active Problems:    Acute exacerbation of chronic obstructive pulmonary disease (COPD) ([address] )    Influenza A    Tracheobronchitis      Plan:    · Acute on chronic hypoxic respiratory failure:  Setting of severe COPD exacerbation secondary to influenza a infection  Today is the last stated of Tamiflu  Continue IV Solu-Medrol every 12 hours  Continue respiratory protocol  Continue nasal cannula       VTE Pharmacologic Prophylaxis:   Pharmacologic: Heparin  Mechanical VTE Prophylaxis in Place: Yes    Patient Centered Rounds: I have performed bedside rounds with nursing staff today  Discussions with Specialists or Other Care Team Provider:     Education and Discussions with Family / Patient:  Patient    Time Spent for Care: 20 minutes  More than 50% of total time spent on counseling and coordination of care as described above  Current Length of Stay: 4 day(s)    Current Patient Status: Inpatient   Certification Statement: The patient will continue to require additional inpatient hospital stay due to Acute respiratory failure on nasal cannula    Discharge Plan / Estimated Discharge Date: likely tomorrow    Code Status: Level 1 - Full Code      Subjective:   Patient denied worsening short of breath  Patient stated he normally uses 3 L nasal cannula at home    Objective:     Vitals:   Temp (24hrs), Av 7 °F (36 5 °C), Min:97 4 °F (36 3 °C), Max:97 9 °F (36 6 °C)    Temp:  [97 4 °F (36 3 °C)-97 9 °F (36 6 °C)] 97 4 °F (36 3 °C)  HR:  [67-75] 74  Resp:  [19-34] 20  BP: (116-154)/(63-84) 154/76  SpO2:  [93 %-99 %] 97 %  Body mass index is 25 08 kg/m²       Input and Output Summary (last 24 hours): Intake/Output Summary (Last 24 hours) at 12/23/18 1117  Last data filed at 12/22/18 2254   Gross per 24 hour   Intake             87 5 ml   Output              575 ml   Net           -487 5 ml       Physical Exam:     Physical Exam   Constitutional: He is oriented to person, place, and time  No distress  On 5 L nasal cannula   Cardiovascular: Normal rate, normal heart sounds and intact distal pulses  Exam reveals no gallop  No murmur heard  Pulmonary/Chest: No respiratory distress  He has no wheezes  He has no rales  He exhibits no tenderness  Decreased breath sounds bilaterally   Abdominal: He exhibits no distension and no mass  There is no tenderness  There is no rebound and no guarding  Neurological: He is alert and oriented to person, place, and time  He has normal reflexes  He displays normal reflexes  No cranial nerve deficit  Coordination normal    Skin: Skin is warm  He is not diaphoretic  Psychiatric: He has a normal mood and affect  His behavior is normal            Additional Data:     Labs:      Results from last 7 days  Lab Units 12/22/18  0520  12/20/18  0620   WBC Thousand/uL 7 83  < > 7 10   HEMOGLOBIN g/dL 11 6*  < > 11 1*   HEMATOCRIT % 35 9*  < > 34 6*   PLATELETS Thousands/uL 184  < > 161   NEUTROS PCT %  --   --  72   LYMPHS PCT %  --   --  13*   LYMPHO PCT % 11*  < >  --    MONOS PCT %  --   --  15*   MONO PCT % 4  < >  --    EOS PCT % 0  < > 0   < > = values in this interval not displayed  Results from last 7 days  Lab Units 12/22/18  0520  12/20/18  0620   POTASSIUM mmol/L 3 9  < > 4 2   CHLORIDE mmol/L 103  < > 103   CO2 mmol/L 30  < > 29   BUN mg/dL 25  < > 34*   CREATININE mg/dL 0 70  < > 0 67   CALCIUM mg/dL 8 5  < > 8 4   ALK PHOS U/L  --   --  62   ALT U/L  --   --  51   AST U/L  --   --  38   < > = values in this interval not displayed  Results from last 7 days  Lab Units 12/20/18  0620   INR  1 01       * I Have Reviewed All Lab Data Listed Above    * Additional Pertinent Lab Tests Reviewed: All Labs Within Last 24 Hours Reviewed    Imaging:    Imaging Reports Reviewed Today Include:   Imaging Personally Reviewed by Myself Includes:      Recent Cultures (last 7 days):       Results from last 7 days  Lab Units 12/19/18  1033 12/19/18  0454 12/19/18  0436 12/17/18  1627 12/17/18  1626   BLOOD CULTURE   --  No Growth After 4 Days  No Growth After 4 Days  No Growth After 5 Days  No Growth After 5 Days  INFLUENZA B PCR  None Detected  --   --   --   --    RSV PCR  None Detected  --   --   --   --        Last 24 Hours Medication List:     Current Facility-Administered Medications:  chlorhexidine 15 mL Swish & Spit Q12H [facility] Doc Double, CRNP   cholestyramine sugar free 4 g Oral BID Ward Zach, PA-C   docusate sodium 100 mg Oral BID [facility], PA-C   fluticasone-vilanterol 1 puff Inhalation Daily Doc Double, CRNP   heparin (porcine) 5,000 Units Subcutaneous [facility] Doc Double, CRNP   insulin lispro 1-6 Units Subcutaneous Q6H [address], CRNP   ipratropium-albuterol 3 mL Nebulization Q4H [facility], PA-C   loratadine 10 mg Oral Daily Doc Double, CRNP   methylPREDNISolone sodium succinate 60 mg Intravenous [facility] Doc Double, CRNP   oseltamivir 75 mg Oral Q12H [facility], PA-C   pantoprazole 40 mg Intravenous Q24H [facility] Doc Double, CRNP   roflumilast 250 mcg Oral Daily Doc Double, CRNP   senna 1 tablet Oral HS [facility], PA-C   sertraline 50 mg Oral Daily Doc Double, CRNP   sodium chloride (PF) 3 mL Intravenous PRN Elaina Marie MD        Today, Patient Was Seen By: Janine Palma MD    ** Please Note: This note has been constructed using a voice recognition system   **

## 2018-12-24 LAB
BACTERIA BLD CULT: NORMAL
BACTERIA BLD CULT: NORMAL
GLUCOSE SERPL-MCNC: 102 MG/DL (ref 65–140)
GLUCOSE SERPL-MCNC: 103 MG/DL (ref 65–140)
GLUCOSE SERPL-MCNC: 179 MG/DL (ref 65–140)
GLUCOSE SERPL-MCNC: 76 MG/DL (ref 65–140)

## 2018-12-24 PROCEDURE — 94640 AIRWAY INHALATION TREATMENT: CPT

## 2018-12-24 PROCEDURE — 99232 SBSQ HOSP IP/OBS MODERATE 35: CPT | Performed by: INTERNAL MEDICINE

## 2018-12-24 PROCEDURE — 94760 N-INVAS EAR/PLS OXIMETRY 1: CPT

## 2018-12-24 PROCEDURE — 82948 REAGENT STRIP/BLOOD GLUCOSE: CPT

## 2018-12-24 PROCEDURE — C9113 INJ PANTOPRAZOLE SODIUM, VIA: HCPCS | Performed by: NURSE PRACTITIONER

## 2018-12-24 RX ORDER — METHYLPREDNISOLONE SODIUM SUCCINATE 40 MG/ML
40 INJECTION, POWDER, LYOPHILIZED, FOR SOLUTION INTRAMUSCULAR; INTRAVENOUS EVERY 12 HOURS SCHEDULED
Status: DISCONTINUED | OUTPATIENT
Start: 2018-12-24 | End: 2018-12-26 | Stop reason: HOSPADM

## 2018-12-24 RX ADMIN — FLUTICASONE FUROATE AND VILANTEROL TRIFENATATE 1 PUFF: 100; 25 POWDER RESPIRATORY (INHALATION) at 09:33

## 2018-12-24 RX ADMIN — HEPARIN SODIUM 5000 UNITS: 5000 INJECTION, SOLUTION INTRAVENOUS; SUBCUTANEOUS at 22:16

## 2018-12-24 RX ADMIN — DOCUSATE SODIUM 100 MG: 100 CAPSULE, LIQUID FILLED ORAL at 09:31

## 2018-12-24 RX ADMIN — CHLORHEXIDINE GLUCONATE 0.12% ORAL RINSE 15 ML: 1.2 LIQUID ORAL at 22:00

## 2018-12-24 RX ADMIN — METHYLPREDNISOLONE SODIUM SUCCINATE 40 MG: 40 INJECTION, POWDER, FOR SOLUTION INTRAMUSCULAR; INTRAVENOUS at 22:00

## 2018-12-24 RX ADMIN — IPRATROPIUM BROMIDE AND ALBUTEROL SULFATE 3 ML: 2.5; .5 SOLUTION RESPIRATORY (INHALATION) at 13:00

## 2018-12-24 RX ADMIN — SERTRALINE HYDROCHLORIDE 50 MG: 50 TABLET ORAL at 09:31

## 2018-12-24 RX ADMIN — CHLORHEXIDINE GLUCONATE 0.12% ORAL RINSE 15 ML: 1.2 LIQUID ORAL at 09:31

## 2018-12-24 RX ADMIN — ROFLUMILAST 250 MCG: 250 TABLET ORAL at 09:33

## 2018-12-24 RX ADMIN — IPRATROPIUM BROMIDE AND ALBUTEROL SULFATE 3 ML: 2.5; .5 SOLUTION RESPIRATORY (INHALATION) at 02:09

## 2018-12-24 RX ADMIN — CHOLESTYRAMINE 4 G: 4 POWDER, FOR SUSPENSION ORAL at 18:40

## 2018-12-24 RX ADMIN — IPRATROPIUM BROMIDE AND ALBUTEROL SULFATE 3 ML: 2.5; .5 SOLUTION RESPIRATORY (INHALATION) at 19:29

## 2018-12-24 RX ADMIN — HEPARIN SODIUM 5000 UNITS: 5000 INJECTION, SOLUTION INTRAVENOUS; SUBCUTANEOUS at 06:46

## 2018-12-24 RX ADMIN — IPRATROPIUM BROMIDE AND ALBUTEROL SULFATE 3 ML: 2.5; .5 SOLUTION RESPIRATORY (INHALATION) at 07:16

## 2018-12-24 RX ADMIN — CHOLESTYRAMINE 4 G: 4 POWDER, FOR SUSPENSION ORAL at 09:32

## 2018-12-24 RX ADMIN — METHYLPREDNISOLONE SODIUM SUCCINATE 60 MG: 125 INJECTION, POWDER, FOR SOLUTION INTRAMUSCULAR; INTRAVENOUS at 09:32

## 2018-12-24 RX ADMIN — PANTOPRAZOLE SODIUM 40 MG: 40 INJECTION, POWDER, FOR SOLUTION INTRAVENOUS at 09:32

## 2018-12-24 RX ADMIN — SENNOSIDES 8.6 MG: 8.6 TABLET, FILM COATED ORAL at 22:17

## 2018-12-24 RX ADMIN — LORATADINE 10 MG: 10 TABLET ORAL at 09:31

## 2018-12-24 NOTE — UTILIZATION REVIEW
Continued Stay Review    Date: 12/23    Vital Signs: Temp:  [97 4 °F (36 3 °C)-97 9 °F (36 6 °C)] 97 4 °F (36 3 °C)  HR:  [67-75] 74  Resp:  [19-34] 20  BP: (116-154)/(63-84) 154/76  SpO2:  [93 %-99 %] 97 %  Body mass index is 25 08 kg/m²  Medications:   Scheduled Meds:   Current Facility-Administered Medications:  chlorhexidine 15 mL Swish & Spit Q12H [facility]    cholestyramine sugar free 4 g Oral BID    docusate sodium 100 mg Oral BID    fluticasone-vilanterol 1 puff Inhalation Daily    heparin (porcine) 5,000 Units Subcutaneous Q8H [facility]    insulin lispro 1-6 Units Subcutaneous Q6H [facility]    ipratropium-albuterol 3 mL Nebulization Q6H    loratadine 10 mg Oral Daily    methylPREDNISolone sodium succinate 60 mg Intravenous Q12H NII    pantoprazole 40 mg Intravenous Q24H NII    roflumilast 250 mcg Oral Daily    senna 1 tablet Oral HS    sertraline 50 mg Oral Daily    sodium chloride (PF) 3 mL Intravenous PRN      Abnormal Labs/Diagnostic Results:none    Age/Sex: 79 y o  male     · Assessment/Plan:Acute on chronic hypoxic respiratory failure:  Setting of severe COPD exacerbation secondary to influenza a infection  Today is the last stated of Tamiflu  Continue IV Solu-Medrol every 12 hours  Continue respiratory protocol    Continue nasal cannula     VTE Pharmacologic Prophylaxis:   Pharmacologic: Heparin  Mechanical VTE Prophylaxis in Place: Yes    Discharge Plan: possible DC 12/24

## 2018-12-25 LAB
ANION GAP SERPL CALCULATED.3IONS-SCNC: 5 MMOL/L (ref 4–13)
BUN SERPL-MCNC: 25 MG/DL (ref 5–25)
CALCIUM SERPL-MCNC: 9 MG/DL (ref 8.3–10.1)
CHLORIDE SERPL-SCNC: 102 MMOL/L (ref 100–108)
CO2 SERPL-SCNC: 33 MMOL/L (ref 21–32)
CREAT SERPL-MCNC: 0.68 MG/DL (ref 0.6–1.3)
ERYTHROCYTE [DISTWIDTH] IN BLOOD BY AUTOMATED COUNT: 14.7 % (ref 11.6–15.1)
GFR SERPL CREATININE-BSD FRML MDRD: 100 ML/MIN/1.73SQ M
GLUCOSE SERPL-MCNC: 104 MG/DL (ref 65–140)
GLUCOSE SERPL-MCNC: 120 MG/DL (ref 65–140)
GLUCOSE SERPL-MCNC: 122 MG/DL (ref 65–140)
GLUCOSE SERPL-MCNC: 124 MG/DL (ref 65–140)
GLUCOSE SERPL-MCNC: 152 MG/DL (ref 65–140)
GLUCOSE SERPL-MCNC: 76 MG/DL (ref 65–140)
HCT VFR BLD AUTO: 40 % (ref 36.5–49.3)
HGB BLD-MCNC: 12.9 G/DL (ref 12–17)
MCH RBC QN AUTO: 29.5 PG (ref 26.8–34.3)
MCHC RBC AUTO-ENTMCNC: 32.3 G/DL (ref 31.4–37.4)
MCV RBC AUTO: 92 FL (ref 82–98)
PLATELET # BLD AUTO: 235 THOUSANDS/UL (ref 149–390)
PMV BLD AUTO: 9.8 FL (ref 8.9–12.7)
POTASSIUM SERPL-SCNC: 3.9 MMOL/L (ref 3.5–5.3)
RBC # BLD AUTO: 4.37 MILLION/UL (ref 3.88–5.62)
SODIUM SERPL-SCNC: 140 MMOL/L (ref 136–145)
WBC # BLD AUTO: 9.63 THOUSAND/UL (ref 4.31–10.16)

## 2018-12-25 PROCEDURE — C9113 INJ PANTOPRAZOLE SODIUM, VIA: HCPCS | Performed by: NURSE PRACTITIONER

## 2018-12-25 PROCEDURE — 80048 BASIC METABOLIC PNL TOTAL CA: CPT | Performed by: INTERNAL MEDICINE

## 2018-12-25 PROCEDURE — 94640 AIRWAY INHALATION TREATMENT: CPT

## 2018-12-25 PROCEDURE — 85027 COMPLETE CBC AUTOMATED: CPT | Performed by: INTERNAL MEDICINE

## 2018-12-25 PROCEDURE — 94760 N-INVAS EAR/PLS OXIMETRY 1: CPT

## 2018-12-25 PROCEDURE — 99232 SBSQ HOSP IP/OBS MODERATE 35: CPT | Performed by: INTERNAL MEDICINE

## 2018-12-25 PROCEDURE — 82948 REAGENT STRIP/BLOOD GLUCOSE: CPT

## 2018-12-25 RX ADMIN — IPRATROPIUM BROMIDE AND ALBUTEROL SULFATE 3 ML: 2.5; .5 SOLUTION RESPIRATORY (INHALATION) at 02:37

## 2018-12-25 RX ADMIN — ROFLUMILAST 250 MCG: 250 TABLET ORAL at 09:37

## 2018-12-25 RX ADMIN — CHLORHEXIDINE GLUCONATE 0.12% ORAL RINSE 15 ML: 1.2 LIQUID ORAL at 21:34

## 2018-12-25 RX ADMIN — IPRATROPIUM BROMIDE AND ALBUTEROL SULFATE 3 ML: 2.5; .5 SOLUTION RESPIRATORY (INHALATION) at 07:23

## 2018-12-25 RX ADMIN — CHOLESTYRAMINE 4 G: 4 POWDER, FOR SUSPENSION ORAL at 18:01

## 2018-12-25 RX ADMIN — HEPARIN SODIUM 5000 UNITS: 5000 INJECTION, SOLUTION INTRAVENOUS; SUBCUTANEOUS at 05:33

## 2018-12-25 RX ADMIN — HEPARIN SODIUM 5000 UNITS: 5000 INJECTION, SOLUTION INTRAVENOUS; SUBCUTANEOUS at 14:35

## 2018-12-25 RX ADMIN — FLUTICASONE FUROATE AND VILANTEROL TRIFENATATE 1 PUFF: 100; 25 POWDER RESPIRATORY (INHALATION) at 09:22

## 2018-12-25 RX ADMIN — LORATADINE 10 MG: 10 TABLET ORAL at 09:23

## 2018-12-25 RX ADMIN — CHOLESTYRAMINE 4 G: 4 POWDER, FOR SUSPENSION ORAL at 09:24

## 2018-12-25 RX ADMIN — HEPARIN SODIUM 5000 UNITS: 5000 INJECTION, SOLUTION INTRAVENOUS; SUBCUTANEOUS at 21:35

## 2018-12-25 RX ADMIN — IPRATROPIUM BROMIDE AND ALBUTEROL SULFATE 3 ML: 2.5; .5 SOLUTION RESPIRATORY (INHALATION) at 19:27

## 2018-12-25 RX ADMIN — INSULIN LISPRO 1 UNITS: 100 INJECTION, SOLUTION INTRAVENOUS; SUBCUTANEOUS at 18:03

## 2018-12-25 RX ADMIN — METHYLPREDNISOLONE SODIUM SUCCINATE 40 MG: 40 INJECTION, POWDER, FOR SOLUTION INTRAMUSCULAR; INTRAVENOUS at 21:36

## 2018-12-25 RX ADMIN — CHLORHEXIDINE GLUCONATE 0.12% ORAL RINSE 15 ML: 1.2 LIQUID ORAL at 09:37

## 2018-12-25 RX ADMIN — IPRATROPIUM BROMIDE AND ALBUTEROL SULFATE 3 ML: 2.5; .5 SOLUTION RESPIRATORY (INHALATION) at 14:20

## 2018-12-25 RX ADMIN — METHYLPREDNISOLONE SODIUM SUCCINATE 40 MG: 40 INJECTION, POWDER, FOR SOLUTION INTRAMUSCULAR; INTRAVENOUS at 09:25

## 2018-12-25 RX ADMIN — SERTRALINE HYDROCHLORIDE 50 MG: 50 TABLET ORAL at 09:23

## 2018-12-25 RX ADMIN — SENNOSIDES 8.6 MG: 8.6 TABLET, FILM COATED ORAL at 21:35

## 2018-12-25 RX ADMIN — PANTOPRAZOLE SODIUM 40 MG: 40 INJECTION, POWDER, FOR SOLUTION INTRAVENOUS at 09:25

## 2018-12-25 NOTE — PROGRESS NOTES
[facility] Progress Note  Patient: Flakito Mccray 79 y o  male   MRN: [id number]  PCP: Luis Haley MD  Unit/Bed#: -[facility] Encounter: [id number]  Date Of Visit: 18    Assessment:    Principal Problem:    Acute on chronic respiratory failure with hypoxia and hypercapnia (HCC)  Active Problems:    Acute exacerbation of chronic obstructive pulmonary disease (COPD) ([facility] [address] )    Influenza A    Tracheobronchitis      Plan:    · Acute on Chronic Respiratory failure with hypoxia: due to Influenza A initially treated with tamiflu  Tapering steroids slowly  Patient states imrpoved from 60% to 90% today  Still very fatigued and winded on exertion  Consider Dc in the am on continued slow taper of oral steroids  · Acute exacerbation of COPD on home oxygen: Completed azithromycin and tamiflu  Solu-medrol decreased to 40 mg q 12 on    Continue for today very diminshed but no wheezing  · Influenza A: resolving   No evidence of pneumonia      VTE Pharmacologic Prophylaxis:   Pharmacologic: Heparin  Mechanical VTE Prophylaxis in Place: Yes    Patient Centered Rounds: updated nursing    Discussions with Specialists or Other Care Team Provider: none    Education and Discussions with Family / Patient: wife aware of plan updated last evening    Time Spent for Care: 20 minutes  More than 50% of total time spent on counseling and coordination of care as described above  Current Length of Stay: 6 day(s)    Current Patient Status: Inpatient   Certification Statement: The patient will continue to require additional inpatient hospital stay due to tapering steroids    Discharge Plan / Estimated Discharge Date: hopeful for wed    Code Status: Level 1 - Full Code      Subjective:   Patient states improving slowly    Still short of breath with exertion        Objective:     Vitals:   Temp (24hrs), Av 5 °F (36 9 °C), Min:97 7 °F (36 5 °C), Max:99 6 °F (37 6 °C)    Temp:  [97 7 °F (36 5 °C)-99 6 °F (37 6 °C)] 97 7 °F (36 5 °C)  HR:  [68-75] 75  Resp:  [18-19] 18  BP: (115-122)/(58-78) 122/58  SpO2:  [93 %-98 %] 95 %  Body mass index is 25 5 kg/m²  Input and Output Summary (last 24 hours): Intake/Output Summary (Last 24 hours) at 12/25/18 1704  Last data filed at 12/25/18 1420   Gross per 24 hour   Intake              480 ml   Output                0 ml   Net              480 ml       Physical Exam:     Physical Exam   Constitutional: He is oriented to person, place, and time  He appears well-developed and well-nourished  No distress  HENT:   Head: Normocephalic and atraumatic  Right Ear: External ear normal    Left Ear: External ear normal    Nose: Nose normal    Mouth/Throat: Oropharynx is clear and moist  No oropharyngeal exudate  Eyes: Pupils are equal, round, and reactive to light  Conjunctivae and EOM are normal  Right eye exhibits no discharge  Left eye exhibits no discharge  No scleral icterus  Neck: Normal range of motion  Neck supple  No JVD present  No thyromegaly present  Cardiovascular: Normal rate, regular rhythm, normal heart sounds and intact distal pulses  Exam reveals no gallop and no friction rub  No murmur heard  Pulmonary/Chest: No respiratory distress  He has no wheezes  He has no rales  Very diminished breath sounds, no wheezing  Abdominal: Soft  Bowel sounds are normal  He exhibits no distension  There is no tenderness  There is no rebound and no guarding  Musculoskeletal: Normal range of motion  He exhibits no edema or deformity  Lymphadenopathy:     He has no cervical adenopathy  Neurological: He is alert and oriented to person, place, and time  He has normal reflexes  No cranial nerve deficit  He exhibits normal muscle tone  Skin: Skin is warm and dry  No rash noted  He is not diaphoretic  No erythema  Psychiatric: He has a normal mood and affect  Vitals reviewed        Additional Data:     Labs:      Results from last 7 days  Lab Units 12/25/18  0531 12/22/18  0520  12/20/18  0620   WBC Thousand/uL 9 63 7 83  < > 7 10   HEMOGLOBIN g/dL 12 9 11 6*  < > 11 1*   HEMATOCRIT % 40 0 35 9*  < > 34 6*   PLATELETS Thousands/uL 235 184  < > 161   NEUTROS PCT %  --   --   --  72   LYMPHS PCT %  --   --   --  13*   LYMPHO PCT %  --  11*  < >  --    MONOS PCT %  --   --   --  15*   MONO PCT %  --  4  < >  --    EOS PCT %  --  0  < > 0   < > = values in this interval not displayed  Results from last 7 days  Lab Units 12/25/18  0531  12/20/18  0620   POTASSIUM mmol/L 3 9  < > 4 2   CHLORIDE mmol/L 102  < > 103   CO2 mmol/L 33*  < > 29   BUN mg/dL 25  < > 34*   CREATININE mg/dL 0 68  < > 0 67   CALCIUM mg/dL 9 0  < > 8 4   ALK PHOS U/L  --   --  62   ALT U/L  --   --  51   AST U/L  --   --  38   < > = values in this interval not displayed  Results from last 7 days  Lab Units 12/20/18  0620   INR  1 01       * I Have Reviewed All Lab Data Listed Above  * Additional Pertinent Lab Tests Reviewed: All Labs Within Last 24 Hours Reviewed    Imaging:    Imaging Reports Reviewed Today Include: none  Imaging Personally Reviewed by Myself Includes:  none    Recent Cultures (last 7 days):       Results from last 7 days  Lab Units 12/19/18  1033 12/19/18  0454 12/19/18  0436   BLOOD CULTURE   --  No Growth After 5 Days  No Growth After 5 Days     INFLUENZA B PCR  None Detected  --   --    RSV PCR  None Detected  --   --        Last 24 Hours Medication List:     Current Facility-Administered Medications:  chlorhexidine 15 mL Swish & Spit Q12H [address] CORNELIA Wade   cholestyramine sugar free 4 g Oral BID Ward Serrano PA-C   docusate sodium 100 mg Oral BID Willa Meier PA-C   fluticasone-vilanterol 1 puff Inhalation Daily CORNELIA Wade   heparin (porcine) 5,000 Units Subcutaneous [facility] CORNELIA Wade   insulin lispro 1-6 Units Subcutaneous Q6H [address] Swapnil Rueda MD   ipratropium-albuterol 3 mL Nebulization Q6H Sola Briceño MD   loratadine 10 mg Oral Daily Lesle Mom, CRNP   methylPREDNISolone sodium succinate 40 mg Intravenous Q12H [address] Hanna Gomez MD   pantoprazole 40 mg Intravenous Q24H [address] Lesle Mom, CRNP   roflumilast 250 mcg Oral Daily Lesle Mom, CRNP   senna 1 tablet Oral HS Emigdio Rayo PA-C   sertraline 50 mg Oral Daily Lesle Mom, CRNP   sodium chloride (PF) 3 mL Intravenous PRN Darolyn Canavan, MD        Today, Patient Was Seen By: Hanna Gomez MD Pager : [phone]     ** Please Note: This note has been constructed using a voice recognition system   **

## 2018-12-25 NOTE — PROGRESS NOTES
Progress Note - Pinky Mackenzie 1950, 79 y o  male MRN: [id number]    Unit/Bed#: -01 Encounter: [id number]    Primary Care Provider: Cassie Parmar MD   Date and time admitted to hospital: 12/19/2018  4:26 AM        Influenza A   Assessment & Plan    Completed Tamiflu treat symptomatically     Acute exacerbation of chronic obstructive pulmonary disease (COPD) ([facility] [address] )   Assessment & Plan    Secondary to the flu  Patient had been discharged home on azithromycin but that return with worsening shortness of breath found to be flu a positive  Patient completed Tamiflu but remains on high dose steroids for exacerbation of his COPD  · Decrease Solu-Medrol to 40 mg Q 12 from 80 mg Q 12  Continue to monitor overnight for worsening exacerbation  · Continue aggressive pulmonary toilet with Breo and nebulized     * Acute on chronic respiratory failure with hypoxia and hypercapnia (HCC)   Assessment & Plan    Improved secondary to influenza A continue with aggressive pulmonary toilet will reassess in a m  To see if patient will be able to transition to a steroid taper         VTE Pharmacologic Prophylaxis:   Pharmacologic: Heparin  Mechanical: Mechanical VTE prophylaxis in place  Patient Centered Rounds: Updated nursing home walking rounds  Discussions with Specialists or Other Care Team Provider:  Reviewed notes from ICU  Education and Discussions with Family / Patient:  Updated patient and called spouse on phone to update  Time Spent for Care: 30 minutes  If More than 50% of total time spent on counseling and coordination of care as described above indicate yes or no and described the counseling and coordination:  None    Current Length of Stay: 5 day(s)  Current Patient Status: Inpatient   Certification Statement: The patient will continue to require additional inpatient hospital stay due to Continue to titrate medications    Discharge Plan:   To be determined possible in next 24-48 hours  Code Status: Level 1 - Full Code    Subjective:   Patient noted sitting in bed  Not short of breath at rest   States breathing okay  Noted still on fairly high-dose Solu-Medrol  Discussed with patient will taper possible home in next 24 but may be 48 hr    Objective:   Vitals:   Temp (24hrs), Av 7 °F (37 1 °C), Min:98 1 °F (36 7 °C), Max:99 6 °F (37 6 °C)    Temp:  [98 1 °F (36 7 °C)-99 6 °F (37 6 °C)] 98 3 °F (36 8 °C)  HR:  [68-83] 68  Resp:  [19] 19  BP: (115-129)/(62-89) 116/78  SpO2:  [93 %-98 %] 97 %  Body mass index is 25 5 kg/m²  Input and Output Summary (last 24 hours): Intake/Output Summary (Last 24 hours)    Gross per 24 hour   Intake              300 ml   Output                0 ml   Net              300 ml       Physical Exam:  Generally thin well-developed male no acute distress normocephalic atraumatic pupils equal round and reactive to light extraocular muscles intact mucous membranes are moist neck is supple there is no JVD no lymph nodes no carotid bruits chest is decreased with poor air entry but no wheezing bases are centrally silent with upper airways breath noted  Cardiovascular regular rate rhythm positive S1 and S2 no S3-S4 murmur or gallop  Abdomen soft nontender nondistended with positive bowel sounds no hepatosplenomegaly no guarding or rebound  Neurologically awake alert oriented cranial nerves 2-12 intact    Physical Exam    Additional Data:   Labs:    Results from last 7 days  Lab Units 18  0520  18  0620   WBC Thousand/uL 7 83  < > 7 10   HEMOGLOBIN g/dL 11 6*  < > 11 1*   HEMATOCRIT % 35 9*  < > 34 6*   PLATELETS Thousands/uL 184  < > 161   NEUTROS PCT %  --   --  72   LYMPHS PCT %  --   --  13*   LYMPHO PCT % 11*  < >  --    MONOS PCT %  --   --  15*   MONO PCT % 4  < >  --    EOS PCT % 0  < > 0   < > = values in this interval not displayed      Results from last 7 days  Lab Units  18  0620   POTASSIUM mmol/L  < > 4 2   CHLORIDE mmol/L  < > 103   CO2 mmol/L  < > 29   BUN mg/dL  < > 34*   CREATININE mg/dL  < > 0 67   CALCIUM mg/dL  < > 8 4   ALK PHOS U/L  --  62   ALT U/L  --  51   AST U/L  --  38   < > = values in this interval not displayed  Results from last 7 days  Lab Units 12/20/18  0620   INR  1 01       * I Have Reviewed All Lab Data Listed Above  * Additional Pertinent Lab Tests Reviewed: No New Labs Available For Today    Imaging:    Imaging Reports Reviewed Today Include:  None    Cultures:   Blood Culture:   Lab Results   Component Value Date    BLOODCX No Growth After 5 Days  12/19/2018    BLOODCX No Growth After 5 Days  12/19/2018    BLOODCX No Growth After 5 Days  12/17/2018    BLOODCX No Growth After 5 Days   12/17/2018     Urine Culture: No results found for: URINECX  Sputum Culture: No components found for: SPUTUMCX  Wound Culture: No results found for: WOUNDCULT    Last 24 Hours Medication List:     Current Facility-Administered Medications:  chlorhexidine 15 mL Swish & Spit Q12H [facility] Avanell Looney, CRNP   cholestyramine sugar free 4 g Oral BID Ward Serrano, PA-C   docusate sodium 100 mg Oral BID Almeta Bun, PA-C   fluticasone-vilanterol 1 puff Inhalation Daily Avanell Looney, CRNP   heparin (porcine) 5,000 Units Subcutaneous [facility] Avanell Looney, CRNP   insulin lispro 1-6 Units Subcutaneous Q6H [facility] Regan Zavaleta MD   ipratropium-albuterol 3 mL Nebulization Q6H Anoop Liu MD   loratadine 10 mg Oral Daily Avanell Looney, CRNP   methylPREDNISolone sodium succinate 40 mg Intravenous Q12H Walt To MD   pantoprazole 40 mg Intravenous Q24H [facility] Avanell Looney, CRNP   roflumilast 250 mcg Oral Daily Avanell Looney, CRNP   senna 1 tablet Oral HS Almeta Bun, PA-C   sertraline 50 mg Oral Daily Avanell Looney, CRNP   sodium chloride (PF) 3 mL Intravenous PRN Colton Tilley MD        Today, Patient Was Seen By: Regan Zavaleta MD    ** Please Note: Dragon 360 Dictation voice to text software may have been used in the creation of this document   **

## 2018-12-25 NOTE — ASSESSMENT & PLAN NOTE
Improved secondary to influenza A continue with aggressive pulmonary toilet will reassess in a m   To see if patient will be able to transition to a steroid taper

## 2018-12-26 VITALS
TEMPERATURE: 98.4 F | DIASTOLIC BLOOD PRESSURE: 64 MMHG | WEIGHT: 169.09 LBS | HEART RATE: 64 BPM | RESPIRATION RATE: 18 BRPM | BODY MASS INDEX: 24.21 KG/M2 | SYSTOLIC BLOOD PRESSURE: 110 MMHG | OXYGEN SATURATION: 95 % | HEIGHT: 70 IN

## 2018-12-26 LAB
GLUCOSE SERPL-MCNC: 109 MG/DL (ref 65–140)
GLUCOSE SERPL-MCNC: 133 MG/DL (ref 65–140)

## 2018-12-26 PROCEDURE — 94760 N-INVAS EAR/PLS OXIMETRY 1: CPT

## 2018-12-26 PROCEDURE — 82948 REAGENT STRIP/BLOOD GLUCOSE: CPT

## 2018-12-26 PROCEDURE — 99239 HOSP IP/OBS DSCHRG MGMT >30: CPT | Performed by: INTERNAL MEDICINE

## 2018-12-26 PROCEDURE — 94761 N-INVAS EAR/PLS OXIMETRY MLT: CPT

## 2018-12-26 PROCEDURE — 94640 AIRWAY INHALATION TREATMENT: CPT

## 2018-12-26 RX ORDER — SENNOSIDES 8.6 MG
1 TABLET ORAL
Qty: 120 EACH | Refills: 0 | Status: ON HOLD | OUTPATIENT
Start: 2018-12-26 | End: 2019-04-09

## 2018-12-26 RX ORDER — PREDNISONE 10 MG/1
TABLET ORAL
Qty: 63 TABLET | Refills: 0 | Status: SHIPPED | OUTPATIENT
Start: 2018-12-26 | End: 2019-01-10 | Stop reason: ALTCHOICE

## 2018-12-26 RX ORDER — IPRATROPIUM BROMIDE AND ALBUTEROL SULFATE 2.5; .5 MG/3ML; MG/3ML
3 SOLUTION RESPIRATORY (INHALATION)
Status: DISCONTINUED | OUTPATIENT
Start: 2018-12-26 | End: 2018-12-26 | Stop reason: HOSPADM

## 2018-12-26 RX ORDER — PANTOPRAZOLE SODIUM 40 MG/1
40 TABLET, DELAYED RELEASE ORAL
Status: DISCONTINUED | OUTPATIENT
Start: 2018-12-26 | End: 2018-12-26 | Stop reason: HOSPADM

## 2018-12-26 RX ADMIN — PANTOPRAZOLE SODIUM 40 MG: 40 TABLET, DELAYED RELEASE ORAL at 10:50

## 2018-12-26 RX ADMIN — LORATADINE 10 MG: 10 TABLET ORAL at 10:31

## 2018-12-26 RX ADMIN — ROFLUMILAST 250 MCG: 250 TABLET ORAL at 10:34

## 2018-12-26 RX ADMIN — CHOLESTYRAMINE 4 G: 4 POWDER, FOR SUSPENSION ORAL at 10:33

## 2018-12-26 RX ADMIN — HEPARIN SODIUM 5000 UNITS: 5000 INJECTION, SOLUTION INTRAVENOUS; SUBCUTANEOUS at 05:40

## 2018-12-26 RX ADMIN — IPRATROPIUM BROMIDE AND ALBUTEROL SULFATE 3 ML: 2.5; .5 SOLUTION RESPIRATORY (INHALATION) at 13:56

## 2018-12-26 RX ADMIN — FLUTICASONE FUROATE AND VILANTEROL TRIFENATATE 1 PUFF: 100; 25 POWDER RESPIRATORY (INHALATION) at 10:30

## 2018-12-26 RX ADMIN — SERTRALINE HYDROCHLORIDE 50 MG: 50 TABLET ORAL at 10:31

## 2018-12-26 RX ADMIN — METHYLPREDNISOLONE SODIUM SUCCINATE 40 MG: 40 INJECTION, POWDER, FOR SOLUTION INTRAMUSCULAR; INTRAVENOUS at 10:50

## 2018-12-26 RX ADMIN — DOCUSATE SODIUM 100 MG: 100 CAPSULE, LIQUID FILLED ORAL at 10:31

## 2018-12-26 RX ADMIN — CHLORHEXIDINE GLUCONATE 0.12% ORAL RINSE 15 ML: 1.2 LIQUID ORAL at 10:33

## 2018-12-26 RX ADMIN — IPRATROPIUM BROMIDE AND ALBUTEROL SULFATE 3 ML: 2.5; .5 SOLUTION RESPIRATORY (INHALATION) at 08:39

## 2018-12-26 NOTE — NURSING NOTE
1615:Discharge instructions reviewed with patient and wife, information about advanced directives and office on aging provided by case management  All parties verbalized understanding  Patient wheeled out to car by PCA; daughter provided transport  Belongings sent home with patient

## 2018-12-26 NOTE — DISCHARGE INSTR - AVS FIRST PAGE
At rest use your oxygen at 3-4 liters  With exertion increase to 4-5L depending on how you feel  Follow up with dr Bonifacio Boston next week

## 2018-12-26 NOTE — PROGRESS NOTES
The pantoprazole has / have been converted to Oral per [facility] IV-to-PO Auto-Conversion Protocol for Adults as approved by the Pharmacy and Therapeutics Committee  The patient met all eligible criteria:  3 Age = 25years old   2) Received at least one dose of the IV form   3) Receiving at least one other scheduled oral/enteral medication   4) Tolerating an oral/enteral diet   and did not have any exclusions:   1) Critical care patient   2) Active GI bleed (IF assessing H2RAs or PPIs)   3) Continuous tube feeding (IF assessing cipro, doxycycline, levofloxacin, minocycline, rifampin, or voriconazole)   4) Receiving PO vancomycin (IF assessing metronidazole)   5) Persistent nausea and/or vomiting   6) Ileus or gastrointestinal obstruction   7) Gabby/nasogastric tube set for continuous suction   8) Specific order not to automatically convert to PO (in the order's comments or if discussed in the most recent Infectious Disease or primary team's progress notes)

## 2018-12-27 ENCOUNTER — TRANSITIONAL CARE MANAGEMENT (OUTPATIENT)
Dept: INTERNAL MEDICINE CLINIC | Facility: CLINIC | Age: 68
End: 2018-12-27

## 2018-12-30 NOTE — DISCHARGE SUMMARY
Discharge Summary - [facility] Internal Medicine    Patient Information: Raisa Singleton 79 y o  male MRN: [id number]  Unit/Bed#: -01 Encounter: [id number]    Discharging Physician / Practitioner: Braulio Chinchilla MD  PCP: Stuart Logan MD  Admission Date: 12/19/2018  Discharge Date: 12/26/18    Reason for Admission:  Shortness of breath    Discharge Diagnoses:     Principal Problem:  ·   Acute on chronic respiratory failure with hypoxia and hypercapnia ([facility]  Secondary to influenza and severe COPD  Patient completed course of Tamiflu and is on tapering dose of steroids  He will follow up as an outpatient with Dr Ellis Moreno follow-up in the next 7-10 days  I have placed him on a very slow taper  Active Problems:  ·   Acute exacerbation of chronic obstructive pulmonary disease (COPD) ([facility] ):  Secondary to influenza and severe COPD to begin with oxygen dependent  Now with exertional component requiring increased dosing of his oxygen  ·   Influenza A:  Treated with Tamiflu now being treated for exacerbation of COPD    Tracheobronchitis  Resolved Problems:    * No resolved hospital problems  *      Consultations During Hospital Stay:  · Pulmonary    Procedures Performed:     · Chest xray:  Mild right basilar atelectasis  · Repeat x-ray shows improved right basilar atelectasis and small effusions    Significant Findings / Test Results:     · As above  · Discharging creatinine 0 68  · Discharging hemoglobin 12 9  · Discharging white count 9 63  · Discharging CO2 33    Incidental Findings:   · None     Test Results Pending at Discharge (will require follow up): · None     Outpatient Tests Requested:  · None     Complications:  None    Hospital Course:     Raisa Singleton is a 79 y o  male patient who originally presented to the hospital on 12/19/2018 due to shortness of breath  Patient presented to the emergency room after 1 weeks worth of the upper respiratory symptoms    He was noted to have a fever at that time  He had multiple sick contacts including his wife who was hospitalized for influenza  He was started on azithromycin discharged home  Patient returned to the emergency room with worsening respiratory distress unable to complete sentences  He required BiPAP for hypercarbic respiratory failure  He was tried off BiPAP refer to step-down for further management  Patient did test positive for influenza a  He completed Tamiflu during the course of hospital stay  His the hospital course was complicated by his exacerbation of COPD which required high-dose steroids now being weaned slowly  Patient has a new exertional component to his oxygen use which will likely improve over time  We therefore have advised him to use elevated oxygen levels during exertion and his baseline oxygen at rest   Patient will taper his steroids down slowly and follow up with Raul Walters in the next 7 days    Condition at Discharge: fair     Discharge Day Visit / Exam:     Subjective:  Patient states feeling about 90% better  Would like to go home  Still with some exertional dyspnea requiring high-dose oxygen as evidence by walking desaturation  Vitals: Blood Pressure: 110/64 (12/26/18 0629)  Pulse: 64 (12/26/18 0629)  Temperature: 98 4 °F (36 9 °C) (12/26/18 0629)  Temp Source: Oral (12/26/18 0629)  Respirations: 18 (12/26/18 0629)  Height: 5' 10" (177 8 cm) (12/19/18 9879)  Weight - Scale: 76 7 kg (169 lb 1 5 oz) (12/26/18 0540)  SpO2: 95 % (12/26/18 1357)  Exam:   Physical Exam    General well-developed reasonably nourished male no acute distress he is thin with slightly decreased BMI  He is normocephalic atraumatic pupils equal round and reactive to light extraocular muscles intact mucous membranes moist neck is supple there is no JVD no lymph nodes no carotid bruits chest is decreased with very poor air entry there is very little air movement    He has no rhonchi rales or wheezes today otherwise normocephalic atraumatic pupils equal round and reactive to light extraocular muscles intact mucous membranes moist neck is supple there is no JVD no lymph nodes no carotid bruits chest is decreased with poor air entry there is no rhonchi rales or wheezes  Cardiovascular regular rate rhythm positive S1 and S2 no S3-S4 murmur or gallop  Abdomen is soft nontender nondistended with positive bowel sounds no hepatosplenomegaly no guarding or rebound  Neurologically the patient is awake alert oriented cranial nerves 2-12 intact    Discharge instructions/Information to patient and family:   See after visit summary for information provided to patient and family  Provisions for Follow-Up Care:  See after visit summary for information related to follow-up care and any pertinent home health orders  Disposition:     Home    For Discharges to [facility] SNF:   · Not Applicable to this Patient - Not Applicable to this Patient    Planned Readmission:  None     Discharge Statement:  I spent 35 minutes discharging the patient  This time was spent on the day of discharge  I had direct contact with the patient on the day of discharge  Greater than 50% of the total time was spent examining patient, answering all patient questions, arranging and discussing plan of care with patient as well as directly providing post-discharge instructions  Additional time then spent on discharge activities  Discharge Medications:  See after visit summary for reconciled discharge medications provided to patient and family        ** Please Note: This note has been constructed using a voice recognition system **

## 2019-01-02 ENCOUNTER — OFFICE VISIT (OUTPATIENT)
Dept: INTERNAL MEDICINE CLINIC | Facility: CLINIC | Age: 69
End: 2019-01-02
Payer: MEDICARE

## 2019-01-02 VITALS
HEART RATE: 88 BPM | SYSTOLIC BLOOD PRESSURE: 120 MMHG | HEIGHT: 70 IN | BODY MASS INDEX: 24.79 KG/M2 | OXYGEN SATURATION: 93 % | WEIGHT: 173.2 LBS | RESPIRATION RATE: 18 BRPM | DIASTOLIC BLOOD PRESSURE: 60 MMHG

## 2019-01-02 DIAGNOSIS — J96.21 ACUTE ON CHRONIC RESPIRATORY FAILURE WITH HYPOXIA AND HYPERCAPNIA (HCC): ICD-10-CM

## 2019-01-02 DIAGNOSIS — Z23 NEED FOR VACCINATION: ICD-10-CM

## 2019-01-02 DIAGNOSIS — J43.9 PULMONARY EMPHYSEMA, UNSPECIFIED EMPHYSEMA TYPE (HCC): ICD-10-CM

## 2019-01-02 DIAGNOSIS — J44.1 ACUTE EXACERBATION OF CHRONIC OBSTRUCTIVE PULMONARY DISEASE (COPD) (HCC): Primary | ICD-10-CM

## 2019-01-02 DIAGNOSIS — J96.22 ACUTE ON CHRONIC RESPIRATORY FAILURE WITH HYPOXIA AND HYPERCAPNIA (HCC): ICD-10-CM

## 2019-01-02 PROCEDURE — 90662 IIV NO PRSV INCREASED AG IM: CPT

## 2019-01-02 PROCEDURE — G0008 ADMIN INFLUENZA VIRUS VAC: HCPCS

## 2019-01-02 PROCEDURE — 99496 TRANSJ CARE MGMT HIGH F2F 7D: CPT | Performed by: PHYSICIAN ASSISTANT

## 2019-01-02 NOTE — PROGRESS NOTES
Assessment/Plan:   Patient Instructions   Patient is to continue all current medications  Patient's wife is to call the Pulmonary office and have an appointment made as soon as possible for both this patient and the wife to be seen post hospitalizations  Patient is to continue his steroid taper, I did review this with them and they understand correctly the taper and proper dosage  Patient is to continue his oxygen at 3 liters/minute when he is at rest, 4 liters/minute when he is exerting  Patient did receive flu immunization today  He refused pneumonia immunization at this time, but will return in 1 month for initiation of pneumonia immunization  Schedule follow-up in 2 months  Due to patient's severe COPD he is at high risk for readmissions  BMI Counseling: Body mass index is 24 85 kg/m²  Discussed the patient's BMI with him  The BMI is in the acceptable range  Return in about 4 weeks (around 1/30/2019) for pneumonia immunization, 2 months for general follow up  Diagnoses and all orders for this visit:    Acute exacerbation of chronic obstructive pulmonary disease (COPD) ([address] )    Pulmonary emphysema, unspecified emphysema type ([address] )    Acute on chronic respiratory failure with hypoxia and hypercapnia ([address] )    Need for vaccination  -     influenza vaccine, [id number], high-dose, PF 0 5 mL, for patients 65 yr+ (FLUZONE HIGH-DOSE)          Subjective:      Patient ID: Dane Roy is a 79 y o  male  Hospital follow-up/transition of care    80-year-old male with known severe COPD hospitalized from 12/19 to 12/20 6/18 due to acute on chronic respiratory failure with hypoxia and hypercapnia, secondary to influenza a and severe exacerbation of COPD  Patient is oxygen dependent  Reported that he was treated with Tamiflu during hospitalization  Patient was hospitalized in the intensive care unit for 3 days    Patient is currently on a slow steroid taper having started at 60 mg on 12/27/2018 and reducing by 10 mg every 3 days  His wife was present understands the taper and is following it  They are to have pulmonary follow-up, and wife will make an appointment  Wife was under the impression the hospital made this appointment for them  Review of the EMR indicates pulmonary follow-up has not been made  Patient reports he feels much better at this time  He is in agreement to have flu immunization, and start pneumonia immunization but does not want to start pneumonia immunization today  Patient was also discharged on oxygen to be used at 3 liters/minute while at rest, 4 liters/minute when exerting  Patient and wife understand this  Patient's laboratory data and imaging study reports have been reviewed  TCM Call (since 12/2/2018)     Date and time call was made  12/27/2018 12:20 PM    Hospital care reviewed  Records reviewed    Patient was hospitial[facility] at  [facility]    Date of Admission  12/19/18    Date of discharge  12/26/18    Diagnosis  respiratory failure    Disposition  Home    Current Symptoms  None      TCM Call (since 12/2/2018)     Post hospital issues  None    Should patient be enrolled in anticoag monitoring? No    Scheduled for follow up? Yes    Did you obtain your prescribed medications  Yes    Do you need help managing your prescriptions or medications  No    Is transportation to your appointment needed  No    Living Arrangements  Family members; Children    Support System  Spouse;  Family    The type of support provided  Emotional; Financial; Physical    Do you have social support  Yes, as much as I need    Are you recieving any outpatient services  No    Are you recieving home care services  No    Are you using any community resources  No    Current waiver services  No    Have you fallen in the last 12 months  No    Interperter language line needed  No    Counseling  Patient              ALLERGIES:  Allergies   Allergen Reactions    Penicillins Hives Passed out    Codeine        CURRENT MEDICATIONS:    Current Outpatient Prescriptions:     albuterol (2 5 mg/3 mL) 0 083 % nebulizer solution, Take 3 mL (2 5 mg total) by nebulization every 4 (four) hours as needed for wheezing, Disp: 1080 mL, Rfl: 3    albuterol (PROVENTIL HFA,VENTOLIN HFA) 90 mcg/act inhaler, Inhale 2 puffs every 6 (six) hours as needed for wheezing, Disp: , Rfl:     cholestyramine (QUESTRAN) 4 g packet, Take 1 packet (4 g total) by mouth 2 (two) times a day with meals, Disp: 60 each, Rfl: 2    fluticasone-vilanterol (BREO ELLIPTA) 100-25 mcg/inh inhaler, Inhale 1 puff daily Rinse mouth after use , Disp: 1 Inhaler, Rfl: 3    loratadine (CLARITIN) 10 mg tablet, Take 10 mg by mouth daily, Disp: , Rfl:     predniSONE 10 mg tablet, 60 mg by mouth daily for 3 days, Then decrease by 10 mg every three days until you reach 10 mg , then stop, Disp: 63 tablet, Rfl: 0    ranitidine (ZANTAC) 150 mg tablet, Take 1 tablet (150 mg total) by mouth daily at bedtime (Patient not taking: Reported on 12/13/2018 ), Disp: 30 tablet, Rfl: 5    roflumilast (DALIRESP) 250 MCG tablet, Take 1 tablet (250 mcg total) by mouth daily, Disp: 30 tablet, Rfl: 5    senna (SENOKOT) 8 6 mg, Take 1 tablet (8 6 mg total) by mouth daily at bedtime, Disp: 120 each, Rfl: 0    sertraline (ZOLOFT) 50 mg tablet, Take 1 tablet (50 mg total) by mouth daily, Disp: 30 tablet, Rfl: 5    tiotropium (SPIRIVA RESPIMAT) 2 5 MCG/ACT AERS inhaler, Inhale 2 puffs daily, Disp: 1 Inhaler, Rfl: 3    ACTIVE PROBLEM LIST:  Patient Active Problem List   Diagnosis    Pulmonary emphysema (HCC)    Gastroesophageal reflux disease without esophagitis    Anxiety    Simple chronic bronchitis (HCC)    Shortness of breath on exertion    Abnormal ECG    PVCs (premature ventricular contractions)    Allergic rhinitis    Functional diarrhea    Diastolic dysfunction    Pulmonary hypertension (HCC)    Acute exacerbation of chronic obstructive pulmonary disease (COPD) ([facility] )    Gastroesophageal reflux disease    Acute on chronic respiratory failure with hypoxia and hypercapnia (HCC)    Influenza A    Tracheobronchitis       PAST MEDICAL HISTORY:  Past Medical History:   Diagnosis Date    Centrilobular emphysema ([facility] )     COPD (chronic obstructive pulmonary disease) (HCC)     Hypoglycemia     On home oxygen therapy     Pneumonia     Pulmonary hypertension (HCC)     Respiratory failure (HCC)     SOB (shortness of breath)        PAST SURGICAL HISTORY:  Past Surgical History:   Procedure Laterality Date    CATARACT EXTRACTION      CHOLECYSTECTOMY      hernia    EYE SURGERY      [place] ESOPHAGOGASTRODUODENOSCOPY TRANSORAL DIAGNOSTIC N/A 9/19/2018    Procedure: ESOPHAGOGASTRODUODENOSCOPY (EGD); Surgeon: Gerson Sanford MD;  Location: [facility]; Service: Gastroenterology    [place] REPAIR ING HERNIA,5+Y/O,REDUCIBL Left 5/23/2018    Procedure: OPEN INGUINAL HERNIA REPAIR WITH MESH;  Surgeon: Bossman Martínez MD;  Location: [facility];  Service: General       FAMILY HISTORY:  Family History   Problem Relation Age of Onset    Diabetes Mother     Hypertension Mother     Hyperlipidemia Mother        SOCIAL HISTORY:  Social History     Social History    Marital status: /Civil Union     Spouse name: janet     Number of children: 11    Years of education: N/A     Occupational History    retired       Social History Main Topics    Smoking status: Former Smoker     Years: 50 00     Types: Cigarettes     Quit date: 2/27/2013    Smokeless tobacco: Never Used    Alcohol use 0 6 oz/week     1 Glasses of wine per week      Comment: socialy    Drug use: No    Sexual activity: No     Other Topics Concern    Not on file     Social History Narrative        Retired       Review of Systems   Constitutional: Negative for activity change, chills, fatigue and fever     HENT: Negative for congestion, ear pain, rhinorrhea, sinus pressure and sore throat  Eyes: Negative for discharge  Respiratory: Positive for cough, shortness of breath and wheezing  Negative for chest tightness  Cardiovascular: Negative for chest pain, palpitations and leg swelling  Gastrointestinal: Negative for abdominal pain, constipation, diarrhea, nausea and vomiting  Genitourinary: Negative for difficulty urinating  Musculoskeletal: Negative for arthralgias and myalgias  Skin: Negative for rash  Allergic/Immunologic: Negative for immunocompromised state  Neurological: Negative for dizziness, syncope, weakness, light-headedness and headaches  Hematological: Negative for adenopathy  Bruises/bleeds easily  Psychiatric/Behavioral: Negative for dysphoric mood  The patient is not nervous/anxious  Objective:  Vitals:    01/02/19 1404 01/02/19 1421 01/02/19 1423   BP: (!) 168/110 120/60    BP Location: Left arm Left arm    Patient Position: Sitting Sitting    Cuff Size: Adult     Pulse: (!) 110  88   Resp: 18     SpO2: 93%     Weight: 78 6 kg (173 lb 3 2 oz)     Height: 5' 10" (1 778 m)       Body mass index is 24 85 kg/m²  Physical Exam   Constitutional: He is oriented to person, place, and time  He appears well-developed and well-nourished  No distress  Somewhat chronically ill-appearing 65-year-old male, appearing older than stated age  Looks comfortable at this time  Wearing a surgical mask  Nasal oxygen in place  HENT:   HEENT-unremarkable, moist mucous membranes   Neck: Neck supple  No JVD present  Carotid bruit is not present  Cardiovascular: Normal rate, regular rhythm and normal heart sounds  Pulmonary/Chest: Effort normal  No respiratory distress  He has no wheezes  Markedly decreased breath sounds throughout, however today you can hear some air movement  Rhonchi present left base that does clear with coughing  Hyper resonant to percussion  Abdominal: Soft  Bowel sounds are normal    Musculoskeletal: He exhibits no edema  Lymphadenopathy:     He has no cervical adenopathy  Neurological: He is alert and oriented to person, place, and time  Skin: Skin is warm and dry  Psychiatric: He has a normal mood and affect  His behavior is normal    Nursing note and vitals reviewed  RESULTS:        This note was created with voice recognition software  Phonic, grammatical and spelling errors may be present within the note as a result

## 2019-01-02 NOTE — PATIENT INSTRUCTIONS
Patient is to continue all current medications  Patient's wife is to call the Pulmonary office and have an appointment made as soon as possible for both this patient and the wife to be seen post hospitalizations  Patient is to continue his steroid taper, I did review this with them and they understand correctly the taper and proper dosage  Patient is to continue his oxygen at 3 liters/minute when he is at rest, 4 liters/minute when he is exerting  Patient did receive flu immunization today  He refused pneumonia immunization at this time, but will return in 1 month for initiation of pneumonia immunization  Schedule follow-up in 2 months

## 2019-01-09 DIAGNOSIS — K59.1 FUNCTIONAL DIARRHEA: ICD-10-CM

## 2019-01-09 RX ORDER — CHOLESTYRAMINE 4 G/9G
1 POWDER, FOR SUSPENSION ORAL 2 TIMES DAILY WITH MEALS
Qty: 60 PACKET | Refills: 2 | Status: SHIPPED | OUTPATIENT
Start: 2019-01-09 | End: 2019-05-22

## 2019-01-09 NOTE — TELEPHONE ENCOUNTER
Patient's wife called to request refill of cholestyramine 4 g packet  Would like to have either the orange or pink   Please call [facility] at [phone]

## 2019-01-10 ENCOUNTER — OFFICE VISIT (OUTPATIENT)
Dept: PULMONOLOGY | Facility: CLINIC | Age: 69
End: 2019-01-10
Payer: MEDICARE

## 2019-01-10 VITALS
OXYGEN SATURATION: 94 % | BODY MASS INDEX: 24.2 KG/M2 | SYSTOLIC BLOOD PRESSURE: 120 MMHG | HEIGHT: 70 IN | HEART RATE: 108 BPM | DIASTOLIC BLOOD PRESSURE: 80 MMHG | WEIGHT: 169 LBS

## 2019-01-10 DIAGNOSIS — J41.0 SIMPLE CHRONIC BRONCHITIS (HCC): Primary | ICD-10-CM

## 2019-01-10 DIAGNOSIS — J96.12 CHRONIC RESPIRATORY FAILURE WITH HYPOXIA AND HYPERCAPNIA (HCC): ICD-10-CM

## 2019-01-10 DIAGNOSIS — J43.2 CENTRILOBULAR EMPHYSEMA (HCC): ICD-10-CM

## 2019-01-10 DIAGNOSIS — J96.11 CHRONIC RESPIRATORY FAILURE WITH HYPOXIA AND HYPERCAPNIA (HCC): ICD-10-CM

## 2019-01-10 PROCEDURE — 99214 OFFICE O/P EST MOD 30 MIN: CPT | Performed by: PHYSICIAN ASSISTANT

## 2019-01-10 NOTE — PROGRESS NOTES
Assessment:    1  Simple chronic bronchitis ([address] )     2  Centrilobular emphysema ([address] )     3  Chronic respiratory failure with hypoxia and hypercapnia (HCC)           Plan:     Patient is here today for hospital follow-up  He had been feeling significantly better, over the past couple days has had increased nasal congestion and cough due to his granddaughter being sick  No wheezing on exam today, he will continue with Breo, Spiriva, albuterol 4 times per day, would use it regularly several times a day over the next few days given increased symptoms  He will let us know if no improvement or any worsening of his symptoms  Patient needs a replacement mattress for his hospital bed  Otherwise follow-up in 3 months or sooner if necessary  He will be due for screening CT scan in May 2019  Subjective:     Patient ID: Maria G Calhoun is a 79 y o  male  Chief Complaint:  Patient is a 65-year-old male with greater than 50 pack-year smoking history who quit about 5 years ago with history of COPD on bronchodilators as well as home oxygen and Daliresp  He also has moderate to severe pulmonary hypertension  He was recently admitted to the hospital with a COPD exacerbation due to influenza  He required a stay in the ICU on BiPAP for several days  He is here today for follow-up  He has been feeling significantly better  Over the past couple days he does have an increased cough and upper respiratory symptoms due to his granddaughter being sick at home  The following portions of the patient's history were reviewed in this encounter and updated as appropriate:   Review of Systems   Constitutional: Negative  HENT: Positive for rhinorrhea  Respiratory: Positive for cough and shortness of breath  Cardiovascular: Negative  Gastrointestinal: Negative  Genitourinary: Negative  Musculoskeletal: Negative  Skin: Negative  Allergic/Immunologic: Negative  Neurological: Negative  Psychiatric/Behavioral: Negative  Objective:  /80   Pulse (!) 108   Ht 5' 10" (1 778 m)   Wt 76 7 kg (169 lb)   SpO2 94% Comment: on 3 liters o2  BMI 24 25 kg/m²   Physical Exam   Constitutional: He is oriented to person, place, and time  He appears well-developed and well-nourished  No distress  HENT:   Mouth/Throat: Oropharynx is clear and moist    Eyes: Pupils are equal, round, and reactive to light  Cardiovascular: Normal rate and regular rhythm  No murmur heard  Pulmonary/Chest: Effort normal  No respiratory distress  He has decreased breath sounds  He has no wheezes  He has no rhonchi  He has no rales  Abdominal: Soft  Musculoskeletal: Normal range of motion  Neurological: He is alert and oriented to person, place, and time  Skin: Skin is warm and dry  Psychiatric: He has a normal mood and affect   His behavior is normal  Judgment and thought content normal

## 2019-01-11 DIAGNOSIS — J96.12 CHRONIC RESPIRATORY FAILURE WITH HYPOXIA AND HYPERCAPNIA (HCC): Primary | ICD-10-CM

## 2019-01-11 DIAGNOSIS — J96.11 CHRONIC RESPIRATORY FAILURE WITH HYPOXIA AND HYPERCAPNIA (HCC): Primary | ICD-10-CM

## 2019-01-17 ENCOUNTER — OFFICE VISIT (OUTPATIENT)
Dept: SURGERY | Facility: CLINIC | Age: 69
End: 2019-01-17
Payer: MEDICARE

## 2019-01-17 VITALS
HEART RATE: 98 BPM | BODY MASS INDEX: 24.91 KG/M2 | WEIGHT: 174.02 LBS | HEIGHT: 70 IN | DIASTOLIC BLOOD PRESSURE: 60 MMHG | TEMPERATURE: 98.1 F | SYSTOLIC BLOOD PRESSURE: 130 MMHG

## 2019-01-17 DIAGNOSIS — R10.32 LEFT GROIN PAIN: Primary | ICD-10-CM

## 2019-01-17 PROCEDURE — 99213 OFFICE O/P EST LOW 20 MIN: CPT | Performed by: SURGERY

## 2019-01-17 NOTE — PROGRESS NOTES
Follow Up - General Surgery   Brii Burns 79 y o  male MRN: [id number]  Unit/Bed#:  Encounter: [id number]    Assessment/Plan     Assessment:  Left groin pain, most likely musculoskeletal  Plan:  No evidence of recurrence of the left inguinal hernia at this time  The patient will contact our office with recurrence of the symptoms  History of Present Illness     HPI:  Brii Burns is a 79 y o  male who presents to my office complaining of left groin pain  The patient is well known to me from status post open repair of left inguinal hernia back in July of 2019  The patient noticed mild tenderness on the left groin, at the site of the incision on and off for the past month  The pain exacerbated with walking and going up the steps  Denies having any bulge, pain radiated to the left testicle or any other constitutional symptoms  Review of Systems   All other systems reviewed and are negative  Historical Information   Past Medical History:   Diagnosis Date    [facility]ular [facility]     COPD (chronic obstructive pulmonary disease) ([facility] )     Hypoglycemia     On home oxygen therapy     Pneumonia     Pulmonary hypertension ([facility] )     Respiratory failure (HCC)     SOB (shortness of breath)      Past Surgical History:   Procedure Laterality Date    CATARACT EXTRACTION      CHOLECYSTECTOMY      hernia    EYE SURGERY      [place] ESOPHAGOGASTRODUODENOSCOPY TRANSORAL DIAGNOSTIC N/A 9/19/2018    Procedure: ESOPHAGOGASTRODUODENOSCOPY (EGD); Surgeon: Milo Aguirre MD;  Location: [facility];   Service: Gastroenterology    [place] REPAIR [address] HERNIA,5+Y/O,REDUCIBL Left 5/23/2018    Procedure: OPEN INGUINAL HERNIA REPAIR WITH MESH;  Surgeon: Marcela Chavez MD;  Location: [facility];  Service: General     Social History   History   Alcohol Use    0 6 oz/week    1 Glasses of wine per week     Comment: socialy     History   Drug Use No     History   Smoking Status    Former Smoker    Years: 50 00    Types: Cigarettes    Quit date: 2/27/2013   Smokeless Tobacco    Never Used     Family History: non-contributory    Meds/Allergies   all medications and allergies reviewed     Current Outpatient Prescriptions:     albuterol (2 5 mg/3 mL) 0 083 % nebulizer solution, Take 3 mL (2 5 mg total) by nebulization every 4 (four) hours as needed for wheezing, Disp: 1080 mL, Rfl: 3    albuterol (PROVENTIL HFA,VENTOLIN HFA) 90 mcg/act inhaler, Inhale 2 puffs every 6 (six) hours as needed for wheezing, Disp: , Rfl:     cholestyramine (QUESTRAN) 4 g packet, Take 1 packet (4 g total) by mouth 2 (two) times a day with meals, Disp: 60 packet, Rfl: 2    fluticasone-vilanterol (BREO ELLIPTA) 100-25 mcg/inh inhaler, Inhale 1 puff daily Rinse mouth after use , Disp: 1 Inhaler, Rfl: 3    loratadine (CLARITIN) 10 mg tablet, Take 10 mg by mouth daily, Disp: , Rfl:     ranitidine (ZANTAC) 150 mg tablet, Take 1 tablet (150 mg total) by mouth daily at bedtime, Disp: 30 tablet, Rfl: 5    roflumilast (DALIRESP) 250 MCG tablet, Take 1 tablet (250 mcg total) by mouth daily, Disp: 30 tablet, Rfl: 5    senna (SENOKOT) 8 6 mg, Take 1 tablet (8 6 mg total) by mouth daily at bedtime, Disp: 120 each, Rfl: 0    sertraline (ZOLOFT) 50 mg tablet, Take 1 tablet (50 mg total) by mouth daily, Disp: 30 tablet, Rfl: 5    tiotropium (SPIRIVA RESPIMAT) 2 5 MCG/ACT AERS inhaler, Inhale 2 puffs daily, Disp: 1 Inhaler, Rfl: 3  Allergies   Allergen Reactions    Penicillins Hives     Passed out    Codeine        Objective     Current Vitals:   Blood Pressure: 130/60 (01/17/19 1120)  Pulse: 98 (01/17/19 1120)  Temperature: 98 1 °F (36 7 °C) (01/17/19 1120)  Temp Source: Tympanic (01/17/19 1120)  Height: 5' 10" (177 8 cm) (01/17/19 1120)  Weight - Scale: 78 9 kg (174 lb 0 3 oz) (01/17/19 1120)      Invasive Devices     Peripheral Intravenous Line            Peripheral IV 10/10/18 Right Antecubital 98 days                Physical Exam Constitutional: He is oriented to person, place, and time  He appears well-developed and well-nourished  No distress  The patient uses oxygen at home  HENT:   Head: Normocephalic  Mouth/Throat: No oropharyngeal exudate  Eyes: Pupils are equal, round, and reactive to light  No scleral icterus  Neck: Normal range of motion  Cardiovascular: Normal rate and regular rhythm  No murmur heard  Pulmonary/Chest: Effort normal and breath sounds normal  No respiratory distress  Abdominal: Soft  He exhibits no mass  There is no tenderness  Genitourinary:   Genitourinary Comments: There is no tenderness over the left groin or evidence of recurrence of the hernia  Musculoskeletal: He exhibits no edema or tenderness  Lymphadenopathy:     He has no cervical adenopathy  Neurological: He is alert and oriented to person, place, and time  No cranial nerve deficit  Skin: No rash noted  No erythema  Psychiatric: He has a normal mood and affect   His behavior is normal

## 2019-01-18 ENCOUNTER — TELEPHONE (OUTPATIENT)
Dept: CARDIOLOGY CLINIC | Facility: CLINIC | Age: 69
End: 2019-01-18

## 2019-01-18 DIAGNOSIS — I27.20 PROGRESSIVE PULMONARY HYPERTENSION (HCC): Primary | ICD-10-CM

## 2019-01-18 NOTE — TELEPHONE ENCOUNTER
Received a message from [facility] about a [facility] w/ swan placement as per Dr Tessy Hayden  S/w Dr Tessy Hayden, he would like procedure to be scheduled in [facility] in 2 weeks   S/w pt's wife, paperwork faxed to [facility]

## 2019-01-25 ENCOUNTER — APPOINTMENT (OUTPATIENT)
Dept: LAB | Facility: HOSPITAL | Age: 69
DRG: 189 | End: 2019-01-25
Payer: MEDICARE

## 2019-01-25 DIAGNOSIS — I27.20 PROGRESSIVE PULMONARY HYPERTENSION (HCC): ICD-10-CM

## 2019-01-25 LAB
ALBUMIN SERPL BCP-MCNC: 3.5 G/DL (ref 3.5–5)
ALP SERPL-CCNC: 86 U/L (ref 46–116)
ALT SERPL W P-5'-P-CCNC: 17 U/L (ref 12–78)
ANION GAP SERPL CALCULATED.3IONS-SCNC: 8 MMOL/L (ref 4–13)
AST SERPL W P-5'-P-CCNC: 18 U/L (ref 5–45)
BASOPHILS # BLD AUTO: 0.03 THOUSANDS/ΜL (ref 0–0.1)
BASOPHILS NFR BLD AUTO: 0 % (ref 0–1)
BILIRUB SERPL-MCNC: 0.6 MG/DL (ref 0.2–1)
BUN SERPL-MCNC: 19 MG/DL (ref 5–25)
CALCIUM SERPL-MCNC: 9 MG/DL (ref 8.3–10.1)
CHLORIDE SERPL-SCNC: 101 MMOL/L (ref 100–108)
CO2 SERPL-SCNC: 32 MMOL/L (ref 21–32)
CREAT SERPL-MCNC: 0.98 MG/DL (ref 0.6–1.3)
EOSINOPHIL # BLD AUTO: 0.07 THOUSAND/ΜL (ref 0–0.61)
EOSINOPHIL NFR BLD AUTO: 1 % (ref 0–6)
ERYTHROCYTE [DISTWIDTH] IN BLOOD BY AUTOMATED COUNT: 15.1 % (ref 11.6–15.1)
GFR SERPL CREATININE-BSD FRML MDRD: 80 ML/MIN/1.73SQ M
GLUCOSE SERPL-MCNC: 134 MG/DL (ref 65–140)
HCT VFR BLD AUTO: 38.6 % (ref 36.5–49.3)
HGB BLD-MCNC: 12.3 G/DL (ref 12–17)
IMM GRANULOCYTES # BLD AUTO: 0.04 THOUSAND/UL (ref 0–0.2)
IMM GRANULOCYTES NFR BLD AUTO: 1 % (ref 0–2)
LYMPHOCYTES # BLD AUTO: 2 THOUSANDS/ΜL (ref 0.6–4.47)
LYMPHOCYTES NFR BLD AUTO: 29 % (ref 14–44)
MCH RBC QN AUTO: 30.2 PG (ref 26.8–34.3)
MCHC RBC AUTO-ENTMCNC: 31.9 G/DL (ref 31.4–37.4)
MCV RBC AUTO: 95 FL (ref 82–98)
MONOCYTES # BLD AUTO: 1.05 THOUSAND/ΜL (ref 0.17–1.22)
MONOCYTES NFR BLD AUTO: 15 % (ref 4–12)
NEUTROPHILS # BLD AUTO: 3.78 THOUSANDS/ΜL (ref 1.85–7.62)
NEUTS SEG NFR BLD AUTO: 54 % (ref 43–75)
NRBC BLD AUTO-RTO: 0 /100 WBCS
PLATELET # BLD AUTO: 240 THOUSANDS/UL (ref 149–390)
PMV BLD AUTO: 9 FL (ref 8.9–12.7)
POTASSIUM SERPL-SCNC: 4.3 MMOL/L (ref 3.5–5.3)
PROT SERPL-MCNC: 7.3 G/DL (ref 6.4–8.2)
RBC # BLD AUTO: 4.07 MILLION/UL (ref 3.88–5.62)
SODIUM SERPL-SCNC: 141 MMOL/L (ref 136–145)
WBC # BLD AUTO: 6.97 THOUSAND/UL (ref 4.31–10.16)

## 2019-01-25 PROCEDURE — 85025 COMPLETE CBC W/AUTO DIFF WBC: CPT

## 2019-01-25 PROCEDURE — 80053 COMPREHEN METABOLIC PANEL: CPT

## 2019-01-25 PROCEDURE — 36415 COLL VENOUS BLD VENIPUNCTURE: CPT

## 2019-01-27 ENCOUNTER — APPOINTMENT (EMERGENCY)
Dept: CT IMAGING | Facility: HOSPITAL | Age: 69
DRG: 189 | End: 2019-01-27
Payer: MEDICARE

## 2019-01-27 ENCOUNTER — APPOINTMENT (EMERGENCY)
Dept: RADIOLOGY | Facility: HOSPITAL | Age: 69
DRG: 189 | End: 2019-01-27
Payer: MEDICARE

## 2019-01-27 ENCOUNTER — HOSPITAL ENCOUNTER (INPATIENT)
Facility: HOSPITAL | Age: 69
LOS: 5 days | Discharge: HOME | DRG: 189 | End: 2019-02-01
Attending: EMERGENCY MEDICINE | Admitting: STUDENT IN AN ORGANIZED HEALTH CARE EDUCATION/TRAINING PROGRAM
Payer: MEDICARE

## 2019-01-27 DIAGNOSIS — J96.00 ACUTE RESPIRATORY FAILURE (HCC): ICD-10-CM

## 2019-01-27 DIAGNOSIS — J18.9 HCAP (HEALTHCARE-ASSOCIATED PNEUMONIA): Primary | ICD-10-CM

## 2019-01-27 DIAGNOSIS — J44.1 ACUTE EXACERBATION OF CHRONIC OBSTRUCTIVE PULMONARY DISEASE (COPD) (HCC): ICD-10-CM

## 2019-01-27 LAB
ALBUMIN SERPL BCP-MCNC: 3.5 G/DL (ref 3.5–5)
ALP SERPL-CCNC: 71 U/L (ref 46–116)
ALT SERPL W P-5'-P-CCNC: 20 U/L (ref 12–78)
ANION GAP SERPL CALCULATED.3IONS-SCNC: 9 MMOL/L (ref 4–13)
APTT PPP: 31 SECONDS (ref 26–38)
AST SERPL W P-5'-P-CCNC: 26 U/L (ref 5–45)
BASOPHILS # BLD AUTO: 0.03 THOUSANDS/ΜL (ref 0–0.1)
BASOPHILS NFR BLD AUTO: 0 % (ref 0–1)
BILIRUB DIRECT SERPL-MCNC: 0.22 MG/DL (ref 0–0.2)
BILIRUB SERPL-MCNC: 1 MG/DL (ref 0.2–1)
BUN SERPL-MCNC: 27 MG/DL (ref 5–25)
CALCIUM SERPL-MCNC: 8.3 MG/DL (ref 8.3–10.1)
CHLORIDE SERPL-SCNC: 101 MMOL/L (ref 100–108)
CO2 SERPL-SCNC: 30 MMOL/L (ref 21–32)
CREAT SERPL-MCNC: 0.92 MG/DL (ref 0.6–1.3)
EOSINOPHIL # BLD AUTO: 0.01 THOUSAND/ΜL (ref 0–0.61)
EOSINOPHIL NFR BLD AUTO: 0 % (ref 0–6)
ERYTHROCYTE [DISTWIDTH] IN BLOOD BY AUTOMATED COUNT: 15 % (ref 11.6–15.1)
GFR SERPL CREATININE-BSD FRML MDRD: 86 ML/MIN/1.73SQ M
GLUCOSE SERPL-MCNC: 155 MG/DL (ref 65–140)
HCT VFR BLD AUTO: 38.4 % (ref 36.5–49.3)
HGB BLD-MCNC: 12.3 G/DL (ref 12–17)
IMM GRANULOCYTES # BLD AUTO: 0.04 THOUSAND/UL (ref 0–0.2)
IMM GRANULOCYTES NFR BLD AUTO: 1 % (ref 0–2)
INR PPP: 1.03 (ref 0.86–1.17)
LACTATE SERPL-SCNC: 1.4 MMOL/L (ref 0.5–2)
LYMPHOCYTES # BLD AUTO: 1.28 THOUSANDS/ΜL (ref 0.6–4.47)
LYMPHOCYTES NFR BLD AUTO: 17 % (ref 14–44)
MCH RBC QN AUTO: 30 PG (ref 26.8–34.3)
MCHC RBC AUTO-ENTMCNC: 32 G/DL (ref 31.4–37.4)
MCV RBC AUTO: 94 FL (ref 82–98)
MONOCYTES # BLD AUTO: 0.73 THOUSAND/ΜL (ref 0.17–1.22)
MONOCYTES NFR BLD AUTO: 10 % (ref 4–12)
NEUTROPHILS # BLD AUTO: 5.53 THOUSANDS/ΜL (ref 1.85–7.62)
NEUTS SEG NFR BLD AUTO: 72 % (ref 43–75)
NRBC BLD AUTO-RTO: 0 /100 WBCS
NT-PROBNP SERPL-MCNC: 41 PG/ML
PLATELET # BLD AUTO: 238 THOUSANDS/UL (ref 149–390)
PMV BLD AUTO: 9.5 FL (ref 8.9–12.7)
POTASSIUM SERPL-SCNC: 4.3 MMOL/L (ref 3.5–5.3)
PROT SERPL-MCNC: 7.6 G/DL (ref 6.4–8.2)
PROTHROMBIN TIME: 13.4 SECONDS (ref 11.8–14.2)
RBC # BLD AUTO: 4.1 MILLION/UL (ref 3.88–5.62)
SODIUM SERPL-SCNC: 140 MMOL/L (ref 136–145)
TROPONIN I SERPL-MCNC: <0.02 NG/ML
WBC # BLD AUTO: 7.62 THOUSAND/UL (ref 4.31–10.16)

## 2019-01-27 PROCEDURE — 96367 TX/PROPH/DG ADDL SEQ IV INF: CPT

## 2019-01-27 PROCEDURE — 94760 N-INVAS EAR/PLS OXIMETRY 1: CPT

## 2019-01-27 PROCEDURE — 99285 EMERGENCY DEPT VISIT HI MDM: CPT

## 2019-01-27 PROCEDURE — 96375 TX/PRO/DX INJ NEW DRUG ADDON: CPT

## 2019-01-27 PROCEDURE — 85610 PROTHROMBIN TIME: CPT | Performed by: PHYSICIAN ASSISTANT

## 2019-01-27 PROCEDURE — 80048 BASIC METABOLIC PNL TOTAL CA: CPT | Performed by: PHYSICIAN ASSISTANT

## 2019-01-27 PROCEDURE — 80076 HEPATIC FUNCTION PANEL: CPT | Performed by: PHYSICIAN ASSISTANT

## 2019-01-27 PROCEDURE — 87040 BLOOD CULTURE FOR BACTERIA: CPT | Performed by: PHYSICIAN ASSISTANT

## 2019-01-27 PROCEDURE — 71275 CT ANGIOGRAPHY CHEST: CPT

## 2019-01-27 PROCEDURE — 94644 CONT INHLJ TX 1ST HOUR: CPT

## 2019-01-27 PROCEDURE — 71045 X-RAY EXAM CHEST 1 VIEW: CPT

## 2019-01-27 PROCEDURE — 84484 ASSAY OF TROPONIN QUANT: CPT | Performed by: PHYSICIAN ASSISTANT

## 2019-01-27 PROCEDURE — 96366 THER/PROPH/DIAG IV INF ADDON: CPT

## 2019-01-27 PROCEDURE — 94660 CPAP INITIATION&MGMT: CPT

## 2019-01-27 PROCEDURE — 93005 ELECTROCARDIOGRAM TRACING: CPT

## 2019-01-27 PROCEDURE — 96365 THER/PROPH/DIAG IV INF INIT: CPT

## 2019-01-27 PROCEDURE — 85730 THROMBOPLASTIN TIME PARTIAL: CPT | Performed by: PHYSICIAN ASSISTANT

## 2019-01-27 PROCEDURE — 85025 COMPLETE CBC W/AUTO DIFF WBC: CPT | Performed by: PHYSICIAN ASSISTANT

## 2019-01-27 PROCEDURE — 83880 ASSAY OF NATRIURETIC PEPTIDE: CPT | Performed by: PHYSICIAN ASSISTANT

## 2019-01-27 PROCEDURE — 36415 COLL VENOUS BLD VENIPUNCTURE: CPT | Performed by: PHYSICIAN ASSISTANT

## 2019-01-27 PROCEDURE — 83605 ASSAY OF LACTIC ACID: CPT | Performed by: PHYSICIAN ASSISTANT

## 2019-01-27 RX ORDER — ACETAMINOPHEN 325 MG/1
975 TABLET ORAL ONCE
Status: COMPLETED | OUTPATIENT
Start: 2019-01-27 | End: 2019-01-27

## 2019-01-27 RX ORDER — METHYLPREDNISOLONE SODIUM SUCCINATE 125 MG/2ML
80 INJECTION, POWDER, LYOPHILIZED, FOR SOLUTION INTRAMUSCULAR; INTRAVENOUS ONCE
Status: COMPLETED | OUTPATIENT
Start: 2019-01-27 | End: 2019-01-27

## 2019-01-27 RX ORDER — MAGNESIUM SULFATE HEPTAHYDRATE 40 MG/ML
2 INJECTION, SOLUTION INTRAVENOUS ONCE
Status: COMPLETED | OUTPATIENT
Start: 2019-01-27 | End: 2019-01-27

## 2019-01-27 RX ORDER — ALBUTEROL SULFATE 2.5 MG/3ML
10 SOLUTION RESPIRATORY (INHALATION) ONCE
Status: COMPLETED | OUTPATIENT
Start: 2019-01-27 | End: 2019-01-27

## 2019-01-27 RX ADMIN — METHYLPREDNISOLONE SODIUM SUCCINATE 80 MG: 125 INJECTION, POWDER, FOR SOLUTION INTRAMUSCULAR; INTRAVENOUS at 23:03

## 2019-01-27 RX ADMIN — SODIUM CHLORIDE 1000 ML: 0.9 INJECTION, SOLUTION INTRAVENOUS at 21:07

## 2019-01-27 RX ADMIN — ACETAMINOPHEN 975 MG: 325 TABLET, FILM COATED ORAL at 21:07

## 2019-01-27 RX ADMIN — AZITHROMYCIN MONOHYDRATE 500 MG: 500 INJECTION, POWDER, LYOPHILIZED, FOR SOLUTION INTRAVENOUS at 23:04

## 2019-01-27 RX ADMIN — IOHEXOL 85 ML: 350 INJECTION, SOLUTION INTRAVENOUS at 22:55

## 2019-01-27 RX ADMIN — VANCOMYCIN HYDROCHLORIDE 1250 MG: 5 INJECTION, POWDER, LYOPHILIZED, FOR SOLUTION INTRAVENOUS at 21:23

## 2019-01-27 RX ADMIN — IPRATROPIUM BROMIDE 1 MG: 0.5 SOLUTION RESPIRATORY (INHALATION) at 20:46

## 2019-01-27 RX ADMIN — CEFEPIME 2000 MG: 2 INJECTION, POWDER, FOR SOLUTION INTRAVENOUS at 21:46

## 2019-01-27 RX ADMIN — ALBUTEROL SULFATE 10 MG: 2.5 SOLUTION RESPIRATORY (INHALATION) at 20:46

## 2019-01-27 RX ADMIN — MAGNESIUM SULFATE HEPTAHYDRATE 2 G: 40 INJECTION, SOLUTION INTRAVENOUS at 21:07

## 2019-01-28 PROBLEM — I50.32 CHRONIC DIASTOLIC HEART FAILURE (HCC): Status: ACTIVE | Noted: 2019-01-28

## 2019-01-28 PROBLEM — J18.9 HCAP (HEALTHCARE-ASSOCIATED PNEUMONIA): Status: ACTIVE | Noted: 2019-01-28

## 2019-01-28 LAB
ANION GAP SERPL CALCULATED.3IONS-SCNC: 10 MMOL/L (ref 4–13)
ARTERIAL PATENCY WRIST A: YES
ATRIAL RATE: 112 BPM
BASE EX.OXY STD BLDV CALC-SCNC: 95.5 % (ref 60–80)
BASE EXCESS BLDA CALC-SCNC: 1.5 MMOL/L
BASE EXCESS BLDV CALC-SCNC: -2.1 MMOL/L
BASOPHILS # BLD MANUAL: 0 THOUSAND/UL (ref 0–0.1)
BASOPHILS NFR MAR MANUAL: 0 % (ref 0–1)
BUN SERPL-MCNC: 24 MG/DL (ref 5–25)
CALCIUM SERPL-MCNC: 8.1 MG/DL (ref 8.3–10.1)
CHLORIDE SERPL-SCNC: 104 MMOL/L (ref 100–108)
CO2 SERPL-SCNC: 27 MMOL/L (ref 21–32)
CREAT SERPL-MCNC: 0.77 MG/DL (ref 0.6–1.3)
EOSINOPHIL # BLD MANUAL: 0 THOUSAND/UL (ref 0–0.4)
EOSINOPHIL NFR BLD MANUAL: 0 % (ref 0–6)
ERYTHROCYTE [DISTWIDTH] IN BLOOD BY AUTOMATED COUNT: 15.2 % (ref 11.6–15.1)
FLUAV AG SPEC QL: NORMAL
FLUBV AG SPEC QL: NORMAL
GFR SERPL CREATININE-BSD FRML MDRD: 95 ML/MIN/1.73SQ M
GLUCOSE SERPL-MCNC: 117 MG/DL (ref 65–140)
GLUCOSE SERPL-MCNC: 132 MG/DL (ref 65–140)
GLUCOSE SERPL-MCNC: 134 MG/DL (ref 65–140)
GLUCOSE SERPL-MCNC: 144 MG/DL (ref 65–140)
HCO3 BLDA-SCNC: 27.6 MMOL/L (ref 22–28)
HCO3 BLDV-SCNC: 24.3 MMOL/L (ref 24–30)
HCT VFR BLD AUTO: 35.7 % (ref 36.5–49.3)
HGB BLD-MCNC: 11.4 G/DL (ref 12–17)
IPAP: 12
LYMPHOCYTES # BLD AUTO: 0.58 THOUSAND/UL (ref 0.6–4.47)
LYMPHOCYTES # BLD AUTO: 13 % (ref 14–44)
MAGNESIUM SERPL-MCNC: 2.6 MG/DL (ref 1.6–2.6)
MCH RBC QN AUTO: 30.1 PG (ref 26.8–34.3)
MCHC RBC AUTO-ENTMCNC: 31.9 G/DL (ref 31.4–37.4)
MCV RBC AUTO: 94 FL (ref 82–98)
MONOCYTES # BLD AUTO: 0.04 THOUSAND/UL (ref 0–1.22)
MONOCYTES NFR BLD: 1 % (ref 4–12)
NEUTROPHILS # BLD MANUAL: 3.69 THOUSAND/UL (ref 1.85–7.62)
NEUTS BAND NFR BLD MANUAL: 5 % (ref 0–8)
NEUTS SEG NFR BLD AUTO: 78 % (ref 43–75)
NON VENT- BIPAP: ABNORMAL
NRBC BLD AUTO-RTO: 0 /100 WBCS
O2 CT BLDA-SCNC: 16 ML/DL (ref 16–23)
O2 CT BLDV-SCNC: 16 ML/DL
OXYHGB MFR BLDA: 95.8 % (ref 94–97)
P AXIS: 87 DEGREES
PCO2 BLDA: 49.9 MM HG (ref 36–44)
PCO2 BLDV: 48.1 MM HG (ref 42–50)
PEEP MAX SETTING VENT: 6 CM[H2O]
PH BLDA: 7.36 [PH] (ref 7.35–7.45)
PH BLDV: 7.32 [PH] (ref 7.3–7.4)
PLATELET # BLD AUTO: 184 THOUSANDS/UL (ref 149–390)
PLATELET # BLD AUTO: 194 THOUSANDS/UL (ref 149–390)
PLATELET BLD QL SMEAR: ADEQUATE
PMV BLD AUTO: 9.1 FL (ref 8.9–12.7)
PMV BLD AUTO: 9.5 FL (ref 8.9–12.7)
PO2 BLDA: 90.8 MM HG (ref 75–129)
PO2 BLDV: 101.6 MM HG (ref 35–45)
POTASSIUM SERPL-SCNC: 4.2 MMOL/L (ref 3.5–5.3)
PR INTERVAL: 160 MS
PROCALCITONIN SERPL-MCNC: 0.06 NG/ML
QRS AXIS: 257 DEGREES
QRSD INTERVAL: 96 MS
QT INTERVAL: 322 MS
QTC INTERVAL: 439 MS
RBC # BLD AUTO: 3.79 MILLION/UL (ref 3.88–5.62)
RSV B RNA SPEC QL NAA+PROBE: NORMAL
SODIUM SERPL-SCNC: 141 MMOL/L (ref 136–145)
SPECIMEN SOURCE: ABNORMAL
T WAVE AXIS: 80 DEGREES
TOTAL CELLS COUNTED SPEC: 100
VARIANT LYMPHS # BLD AUTO: 3 %
VENT BIPAP FIO2: 35 %
VENTRICULAR RATE: 112 BPM
WBC # BLD AUTO: 4.44 THOUSAND/UL (ref 4.31–10.16)

## 2019-01-28 PROCEDURE — 80048 BASIC METABOLIC PNL TOTAL CA: CPT | Performed by: PHYSICIAN ASSISTANT

## 2019-01-28 PROCEDURE — 85007 BL SMEAR W/DIFF WBC COUNT: CPT | Performed by: PHYSICIAN ASSISTANT

## 2019-01-28 PROCEDURE — 82948 REAGENT STRIP/BLOOD GLUCOSE: CPT

## 2019-01-28 PROCEDURE — 82805 BLOOD GASES W/O2 SATURATION: CPT | Performed by: PHYSICIAN ASSISTANT

## 2019-01-28 PROCEDURE — 94660 CPAP INITIATION&MGMT: CPT

## 2019-01-28 PROCEDURE — 87631 RESP VIRUS 3-5 TARGETS: CPT | Performed by: PHYSICIAN ASSISTANT

## 2019-01-28 PROCEDURE — 99291 CRITICAL CARE FIRST HOUR: CPT | Performed by: STUDENT IN AN ORGANIZED HEALTH CARE EDUCATION/TRAINING PROGRAM

## 2019-01-28 PROCEDURE — 85027 COMPLETE CBC AUTOMATED: CPT | Performed by: PHYSICIAN ASSISTANT

## 2019-01-28 PROCEDURE — 83735 ASSAY OF MAGNESIUM: CPT | Performed by: PHYSICIAN ASSISTANT

## 2019-01-28 PROCEDURE — 36600 WITHDRAWAL OF ARTERIAL BLOOD: CPT

## 2019-01-28 PROCEDURE — 94640 AIRWAY INHALATION TREATMENT: CPT

## 2019-01-28 PROCEDURE — 36415 COLL VENOUS BLD VENIPUNCTURE: CPT | Performed by: PHYSICIAN ASSISTANT

## 2019-01-28 PROCEDURE — 94760 N-INVAS EAR/PLS OXIMETRY 1: CPT

## 2019-01-28 PROCEDURE — 93010 ELECTROCARDIOGRAM REPORT: CPT | Performed by: INTERNAL MEDICINE

## 2019-01-28 PROCEDURE — 85049 AUTOMATED PLATELET COUNT: CPT | Performed by: PHYSICIAN ASSISTANT

## 2019-01-28 PROCEDURE — 84145 PROCALCITONIN (PCT): CPT | Performed by: PHYSICIAN ASSISTANT

## 2019-01-28 RX ORDER — LEVALBUTEROL 1.25 MG/.5ML
1.25 SOLUTION, CONCENTRATE RESPIRATORY (INHALATION)
Status: DISCONTINUED | OUTPATIENT
Start: 2019-01-28 | End: 2019-01-28

## 2019-01-28 RX ORDER — FAMOTIDINE 20 MG/1
20 TABLET, FILM COATED ORAL DAILY
Status: DISCONTINUED | OUTPATIENT
Start: 2019-01-28 | End: 2019-02-01 | Stop reason: HOSPADM

## 2019-01-28 RX ORDER — AZITHROMYCIN 250 MG/1
500 TABLET, FILM COATED ORAL EVERY 24 HOURS
Status: DISCONTINUED | OUTPATIENT
Start: 2019-01-29 | End: 2019-01-31

## 2019-01-28 RX ORDER — CHLORHEXIDINE GLUCONATE 0.12 MG/ML
15 RINSE ORAL EVERY 12 HOURS SCHEDULED
Status: DISCONTINUED | OUTPATIENT
Start: 2019-01-28 | End: 2019-01-28

## 2019-01-28 RX ORDER — LEVALBUTEROL 1.25 MG/.5ML
1.25 SOLUTION, CONCENTRATE RESPIRATORY (INHALATION)
Status: DISCONTINUED | OUTPATIENT
Start: 2019-01-28 | End: 2019-02-01 | Stop reason: HOSPADM

## 2019-01-28 RX ORDER — SENNOSIDES 8.6 MG
1 TABLET ORAL
Status: DISCONTINUED | OUTPATIENT
Start: 2019-01-28 | End: 2019-02-01 | Stop reason: HOSPADM

## 2019-01-28 RX ORDER — METHYLPREDNISOLONE SODIUM SUCCINATE 40 MG/ML
40 INJECTION, POWDER, LYOPHILIZED, FOR SOLUTION INTRAMUSCULAR; INTRAVENOUS EVERY 6 HOURS SCHEDULED
Status: DISCONTINUED | OUTPATIENT
Start: 2019-01-28 | End: 2019-01-30

## 2019-01-28 RX ORDER — LORATADINE 10 MG/1
10 TABLET ORAL DAILY
Status: DISCONTINUED | OUTPATIENT
Start: 2019-01-28 | End: 2019-02-01 | Stop reason: HOSPADM

## 2019-01-28 RX ORDER — BUDESONIDE 0.5 MG/2ML
0.5 INHALANT ORAL
Status: DISCONTINUED | OUTPATIENT
Start: 2019-01-28 | End: 2019-02-01 | Stop reason: HOSPADM

## 2019-01-28 RX ORDER — CHOLESTYRAMINE LIGHT 4 G/5.7G
4 POWDER, FOR SUSPENSION ORAL 2 TIMES DAILY
Status: DISCONTINUED | OUTPATIENT
Start: 2019-01-28 | End: 2019-02-01 | Stop reason: HOSPADM

## 2019-01-28 RX ADMIN — LEVALBUTEROL HYDROCHLORIDE 1.25 MG: 1.25 SOLUTION, CONCENTRATE RESPIRATORY (INHALATION) at 20:23

## 2019-01-28 RX ADMIN — BUDESONIDE 0.5 MG: 0.5 INHALANT RESPIRATORY (INHALATION) at 20:24

## 2019-01-28 RX ADMIN — VANCOMYCIN HYDROCHLORIDE 1500 MG: 1 INJECTION, POWDER, LYOPHILIZED, FOR SOLUTION INTRAVENOUS at 09:20

## 2019-01-28 RX ADMIN — METHYLPREDNISOLONE SODIUM SUCCINATE 40 MG: 40 INJECTION, POWDER, FOR SOLUTION INTRAMUSCULAR; INTRAVENOUS at 05:00

## 2019-01-28 RX ADMIN — CHOLESTYRAMINE 4 G: 4 POWDER, FOR SUSPENSION ORAL at 17:09

## 2019-01-28 RX ADMIN — BUDESONIDE 0.5 MG: 0.5 INHALANT RESPIRATORY (INHALATION) at 07:42

## 2019-01-28 RX ADMIN — LEVALBUTEROL HYDROCHLORIDE 1.25 MG: 1.25 SOLUTION, CONCENTRATE RESPIRATORY (INHALATION) at 07:42

## 2019-01-28 RX ADMIN — IPRATROPIUM BROMIDE 0.5 MG: 0.5 SOLUTION RESPIRATORY (INHALATION) at 13:23

## 2019-01-28 RX ADMIN — METHYLPREDNISOLONE SODIUM SUCCINATE 40 MG: 40 INJECTION, POWDER, FOR SOLUTION INTRAMUSCULAR; INTRAVENOUS at 13:46

## 2019-01-28 RX ADMIN — IPRATROPIUM BROMIDE 0.5 MG: 0.5 SOLUTION RESPIRATORY (INHALATION) at 07:42

## 2019-01-28 RX ADMIN — METHYLPREDNISOLONE SODIUM SUCCINATE 40 MG: 40 INJECTION, POWDER, FOR SOLUTION INTRAMUSCULAR; INTRAVENOUS at 17:08

## 2019-01-28 RX ADMIN — CEFEPIME 2000 MG: 2 INJECTION, POWDER, FOR SOLUTION INTRAVENOUS at 09:20

## 2019-01-28 RX ADMIN — IPRATROPIUM BROMIDE 0.5 MG: 0.5 SOLUTION RESPIRATORY (INHALATION) at 20:23

## 2019-01-28 RX ADMIN — LEVALBUTEROL HYDROCHLORIDE 1.25 MG: 1.25 SOLUTION, CONCENTRATE RESPIRATORY (INHALATION) at 13:23

## 2019-01-28 NOTE — ED PROVIDER NOTES
History  Chief Complaint   Patient presents with    Shortness of Breath     Onset 2 days, worsening tonight  Was seen here aaround 12/25 with flu and was on bipap  22-year-old male patient with history of COPD and emphysema presents for shortness of breath  Presents in mild respiratory distress  The the onset 2-3 days ago  No fevers chills  He he states he can't even walk from the ER stretcher to the door of his room without becoming short of breath  History is limited as he has mild respiratory distress  Conversational dyspnea  His wife supplement history  Wife notes he was here in December for the same reason  States on that occasion it was much worse, this occasion he came in before has got to that point in severity  Denies any recent travels traumas or surgeries  No history of PE, not on anticoagulants          History provided by:  Patient   used: No    Shortness of Breath   Severity:  Severe  Onset quality:  Sudden  Duration:  2 days  Timing:  Constant  Progression:  Unchanged  Chronicity:  New  Context: not activity, not animal exposure, not emotional upset, not fumes, not known allergens, not occupational exposure, not pollens, not smoke exposure, not strong odors, not URI and not weather changes    Relieved by:  Nothing  Worsened by:  Exertion  Ineffective treatments:  None tried  Associated symptoms: chest pain, cough and wheezing    Associated symptoms: no abdominal pain, no claudication, no diaphoresis, no ear pain, no fever, no headaches, no hemoptysis, no neck pain, no PND, no rash, no sore throat, no sputum production, no syncope, no swollen glands and no vomiting    Wheezing:     Severity:  Severe    Onset quality:  Sudden    Duration:  2 days    Timing:  Constant    Progression:  Unchanged    Chronicity:  New  Risk factors: no recent alcohol use, no family hx of DVT, no hx of cancer, no hx of PE/DVT, no obesity, no oral contraceptive use, no prolonged immobilization, no recent surgery and no tobacco use        Prior to Admission Medications   Prescriptions Last Dose Informant Patient Reported? Taking? albuterol (2 5 mg/3 mL) 0 083 % nebulizer solution  Self No No   Sig: Take 3 mL (2 5 mg total) by nebulization every 4 (four) hours as needed for wheezing   albuterol (PROVENTIL HFA,VENTOLIN HFA) 90 mcg/act inhaler  Self Yes No   Sig: Inhale 2 puffs every 6 (six) hours as needed for wheezing   cholestyramine (QUESTRAN) 4 g packet   No No   Sig: Take 1 packet (4 g total) by mouth 2 (two) times a day with meals   fluticasone-vilanterol (BREO ELLIPTA) 100-25 mcg/inh inhaler  Self No No   Sig: Inhale 1 puff daily Rinse mouth after use    loratadine (CLARITIN) 10 mg tablet  Self Yes No   Sig: Take 10 mg by mouth daily   ranitidine (ZANTAC) 150 mg tablet  Self No No   Sig: Take 1 tablet (150 mg total) by mouth daily at bedtime   roflumilast (DALIRESP) 250 MCG tablet  Self No No   Sig: Take 1 tablet (250 mcg total) by mouth daily   senna (SENOKOT) 8 6 mg   No No   Sig: Take 1 tablet (8 6 mg total) by mouth daily at bedtime   sertraline (ZOLOFT) 50 mg tablet  Self No No   Sig: Take 1 tablet (50 mg total) by mouth daily   tiotropium (SPIRIVA RESPIMAT) 2 5 MCG/ACT AERS inhaler  Self No No   Sig: Inhale 2 puffs daily      Facility-Administered Medications: None       Past Medical History:   Diagnosis Date    Centrilobular [facility]ma [facility]     COPD (chronic obstructive pulmonary disease) (HCC)     Hypoglycemia     On home oxygen therapy     Pneumonia     Pulmonary hypertension ([facility]     Respiratory failure (HCC)     SOB (shortness of breath)        Past Surgical History:   Procedure Laterality Date    CATARACT EXTRACTION      CHOLECYSTECTOMY      hernia    EYE SURGERY      RI ESOPHAGOGASTRODUODENOSCOPY TRANSORAL DIAGNOSTIC N/A 9/19/2018    Procedure: ESOPHAGOGASTRODUODENOSCOPY (EGD); Surgeon: Kecia Newby MD;  Location: [facility];   Service: Gastroenterology    [place] REPAIR ING HERNIA,5+Y/O,REDUCIBL Left 5/23/2018    Procedure: OPEN INGUINAL HERNIA REPAIR WITH MESH;  Surgeon: John Chapman MD;  Location: [facility];  Service: General       Family History   Problem Relation Age of Onset    Diabetes Mother     Hypertension Mother     Hyperlipidemia Mother      I have reviewed and agree with the history as documented  Social History   Substance Use Topics    Smoking status: Former Smoker     Years: 50 00     Types: Cigarettes     Quit date: 2/27/2013    Smokeless tobacco: Never Used    Alcohol use 0 6 oz/week     1 Glasses of wine per week      Comment: socialy        Review of Systems   Constitutional: Negative for activity change, appetite change, chills, diaphoresis, fatigue, fever and unexpected weight change  HENT: Negative for congestion, ear pain, rhinorrhea, sinus pressure, sore throat and trouble swallowing  Eyes: Negative for photophobia and visual disturbance  Respiratory: Positive for cough, shortness of breath and wheezing  Negative for apnea, hemoptysis, sputum production, choking, chest tightness and stridor  Cardiovascular: Positive for chest pain  Negative for palpitations, claudication, leg swelling, syncope and PND  Gastrointestinal: Negative for abdominal distention, abdominal pain, blood in stool, constipation, diarrhea, nausea and vomiting  Genitourinary: Negative for decreased urine volume, difficulty urinating, dysuria, enuresis, flank pain, frequency, hematuria and urgency  Musculoskeletal: Negative for arthralgias, myalgias, neck pain and neck stiffness  Skin: Negative for color change, pallor, rash and wound  Allergic/Immunologic: Negative  Neurological: Negative for dizziness, tremors, syncope, weakness, light-headedness, numbness and headaches  Hematological: Negative  Psychiatric/Behavioral: Negative  All other systems reviewed and are negative        Physical Exam  Physical Exam Constitutional: He is oriented to person, place, and time  He appears toxic  He has a sickly appearance  He appears ill  He appears distressed  HENT:   Head: Normocephalic and atraumatic  Eyes: Pupils are equal, round, and reactive to light  EOM and lids are normal    Neck: Normal range of motion  Neck supple  Cardiovascular: Regular rhythm, S1 normal, S2 normal, normal heart sounds, intact distal pulses and normal pulses  Tachycardia present  Exam reveals no gallop, no distant heart sounds, no friction rub and no decreased pulses  No murmur heard  Pulses:       Radial pulses are 2+ on the right side, and 2+ on the left side  Pulmonary/Chest: Accessory muscle usage present  Tachypnea noted  No apnea and no bradypnea  He is in respiratory distress  He has no decreased breath sounds  He has wheezes (scattered throughout)  He has no rhonchi  He has no rales  Abdominal: Soft  Normal appearance  He exhibits no distension  There is no tenderness  There is no rigidity, no rebound and no guarding  Musculoskeletal: Normal range of motion  He exhibits no edema, tenderness or deformity  Neurological: He is alert and oriented to person, place, and time  No cranial nerve deficit  GCS eye subscore is 4  GCS verbal subscore is 5  GCS motor subscore is 6  GCS 15  AAOx3  Ambulating in department without difficulty  CN II-XII grossly intact  No focal neuro deficits  Skin: Skin is warm, dry and intact  No rash noted  He is not diaphoretic  No erythema  No pallor  Psychiatric: His speech is normal    Nursing note and vitals reviewed        Vital Signs  ED Triage Vitals   Temperature Pulse Respirations Blood Pressure SpO2   01/27/19 2014 01/27/19 2014 01/27/19 2014 01/27/19 2014 01/27/19 2014   (!) 101 2 °F (38 4 °C) (!) 108 22 117/75 90 %      Temp Source Heart Rate Source Patient Position - Orthostatic VS BP Location FiO2 (%)   01/27/19 2014 01/27/19 2014 01/27/19 2014 01/27/19 2014 01/28/19 0428   Oral Monitor Sitting Left arm 35      Pain Score       01/27/19 2014       4           Vitals:    01/28/19 0300 01/28/19 0400 01/28/19 0742 01/28/19 1000   BP: 110/64 105/62 108/70 108/69   Pulse: 74 76 74 75   Patient Position - Orthostatic VS: Lying  Lying        Visual Acuity      ED Medications  Medications   cholestyramine sugar free (QUESTRAN LIGHT) packet 4 g (4 g Oral Not Given 1/28/19 0901)   loratadine (CLARITIN) tablet 10 mg (10 mg Oral Not Given 1/28/19 0902)   famotidine (PEPCID) tablet 20 mg (20 mg Oral Not Given 1/28/19 0902)   roflumilast (DALIRESP) tablet 250 mcg (250 mcg Oral Not Given 1/28/19 0902)   senna (SENOKOT) tablet 8 6 mg (8 6 mg Oral Not Given 1/28/19 0902)   sertraline (ZOLOFT) tablet 50 mg (50 mg Oral Not Given 1/28/19 0903)   enoxaparin (LOVENOX) subcutaneous injection 40 mg (not administered)   methylPREDNISolone sodium succinate (Solu-MEDROL) injection 40 mg (40 mg Intravenous Given 1/28/19 1346)   azithromycin (ZITHROMAX) tablet 500 mg (not administered)   budesonide (PULMICORT) inhalation solution 0 5 mg (0 5 mg Nebulization Given 1/28/19 0742)   pneumococcal 13-valent conjugate vaccine (PREVNAR-13) IM injection 0 5 mL (not administered)   ipratropium (ATROVENT) 0 02 % inhalation solution 0 5 mg (0 5 mg Nebulization Given 1/28/19 1323)   levalbuterol (XOPENEX) inhalation solution 1 25 mg (1 25 mg Nebulization Given 1/28/19 1323)   vancomycin (VANCOCIN) 1,250 mg in sodium chloride 0 9 % 250 mL IVPB (0 mg/kg × 75 4 kg (Adjusted) Intravenous Stopped 1/27/19 2304)   acetaminophen (TYLENOL) tablet 975 mg (975 mg Oral Given 1/27/19 2107)   albuterol inhalation solution 10 mg (10 mg Nebulization Given 1/27/19 2046)   ipratropium (ATROVENT) 0 02 % inhalation solution 1 mg (1 mg Nebulization Given 1/27/19 2046)   magnesium sulfate 2 g/50 mL IVPB (premix) 2 g (0 g Intravenous Stopped 1/27/19 2146)   sodium chloride 0 9 % bolus 1,000 mL (0 mL Intravenous Stopped 1/27/19 2146)   methylPREDNISolone sodium succinate (Solu-MEDROL) injection 80 mg (80 mg Intravenous Given 1/27/19 2303)   iohexol (OMNIPAQUE) 350 MG/ML injection (MULTI-DOSE) 85 mL (85 mL Intravenous Given 1/27/19 2255)       Diagnostic Studies  Results Reviewed     Procedure Component Value Units Date/Time    Blood gas, venous [[id number]]  (Abnormal) Collected:  01/28/19 0023    Lab Status:  Final result Specimen:  Blood from Arm, Right Updated:  01/28/19 0038     pH, Uli 7 321     pCO2, Uli 48 1 mm Hg      pO2, Uli 101 6 (H) mm Hg      HCO3, Uli 24 3 mmol/L      Base Excess, Uli -2 1 mmol/L      O2 Content, Uli 16 0 ml/dL      O2 HGB, VENOUS 95 5 (H) %     Hepatic function panel [[id number]]  (Abnormal) Collected:  01/27/19 2040    Lab Status:  Final result Specimen:  Blood from Arm, Right Updated:  01/27/19 2137     Total Bilirubin 1 00 mg/dL      Bilirubin, Direct 0 22 (H) mg/dL      Alkaline Phosphatase 71 U/L      AST 26 U/L      ALT 20 U/L      Total Protein 7 6 g/dL      Albumin 3 5 g/dL     B-type natriuretic peptide [[id number]]  (Normal) Collected:  01/27/19 2040    Lab Status:  Final result Specimen:  Blood from Arm, Right Updated:  01/27/19 2137     NT-proBNP 41 pg/mL     Lactic acid, plasma [[id number]]  (Normal) Collected:  01/27/19 2039    Lab Status:  Final result Specimen:  Blood from Arm, Right Updated:  01/27/19 2128     LACTIC ACID 1 4 mmol/L     Narrative:         Result may be elevated if tourniquet was used during collection      Troponin I [[id number]]  (Normal) Collected:  01/27/19 2040    Lab Status:  Final result Specimen:  Blood from Arm, Right Updated:  01/27/19 2128     Troponin I <0 02 ng/mL     Protime-INR [[id number]]  (Normal) Collected:  01/27/19 2040    Lab Status:  Final result Specimen:  Blood from Arm, Right Updated:  01/27/19 2126     Protime 13 4 seconds      INR 1 03    APTT [[id number]]  (Normal) Collected:  01/27/19 2040    Lab Status:  Final result Specimen:  Blood from Arm, Right Updated:  01/27/19 8349 PTT 31 seconds     Basic metabolic panel [[id number]]  (Abnormal) Collected:  01/27/19 2040    Lab Status:  Final result Specimen:  Blood from Arm, Right Updated:  01/27/19 2125     Sodium 140 mmol/L      Potassium 4 3 mmol/L      Chloride 101 mmol/L      CO2 30 mmol/L      ANION GAP 9 mmol/L      BUN 27 (H) mg/dL      Creatinine 0 92 mg/dL      Glucose 155 (H) mg/dL      Calcium 8 3 mg/dL      eGFR 86 ml/min/1 73sq m     Narrative:         National Kidney Disease Education Program recommendations are as follows:  GFR calculation is accurate only with a steady state creatinine  Chronic Kidney disease less than 60 ml/min/1 73 sq  meters  Kidney failure less than 15 ml/min/1 73 sq  meters  CBC and differential [[id number]] Collected:  01/27/19 2039    Lab Status:  Final result Specimen:  Blood from Arm, Right Updated:  01/27/19 2108     WBC 7 62 Thousand/uL      RBC 4 10 Million/uL      Hemoglobin 12 3 g/dL      Hematocrit 38 4 %      MCV 94 fL      MCH 30 0 pg      MCHC 32 0 g/dL      RDW 15 0 %      MPV 9 5 fL      Platelets 105 Thousands/uL      nRBC 0 /100 WBCs      Neutrophils Relative 72 %      Immat GRANS % 1 %      Lymphocytes Relative 17 %      Monocytes Relative 10 %      Eosinophils Relative 0 %      Basophils Relative 0 %      Neutrophils Absolute 5 53 Thousands/µL      Immature Grans Absolute 0 04 Thousand/uL      Lymphocytes Absolute 1 28 Thousands/µL      Monocytes Absolute 0 73 Thousand/µL      Eosinophils Absolute 0 01 Thousand/µL      Basophils Absolute 0 03 Thousands/µL     Blood culture #1 [[id number]] Collected:  01/27/19 2046    Lab Status: In process Specimen:  Blood from Arm, Left Updated:  01/27/19 2104    Blood culture #2 [[id number]] Collected:  01/27/19 2040    Lab Status:   In process Specimen:  Blood from Arm, Right Updated:  01/27/19 2104    UA w Reflex to Microscopic w Reflex to Culture [[id number]]     Lab Status:  No result Specimen:  Urine                  XR chest portable   Final Result by Saul Smith MD (01/28 1001)      1  Emphysema  2   Subsegmental atelectasis in the right lung base  Workstation performed: [id number]         CTA ED chest PE study   Final Result by Jessica Romano DO (01/27 2689)      No evidence of central pulmonary embolus  Nodular airspace opacities within the lungs as described above which may represent infectious versus inflammatory causes  Follow-up to resolution is recommended  Questionable nodular contour of the liver which may represent underlying cirrhosis  Clinical correlation is recommended  The study was marked in [facility] for immediate notification                    Workstation performed: [id number]                    Procedures  ECG 12 Lead Documentation  Date/Time: 1/27/2019 9:31 PM  Performed by: Davian Meyers by: Charmayne Forte     Indications / Diagnosis:  Sob  ECG reviewed by me, the ED Provider: yes    Patient location:  ED  Previous ECG:     Previous ECG:  Compared to current    Comparison ECG info:  Dec 19 2018    Similarity:  Changes noted    Comparison to cardiac monitor: Yes    Interpretation:     Interpretation: abnormal    Quality:     Tracing quality:  Limited by artifact  Rate:     ECG rate:  112    ECG rate assessment: normal    Rhythm:     Rhythm: sinus tachycardia    Ectopy:     Ectopy: none    QRS:     QRS axis:  Normal    QRS intervals:  Normal  Conduction:     Conduction: abnormal      Abnormal conduction: incomplete RBBB    ST segments:     ST segments:  Normal  T waves:     T waves: normal      CriticalCare Time  Performed by: Davian Meyers by: Charmayne Forte     Critical care provider statement:     Critical care time (minutes):  32    Critical care time was exclusive of:  Separately billable procedures and treating other patients    Critical care was necessary to treat or prevent imminent or life-threatening deterioration of the following conditions:  Respiratory failure  Comments:      Critical care time needed for management of respiratory distress including bipap, IV mag, heart neb  Phone Contacts  ED Phone Contact    ED Course  ED Course as of Jan 28 1502   Eda Cordero Jan 27, 2019   2213 Pt with no improvement of symptoms after breathing tx  Will start on bipap            HEART Risk Score      Most Recent Value   History  0 Filed at: 01/28/2019 1501   ECG  1 Filed at: 01/28/2019 1501   Age  2 Filed at: 01/28/2019 1501   Risk Factors  2 Filed at: 01/28/2019 1501   Troponin  0 Filed at: 01/28/2019 1501   Heart Score Risk Calculator   History  0 Filed at: 01/28/2019 1501   ECG  1 Filed at: 01/28/2019 1501   Age  2 Filed at: 01/28/2019 1501   Risk Factors  2 Filed at: 01/28/2019 1501   Troponin  0 Filed at: 01/28/2019 1501   HEART Score  5 Filed at: 01/28/2019 1501   HEART Score  5 Filed at: 01/28/2019 1501                            MDM  Number of Diagnoses or Management Options  Acute respiratory failure ([address] ): new and requires workup  HCAP (healthcare-associated pneumonia): new and requires workup  Diagnosis management comments: DDX including but not limited to: pneumonia, pleural effusion, CHF, PE, PTX, ACS, MI, asthma exacerbation, COPD exacerbation, anemia, metabolic abnormality, renal failure  Plan: heart neb  Cardiac workup  abx given fever and high suspicion for pneumonia  HCAP abx  Amount and/or Complexity of Data Reviewed  Clinical lab tests: ordered and reviewed  Tests in the radiology section of CPT®: ordered and reviewed  Independent visualization of images, tracings, or specimens: yes    Risk of Complications, Morbidity, and/or Mortality  Presenting problems: moderate  Management options: moderate  General comments: 80 y/o male with respiratory distress  Found to have pneumonia  Started on abx for HCAP  Despite heart neb he had worsening SOB  Started on Bipap  PE study obtained  No PE  Evidence of pneumonia   With bipap the patient is much more comfortable  He is feeling much better  Discussed case with on call critical care physician and is in agreement for stepdown admission  Stable at time of admission  Critical care has seen patient at bedside in ED  Patient Progress  Patient progress: stable    CritCare Time    Disposition  Final diagnoses:   HCAP (healthcare-associated pneumonia)   Acute respiratory failure ([address] )     Time reflects when diagnosis was documented in both MDM as applicable and the Disposition within this note     Time User Action Codes Description Comment    1/27/2019 11:17 PM Arin August L Add [J18 9] HCAP (healthcare-associated pneumonia)     1/27/2019 11:17 PM Arin August L Add [J96 00] Acute respiratory failure [facility])       ED Disposition     ED Disposition Condition Comment    Admit  Case was discussed with Dr Janene Brittle and the patient's admission status was agreed to be Admission Status: inpatient status to the service of Dr Janene Brittle   Follow-up Information    None         Current Discharge Medication List      CONTINUE these medications which have NOT CHANGED    Details   albuterol (2 5 mg/3 mL) 0 083 % nebulizer solution Take 3 mL (2 5 mg total) by nebulization every 4 (four) hours as needed for wheezing  Qty: 1080 mL, Refills: 3    Associated Diagnoses: COPD (chronic obstructive pulmonary disease) with chronic bronchitis (HCC)      albuterol (PROVENTIL HFA,VENTOLIN HFA) 90 mcg/act inhaler Inhale 2 puffs every 6 (six) hours as needed for wheezing      cholestyramine (QUESTRAN) 4 g packet Take 1 packet (4 g total) by mouth 2 (two) times a day with meals  Qty: 60 packet, Refills: 2    Associated Diagnoses: Functional diarrhea      fluticasone-vilanterol (BREO ELLIPTA) 100-25 mcg/inh inhaler Inhale 1 puff daily Rinse mouth after use    Qty: 1 Inhaler, Refills: 3    Associated Diagnoses: Chronic obstructive pulmonary disease, unspecified COPD type (HCC)      loratadine (CLARITIN) 10 mg tablet Take 10 mg by mouth daily      ranitidine (ZANTAC) 150 mg tablet Take 1 tablet (150 mg total) by mouth daily at bedtime  Qty: 30 tablet, Refills: 5    Associated Diagnoses: Gastroesophageal reflux disease without esophagitis      roflumilast (DALIRESP) 250 MCG tablet Take 1 tablet (250 mcg total) by mouth daily  Qty: 30 tablet, Refills: 5    Associated Diagnoses: COPD (chronic obstructive pulmonary disease) with chronic bronchitis (HCC)      senna (SENOKOT) 8 6 mg Take 1 tablet (8 6 mg total) by mouth daily at bedtime  Qty: 120 each, Refills: 0    Associated Diagnoses: Gastroesophageal reflux disease without esophagitis      sertraline (ZOLOFT) 50 mg tablet Take 1 tablet (50 mg total) by mouth daily  Qty: 30 tablet, Refills: 5    Associated Diagnoses: Anxiety      tiotropium (SPIRIVA RESPIMAT) 2 5 MCG/ACT AERS inhaler Inhale 2 puffs daily  Qty: 1 Inhaler, Refills: 3    Comments: PLEASE FILL THIS SCRIPT, NOT THE 1 25 MCG  Associated Diagnoses: Chronic obstructive pulmonary disease, unspecified COPD type ([facility] )           No discharge procedures on file      ED Provider  Electronically Signed by           Sagrario Muller PA-C  01/28/19 7151

## 2019-01-28 NOTE — SOCIAL WORK
CM met with  pt at bedside  Pt lives with his wife Erick Castellon and daughter in a 2 story house with 17 steps w/railing to reach his bedroom and 2 ALIS  Pt is able to navigate steps, but if he rushes, he must stop and rest   He is independent with ADL's  He uses O2-3lpm and a nebulizer prn through [address]  Dr Jordan Ball is his pulmonologist and Dr Samuel Nicolas is his PCP  He has never been in rehab or used [facility] services  Denies substance abuse or mental health issues  He was a heavy smoker-PPD-1 x 50 years, but he quit 6 years ago  He uses [employer] on [address] and has a limited income so is hoping he can be discharged on generic medications  He does not have a POA or Advanced Directive  CM supplied info on both  He is retired, but still drives  His wife will transport home when he is medically cleared  CM discussed d/c needs including [facility] services, but pt does not feel this will be needed  CM department will continue to follow through hospitalization  CM reviewed discharge planning process including the following: identifying help at home, patient preference for discharge planning needs, pharmacy preference, and availability of treatment team to discuss questions or concerns patient and/or family may have regarding understanding medications and recognizing signs and symptoms once discharged  CM also encouraged patient to follow up with all recommended appointments after discharge  Patient advised of importance for patient and family to participate in managing patients medical well being  CM name and role reviewed  Discharge Checklist reviewed and CM will continue to monitor for progress toward discharge goals in nursing and provider rounds

## 2019-01-28 NOTE — UTILIZATION REVIEW
Initial Clinical Review    Admission: Date/Time/Statement: 1/27/19 @ 2357   Orders Placed This Encounter   Procedures    Inpatient Admission (expected length of stay for this patient is greater than two midnights)     Standing Status:   Standing     Number of Occurrences:   1     Order Specific Question:   Admitting Physician     Answer:   Marianne Smith [[id number]]     Order Specific Question:   Level of Care     Answer:   Level 1 Stepdown [13]     Order Specific Question:   Estimated length of stay     Answer:   More than 2 Midnights     Order Specific Question:   Certification     Answer:   I certify that inpatient services are medically necessary for this patient for a duration of greater than two midnights  See H&P and MD Progress Notes for additional information about the patient's course of treatment  ED: Date/Time/Mode of Arrival:   ED Arrival Information     Expected Arrival Acuity Means of Arrival Escorted By Service Admission Type    - 1/27/2019 20:10 Emergent Wheelchair Family Member Critical Care/ICU Emergency    Arrival Complaint    sob        Chief Complaint:   Chief Complaint   Patient presents with    Shortness of Breath     Onset 2 days, worsening tonight  Was seen here aaround 12/25 with flu and was on bipap  History of Illness: 79 yr old male presents to ed -- sob x 2 days and acutely worsened in the evening--- on 2l 02 chronically-- had increased resp effort with accessory muscle us, hypoxia and moving very little air  -- tried cannula and put him on bipap  ED Vital Signs:   ED Triage Vitals   Temperature Pulse Respirations Blood Pressure SpO2   01/27/19 2014 01/27/19 2014 01/27/19 2014 01/27/19 2014 01/27/19 2014   (!) 101 2 °F (38 4 °C) (!) 108 22 117/75 90 %      Temp Source Heart Rate Source Patient Position - Orthostatic VS BP Location FiO2 (%)   01/27/19 2014 01/27/19 2014 01/27/19 2014 01/27/19 2014 01/28/19 0428   Oral Monitor Sitting Left arm 35      Pain Score       01/27/19 2014       4        Wt Readings from Last 1 Encounters:   01/28/19 81 4 kg (179 lb 7 3 oz)     Vital Signs (abnormal): 101 2--90% on cannula  Pertinent Labs/Diagnostic Test Results: p02 venous 101 6--02 hgb 95 5--direct bili 0 22  Bun 27--bs 155  Blood cultures x2  cta chest -      No evidence of central pulmonary embolus  Nodular airspace opacities within the lungs as described above which may represent infectious versus inflammatory causes   Follow-up to resolution is recommended  Questionable nodular contour of the liver which may represent underlying cirrhosis   Clinical correlation is recommended       ekg--     No evidence of central pulmonary embolus  Nodular airspace opacities within the lungs as described above which may represent infectious versus inflammatory causes   Follow-up to resolution is recommended  Questionable nodular contour of the liver which may represent underlying cirrhosis   Clinical correlation is recommended              ED Treatment:   Medication Administration from 01/27/2019 2010 to 01/28/2019 0115       Date/Time Order Dose Route Action Action by Comments     01/27/2019 2304 vancomycin (VANCOCIN) 1,250 mg in sodium chloride 0 9 % 250 mL IVPB 0 mg/kg Intravenous Stopped Edouard Oviedo RN      01/27/2019 2123 vancomycin (VANCOCIN) 1,250 mg in sodium chloride 0 9 % 250 mL IVPB 1,250 mg Intravenous New Bag Edouard Oviedo RN      01/27/2019 2240 cefepime (MAXIPIME) 2,000 mg in dextrose 5 % 50 mL IVPB 0 mg Intravenous Stopped Edouard Oviedo RN      01/27/2019 2146 cefepime (MAXIPIME) 2,000 mg in dextrose 5 % 50 mL IVPB 2,000 mg Intravenous New Bag Edouard Oviedo RN      01/27/2019 2107 acetaminophen (TYLENOL) tablet 975 mg 975 mg Oral Given Edouard Oviedo RN      01/27/2019 2304 azithromycin (ZITHROMAX) 500 mg in sodium chloride 0 9% 250mL IVPB 500 mg 500 mg Intravenous New Bag Edouard Oviedo RN      01/27/2019 2046 albuterol inhalation solution 10 mg 10 mg Nebulization Given Julian Bryan,       01/27/2019 2046 ipratropium (ATROVENT) 0 02 % inhalation solution 1 mg 1 mg Nebulization Given Julian Bryan,       01/27/2019 2146 magnesium sulfate 2 g/50 mL IVPB (premix) 2 g 0 g Intravenous Stopped Tana Bautista RN      01/27/2019 2107 magnesium sulfate 2 g/50 mL IVPB (premix) 2 g 2 g Intravenous New Bag Tana Bautista RN      01/27/2019 2146 sodium chloride 0 9 % bolus 1,000 mL 0 mL Intravenous Stopped Tana Bautista RN      01/27/2019 2107 sodium chloride 0 9 % bolus 1,000 mL 1,000 mL Intravenous New Bag Tana Bautista RN      01/27/2019 2303 methylPREDNISolone sodium succinate (Solu-MEDROL) injection 80 mg 80 mg Intravenous Given Tana Bautista RN      01/27/2019 2255 iohexol (OMNIPAQUE) 350 MG/ML injection (MULTI-DOSE) 85 mL 85 mL Intravenous Given Jim Pleasant         Past Medical/Surgical History:    Active Ambulatory Problems     Diagnosis Date Noted    Pulmonary emphysema ([facility] ) 03/12/2018    Gastroesophageal reflux disease without esophagitis 03/12/2018    Anxiety 03/12/2018    Simple chronic bronchitis (HCC)     Shortness of breath on exertion 04/25/2018    Abnormal ECG 04/25/2018    PVCs (premature ventricular contractions) 04/25/2018    Allergic rhinitis 07/26/2018    Functional diarrhea 57/15/7150    Diastolic dysfunction 21/82/9809    Pulmonary hypertension ([facility] ) 07/31/2018    Acute exacerbation of chronic obstructive pulmonary disease (COPD) ([facility] ) 08/14/2018    Gastroesophageal reflux disease 09/10/2018    Acute on chronic respiratory failure with hypoxia ([facility] ) 12/19/2018    Influenza A 12/20/2018    Tracheobronchitis 12/22/2018     Resolved Ambulatory Problems     Diagnosis Date Noted    Left inguinal hernia 03/12/2018    Preop cardiovascular exam 04/25/2018     Past Medical History:   Diagnosis Date    Centrilobular emphysema ([facility] )     COPD (chronic obstructive pulmonary disease) (HCC)     Hypoglycemia     On home oxygen therapy     Pneumonia     [facility] ([facility] )     Respiratory failure (HCC)     SOB (shortness of breath)      Admitting Diagnosis: Acute respiratory failure (HCC) [J96 00]  SOB (shortness of breath) [R06 02]  HCAP (healthcare-associated pneumonia) [J18 9]  Age/Sex: 79 y o  male       Assessment/Plan:  Chronic resp with hypoxia likely 2nd to central lobar emphysema with acute exacerbation  Noninvasive positive pressure ventilation for for now  Solumedrol q6  Continue nebs  abx   Admission Orders:      Scheduled Meds:   Current Facility-Administered Medications:  [START ON 1/29/2019] azithromycin 500 mg Oral Q24H Pavan Cash PA-C   budesonide 0 5 mg Nebulization Q12H Andrés Kaba MD   cholestyramine sugar free 4 g Oral BID Dimple Navarrete, PA-C   [START ON 1/29/2019] enoxaparin 40 mg Subcutaneous Daily Dimple Navarrete, PA-C   famotidine 20 mg Oral Daily Dimple Navarrete, PA-C   ipratropium 0 5 mg Nebulization Q6H Andrés Kaba MD   levalbuterol 1 25 mg Nebulization Q6H Andrés Kaba MD   loratadine 10 mg Oral Daily Dimple Navarrete, PA-C   methylPREDNISolone sodium succinate 40 mg Intravenous Q6H [address] Dimple Navarrete, PA-C   pneumococcal 13-valent conjugate vaccine 0 5 mL Intramuscular Prior to discharge Andrés Kaba MD   roflumilast 250 mcg Oral Daily Dimple Navarrete, PA-C   senna 1 tablet Oral HS Dimple Navarrete, PA-C   sertraline 50 mg Oral Daily Dimple Navarrete, PA-C     Continuous Infusions:    PRN Meds: pneumococcal 13-valent conjugate vaccine   Neuro checks q4  I&0 q2  Cardiopulmonary monitoring  Fall precautions  scd  Dysphagia assess  Sputum culture  Urine culture  Reg diet  bipap cont

## 2019-01-28 NOTE — PLAN OF CARE
Problem: DISCHARGE PLANNING - CARE MANAGEMENT  Goal: Discharge to post-acute care or home with appropriate resources  INTERVENTIONS:  - Conduct assessment to determine patient/family and health care team treatment goals, and need for post-acute services based on payer coverage, community resources, and patient preferences, and barriers to discharge  - Address psychosocial, clinical, and financial barriers to discharge as identified in assessment in conjunction with the patient/family and health care team  - Arrange appropriate level of post-acute services according to patients   needs and preference and payer coverage in collaboration with the physician and health care team  - Communicate with and update the patient/family, physician, and health care team regarding progress on the discharge plan  - Arrange appropriate transportation to post-acute venues  Outcome: Progressing  CM met with  pt at bedside  Pt lives with his wife Brayan Sun and daughter in a 2 story house with 17 steps w/railing to reach his bedroom and 2 ALIS  Pt is able to navigate steps, but if he rushes, he must stop and rest   He is independent with ADL's  He uses O2-3lpm and a nebulizer prn through [address]  Dr Bill Ha is his pulmonologist and Dr Nora Del Real is his PCP  He has never been in rehab or used [place] services  Denies substance abuse or mental health issues  He was a heavy smoker-PPD-1 x 50 years, but he quit 6 years ago  He uses [facility] and has a limited income so is hoping he can be discharged on generic medications  He does not have a POA or Advanced Directive  CM supplied info on both  He is retired, but still drives  His wife will transport home when he is medically cleared  CM discussed d/c needs including [place] services, but pt does not feel this will be needed  CM department will continue to follow through hospitalization      CM reviewed discharge planning process including the following: identifying help at home, patient preference for discharge planning needs, pharmacy preference, and availability of treatment team to discuss questions or concerns patient and/or family may have regarding understanding medications and recognizing signs and symptoms once discharged  CM also encouraged patient to follow up with all recommended appointments after discharge  Patient advised of importance for patient and family to participate in managing patients medical well being  CM name and role reviewed  Discharge Checklist reviewed and CM will continue to monitor for progress toward discharge goals in nursing and provider rounds

## 2019-01-28 NOTE — RESPIRATORY THERAPY NOTE
RT Protocol Note  Paras Gave 79 y o  male MRN: [id number]  Unit/Bed#: ICU 36 Encounter: [id number]    Assessment    Principal Problem:    Acute on chronic respiratory failure with hypoxia (HCC)  Active Problems:    Pulmonary emphysema (HCC)    Gastroesophageal reflux disease without esophagitis    Anxiety    Diastolic dysfunction    Pulmonary hypertension (HCC)    Acute exacerbation of chronic obstructive pulmonary disease (COPD) (HCC)    Gastroesophageal reflux disease    HCAP (healthcare-associated pneumonia)    Chronic diastolic heart failure (HCC)      Home Pulmonary Medications:  Albuterol, Spiriva, Breo       Past Medical History:   Diagnosis Date    [facility]     COPD (chronic obstructive [facility] disease) ([facility] )     Hypoglycemia     On home oxygen therapy     Pneumonia     Pulmonary hypertension ([facility] )     [facility] failure [facility]     SOB (shortness of breath)      Social History     Social History    Marital status: /Civil Union     Spouse name: janet     Number of children: 11    Years of education: N/A     Occupational History    retired       Social History Main Topics    Smoking status: Former Smoker     Years: 50 00     Types: Cigarettes     Quit date: 2/27/2013    Smokeless tobacco: Never Used    Alcohol use 0 6 oz/week     1 Glasses of wine per week      Comment: socialy    Drug use: No    Sexual activity: No     Other Topics Concern    None     Social History Narrative        Retired       Subjective  Pt offers no respiratory complaints at this time  Objective    Physical Exam:   Assessment Type: Assess only  General Appearance: Alert, Awake  Respiratory Pattern: Assisted  Chest Assessment: Chest expansion symmetrical  Bilateral Breath Sounds: Diminished, Clear  O2 Device: BiPAP    Vitals:  Blood pressure 103/68, pulse 79, temperature 97 7 °F (36 5 °C), temperature source Axillary, resp   rate 20, height 5' 10" (1 778 m), weight 81 4 kg (179 lb 7 3 oz), SpO2 96 %  Imaging and other studies: I have personally reviewed pertinent reports        O2 Device: BiPAP     Plan    Respiratory Plan: Mild Distress pathway, Vent/NIV/HFNC, Home Bronchodilator Patient pathway     Xop/Atr Q6; Pulmicort Q12        Resp Comments: pt transported to ICU

## 2019-01-28 NOTE — ED NOTES
Pt wheezing audible from bedside without scope  Pt states he is having difficulty breathing since this morning  Wife states fever at home and that on 3L NC he was satting in the [de-identified]    Pt denies trauma to chest      Solo Reynolds RN  01/28/19 9285 None

## 2019-01-28 NOTE — PROGRESS NOTES
Vancomycin Assessment    Dora Laboy is a 79 y o  male who is currently receiving vancomycin 1250 mg Q12H for Pneumonia     Relevant clinical data and objective history reviewed:  Creatinine   Date Value Ref Range Status   01/27/2019 0 92 0 60 - 1 30 mg/dL Final     Comment:     Standardized to IDMS reference method   01/25/2019 0 98 0 60 - 1 30 mg/dL Final     Comment:     Standardized to IDMS reference method   12/25/2018 0 68 0 60 - 1 30 mg/dL Final     Comment:     Standardized to IDMS reference method     /62   Pulse 76   Temp 97 8 °F (36 6 °C) (Axillary)   Resp 18   Ht 5' 10" (1 778 m)   Wt 81 4 kg (179 lb 7 3 oz)   SpO2 97%   BMI 25 75 kg/m²   No intake/output data recorded  Lab Results   Component Value Date/Time    BUN 27 (H) 01/27/2019 08:40 PM    WBC 7 62 01/27/2019 08:39 PM    HGB 12 3 01/27/2019 08:39 PM    HCT 38 4 01/27/2019 08:39 PM    MCV 94 01/27/2019 08:39 PM     01/28/2019 01:34 AM     Temp Readings from Last 3 Encounters:   01/28/19 97 8 °F (36 6 °C) (Axillary)   01/17/19 98 1 °F (36 7 °C) (Tympanic)   12/26/18 98 4 °F (36 9 °C) (Oral)     Vancomycin Days of Therapy: 1    Assessment/Plan  The patient is currently on vancomycin utilizing scheduled dosing based on actual body weight  The patient is currently receiving 1250 mg Q12H and after clinical evaluation will be changed to 1500 mg Q12H  Pharmacy will also follow closely for s/sx of nephrotoxicity, infusion reactions and appropriateness of therapy  BMP and CBC will be ordered per protocol  Plan for trough as patient approaches steady state, prior to the 5th  dose at approximately 2100 on 1/29/19  Due to infection severity, will target a trough of 15-20 (appropriate for most indications)   Pharmacy will continue to follow the patients culture results and clinical progress daily      Star Free, Pharmacist

## 2019-01-28 NOTE — H&P
History and Physical - Critical Care   Raisa Singleton 79 y o  male MRN: [id number]  Unit/Bed#:  Encounter: [id number]    Reason for Admission / Chief Complaint:  Fever, Hcap, COPD exacerbation    History of Present Illness:  Raisa Singleton is a 79 y o  male who presents to the [facility] AT [place] on 01/27 in the evening with rather acute onset shortness of breath  Patient states that he has been having shortness of breath for about 2 days, but it acutely worsened in early evening  Patient has a past medical history of significant COPD with central lobar emphysema on 2L chronic oxygen, grade 1 diastolic heart failure, pulmonary hypertension, and GERD  Patient has had fevers at home  He denies any chills, malaise  Denies chest pain, leg swelling, increase or decrease in urinary frequency  He has had sick contacts with his grandchildren  In chart review, it is noted that patient was admitted earlier this winter with influenza a  On arrival to emergency department patient had increased respiratory effort with accessory muscle use, hypoxia and was moving very little air  Was tried on nasal cannula, but quickly transitioned to BiPAP  He got an hour long nebulizer with minimal relief  Patient had laboratories drawn that were relatively normal   Patient had a chest x-ray that showed hyperinflated lungs  He had a PE study that showed significant blebs with emphysema and questionable right lower lobe infiltrates  He was given azithromycin, cefepime min [facility] emergency department and referred for step-down admission  History obtained from chart review and the patient      Past Medical History:  Past Medical History:   Diagnosis Date    Centrilobular emphysema ([facility]     COPD (chronic obstructive pulmonary disease) ([facility] )     Hypoglycemia     On home oxygen therapy     Pneumonia     Pulmonary hypertension (HCC)     Respiratory failure (HCC)     SOB (shortness of breath) Past Surgical History:  Past Surgical History:   Procedure Laterality Date    CATARACT EXTRACTION      CHOLECYSTECTOMY      hernia    EYE SURGERY      KS ESOPHAGOGASTRODUODENOSCOPY TRANSORAL DIAGNOSTIC N/A 9/19/2018    Procedure: ESOPHAGOGASTRODUODENOSCOPY (EGD); Surgeon: Manav Chase MD;  Location: [facility];   Service: Gastroenterology    KS REPAIR ING HERNIA,5+Y/O,REDUCIBL Left 5/23/2018    Procedure: OPEN INGUINAL HERNIA REPAIR WITH MESH;  Surgeon: Xuan Siegel MD;  Location: [facility];  Service: General       Past Family History:  Family History   Problem Relation Age of Onset    Diabetes Mother     Hypertension Mother     Hyperlipidemia Mother        Social History:  History   Smoking Status    Former Smoker    Years: 50 00    Types: Cigarettes    Quit date: 2/27/2013   Smokeless Tobacco    Never Used     History   Alcohol Use    0 6 oz/week    1 Glasses of wine per week     Comment: socialy     History   Drug Use No     Marital Status: /Civil Union  Exercise History:  Independent ADLs    Medications:  Current Facility-Administered Medications   Medication Dose Route Frequency    azithromycin (ZITHROMAX) 500 mg in sodium chloride 0 9% 250mL IVPB 500 mg  500 mg Intravenous Q24H    [START ON 1/29/2019] azithromycin (ZITHROMAX) tablet 500 mg  500 mg Oral Q24H    budesonide (PULMICORT) inhalation solution 0 5 mg  0 5 mg Nebulization Q12H    cefepime (MAXIPIME) 2,000 mg in dextrose 5 % 50 mL IVPB  2,000 mg Intravenous Q12H    cefepime (MAXIPIME) 2,000 mg in dextrose 5 % 50 mL IVPB  2,000 mg Intravenous Q12H    chlorhexidine (PERIDEX) 0 12 % oral rinse 15 mL  15 mL Swish & Spit Q12H [address]    cholestyramine sugar free (QUESTRAN LIGHT) packet 4 g  4 g Oral BID    [START ON 1/29/2019] enoxaparin (LOVENOX) subcutaneous injection 40 mg  40 mg Subcutaneous Daily    famotidine (PEPCID) tablet 20 mg  20 mg Oral Daily    ipratropium (ATROVENT) 0 02 % inhalation solution 0 5 mg 0 5 mg Nebulization 4x Daily    levalbuterol (XOPENEX) inhalation solution 1 25 mg  1 25 mg Nebulization 4x Daily    loratadine (CLARITIN) tablet 10 mg  10 mg Oral Daily    methylPREDNISolone sodium succinate (Solu-MEDROL) injection 40 mg  40 mg Intravenous Q6H [address]    roflumilast (DALIRESP) tablet 250 mcg  250 mcg Oral Daily    senna (SENOKOT) tablet 8 6 mg  1 tablet Oral HS    sertraline (ZOLOFT) tablet 50 mg  50 mg Oral Daily    vancomycin (VANCOCIN) 1,250 mg in sodium chloride 0 9 % 250 mL IVPB  15 mg/kg Intravenous Q12H     Home medications:  Prior to Admission medications    Medication Sig Start Date End Date Taking? Authorizing Provider   albuterol (2 5 mg/3 mL) 0 083 % nebulizer solution Take 3 mL (2 5 mg total) by nebulization every 4 (four) hours as needed for wheezing 4/9/18   Shirley Olson MD   albuterol (PROVENTIL HFA,VENTOLIN HFA) 90 mcg/act inhaler Inhale 2 puffs every 6 (six) hours as needed for wheezing    Historical Provider, MD   cholestyramine (QUESTRAN) 4 g packet Take 1 packet (4 g total) by mouth 2 (two) times a day with meals 1/9/19   Myra Fiore PA-C   fluticasone-vilanterol (BREO ELLIPTA) 100-25 mcg/inh inhaler Inhale 1 puff daily Rinse mouth after use  12/10/18   Stephani Brady PA-C   loratadine (CLARITIN) 10 mg tablet Take 10 mg by mouth daily    Historical Provider, MD   ranitidine (ZANTAC) 150 mg tablet Take 1 tablet (150 mg total) by mouth daily at bedtime 8/14/18   Efe Warner PA-C   roflumilast (DALIRESP) 250 MCG tablet Take 1 tablet (250 mcg total) by mouth daily 9/10/18   Shirley Olson MD   senna (SENOKOT) 8 6 mg Take 1 tablet (8 6 mg total) by mouth daily at bedtime 12/26/18   Mary Grace Garcia MD   sertraline (ZOLOFT) 50 mg tablet Take 1 tablet (50 mg total) by mouth daily 10/9/18   Ernestina Espinoza MD   tiotropium (SPIRIVA RESPIMAT) 2 5 MCG/ACT AERS inhaler Inhale 2 puffs daily 12/10/18   Stephani Brady PA-C     Allergies:   Allergies   Allergen Reactions    Penicillins Hives     Passed out    Codeine        ROS:   Review of Systems   Constitutional: Positive for fatigue and fever  Negative for chills  HENT: Negative for congestion, postnasal drip and rhinorrhea  Eyes: Negative for discharge and redness  Respiratory: Positive for shortness of breath  Negative for cough, choking and chest tightness  Cardiovascular: Negative for chest pain and leg swelling  Gastrointestinal: Negative for abdominal pain, diarrhea and vomiting  Musculoskeletal: Negative  Skin: Negative  Neurological: Negative  Psychiatric/Behavioral: Negative  All other systems reviewed and are negative  Vitals:  Vitals:    19 2226 19 0015 19 0115 19 0124   BP: 126/77  122/73    BP Location: Left arm  Right arm    Pulse: 93 82 87    Resp: (!) 24 21 (!) 25    Temp:  99 2 °F (37 3 °C) 97 7 °F (36 5 °C)    TempSrc:  Oral Axillary    SpO2: 96% 98% 98% 99%   Weight:   81 4 kg (179 lb 7 3 oz)    Height:   5' 10" (1 778 m)      Temperature:   Temp (24hrs), Av 4 °F (37 4 °C), Min:97 7 °F (36 5 °C), Max:101 2 °F (38 4 °C)    Current Temperature: 97 7 °F (36 5 °C)    Weights:   IBW: 73 kg  Body mass index is 25 75 kg/m²  Hemodynamic Monitoring:  N/A     Non-Invasive/Invasive Ventilation Settings:  Respiratory    Lab Data (Last 4 hours)    None         O2/Vent Data (Last 4 hours)       0045  0124      Non-Invasive Ventilation Mode BiPAP BiPAP BiPAP               No results found for: PHART, NEC8WAO, PO2ART, RMJ0RSA, U5BOYNFJ, BEART, SOURCE  SpO2: SpO2: 97 %, SpO2 Activity: SpO2 Activity: At Rest, SpO2 Device: O2 Device:  (Bipap)     Physical Exam:  Physical Exam   Constitutional: He is oriented to person, place, and time  Lying in bed  Appears older than stated age  Noninvasive positive pressure ventilation mask in place   HENT:   Head: Normocephalic and atraumatic     Eyes: Conjunctivae and lids are normal  Right conjunctiva is not injected  Left conjunctiva is not injected  Neck: Trachea normal  Neck supple  No JVD present  Cardiovascular: Normal rate and regular rhythm  Exam reveals no friction rub  No murmur heard  Pulmonary/Chest:   Tachypnea with mild accessory muscle use with BiPAP mask on  Significantly diminished breath sounds in all lung fields  No audible wheezes, no rhonchi, no rales  Abdominal: Soft  Normal appearance  He exhibits no distension  There is no tenderness  Genitourinary:   Genitourinary Comments: Deferred   Musculoskeletal: Normal range of motion  He exhibits no edema  Neurological: He is alert and oriented to person, place, and time  No cranial nerve deficit  Skin: Skin is warm and dry  Nursing note and vitals reviewed        Labs:    Results from last 7 days  Lab Units 01/27/19 2039 01/25/19  1507   WBC Thousand/uL 7 62 6 97   HEMOGLOBIN g/dL 12 3 12 3   HEMATOCRIT % 38 4 38 6   PLATELETS Thousands/uL 238 240   NEUTROS PCT % 72 54   MONOS PCT % 10 15*       Results from last 7 days  Lab Units 01/27/19 2040 01/25/19  1507   SODIUM mmol/L 140 141   POTASSIUM mmol/L 4 3 4 3   CHLORIDE mmol/L 101 101   CO2 mmol/L 30 32   ANION GAP mmol/L 9 8   BUN mg/dL 27* 19   CREATININE mg/dL 0 92 0 98   CALCIUM mg/dL 8 3 9 0   ALT U/L 20 17   AST U/L 26 18   ALK PHOS U/L 71 86   ALBUMIN g/dL 3 5 3 5   TOTAL BILIRUBIN mg/dL 1 00 0 60            Results from last 7 days  Lab Units 01/27/19 2040   INR  1 03   PTT seconds 31       Results from last 7 days  Lab Units 01/27/19 2040   TROPONIN I ng/mL <0 02       Results from last 7 days  Lab Units 01/27/19 2039   LACTIC ACID mmol/L 1 4     ABG:No results found for: PHART, TPH2AEX, PO2ART, MQP8ICZ, Z2XSBCRL, BEART, SOURCE  VBG:    Results from last 7 days  Lab Units 01/28/19  0023   PH CARROLL  7 321   PCO2 CARROLL mm Hg 48 1   PO2 CARROLL mm Hg 101 6*   HCO3 CARROLL mmol/L 24 3   BASE EXC CARROLL mmol/L -2 1             Imaging:  I have personally reviewed pertinent films in PACS CTA ED chest PE study   Final Result by Danielle Saldana DO (01/27 7675)      No evidence of central pulmonary embolus  Nodular airspace opacities within the lungs as described above which may represent infectious versus inflammatory causes  Follow-up to resolution is recommended  Questionable nodular contour of the liver which may represent underlying cirrhosis  Clinical correlation is recommended  The study was marked in [facility] for immediate notification  Workstation performed: [id number]         XR chest portable    (Results Pending)     EKG: This was personally reviewed by myself  Micro:        ______________________________________________________________________    Assessment:   Principal Problem:    Acute on chronic respiratory failure with hypoxia (HCC)  Active Problems:    Pulmonary emphysema (HCC)    Gastroesophageal reflux disease without esophagitis    Anxiety    Diastolic dysfunction    Pulmonary hypertension (HCC)    Acute exacerbation of chronic obstructive pulmonary disease (COPD) (HCC)    Gastroesophageal reflux disease    HCAP (healthcare-associated pneumonia)    Chronic diastolic heart failure ([facility] [address] )  Resolved Problems:    * No resolved hospital problems   *    Plan:    Neuro:   Anxiety/depression  - continue home Zoloft    Regulate sleep/wake cycle    CV:   Chronic diastolic heart failure not in acute exacerbation  - patient appears euvolemic  - no pulmonary edema seen on CT chest, node leg swelling  - is not on chronic diuretics  - even fluid balance    Pulmonary hypertension  - patient follows with Dr Jaimie Agustin as outpatient  - continue home roflumilast    Cardiac infusions:  Non  Rhythm: NSR  Follow rhythm on telemetry    Pulm:   Acute on chronic hypoxic respiratory failure  - likely secondary to below  - continue noninvasive positive pressure ventilation for now  - patient with significantly improved work of breathing  - continue Solu-Medrol 40 q 6  - continue azithromycin, vancomycin, cefepime for now  - continue nebulizers    Central lobar emphysema with acute exacerbation  - patient is on 2 L nasal cannula at home  - she follows with Dr Pool Browning  - at home, is on Spiriva, [facility], p r n  Albuterol  - for now, will place on Xopenex, Atrovent, Pulmicort  - continue IV steroids  - home consultation on transfer out of ICU    Hcap  - receive cefepime, azithromycin, vancomycin in emergency department  - will continue these due to recent hospital stay  - obtain sputum culture, follow-up blood cultures, obtain flu PCR, comprehensive respiratory PCR, urinary antigens    Encourage deep breathing/coughing/IS/PulmToileting   Wean O2 for sats > 92%  GI:   GERD  - continue Questran and H2 blocker    Nutrition/diet plan:  NPO while on BiPAP  Stress ulcer prophylaxis: No prophylaxis needed  Bowel regimen: Continue home senna      :   No issues  Follow urine output    FEN:   No issues, follow-up morning labs  Fluid/Diuretic plan: No intervention  Goal 24 hour fluid balance:  Roughly even  Electrolytes repleted: not applicable  Goal: K >7 1, Mag >2 0, and Phos >3 0    ID:   Hcap  - see above  Abx ordered: Azithromycin, Cefepime and Vanco  Day # 1 of TBD day course    Trend temps and WBC count    Heme:   No issues  Trend hgb and plts  Transfuse as needed for goal hgb > 7    Endo:   No issues  Msk/Skin:  Mobility goal:  Out of bed with assistance once off BiPAP  PT consult: yes  OT consult: yes  Frequent turning and off-loading    Family:  Family updated within 24 hours: yes   Family meeting planned today: no     Lines:  Peripheral IVs    VTE Prophylaxis:  Pharmacologic Prophylaxis: Enoxaparin (Lovenox)  Mechanical Prophylaxis: sequential compression device    Disposition: Admit to step-down    Code Status: Level 1 - Full Code    Counseling / Coordination of Care  Total Critical Care time spent 34 minutes excluding procedures, teaching and family updates  ______________________________________________________________________    Invasive lines and devices: Invasive Devices     Peripheral Intravenous Line            Peripheral IV 01/27/19 Left Antecubital less than 1 day    Peripheral IV 01/27/19 Right Antecubital less than 1 day                Code Status: Level 1 - Full Code  POA:    POLST:      Given critical illness, patient length of stay will require greater than two midnights  Portions of the record may have been created with voice recognition software  Occasional wrong word or "sound a like" substitutions may have occurred due to the inherent limitations of voice recognition software  Read the chart carefully and recognize, using context, where substitutions have occurred        Robbi García PA-C

## 2019-01-28 NOTE — PLAN OF CARE
Problem: PAIN - ADULT  Goal: Verbalizes/displays adequate comfort level or baseline comfort level  Interventions:  - Encourage patient to monitor pain and request assistance  - Assess pain using appropriate pain scale  - Administer analgesics based on type and severity of pain and evaluate response  - Implement non-pharmacological measures as appropriate and evaluate response  - Consider cultural and social influences on pain and pain management  - Notify physician/advanced practitioner if interventions unsuccessful or patient reports new pain  Outcome: Progressing      Problem: INFECTION - ADULT  Goal: Absence or prevention of progression during hospitalization  INTERVENTIONS:  - Assess and monitor for signs and symptoms of infection  - Monitor lab/diagnostic results  - Monitor all insertion sites, i e  indwelling lines, tubes, and drains  - Monitor endotracheal (as able) and nasal secretions for changes in amount and color  - [place] appropriate cooling/warming therapies per order  - Administer medications as ordered  - Instruct and encourage patient and family to use good hand hygiene technique  - Identify and instruct in appropriate isolation precautions for identified infection/condition  Outcome: Progressing    Goal: Absence of fever/infection during neutropenic period  INTERVENTIONS:  - Monitor WBC  - Implement neutropenic guidelines  Outcome: Progressing      Problem: SAFETY ADULT  Goal: Patient will remain free of falls  INTERVENTIONS:  - Assess patient frequently for physical needs  -  Identify cognitive and physical deficits and behaviors that affect risk of falls    -  [place] fall precautions as indicated by assessment   - Educate patient/family on patient safety including physical limitations  - Instruct patient to call for assistance with activity based on assessment  - Modify environment to reduce risk of injury  - Consider OT/PT consult to assist with strengthening/mobility  Outcome: Progressing    Goal: Maintain or return to baseline ADL function  INTERVENTIONS:  -  Assess patient's ability to carry out ADLs; assess patient's baseline for ADL function and identify physical deficits which impact ability to perform ADLs (bathing, care of mouth/teeth, toileting, grooming, dressing, etc )  - Assess/evaluate cause of self-care deficits   - Assess range of motion  - Assess patient's mobility; develop plan if impaired  - Assess patient's need for assistive devices and provide as appropriate  - Encourage maximum independence but intervene and supervise when necessary  ¯ Involve family in performance of ADLs  ¯ Assess for home care needs following discharge   ¯ Request OT consult to assist with ADL evaluation and planning for discharge  ¯ Provide patient education as appropriate  Outcome: Progressing    Goal: Maintain or return mobility status to optimal level  INTERVENTIONS:  - Assess patient's baseline mobility status (ambulation, transfers, stairs, etc )    - Identify cognitive and physical deficits and behaviors that affect mobility  - Identify mobility aids required to assist with transfers and/or ambulation (gait belt, sit-to-stand, lift, walker, cane, etc )  - [place] fall precautions as indicated by assessment  - Record patient progress and toleration of activity level on Mobility SBAR; progress patient to next Phase/Stage  - Instruct patient to call for assistance with activity based on assessment  - Request Rehabilitation consult to assist with strengthening/weightbearing, etc   Outcome: Progressing      Problem: DISCHARGE PLANNING  Goal: Discharge to home or other facility with appropriate resources  INTERVENTIONS:  - Identify barriers to discharge w/patient and caregiver  - Arrange for needed discharge resources and transportation as appropriate  - Identify discharge learning needs (meds, wound care, etc )  - Arrange for interpretive services to assist at discharge as needed  - Refer to Case Management Department for coordinating discharge planning if the patient needs post-hospital services based on physician/advanced practitioner order or complex needs related to functional status, cognitive ability, or social support system  Outcome: Progressing      Problem: Knowledge Deficit  Goal: Patient/family/caregiver demonstrates understanding of disease process, treatment plan, medications, and discharge instructions  Complete learning assessment and assess knowledge base    Interventions:  - Provide teaching at level of understanding  - Provide teaching via preferred learning methods  Outcome: Progressing      Problem: RESPIRATORY - ADULT  Goal: Achieves optimal ventilation and oxygenation  INTERVENTIONS:  - Assess for changes in respiratory status  - Assess for changes in mentation and behavior  - Position to facilitate oxygenation and minimize respiratory effort  - Oxygen administration by appropriate delivery method based on oxygen saturation (per order) or ABGs  - Initiate smoking cessation education as indicated  - Encourage broncho-pulmonary hygiene including cough, deep breathe, Incentive Spirometry  - Assess the need for suctioning and aspirate as needed  - Assess and instruct to report SOB or any respiratory difficulty  - Respiratory Therapy support as indicated  Outcome: Progressing

## 2019-01-28 NOTE — CONSULTS
The patient's Vancomycin therapy has been completed / discontinued  Thank you for this consult;  Pharmacy will sign-off now "

## 2019-01-29 LAB
ANION GAP SERPL CALCULATED.3IONS-SCNC: 10 MMOL/L (ref 4–13)
BASOPHILS # BLD MANUAL: 0 THOUSAND/UL (ref 0–0.1)
BASOPHILS NFR MAR MANUAL: 0 % (ref 0–1)
BUN SERPL-MCNC: 25 MG/DL (ref 5–25)
CA-I BLD-SCNC: 1.1 MMOL/L (ref 1.12–1.32)
CALCIUM SERPL-MCNC: 8.3 MG/DL (ref 8.3–10.1)
CHLORIDE SERPL-SCNC: 106 MMOL/L (ref 100–108)
CO2 SERPL-SCNC: 29 MMOL/L (ref 21–32)
CREAT SERPL-MCNC: 0.8 MG/DL (ref 0.6–1.3)
EOSINOPHIL # BLD MANUAL: 0 THOUSAND/UL (ref 0–0.4)
EOSINOPHIL NFR BLD MANUAL: 0 % (ref 0–6)
ERYTHROCYTE [DISTWIDTH] IN BLOOD BY AUTOMATED COUNT: 14.8 % (ref 11.6–15.1)
GFR SERPL CREATININE-BSD FRML MDRD: 93 ML/MIN/1.73SQ M
GLUCOSE SERPL-MCNC: 109 MG/DL (ref 65–140)
GLUCOSE SERPL-MCNC: 118 MG/DL (ref 65–140)
GLUCOSE SERPL-MCNC: 135 MG/DL (ref 65–140)
HCT VFR BLD AUTO: 33.7 % (ref 36.5–49.3)
HGB BLD-MCNC: 10.8 G/DL (ref 12–17)
LYMPHOCYTES # BLD AUTO: 1.5 THOUSAND/UL (ref 0.6–4.47)
LYMPHOCYTES # BLD AUTO: 26 % (ref 14–44)
MAGNESIUM SERPL-MCNC: 2.5 MG/DL (ref 1.6–2.6)
MCH RBC QN AUTO: 30 PG (ref 26.8–34.3)
MCHC RBC AUTO-ENTMCNC: 32 G/DL (ref 31.4–37.4)
MCV RBC AUTO: 94 FL (ref 82–98)
MONOCYTES # BLD AUTO: 0.35 THOUSAND/UL (ref 0–1.22)
MONOCYTES NFR BLD: 6 % (ref 4–12)
NEUTROPHILS # BLD MANUAL: 3.69 THOUSAND/UL (ref 1.85–7.62)
NEUTS SEG NFR BLD AUTO: 64 % (ref 43–75)
NRBC BLD AUTO-RTO: 0 /100 WBCS
PHOSPHATE SERPL-MCNC: 3.6 MG/DL (ref 2.3–4.1)
PLATELET # BLD AUTO: 206 THOUSANDS/UL (ref 149–390)
PLATELET BLD QL SMEAR: ADEQUATE
PMV BLD AUTO: 9.7 FL (ref 8.9–12.7)
POTASSIUM SERPL-SCNC: 4.3 MMOL/L (ref 3.5–5.3)
RBC # BLD AUTO: 3.6 MILLION/UL (ref 3.88–5.62)
RBC MORPH BLD: NORMAL
SODIUM SERPL-SCNC: 145 MMOL/L (ref 136–145)
TOTAL CELLS COUNTED SPEC: 100
VARIANT LYMPHS # BLD AUTO: 4 %
WBC # BLD AUTO: 5.77 THOUSAND/UL (ref 4.31–10.16)

## 2019-01-29 PROCEDURE — 85007 BL SMEAR W/DIFF WBC COUNT: CPT | Performed by: PHYSICIAN ASSISTANT

## 2019-01-29 PROCEDURE — 82330 ASSAY OF CALCIUM: CPT | Performed by: PHYSICIAN ASSISTANT

## 2019-01-29 PROCEDURE — 85027 COMPLETE CBC AUTOMATED: CPT | Performed by: PHYSICIAN ASSISTANT

## 2019-01-29 PROCEDURE — 80048 BASIC METABOLIC PNL TOTAL CA: CPT | Performed by: PHYSICIAN ASSISTANT

## 2019-01-29 PROCEDURE — 94760 N-INVAS EAR/PLS OXIMETRY 1: CPT

## 2019-01-29 PROCEDURE — 94660 CPAP INITIATION&MGMT: CPT

## 2019-01-29 PROCEDURE — 82948 REAGENT STRIP/BLOOD GLUCOSE: CPT

## 2019-01-29 PROCEDURE — 99233 SBSQ HOSP IP/OBS HIGH 50: CPT | Performed by: NURSE PRACTITIONER

## 2019-01-29 PROCEDURE — 84100 ASSAY OF PHOSPHORUS: CPT | Performed by: PHYSICIAN ASSISTANT

## 2019-01-29 PROCEDURE — 83735 ASSAY OF MAGNESIUM: CPT | Performed by: PHYSICIAN ASSISTANT

## 2019-01-29 PROCEDURE — 94640 AIRWAY INHALATION TREATMENT: CPT

## 2019-01-29 RX ADMIN — LEVALBUTEROL HYDROCHLORIDE 1.25 MG: 1.25 SOLUTION, CONCENTRATE RESPIRATORY (INHALATION) at 13:19

## 2019-01-29 RX ADMIN — SERTRALINE HYDROCHLORIDE 50 MG: 50 TABLET ORAL at 10:00

## 2019-01-29 RX ADMIN — LORATADINE 10 MG: 10 TABLET ORAL at 10:00

## 2019-01-29 RX ADMIN — IPRATROPIUM BROMIDE 0.5 MG: 0.5 SOLUTION RESPIRATORY (INHALATION) at 20:04

## 2019-01-29 RX ADMIN — LEVALBUTEROL HYDROCHLORIDE 1.25 MG: 1.25 SOLUTION, CONCENTRATE RESPIRATORY (INHALATION) at 07:36

## 2019-01-29 RX ADMIN — ROFLUMILAST 250 MCG: 250 TABLET ORAL at 10:00

## 2019-01-29 RX ADMIN — METHYLPREDNISOLONE SODIUM SUCCINATE 40 MG: 40 INJECTION, POWDER, FOR SOLUTION INTRAMUSCULAR; INTRAVENOUS at 00:00

## 2019-01-29 RX ADMIN — AZITHROMYCIN MONOHYDRATE 500 MG: 250 TABLET ORAL at 21:37

## 2019-01-29 RX ADMIN — FAMOTIDINE 20 MG: 20 TABLET ORAL at 10:00

## 2019-01-29 RX ADMIN — BUDESONIDE 0.5 MG: 0.5 INHALANT RESPIRATORY (INHALATION) at 07:36

## 2019-01-29 RX ADMIN — ENOXAPARIN SODIUM 40 MG: 40 INJECTION SUBCUTANEOUS at 10:00

## 2019-01-29 RX ADMIN — LEVALBUTEROL HYDROCHLORIDE 1.25 MG: 1.25 SOLUTION, CONCENTRATE RESPIRATORY (INHALATION) at 20:04

## 2019-01-29 RX ADMIN — METHYLPREDNISOLONE SODIUM SUCCINATE 40 MG: 40 INJECTION, POWDER, FOR SOLUTION INTRAMUSCULAR; INTRAVENOUS at 05:04

## 2019-01-29 RX ADMIN — METHYLPREDNISOLONE SODIUM SUCCINATE 40 MG: 40 INJECTION, POWDER, FOR SOLUTION INTRAMUSCULAR; INTRAVENOUS at 17:42

## 2019-01-29 RX ADMIN — CHOLESTYRAMINE 4 G: 4 POWDER, FOR SUSPENSION ORAL at 17:42

## 2019-01-29 RX ADMIN — BUDESONIDE 0.5 MG: 0.5 INHALANT RESPIRATORY (INHALATION) at 20:04

## 2019-01-29 RX ADMIN — CHOLESTYRAMINE 4 G: 4 POWDER, FOR SUSPENSION ORAL at 10:00

## 2019-01-29 RX ADMIN — IPRATROPIUM BROMIDE 0.5 MG: 0.5 SOLUTION RESPIRATORY (INHALATION) at 13:19

## 2019-01-29 RX ADMIN — IPRATROPIUM BROMIDE 0.5 MG: 0.5 SOLUTION RESPIRATORY (INHALATION) at 01:28

## 2019-01-29 RX ADMIN — IPRATROPIUM BROMIDE 0.5 MG: 0.5 SOLUTION RESPIRATORY (INHALATION) at 07:36

## 2019-01-29 RX ADMIN — LEVALBUTEROL HYDROCHLORIDE 1.25 MG: 1.25 SOLUTION, CONCENTRATE RESPIRATORY (INHALATION) at 01:28

## 2019-01-29 RX ADMIN — METHYLPREDNISOLONE SODIUM SUCCINATE 40 MG: 40 INJECTION, POWDER, FOR SOLUTION INTRAMUSCULAR; INTRAVENOUS at 12:48

## 2019-01-29 NOTE — PROGRESS NOTES
Progress Note - Critical Care   Destini Trinh 79 y o  male MRN: [id number]  Unit/Bed#:  Encounter: [id number]    Assessment:   Principal Problem:    Acute on chronic respiratory failure with hypoxia (HCC)  Active Problems:    Pulmonary emphysema (HCC)    Gastroesophageal reflux disease without esophagitis    Anxiety    Diastolic dysfunction    Pulmonary hypertension (HCC)    Acute exacerbation of chronic obstructive pulmonary disease (COPD) (HCC)    Gastroesophageal reflux disease    HCAP (healthcare-associated pneumonia)    Chronic diastolic heart failure (HCC)  Resolved Problems:    * No resolved hospital problems  *    Plan:      Neuro:   -monitor neuro status   -continue home zoloft  -CAM ICU  -sleep hygiene    CV:   -overall appears euvolemic  -monitor hemodynamics   Lung:   -maintain nasal cannula during day time  -BIPAP at HS   -continue azithromycin for COPD exacerbation   -continue ATC nebulizers and home dose inhalers  -continue steroid therapy    GI:   -diet as tolerated  -famotidine for GI prophylaxis    FEN:   -monitor electrolytes and replete as necessary    :   -monitor urine output closely    ID:   -continue azithromycin   -follow up culture data  -monitor temperature curve and WBCs   Heme:   -lovenox for DVT prophylaxis    Endo:   -monitor glucose via BMP   Msk/Skin:   -turn and reposition q 2 hours while in bed  -early mobilization  -PT/OT when appropriate   Disposition:   -transition to med/surg tele    ______________________________________________________________________    HPI/24hr events: tolerated ns during day with BIPAP as HS     ______________________________________________________________________    Physical Exam:   PHYSICAL EXAM  General :   Chronically ill appearing, cachectic male, no acute distress  Neuro:   GCS= 15 CN 2-12 intact  Non Focal  HEENT:  Normocephalic, atraumatic, Pupils 3 brisk bilat  PERRLA, EOMI, hearing grossly intact   Symmetrical facial expressions without droop or slurred speech  Tongue midline without fasiculations  Neck:  No JVD, FROM, No masses/adenopathy  Back:   Symmetrical, atruamatic, spinous process pain free on palpation  Cardiovascular:   No heaves,lifts,thrills  S1/S2 [place] No noted MRGC  Pulmonary:   Symmetrical expansion of chest  Resp even & unlabored, diminished throughout   GI :   Abd SNT + BSX4 quads  No palp/pulsitile masses  :  N/A  Musculoskeletal:  Symmetrical  No obvious deformity  FROM in UE & LE  Muscle strength 5/5  No lymphadenopathy  Extrem warm to touch  DTR grossly intact  Brachial/radial/femoral/popliteal/pedal/posterior tibial pulses +2  No lesions noted  CN 2-12 grossly intact  Sensation and motor function intact   ______________________________________________________________________  Vitals:    19 2300 19 2317 19 0400 19 0418   BP: 105/65  102/55    BP Location: Right arm      Pulse: 80  74    Resp: 18  17    Temp: 97 9 °F (36 6 °C)      TempSrc: Axillary      SpO2: 92% 92% 99% 95%   Weight:       Height:         Temperature:   Temp (24hrs), Av 7 °F (36 5 °C), Min:97 1 °F (36 2 °C), Max:97 9 °F (36 6 °C)    Current Temperature: 97 9 °F (36 6 °C)  Weights:   IBW: 73 kg    Body mass index is 25 75 kg/m²    Weight (last 2 days)     Date/Time   Weight    19 0600  81 4 (179 45)    19 0115  81 4 (179 45)    19 2014  78 9 (173 94)            Hemodynamic Monitoring:  N/A     Non-Invasive/Invasive Ventilation Settings:  Respiratory    Lab Data (Last 4 hours)    None         O2/Vent Data (Last 4 hours)      418          Non-Invasive Ventilation Mode BiPAP                 Lab Results   Component Value Date    PHART 7 360 2019    DRI6UJG 49 9 (H) 2019    PO2ART 90 8 2019    QSM9EJW 27 6 2019    BEART 1 5 2019    SOURCE Radial, Right 2019     SpO2: SpO2: 95 %  Intake and Outputs:  I/O       701 -  0700  07 -  0700 IV Piggyback 1380 6 300    Total Intake(mL/kg) 1380 6 (17) 300 (3 7)    Urine (mL/kg/hr)  1150 (0 6)    Total Output   1150    Net +1380 6 -850              Nutrition:        Diet Orders            Start     Ordered    01/28/19 1350  Diet Regular; Regular House; Lo Fat  Diet effective now     Question Answer Comment   Diet Type Regular    Regular Regular House    Other Restriction(s): Lo Fat    RD to adjust diet per protocol? No        01/28/19 1350        Labs:     Results from last 7 days  Lab Units 01/28/19  0450 01/28/19  0134 01/27/19 2039 01/25/19  1507   WBC Thousand/uL 4 44  --  7 62 6 97   HEMOGLOBIN g/dL 11 4*  --  12 3 12 3   HEMATOCRIT % 35 7*  --  38 4 38 6   PLATELETS Thousands/uL 194 184 238 240   NEUTROS PCT %  --   --  72 54   MONOS PCT %  --   --  10 15*   MONO PCT % 1*  --   --   --       Results from last 7 days  Lab Units 01/28/19  0450 01/27/19 2040 01/25/19  1507   POTASSIUM mmol/L 4 2 4 3 4 3   CHLORIDE mmol/L 104 101 101   CO2 mmol/L 27 30 32   BUN mg/dL 24 27* 19   CREATININE mg/dL 0 77 0 92 0 98   CALCIUM mg/dL 8 1* 8 3 9 0   ALK PHOS U/L  --  71 86   ALT U/L  --  20 17   AST U/L  --  26 18       Results from last 7 days  Lab Units 01/28/19  0450   MAGNESIUM mg/dL 2 6            Results from last 7 days  Lab Units 01/27/19  2040   INR  1 03   PTT seconds 31       Results from last 7 days  Lab Units 01/27/19  2039   LACTIC ACID mmol/L 1 4       0  Lab Value Date/Time   TROPONINI <0 02 01/27/2019 2040   TROPONINI <0 02 12/19/2018 1722   TROPONINI 0 02 12/19/2018 1212   TROPONINI 0 02 12/19/2018 0928   TROPONINI <0 02 12/19/2018 0435   TROPONINI <0 02 12/17/2018 1626   TROPONINI <0 02 10/10/2018 1321   TROPONINI <0 02 05/31/2018 1203     Imaging: no new imaging today   EKG: NSR   Micro:  Lab Results   Component Value Date    BLOODCX No Growth After 5 Days  12/19/2018    BLOODCX No Growth After 5 Days  12/19/2018    BLOODCX No Growth After 5 Days  12/17/2018     Allergies:    Allergies Allergen Reactions    Penicillins Hives     Passed out    Codeine      Medications:   Scheduled Meds:  Current Facility-Administered Medications:  azithromycin 500 mg Oral Q24H Katelin Salcedo PA-C   budesonide 0 5 mg Nebulization Q12H Jean Nunez MD   cholestyramine sugar free 4 g Oral BID Katelin Salcedo PA-C   enoxaparin 40 mg Subcutaneous Daily Katelin Salcedo PA-C   famotidine 20 mg Oral Daily Katelin Salcedo PA-C   ipratropium 0 5 mg Nebulization Q6H Jean Nunez MD   levalbuterol 1 25 mg Nebulization Q6H Jean Nunez MD   loratadine 10 mg Oral Daily Katelin Salcedo PA-C   methylPREDNISolone sodium succinate 40 mg Intravenous Q6H [address] Katelin Salcedo PA-C   pneumococcal 13-valent conjugate vaccine 0 5 mL Intramuscular Prior to discharge Jean Nunez MD   roflumilast 250 mcg Oral Daily Katelin Salcedo PA-C   senna 1 tablet Oral HS Katelin Salcedo PA-C   sertraline 50 mg Oral Daily Pavan Cash PA-C     Continuous Infusions:   PRN Meds:    pneumococcal 13-valent conjugate vaccine 0 5 mL Prior to discharge     VTE Pharmacologic Prophylaxis: Sequential compression device (Venodyne)  and Enoxaparin (Lovenox)  VTE Mechanical Prophylaxis: sequential compression device  Invasive lines and devices: Invasive Devices     Peripheral Intravenous Line            Peripheral IV 01/27/19 Left Antecubital 1 day    Peripheral IV 01/27/19 Right Antecubital 1 day              Counseling / Coordination of Care  Total time spent today 33 minutes  Greater than 50% of total time was spent with the patient and / or family counseling and / or coordination of care  Code Status: Level 1 - Full Code    Portions of the record may have been created with voice recognition software  Occasional wrong word or "sound a like" substitutions may have occurred due to the inherent limitations of voice recognition software  Read the chart carefully and recognize, using context, where substitutions have occurred      CORNELIA Campos

## 2019-01-29 NOTE — PROGRESS NOTES
Progress Note - ICU Transfer to SD/MS tele   Dot Molt 79 y o  male MRN: [id number]  [address] [facility]   Unit/Bed#:  Encounter: [id number]    Code Status: Level 1 - Full Code  POA:    POLST:      Reason for ICU admission:  Acute on chronic hypoxic respiratory failure    Active problems:   Principal Problem:    Acute on chronic respiratory failure with hypoxia ([facility] [address] )  Active Problems:    Pulmonary emphysema (HCC)    Gastroesophageal reflux disease without esophagitis    Anxiety    Diastolic dysfunction    Pulmonary hypertension (HCC)    Acute exacerbation of chronic obstructive pulmonary disease (COPD) ([facility] [address] )    Gastroesophageal reflux disease    HCAP (healthcare-associated pneumonia)    Chronic diastolic heart failure ([facility] [address] )  Resolved Problems:    * No resolved hospital problems  *      Consultants:   None    History of Present Illness:  Patient is a 24-year-old male presenting on 01/28 with complaint of acute onset of shortness of breath  He has a past medical history of COPD on 2 L nasal cannula chronically, grade 1 diastolic heart failure, pulmonary hypertension, and GERD as well as a previous admission for influenza a  His workup in the emergency room was significant for possible lower lobe infiltrates and was started on broad-spectrum antibiotics as well as placed on BiPAP and referred to the step-down unit for admission  Summary of clinical course:   He was transitioned to nasal cannula and his vancomycin discontinued  He was placed on around the clock nebulizers as well as IV steroids for likely COPD exacerbation  Continued on noninvasive positive pressure at night  At this point is felt to be stable for transition to a medical-surgical floor with telemetry        Recent or scheduled procedures:   1/27 CTA Chest- no evidence of central pulmonary embolus, nodular airspace opacities within the lungs, questionable nodule contour of the liver  1/28 CXR- emphysema with subsegmental atelectasis right lung base    Outstanding/pending diagnostics:   Transition to oral steroids    Cultures:   1/28 Flu- Negative  1/28 Blood x2- no growth for 24 hours       Mobilization Plan:   Out of bed as tolerated    Nutrition Plan:   Oral diet    VTE Pharmacologic Prophylaxis: Enoxaparin (Lovenox)  VTE Mechanical Prophylaxis: sequential compression device    Discharge Plan:   Patient should be ready for discharge to home after transition from IV steroids    Initial Physical Therapy Recommendations: Pending  Initial Occupational Therapy Recommendations: Pending  Initial /Plan:  Following    Home medications that are not reordered and reason why:   Albuterol-on scheduled nebulizers    Spoke with Dr Juan Melendez regarding transfer  Please call [phone] with any questions or concerns  Portions of the record may have been created with voice recognition software  Occasional wrong word or "sound a like" substitutions may have occurred due to the inherent limitations of voice recognition software  Read the chart carefully and recognize, using context, where substitutions have occurred      CORNELIA Lopez

## 2019-01-30 ENCOUNTER — TELEPHONE (OUTPATIENT)
Dept: PULMONOLOGY | Facility: CLINIC | Age: 69
End: 2019-01-30

## 2019-01-30 LAB
L PNEUMO1 AG UR QL IA.RAPID: NEGATIVE
PROCALCITONIN SERPL-MCNC: <0.05 NG/ML
S PNEUM AG UR QL: NEGATIVE

## 2019-01-30 PROCEDURE — 94640 AIRWAY INHALATION TREATMENT: CPT

## 2019-01-30 PROCEDURE — 94760 N-INVAS EAR/PLS OXIMETRY 1: CPT

## 2019-01-30 PROCEDURE — 94660 CPAP INITIATION&MGMT: CPT

## 2019-01-30 PROCEDURE — 87449 NOS EACH ORGANISM AG IA: CPT | Performed by: PHYSICIAN ASSISTANT

## 2019-01-30 PROCEDURE — 84145 PROCALCITONIN (PCT): CPT | Performed by: NURSE PRACTITIONER

## 2019-01-30 PROCEDURE — 87205 SMEAR GRAM STAIN: CPT | Performed by: PHYSICIAN ASSISTANT

## 2019-01-30 PROCEDURE — 87081 CULTURE SCREEN ONLY: CPT | Performed by: PHYSICIAN ASSISTANT

## 2019-01-30 PROCEDURE — 87070 CULTURE OTHR SPECIMN AEROBIC: CPT | Performed by: PHYSICIAN ASSISTANT

## 2019-01-30 RX ORDER — METHYLPREDNISOLONE SODIUM SUCCINATE 40 MG/ML
40 INJECTION, POWDER, LYOPHILIZED, FOR SOLUTION INTRAMUSCULAR; INTRAVENOUS EVERY 8 HOURS SCHEDULED
Status: DISCONTINUED | OUTPATIENT
Start: 2019-01-30 | End: 2019-01-31

## 2019-01-30 RX ORDER — FLUTICASONE FUROATE AND VILANTEROL 100; 25 UG/1; UG/1
1 POWDER RESPIRATORY (INHALATION) DAILY
Status: DISCONTINUED | OUTPATIENT
Start: 2019-01-30 | End: 2019-02-01 | Stop reason: HOSPADM

## 2019-01-30 RX ADMIN — BUDESONIDE 0.5 MG: 0.5 INHALANT RESPIRATORY (INHALATION) at 20:06

## 2019-01-30 RX ADMIN — CHOLESTYRAMINE 4 G: 4 POWDER, FOR SUSPENSION ORAL at 09:14

## 2019-01-30 RX ADMIN — METHYLPREDNISOLONE SODIUM SUCCINATE 40 MG: 40 INJECTION, POWDER, FOR SOLUTION INTRAMUSCULAR; INTRAVENOUS at 11:52

## 2019-01-30 RX ADMIN — LEVALBUTEROL HYDROCHLORIDE 1.25 MG: 1.25 SOLUTION, CONCENTRATE RESPIRATORY (INHALATION) at 20:06

## 2019-01-30 RX ADMIN — LORATADINE 10 MG: 10 TABLET ORAL at 09:15

## 2019-01-30 RX ADMIN — SERTRALINE HYDROCHLORIDE 50 MG: 50 TABLET ORAL at 09:16

## 2019-01-30 RX ADMIN — CHOLESTYRAMINE 4 G: 4 POWDER, FOR SUSPENSION ORAL at 17:11

## 2019-01-30 RX ADMIN — METHYLPREDNISOLONE SODIUM SUCCINATE 40 MG: 40 INJECTION, POWDER, FOR SOLUTION INTRAMUSCULAR; INTRAVENOUS at 06:27

## 2019-01-30 RX ADMIN — IPRATROPIUM BROMIDE 0.5 MG: 0.5 SOLUTION RESPIRATORY (INHALATION) at 20:06

## 2019-01-30 RX ADMIN — METHYLPREDNISOLONE SODIUM SUCCINATE 40 MG: 40 INJECTION, POWDER, FOR SOLUTION INTRAMUSCULAR; INTRAVENOUS at 21:02

## 2019-01-30 RX ADMIN — BUDESONIDE 0.5 MG: 0.5 INHALANT RESPIRATORY (INHALATION) at 07:56

## 2019-01-30 RX ADMIN — IPRATROPIUM BROMIDE 0.5 MG: 0.5 SOLUTION RESPIRATORY (INHALATION) at 07:56

## 2019-01-30 RX ADMIN — LEVALBUTEROL HYDROCHLORIDE 1.25 MG: 1.25 SOLUTION, CONCENTRATE RESPIRATORY (INHALATION) at 13:39

## 2019-01-30 RX ADMIN — IPRATROPIUM BROMIDE 0.5 MG: 0.5 SOLUTION RESPIRATORY (INHALATION) at 02:31

## 2019-01-30 RX ADMIN — ENOXAPARIN SODIUM 40 MG: 40 INJECTION SUBCUTANEOUS at 09:16

## 2019-01-30 RX ADMIN — IPRATROPIUM BROMIDE 0.5 MG: 0.5 SOLUTION RESPIRATORY (INHALATION) at 13:39

## 2019-01-30 RX ADMIN — FLUTICASONE FUROATE AND VILANTEROL TRIFENATATE 1 PUFF: 100; 25 POWDER RESPIRATORY (INHALATION) at 12:32

## 2019-01-30 RX ADMIN — FAMOTIDINE 20 MG: 20 TABLET ORAL at 09:15

## 2019-01-30 RX ADMIN — METHYLPREDNISOLONE SODIUM SUCCINATE 40 MG: 40 INJECTION, POWDER, FOR SOLUTION INTRAMUSCULAR; INTRAVENOUS at 00:37

## 2019-01-30 RX ADMIN — ROFLUMILAST 250 MCG: 250 TABLET ORAL at 09:14

## 2019-01-30 RX ADMIN — AZITHROMYCIN MONOHYDRATE 500 MG: 250 TABLET ORAL at 21:02

## 2019-01-30 RX ADMIN — LEVALBUTEROL HYDROCHLORIDE 1.25 MG: 1.25 SOLUTION, CONCENTRATE RESPIRATORY (INHALATION) at 02:31

## 2019-01-30 RX ADMIN — LEVALBUTEROL HYDROCHLORIDE 1.25 MG: 1.25 SOLUTION, CONCENTRATE RESPIRATORY (INHALATION) at 07:56

## 2019-01-30 NOTE — TELEPHONE ENCOUNTER
----- Message from [facility] OFELIA Davies sent at 1/30/2019  3:27 PM EST -----  Would you happen to know or could you find out from [employer] what the absolute requirements are for obtaining a BiPAP or Trilogy at home for this patient?

## 2019-01-30 NOTE — CONSULTS
Consultation - Pulmonary Medicine   Alexandria Nunez 79 y o  male MRN: [id number]  Unit/Bed#: -01 Encounter: [id number]      Assessment:  1  Acute on chronic hypoxemic respiratory failure  2  COPD exacerbation      Plan:   CTA done on admission shows no PE, shows nodular airspace opacities within the lungs infectious versus inflammatory  He did have a fever of 101 2 on presentation, has been afebrile since then  He has no leukocytosis, procalcitonin is normal, possibly viral in nature  He had been started on broad-spectrum antibiotics which were stopped  He had a rapid improvement in his symptoms with initiation of BiPAP as well as steroids  He is currently saturating mid to high 90s on his baseline 3 L nasal cannula  Continue Solu-Medrol, can likely decrease to Q 12 tomorrow  Continue around the clock nebulizer treatments as well as Jeanne Mancilla  Will speak with the medical supply company to see if we can obtain either BiPAP or trilogy for him for home use as he does find significant relief in his shortness of breath when he is placed on at here  His pCO2 was 49 9 on presentation  He does have very severe COPD on PFTs, along with his chronic respiratory failure and CHF would benefit from nocturnal noninvasive ventilation at home  History of Present Illness   Physician Requesting Consult: Leighann Gupta MD  Reason for Consult / Principal Problem:  COPD exacerbation  Hx and PE limited by:  None  HPI: Alexandria Nunez is a 79y o  year old male former smoker with greater than 50 pack-year smoking history with severe COPD on bronchodilators and home O2, pulmonary hypertension, CHF who presents with complaint of increasing shortness of breath and cough at home  I did see him for hospital follow-up about 3 weeks ago, states that over the last few days he has had increasing shortness of breath    He has had several exacerbations recently for influenza and COPD exacerbations requiring BiPAP  He is currently feeling improved with his breathing though reports feeling significantly fatigued  Consults    Review of Systems   Constitutional: Negative  HENT: Negative  Respiratory: Positive for cough and shortness of breath  Cardiovascular: Negative  Gastrointestinal: Negative  Genitourinary: Negative  Musculoskeletal: Negative  Skin: Negative  Allergic/Immunologic: Negative  Neurological: Negative  Psychiatric/Behavioral: Negative  Historical Information   Past Medical History:   Diagnosis Date    Centrilobular emphysema ([facility] [address] )     COPD [facility]ch[facility] obstructive [facility] disease) ([facility] )     Hypoglycemia     On home oxygen therapy     Pneumonia     Pulmonary hypertension ([facility] )     Respiratory failure (HCC)     SOB (shortness of breath)      Past Surgical History:   Procedure Laterality Date    CATARACT EXTRACTION      CHOLECYSTECTOMY      hernia    EYE SURGERY      KS ESOPHAGOGASTRODUODENOSCOPY TRANSORAL DIAGNOSTIC N/A 9/19/2018    Procedure: ESOPHAGOGASTRODUODENOSCOPY (EGD); Surgeon: Iman Verma MD;  Location: [facility];   Service: Gastroenterology    KS REPAIR [address] HERNIA,5+Y/O,REDUCIBL Left 5/23/2018    Procedure: OPEN INGUINAL HERNIA REPAIR WITH MESH;  Surgeon: Shawna Virgen MD;  Location: [facility];  Service: General     Social History   History   Alcohol Use    0 6 oz/week    1 Glasses of wine per week     Comment: socialy     History   Drug Use No     History   Smoking Status    Former Smoker    Years: 50 00    Types: Cigarettes    Quit date: 2/27/2013   Smokeless Tobacco    Never Used     Occupational History:     Family History: non-contributory    Meds/Allergies   all current active meds have been reviewed, pertinent pulmonary meds have been reviewed and current meds:   Current Facility-Administered Medications   Medication Dose Route Frequency    [MAR Hold] azithromycin (ZITHROMAX) tablet 500 mg  500 mg Oral Q24H    [facility] Hold] budesonide (PULMICORT) inhalation solution 0 5 mg  0 5 mg Nebulization Q12H    [MAR Hold] cholestyramine sugar free (QUESTRAN LIGHT) packet 4 g  4 g Oral BID    [MAR Hold] enoxaparin (LOVENOX) subcutaneous injection 40 mg  40 mg Subcutaneous Daily    [MAR Hold] famotidine (PEPCID) tablet 20 mg  20 mg Oral Daily    [MAR Hold] fluticasone-vilanterol (BREO ELLIPTA) 100-25 mcg/inh inhaler 1 puff  1 puff Inhalation Daily    [MAR Hold] ipratropium (ATROVENT) 0 02 % inhalation solution 0 5 mg  0 5 mg Nebulization Q6H    [MAR Hold] levalbuterol (XOPENEX) inhalation solution 1 25 mg  1 25 mg Nebulization Q6H    [MAR Hold] loratadine (CLARITIN) tablet 10 mg  10 mg Oral Daily    [MAR Hold] methylPREDNISolone sodium succinate (Solu-MEDROL) injection 40 mg  40 mg Intravenous Q8H [address]    [MAR Hold] pneumococcal 13-valent conjugate vaccine (PREVNAR-13) IM injection 0 5 mL  0 5 mL Intramuscular Prior to discharge    [MAR Hold] roflumilast (DALIRESP) tablet 250 mcg  250 mcg Oral Daily    [MAR Hold] senna (SENOKOT) tablet 8 6 mg  1 tablet Oral HS    [MAR Hold] sertraline (ZOLOFT) tablet 50 mg  50 mg Oral Daily       Allergies   Allergen Reactions    Penicillins Hives     Passed out    Codeine        Objective   Vitals: Blood pressure 147/83, pulse 72, temperature 97 5 °F (36 4 °C), temperature source Oral, resp  rate 22, height 5' 10" (1 778 m), weight 81 4 kg (179 lb 7 3 oz), SpO2 93 %  ,Body mass index is 25 75 kg/m²  Intake/Output Summary (Last 24 hours) at 01/30/19 1523  Last data filed at 01/30/19 0800   Gross per 24 hour   Intake                6 ml   Output             1750 ml   Net            -1744 ml     Invasive Devices     Peripheral Intravenous Line            Peripheral IV 01/27/19 Left Antecubital 2 days    Peripheral IV 01/27/19 Right Antecubital 2 days                Physical Exam   Constitutional: He is oriented to person, place, and time  He appears well-developed and well-nourished   No distress  HENT:   Mouth/Throat: Oropharynx is clear and moist    Eyes: Pupils are equal, round, and reactive to light  Cardiovascular: Normal rate, regular rhythm and normal heart sounds  No murmur heard  Pulmonary/Chest: Effort normal  No accessory muscle usage  No respiratory distress  He has decreased breath sounds  He has no wheezes  He has no rhonchi  He has no rales  Abdominal: Soft  There is no tenderness  Musculoskeletal: Normal range of motion  He exhibits no edema  Neurological: He is alert and oriented to person, place, and time  Skin: Skin is warm and dry  No rash noted  Psychiatric: He has a normal mood and affect  Lab Results: I have personally reviewed pertinent lab results  , CBC: No results found for: WBC, HGB, HCT, MCV, PLT, ADJUSTEDWBC, MCH, MCHC, RDW, MPV, NRBC, CMP: No results found for: SODIUM, K, CL, CO2, ANIONGAP, BUN, CREATININE, GLUCOSE, CALCIUM, AST, ALT, ALKPHOS, PROT, BILITOT, EGFR  Imaging Studies: I have personally reviewed pertinent reports  and I have personally reviewed pertinent films in PACS  EKG, Pathology, and Other Studies: I have personally reviewed pertinent reports      VTE Prophylaxis: Sequential compression device (Venodyne)  and Enoxaparin (Lovenox)    Code Status: Level 1 - Full Code  Advance Directive and Living Will:      Power of :    POLST:

## 2019-01-30 NOTE — TELEPHONE ENCOUNTER
Called [facility] , they suggest trilogy , they are sending over a form to fill out and then we need to fax back with office notes !  fyi

## 2019-01-30 NOTE — PLAN OF CARE
DISCHARGE PLANNING     Discharge to home or other facility with appropriate resources Progressing        DISCHARGE PLANNING - CARE MANAGEMENT     Discharge to post-acute care or home with appropriate resources Progressing        INFECTION - ADULT     Absence or prevention of progression during hospitalization Progressing        Knowledge Deficit     Patient/family/caregiver demonstrates understanding of disease process, treatment plan, medications, and discharge instructions Progressing        Nutrition/Hydration-ADULT     Nutrient/Hydration intake appropriate for improving, restoring or maintaining nutritional needs Progressing        PAIN - ADULT     Verbalizes/displays adequate comfort level or baseline comfort level Progressing        Potential for Falls     Patient will remain free of falls Progressing        RESPIRATORY - ADULT     Achieves optimal ventilation and oxygenation Progressing        SAFETY ADULT     Patient will remain free of falls Progressing     Maintain or return to baseline ADL function Progressing     Maintain or return mobility status to optimal level Progressing          INFECTION - ADULT     Absence of fever/infection during neutropenic period Completed

## 2019-01-31 ENCOUNTER — TELEPHONE (OUTPATIENT)
Dept: CARDIOLOGY CLINIC | Facility: CLINIC | Age: 69
End: 2019-01-31

## 2019-01-31 LAB
HCT VFR BLD AUTO: 34.8 % (ref 36.5–49.3)
HGB BLD-MCNC: 11.1 G/DL (ref 12–17)
MRSA NOSE QL CULT: NORMAL

## 2019-01-31 PROCEDURE — 99232 SBSQ HOSP IP/OBS MODERATE 35: CPT | Performed by: STUDENT IN AN ORGANIZED HEALTH CARE EDUCATION/TRAINING PROGRAM

## 2019-01-31 PROCEDURE — G8978 MOBILITY CURRENT STATUS: HCPCS

## 2019-01-31 PROCEDURE — 97535 SELF CARE MNGMENT TRAINING: CPT

## 2019-01-31 PROCEDURE — 99232 SBSQ HOSP IP/OBS MODERATE 35: CPT | Performed by: PHYSICIAN ASSISTANT

## 2019-01-31 PROCEDURE — 85014 HEMATOCRIT: CPT | Performed by: STUDENT IN AN ORGANIZED HEALTH CARE EDUCATION/TRAINING PROGRAM

## 2019-01-31 PROCEDURE — G8979 MOBILITY GOAL STATUS: HCPCS

## 2019-01-31 PROCEDURE — 94640 AIRWAY INHALATION TREATMENT: CPT

## 2019-01-31 PROCEDURE — 97163 PT EVAL HIGH COMPLEX 45 MIN: CPT

## 2019-01-31 PROCEDURE — 85018 HEMOGLOBIN: CPT | Performed by: STUDENT IN AN ORGANIZED HEALTH CARE EDUCATION/TRAINING PROGRAM

## 2019-01-31 PROCEDURE — 94760 N-INVAS EAR/PLS OXIMETRY 1: CPT

## 2019-01-31 PROCEDURE — 94660 CPAP INITIATION&MGMT: CPT

## 2019-01-31 RX ORDER — METHYLPREDNISOLONE SODIUM SUCCINATE 40 MG/ML
40 INJECTION, POWDER, LYOPHILIZED, FOR SOLUTION INTRAMUSCULAR; INTRAVENOUS EVERY 12 HOURS SCHEDULED
Status: DISCONTINUED | OUTPATIENT
Start: 2019-01-31 | End: 2019-02-01 | Stop reason: HOSPADM

## 2019-01-31 RX ADMIN — FLUTICASONE FUROATE AND VILANTEROL TRIFENATATE 1 PUFF: 100; 25 POWDER RESPIRATORY (INHALATION) at 08:50

## 2019-01-31 RX ADMIN — LEVALBUTEROL HYDROCHLORIDE 1.25 MG: 1.25 SOLUTION, CONCENTRATE RESPIRATORY (INHALATION) at 19:23

## 2019-01-31 RX ADMIN — FAMOTIDINE 20 MG: 20 TABLET ORAL at 08:49

## 2019-01-31 RX ADMIN — CHOLESTYRAMINE 4 G: 4 POWDER, FOR SUSPENSION ORAL at 18:20

## 2019-01-31 RX ADMIN — LEVALBUTEROL HYDROCHLORIDE 1.25 MG: 1.25 SOLUTION, CONCENTRATE RESPIRATORY (INHALATION) at 13:52

## 2019-01-31 RX ADMIN — ENOXAPARIN SODIUM 40 MG: 40 INJECTION SUBCUTANEOUS at 08:49

## 2019-01-31 RX ADMIN — IPRATROPIUM BROMIDE 0.5 MG: 0.5 SOLUTION RESPIRATORY (INHALATION) at 19:23

## 2019-01-31 RX ADMIN — IPRATROPIUM BROMIDE 0.5 MG: 0.5 SOLUTION RESPIRATORY (INHALATION) at 08:20

## 2019-01-31 RX ADMIN — BUDESONIDE 0.5 MG: 0.5 INHALANT RESPIRATORY (INHALATION) at 08:20

## 2019-01-31 RX ADMIN — METHYLPREDNISOLONE SODIUM SUCCINATE 40 MG: 40 INJECTION, POWDER, FOR SOLUTION INTRAMUSCULAR; INTRAVENOUS at 05:05

## 2019-01-31 RX ADMIN — LEVALBUTEROL HYDROCHLORIDE 1.25 MG: 1.25 SOLUTION, CONCENTRATE RESPIRATORY (INHALATION) at 02:27

## 2019-01-31 RX ADMIN — METHYLPREDNISOLONE SODIUM SUCCINATE 40 MG: 40 INJECTION, POWDER, FOR SOLUTION INTRAMUSCULAR; INTRAVENOUS at 20:25

## 2019-01-31 RX ADMIN — IPRATROPIUM BROMIDE 0.5 MG: 0.5 SOLUTION RESPIRATORY (INHALATION) at 13:52

## 2019-01-31 RX ADMIN — LEVALBUTEROL HYDROCHLORIDE 1.25 MG: 1.25 SOLUTION, CONCENTRATE RESPIRATORY (INHALATION) at 08:20

## 2019-01-31 RX ADMIN — LORATADINE 10 MG: 10 TABLET ORAL at 08:49

## 2019-01-31 RX ADMIN — SERTRALINE HYDROCHLORIDE 50 MG: 50 TABLET ORAL at 08:49

## 2019-01-31 RX ADMIN — BUDESONIDE 0.5 MG: 0.5 INHALANT RESPIRATORY (INHALATION) at 19:23

## 2019-01-31 RX ADMIN — IPRATROPIUM BROMIDE 0.5 MG: 0.5 SOLUTION RESPIRATORY (INHALATION) at 02:27

## 2019-01-31 NOTE — ASSESSMENT & PLAN NOTE
History of severe COPD  CTA negative for pulmonary embolism, nodular airspace opacities possible infectious versus inflammatory changes  Likely due to viral etiology  Antibiotics were stopped  Feeling better  Currently O2 saturation 97% on 3-4 L nasal cannula    Continue current breathing treatment  Start tapering steroids  Nighttime BiPAP  Pulmonary recommendations appreciated  Recommended pulmonary rehab  Will have care team meeting with him and his wife was also admitted for COPD exacerbation

## 2019-01-31 NOTE — PROGRESS NOTES
Patient arrived on floor around 1330  Patient is alert and oriented x4  Minimal assist to transfer from wheelchair to bed  Vitals stable  97% on 3L via NC  Neb treatment given  Feeling better  No pain reported  Will continue to monitor  Wife visiting in room  She is a patient in room 333

## 2019-01-31 NOTE — PHYSICAL THERAPY NOTE
PHYSICAL THERAPY NOTE (14:45-15:15)          Patient Name: Marylou BURRELL Date: 1/31/2019       Care team mtg held c pt, pt's spouse Shekhar Maxwell MD (Yaima), PA Barbie Weinstein), RT Asha Nuñez),  Ravinder Salcido), nsg Franco Rodgers), + MD Yogesh Joaquin)  Topics discussed include pt's med status/dx, O2 needs upon d/c, benefits of pulmonary rhb + home set up  Pt and spouse reside c dtr who has 2 story home c shower upstairs  Pt + family encouraged to possibly consider 1st floor set up for pt + spouse  At this time, pt + spouse prefer to remain upstairs at home as pt more comfortable upstairs  Casemgt to work c pt + insurance to obtain addition O2 equipment for home  Pt agreeable to pulmonary rhb provided supplemental insurance is able to be obtained; currently pt would need to pay 20%  Will cont to follow for skilled PT for remainder of hospital course       Arias Esquivel, PT, DPT

## 2019-01-31 NOTE — PHYSICAL THERAPY NOTE
PT Evaluation (15min)  (10:25-10:40)    Past Medical History:   Diagnosis Date    Centrilobular emphysema ([facility] [address] )     COPD (chronic obstructive pulmonary disease) (HCC)     Hypoglycemia     On home oxygen therapy     Pneumonia     Pulmonary hypertension (HCC)     Respiratory failure (HCC)     SOB (shortness of breath)       01/31/19 1027   Note Type   Note type Eval only   Pain Assessment   Pain Assessment No/denies pain   Home Living   Type of [address] Two level;Stairs to enter with rails  (2 ALIS; 18 steps to 2nd floor)   Bathroom Shower/Tub Tub/shower unit   Bathroom Toilet Standard   Home Equipment Other (Comment)  (home O2 3L cont)   Prior Function   Level of [place] Independent with ADLs and functional mobility  (reports navigating stairs 1 step at a time)   Lives With Spouse;Daughter  (dtr works 2 days/wk)   Caden Tierney 34 in the last 6 months 0   Vocational Retired   Restrictions/Precautions   Other Precautions Contact/isolation;O2;Fall Risk   General   Additional Pertinent History pt presents to [facility] c worsening SOB  dx: acute on chronic respiratory failure c hypoxia  uses 3L home O2 at baseline  PT consulted for mobility + d/c planning  up c (A)  Family/Caregiver Present No   Cognition   Orientation Level Oriented X4   RUE Assessment   RUE Assessment WFL  (4/5)   LUE Assessment   LUE Assessment WFL  (4/5)   RLE Assessment   RLE Assessment WFL  (4/5)   LLE Assessment   LLE Assessment WFL  (4/5)   Coordination   Sensation WFL   Bed Mobility   Supine to Sit Unable to assess  (pt sitting EOB upon arrival)   Transfers   Sit to Stand 5  Supervision   Additional items Verbal cues; Increased time required   Stand to Sit 5  Supervision   Additional items Verbal cues; Increased time required   Ambulation/Elevation   Gait pattern Decreased foot clearance; Inconsistent luís   Gait Assistance 5  Supervision   Additional items Verbal cues   Assistive Device None   Distance 10' limited by SOB   Balance   Static Sitting Good   Dynamic Sitting Good   Static Standing Fair +   Dynamic Standing Fair   Ambulatory Fair   Endurance Deficit   Endurance Deficit Yes   Endurance Deficit Description SOB   Activity Tolerance   Activity Tolerance Patient limited by fatigue   Nurse Made Aware Amanda   Assessment   Prognosis Good   Problem List Decreased strength;Decreased endurance; Impaired balance;Decreased mobility   Assessment pt is a 67y/o m who presents to [facility] c acute on chronic respiratory failure c hypoxia  uses 3L O2 at baseline  PMH significant for anxiety, COPD, GERD, + pulmonary emphysema  at baseline, pt (I) c functional mobility s AD  resides c spouse + dtr in 2 story home c 2 ALIS + 18 steps to 2nd floor c rail  dtr works 2 days/wk  currently presents c deficits in strength, balance, gait quality, + severe deconditioning noted in PT exam above  Barthel Index 55/100  ambulated 10' s AD significantly limited by fatigue  pt declined further mobility at this time 2* above  would benefit from skilled PT to maximize functional mobility + return home safely  upon d/c, recommend hhpt  PT eval of high complexity 2* unstable med status c pt requiring ongoing medical management 2* acute on chronic respiratory failure c hypoxia  pt c significant SOB c short bouts of activity  resides in 2 story home c 18 steps to shower  also c multiple co-morbidities impacting PT including anxiety, GERD, COPD, + pulmonary emphysema  Barriers to Discharge Inaccessible home environment   Goals   Patient Goals "To get upstairs"  STG Expiration Date 02/10/19   Short Term Goal #1 1   increase strength 1/2 grade to improve overall functional mobility, 2  perform transfers mod (I) to safely perform ADLs, 3  ambulate >150' (I) to safely navigate home environment, 4  negotiate 18 stairs mod (I) to safely access shower   Plan   Treatment/Interventions Functional transfer training;LE strengthening/ROM; Elevations; Therapeutic exercise; Endurance training;Patient/family training;Bed mobility;Gait training;Spoke to nursing;Spoke to case management   PT Frequency Other (Comment)  (3-5x/wk)   Recommendation   Recommendation Home PT   PT - OK to Discharge No   Barthel Index   Feeding 10   Bathing 0   Grooming Score 5   Dressing Score 5   Bladder Score 10   Bowels Score 10   Toilet Use Score 5   Transfers (Bed/Chair) Score 10   Mobility (Level Surface) Score 0   Stairs Score 0   Barthel Index Score 55     Dilia Thomas, PT, DPT

## 2019-01-31 NOTE — PROGRESS NOTES
Progress Note - Pulmonary   Raisa Singleton 79 y o  male MRN: [id number]  Unit/Bed#: -01 Encounter: [id number]    Assessment:  Acute on chronic hypoxemic respiratory failure  COPD exacerbation  Emphysema    Plan:  CT on admission-no PE, nodular airspace opacities within the lung fields infectious versus inflammatory  Fever of 101 2 on presentation  Remained afebrile since then  No leukocytosis  Procalcitonin normal   Likely viral in nature  He was begun on broad-spectrum antibiotics earlier in admission which have now been stopped  He previously required BiPAP during this admission which resulted in rapid improvement of his symptoms  He is currently saturating well on his baseline 3 L nasal cannula  Solu-Medrol has been decreased to 40 mg q 12 hours today  Continue round-the-clock nebulizer treatments, Breo, daily resp  Would like to obtain either BiPAP or trilogy for this patient as he benefits greatly from noninvasive ventilation  His pCO2 was 49 9 on presentation and he has very severe COPD demonstrated on his PFTs and chronic respiratory failure  Has demonstrated that he would benefit from nocturnal noninvasive ventilation at home  Subjective:   He feels much better today  Denies any shortness of breath at rest currently, however, becomes quite short of breath when he tries to walk across the room  Objective:     Vitals: Blood pressure 135/60, pulse 70, temperature 98 6 °F (37 °C), temperature source Oral, resp  rate 18, height 5' 10" (1 778 m), weight 75 8 kg (167 lb 1 7 oz), SpO2 97 %  ,Body mass index is 23 98 kg/m²        Intake/Output Summary (Last 24 hours) at 01/31/19 1324  Last data filed at 01/31/19 1022   Gross per 24 hour   Intake              740 ml   Output              975 ml   Net             -235 ml       Invasive Devices     Peripheral Intravenous Line            Peripheral IV 01/27/19 Left Antecubital 3 days    Peripheral IV 01/27/19 Right Antecubital 3 days Physical Exam: /60 (BP Location: Left arm)   Pulse 70   Temp 98 6 °F (37 °C) (Oral)   Resp 18   Ht 5' 10" (1 778 m)   Wt 75 8 kg (167 lb 1 7 oz) Comment: pump and blankets taken off bed  SpO2 97%   BMI 23 98 kg/m²   General appearance: alert and oriented, in no acute distress  Patient is thin  Head: Normocephalic, without obvious abnormality, atraumatic  Eyes: EOMI  No discharge bilaterally  No scleral icterus  Throat: Poor dentition  Neck: supple, symmetrical, trachea midline  Lungs: Decreased air movement, improved from prior exams  Faint expiratory wheezes anteriorly  Heart: regular rate and rhythm, S1, S2 normal, no murmur, click, rub or gallop  Abdomen: soft, non-tender; bowel sounds normal; no masses,  no organomegaly  Extremities: extremities normal, warm and well-perfused; no cyanosis, clubbing, or edema  Pulses: 2+ and symmetric  Skin: Skin color, texture, turgor normal  No rashes or lesions  Neurologic: Grossly normal     Labs: I have personally reviewed pertinent lab results  Imaging and other studies: I have personally reviewed pertinent reports

## 2019-01-31 NOTE — PROGRESS NOTES
Progress Note - Amilcar Wheeler 1950, 79 y o  male MRN: [id number]    Unit/Bed#: -01 Encounter: [id number]    Primary Care Provider: Jose Kaba MD   Date and time admitted to hospital: 1/27/2019  8:14 PM        * Acute on chronic respiratory failure with hypoxia [facility])   Assessment & Plan    History of severe COPD  CTA negative for pulmonary embolism, nodular airspace opacities possible infectious versus inflammatory changes  Likely due to viral etiology  Antibiotics were stopped  Feeling better  Currently O2 saturation 97% on 3-4 L nasal cannula  Continue current breathing treatment  Start tapering steroids  Nighttime BiPAP  Pulmonary recommendations appreciated  Recommended pulmonary rehab  Will have care team meeting with him and his wife was also admitted for COPD exacerbation       Chronic diastolic heart failure ([facility] [address] )   Assessment & Plan    History of diastolic dysfunction  Last echo in May 2018 shows EF of 60% with grade 1 diastolic dysfunction  Currently in negative balance  Continue I&O monitoring  Low-salt fluid-restricted diet       Gastroesophageal reflux disease   Assessment & Plan    Continue PPI     Acute exacerbation of chronic obstructive pulmonary disease (COPD) ([facility] [address] )   Assessment & Plan    History of severe COPD  CTA negative for pulmonary embolism, nodular airspace opacities possible infectious versus inflammatory changes  Likely due to viral etiology  Antibiotics were stopped  Feeling better  Currently O2 saturation 97% on 3-4 L nasal cannula    Continue current breathing treatment  Start tapering steroids  Nighttime BiPAP  Pulmonary recommendations appreciated  Recommended pulmonary rehab  Will have care team meeting with him and his wife was also admitted for COPD exacerbation       Anxiety   Assessment & Plan    Stable         VTE Pharmacologic Prophylaxis:   Pharmacologic: Enoxaparin (Lovenox)  Mechanical VTE Prophylaxis in Place: Yes    Patient Centered Rounds: I have performed bedside rounds with nursing staff today  Discussions with Specialists or Other Care Team Provider:  Pulmonary recommendation noted    Education and Discussions with Family / Patient:  Patient    Time Spent for Care: 30 minutes  More than 50% of total time spent on counseling and coordination of care as described above  Current Length of Stay: 4 day(s)    Current Patient Status: Inpatient   Certification Statement: The patient will continue to require additional inpatient hospital stay due to Improving COPD exacerbation, tapering steroids, care team meeting today  Discharge Plan:  As per above    Code Status: Level 1 - Full Code      Subjective:   Not in acute distress  Appears improved compared to yesterday  Patient reports feeling better today  O2 saturation 97% on 3-4 L nasal cannula  No other events reported  Care team meeting today  Objective:     Vitals:   Temp (24hrs), Av 2 °F (36 8 °C), Min:98 °F (36 7 °C), Max:98 6 °F (37 °C)    Temp:  [98 °F (36 7 °C)-98 6 °F (37 °C)] 98 6 °F (37 °C)  HR:  [66-70] 70  Resp:  [18-20] 18  BP: ()/(57-62) 135/60  SpO2:  [93 %-98 %] 97 %  Body mass index is 23 98 kg/m²  Input and Output Summary (last 24 hours): Intake/Output Summary (Last 24 hours) at 19 1239  Last data filed at 19 1022   Gross per 24 hour   Intake              740 ml   Output              975 ml   Net             -235 ml       Physical Exam:     Physical Exam   Constitutional: He is oriented to person, place, and time  No distress  HENT:   Head: Normocephalic and atraumatic  Eyes: Pupils are equal, round, and reactive to light  Neck: Normal range of motion  No JVD present  Cardiovascular: Normal rate  Pulmonary/Chest: Effort normal and breath sounds normal  No respiratory distress  Not using accessory muscles  Able to speak in full sentences  Poor entry bilaterally  Abdominal: Soft   Bowel sounds are normal  He exhibits no distension  There is no tenderness  Musculoskeletal: Normal range of motion  He exhibits no edema  Neurological: He is alert and oriented to person, place, and time  Psychiatric: He has a normal mood and affect  His behavior is normal        Additional Data:     Labs:      Results from last 7 days  Lab Units 01/31/19  0627 01/29/19  0503 01/28/19  0450  01/27/19 2039   WBC Thousand/uL  --  5 77 4 44  --  7 62   HEMOGLOBIN g/dL 11 1* 10 8* 11 4*  --  12 3   HEMATOCRIT % 34 8* 33 7* 35 7*  --  38 4   PLATELETS Thousands/uL  --  206 194  < > 238   BANDS PCT %  --   --  5  --   --    NEUTROS PCT %  --   --   --   --  72   LYMPHS PCT %  --   --   --   --  17   LYMPHO PCT %  --  26 13*  < >  --    MONOS PCT %  --   --   --   --  10   MONO PCT %  --  6 1*  < >  --    EOS PCT %  --  0 0  < > 0   < > = values in this interval not displayed  Results from last 7 days  Lab Units 01/29/19  0503  01/27/19 2040   SODIUM mmol/L 145  < > 140   POTASSIUM mmol/L 4 3  < > 4 3   CHLORIDE mmol/L 106  < > 101   CO2 mmol/L 29  < > 30   BUN mg/dL 25  < > 27*   CREATININE mg/dL 0 80  < > 0 92   ANION GAP mmol/L 10  < > 9   CALCIUM mg/dL 8 3  < > 8 3   ALBUMIN g/dL  --   --  3 5   TOTAL BILIRUBIN mg/dL  --   --  1 00   ALK PHOS U/L  --   --  71   ALT U/L  --   --  20   AST U/L  --   --  26   GLUCOSE RANDOM mg/dL 118  < > 155*   < > = values in this interval not displayed  Results from last 7 days  Lab Units 01/27/19  2040   INR  1 03       Results from last 7 days  Lab Units 01/29/19  1203 01/29/19  0624 01/28/19  1809 01/28/19  1213 01/28/19  0633   POC GLUCOSE mg/dl 135 109 117 144* 134           Results from last 7 days  Lab Units 01/30/19  1002 01/28/19  0134 01/27/19 2039   LACTIC ACID mmol/L  --   --  1 4   PROCALCITONIN ng/ml <0 05 0 06  --            * I Have Reviewed All Lab Data Listed Above  * Additional Pertinent Lab Tests Reviewed:  All Labs Within Last 24 Hours Reviewed        Recent Cultures (last 7 days): Results from last 7 days  Lab Units 01/30/19  1932 01/30/19  1747 01/28/19  0132 01/27/19 2046 01/27/19 2040   BLOOD CULTURE   --   --   --  No Growth at 72 hrs  No Growth at 72 hrs  SPUTUM CULTURE  Culture too young- will reincubate  --   --   --   --    INFLUENZA B PCR   --   --  None Detected  --   --    RSV PCR   --   --  None Detected  --   --    LEGIONELLA URINARY ANTIGEN   --  Negative  --   --   --        Last 24 Hours Medication List:     Current Facility-Administered Medications:  budesonide 0 5 mg Nebulization Q12H Celeste Perrin MD   cholestyramine sugar free 4 g Oral BID Rusty Suárez PA-C   enoxaparin 40 mg Subcutaneous Daily Pavan Cash PA-C   famotidine 20 mg Oral Daily Rusty Suárez PA-C   fluticasone-vilanterol 1 puff Inhalation Daily Matt HOLLAND MD   ipratropium 0 5 mg Nebulization Q6H Celeste Perrin MD   levalbuterol 1 25 mg Nebulization Q6H Celeste Perrin MD   loratadine 10 mg Oral Daily Rusty Suárez PA-C   methylPREDNISolone sodium succinate 40 mg Intravenous Q12H [facility] Matt HOLLAND MD   pneumococcal 13-valent conjugate vaccine 0 5 mL Intramuscular Prior to discharge Celeste Perrin MD   roflumilast 250 mcg Oral Daily Rusty Suárez PA-C   senna 1 tablet Oral HS Rusty Suárez PA-C   sertraline 50 mg Oral Daily Rusty Suárez PA-C        Today, Patient Was Seen By: Carson Beatty MD    ** Please Note: Dictation voice to text software may have been used in the creation of this document   **

## 2019-01-31 NOTE — ASSESSMENT & PLAN NOTE
History of severe COPD  CTA negative for pulmonary embolism, nodular airspace opacities possible infectious versus inflammatory changes  Currently no fever, no leukocytosis, procalcitonin negative x2  Likely viral etiology  Patient had been on broad-spectrum antibiotics which were stopped  Rapid improvement on BiPAP was transferred out to regular med surge floor  Symptoms likely due to COPD exacerbation  Currently O2 saturation high 90s on 3-4 L nasal cannula  Continue current breathing treatment as well as steroids    Resume Breo  Follow-up pulmonary consult

## 2019-01-31 NOTE — PLAN OF CARE
Problem: PHYSICAL THERAPY ADULT  Goal: Performs mobility at highest level of function for planned discharge setting  See evaluation for individualized goals  Treatment/Interventions: Functional transfer training, LE strengthening/ROM, Elevations, Therapeutic exercise, Endurance training, Patient/family training, Bed mobility, Gait training, Spoke to nursing, Spoke to case management          See flowsheet documentation for full assessment, interventions and recommendations  Prognosis: Good  Problem List: Decreased strength, Decreased endurance, Impaired balance, Decreased mobility  Assessment: pt is a 69y/o m who presents to [facility] c acute on chronic respiratory failure c hypoxia  uses 3L O2 at baseline  PMH significant for anxiety, COPD, GERD, + pulmonary emphysema  at baseline, pt (I) c functional mobility s AD  resides c spouse + dtr in 2 story home c 2 ALIS + 18 steps to 2nd floor c rail  dtr works 2 days/wk  currently presents c deficits in strength, balance, gait quality, + severe deconditioning noted in PT exam above  Barthel Index 55/100  ambulated 10' s AD significantly limited by fatigue  pt declined further mobility at this time 2* above  would benefit from skilled PT to maximize functional mobility + return home safely  upon d/c, recommend hhpt  PT eval of high complexity 2* unstable med status c pt requiring ongoing medical management 2* acute on chronic respiratory failure c hypoxia  pt c significant SOB c short bouts of activity  resides in 2 story home c 18 steps to shower  also c multiple co-morbidities impacting PT including anxiety, GERD, COPD, + pulmonary emphysema  Barriers to Discharge: Inaccessible home environment     Recommendation: Home PT     PT - OK to Discharge: No    See flowsheet documentation for full assessment

## 2019-01-31 NOTE — TELEPHONE ENCOUNTER
Called patient, wife picked up and I told her about the blood work, she said that he is currently in the hospital since Sunday  She wanted me to cancel the appt for tomorrow but I only see a procedure done for tomorrow  Not sure if that's what she is talking about that      ===View-only below this line===    ----- Message -----  From: Paolo Maciel MD  Sent: 1/31/2019   7:30 AM  To: Sakina Yates MA    Normal results!

## 2019-01-31 NOTE — ASSESSMENT & PLAN NOTE
History of diastolic dysfunction  Last echo in May 2018 shows EF of 60% with grade 1 diastolic dysfunction  Currently in negative balance    Continue I&O monitoring  Low-salt fluid-restricted diet

## 2019-01-31 NOTE — UTILIZATION REVIEW
Continued Stay Review    Date: 1/31  Vital Signs: /60 (BP Location: Left arm)   Pulse 70   Temp 98 6 °F (37 °C) (Oral)   Resp 18   Ht 5' 10" (1 778 m)   Wt 75 8 kg (167 lb 1 7 oz) Comment: pump and blankets taken off bed  SpO2 97%   BMI 23 98 kg/m²   Assessment/Plan: 80 yo male admitted w/ acute on chronic resp failure w/ hypoxia likely viral , abx have been stopped  Nighttime bipap , tapering steroids  CHF I&O , low salt diet   [place] GERD -PPI   Medications:   Scheduled Meds:   Current Facility-Administered Medications:  budesonide 0 5 mg Nebulization Q12H    cholestyramine sugar free 4 g Oral BID    enoxaparin 40 mg Subcutaneous Daily    famotidine 20 mg Oral Daily    fluticasone-vilanterol 1 puff Inhalation Daily    ipratropium 0 5 mg Nebulization Q6H    levalbuterol 1 25 mg Nebulization Q6H    loratadine 10 mg Oral Daily    methylPREDNISolone sodium succinate 40 mg Intravenous Q12H [address]    pneumococcal 13-valent conjugate vaccine 0 5 mL Intramuscular Prior to discharge    roflumilast 250 mcg Oral Daily    senna 1 tablet Oral HS    sertraline 50 mg Oral Daily      Pertinent Labs/Diagnostic Results:   H&H   11 1  34 8  Age/Sex: 79 y o  male   Discharge Plan:     pulm consult  1/30  Plan:   CTA done on admission shows no PE, shows nodular airspace opacities within the lungs infectious versus inflammatory  He did have a fever of 101 2 on presentation, has been afebrile since then  He has no leukocytosis, procalcitonin is normal, possibly viral in nature  He had been started on broad-spectrum antibiotics which were stopped  He had a rapid improvement in his symptoms with initiation of BiPAP as well as steroids  He is currently saturating mid to high 90s on his baseline 3 L nasal cannula  Continue Solu-Medrol, can likely decrease to Q 12 tomorrow  Continue around the clock nebulizer treatments as well as [facility]    Will speak with the medical supply company to see if we can obtain either BiPAP or trilogy for him for home use as he does find significant relief in his shortness of breath when he is placed on at here  His pCO2 was 49 9 on presentation  He does have very severe COPD on PFTs, along with his chronic respiratory failure and CHF would benefit from nocturnal noninvasive ventilation at home

## 2019-02-01 VITALS
BODY MASS INDEX: 23.36 KG/M2 | HEART RATE: 76 BPM | SYSTOLIC BLOOD PRESSURE: 167 MMHG | TEMPERATURE: 97.6 F | WEIGHT: 163.14 LBS | OXYGEN SATURATION: 95 % | HEIGHT: 70 IN | RESPIRATION RATE: 18 BRPM | DIASTOLIC BLOOD PRESSURE: 81 MMHG

## 2019-02-01 LAB
BACTERIA SPT RESP CULT: NORMAL
BACTERIA SPT RESP CULT: NORMAL
GRAM STN SPEC: NORMAL

## 2019-02-01 PROCEDURE — 97116 GAIT TRAINING THERAPY: CPT

## 2019-02-01 PROCEDURE — 99232 SBSQ HOSP IP/OBS MODERATE 35: CPT | Performed by: PHYSICIAN ASSISTANT

## 2019-02-01 PROCEDURE — 94640 AIRWAY INHALATION TREATMENT: CPT

## 2019-02-01 PROCEDURE — 97110 THERAPEUTIC EXERCISES: CPT

## 2019-02-01 PROCEDURE — 94760 N-INVAS EAR/PLS OXIMETRY 1: CPT

## 2019-02-01 RX ORDER — PREDNISONE 10 MG/1
10 TABLET ORAL DAILY
Qty: 30 TABLET | Refills: 0 | Status: SHIPPED | OUTPATIENT
Start: 2019-02-01 | End: 2019-02-01

## 2019-02-01 RX ORDER — PREDNISONE 10 MG/1
10 TABLET ORAL DAILY
Qty: 45 TABLET | Refills: 0 | Status: ON HOLD | OUTPATIENT
Start: 2019-02-01 | End: 2019-04-18 | Stop reason: SDUPTHER

## 2019-02-01 RX ADMIN — LEVALBUTEROL HYDROCHLORIDE 1.25 MG: 1.25 SOLUTION, CONCENTRATE RESPIRATORY (INHALATION) at 07:39

## 2019-02-01 RX ADMIN — FAMOTIDINE 20 MG: 20 TABLET ORAL at 08:49

## 2019-02-01 RX ADMIN — BUDESONIDE 0.5 MG: 0.5 INHALANT RESPIRATORY (INHALATION) at 07:39

## 2019-02-01 RX ADMIN — LORATADINE 10 MG: 10 TABLET ORAL at 08:49

## 2019-02-01 RX ADMIN — SENNOSIDES 8.6 MG: 8.6 TABLET, FILM COATED ORAL at 08:49

## 2019-02-01 RX ADMIN — IPRATROPIUM BROMIDE 0.5 MG: 0.5 SOLUTION RESPIRATORY (INHALATION) at 13:58

## 2019-02-01 RX ADMIN — IPRATROPIUM BROMIDE 0.5 MG: 0.5 SOLUTION RESPIRATORY (INHALATION) at 07:39

## 2019-02-01 RX ADMIN — LEVALBUTEROL HYDROCHLORIDE 1.25 MG: 1.25 SOLUTION, CONCENTRATE RESPIRATORY (INHALATION) at 13:58

## 2019-02-01 RX ADMIN — ENOXAPARIN SODIUM 40 MG: 40 INJECTION SUBCUTANEOUS at 08:54

## 2019-02-01 RX ADMIN — SERTRALINE HYDROCHLORIDE 50 MG: 50 TABLET ORAL at 08:49

## 2019-02-01 RX ADMIN — CHOLESTYRAMINE 4 G: 4 POWDER, FOR SUSPENSION ORAL at 08:49

## 2019-02-01 RX ADMIN — METHYLPREDNISOLONE SODIUM SUCCINATE 40 MG: 40 INJECTION, POWDER, FOR SOLUTION INTRAMUSCULAR; INTRAVENOUS at 08:54

## 2019-02-01 NOTE — ASSESSMENT & PLAN NOTE
History of diastolic dysfunction  Last echo in May 2018 shows EF of 60% with grade 1 diastolic dysfunction  Currently in negative balance    Low-salt fluid-restricted diet  Recommended close follow-up with primary care provider outpatient

## 2019-02-01 NOTE — PROGRESS NOTES
Progress Note - Pulmonary   Rajeev Rodriguez 79 y o  male MRN: [id number]  Unit/Bed#: -01 Encounter: [id number]    Assessment:  Acute on chronic hypoxemic respiratory failure  COPD exacerbation  Emphysema    Plan:  Currently saturating well on baseline oxygen  Requiring BiPAP previously this admission  CT on admission- no PE, nodular airspace opacities within the lung field infectious versus inflammatory  Fever 101 2 on presentation  He has remained afebrile  No leukocytosis  Procalcitonin normal   Likely viral in nature  Initially begun on broad-spectrum antibiotics which have now been stopped  Received 1 dose of Solu-Medrol 40 mg today  Begin taper 50 mg prednisone tomorrow decreasing 10 mg every 3 days until course is complete will discuss utility of baseline low-dose prednisone daily for this patient in the office  Continue nebulizer treatments, pantera Kemp  Patient will be receiving trilogy for noninvasive ventilation following discharge  He demonstrated failure of his current supplemental O2 regimen with a pCO2 of 49 9 on presentation  He has very severe COPD as demonstrated by his PFTs and chronic respiratory failure  Outpatient Pulmonary follow-up  Also follow up with PCP for anxiety management  Recommend outpatient pulmonary rehab as well as evaluation at [facility] for [facility]  Subjective:   Patient states he feels much better today  No shortness of breath at rest   He does become short of breath with activity which makes him anxious which increases the shortness of breath  Patient tells me that his anxiety in general is not well controlled at this time  Will seek further help with his PCP following discharge  Objective:     Vitals: Blood pressure 167/81, pulse 76, temperature 97 6 °F (36 4 °C), temperature source Oral, resp  rate 18, height 5' 10" (1 778 m), weight 74 kg (163 lb 2 3 oz), SpO2 95 %  ,Body mass index is 23 41 kg/m²        Intake/Output Summary (Last 24 hours) at 02/01/19 1450  Last data filed at 02/01/19 1358   Gross per 24 hour   Intake              850 ml   Output              200 ml   Net              650 ml       Invasive Devices     Peripheral Intravenous Line            Peripheral IV 01/31/19 Left Arm less than 1 day                Physical Exam: /81 (BP Location: Right arm)   Pulse 76   Temp 97 6 °F (36 4 °C) (Oral)   Resp 18   Ht 5' 10" (1 778 m)   Wt 74 kg (163 lb 2 3 oz)   SpO2 95%   BMI 23 41 kg/m²   General appearance: alert and oriented, in no acute distress  Head: Normocephalic, without obvious abnormality, atraumatic  Eyes: EOMI I  No discharge bilaterally  No scleral icterus  Nose: Nares normal  Septum midline  Mucosa normal  No drainage or sinus tenderness  Neck: supple, symmetrical, trachea midline  Lungs: Decreased air movement bilaterally, improved from prior exam   Anterior wheezes  Appears comfortable at rest with some slight increased work of breathing  Patient has some increased work at baseline  Heart: regular rate and rhythm, S1, S2 normal, no murmur, click, rub or gallop  Abdomen: soft, non-tender; bowel sounds normal; no masses,  no organomegaly  Extremities: extremities normal, warm and well-perfused; no cyanosis, clubbing, or edema  Pulses: 2+ and symmetric  Skin: Skin color, texture, turgor normal  No rashes or lesions  Neurologic: Grossly normal     Labs: I have personally reviewed pertinent lab results  Imaging and other studies: I have personally reviewed pertinent reports

## 2019-02-01 NOTE — SOCIAL WORK
CM phoned [address] and spoke to [facility] at [phone]  [address] will send a representative to patient's house on Wednesday to assess patient's need for oxygen on the first floor  CM informed patient  Nurse and SLIM notified

## 2019-02-01 NOTE — DISCHARGE INSTR - AVS FIRST PAGE
Follow-up with primary care provider outpatient  Recommended close follow-up with primary pulmonology outpatient  Strongly encouraged to take small steps when ambulating to avoid getting short of breath

## 2019-02-01 NOTE — PLAN OF CARE
DISCHARGE PLANNING     Discharge to home or other facility with appropriate resources Progressing        DISCHARGE PLANNING - CARE MANAGEMENT     Discharge to post-acute care or home with appropriate resources Progressing        INFECTION - ADULT     Absence or prevention of progression during hospitalization Progressing        Knowledge Deficit     Patient/family/caregiver demonstrates understanding of disease process, treatment plan, medications, and discharge instructions Progressing        Nutrition/Hydration-ADULT     Nutrient/Hydration intake appropriate for improving, restoring or maintaining nutritional needs Progressing        PAIN - ADULT     Verbalizes/displays adequate comfort level or baseline comfort level Progressing        Potential for Falls     Patient will remain free of falls Progressing        RESPIRATORY - ADULT     Achieves optimal ventilation and oxygenation Progressing        SAFETY ADULT     Patient will remain free of falls Progressing     Maintain or return to baseline ADL function Progressing     Maintain or return mobility status to optimal level Progressing

## 2019-02-01 NOTE — DISCHARGE SUMMARY
Discharge- German Carrion 1950, 79 y o  male MRN: [id number]    Unit/Bed#: -01 Encounter: [id number]    Primary Care Provider: Jozef Quinteros MD   Date and time admitted to hospital: 1/27/2019  8:14 PM        * Acute on chronic respiratory failure with hypoxia [facility])   Assessment & Plan    History of severe COPD  CTA negative for pulmonary embolism, nodular airspace opacities possible infectious versus inflammatory changes  Likely due to viral etiology  Antibiotics were stopped  Feeling better  Currently O2 saturation 95% on 3-4 L nasal cannula  Start tapering steroids  Nighttime BiPAP:  Dr Raymond Benavides working to get Home bipap   Pulmonary recommendations appreciated  Recommended pulmonary rehab  Had care team meeting with him and his wife was also admitted for COPD exacerbation  Strongly recommended to take small steps when ambulating other than splinting to avoid getting short of breath  Hemodynamically stable to discharge home  Chronic diastolic heart failure (HCC)   Assessment & Plan    History of diastolic dysfunction  Last echo in May 2018 shows EF of 60% with grade 1 diastolic dysfunction  Currently in negative balance  Low-salt fluid-restricted diet  Recommended close follow-up with primary care provider outpatient     Gastroesophageal reflux disease   Assessment & Plan    Continue PPI     Acute exacerbation of chronic obstructive pulmonary disease (COPD) ([address] )   Assessment & Plan    History of severe COPD  CTA negative for pulmonary embolism, nodular airspace opacities possible infectious versus inflammatory changes  Likely due to viral etiology  Antibiotics were stopped  Feeling better  Currently O2 saturation 95% on 3-4 L nasal cannula    Start tapering steroids  Nighttime BiPAP:  Dr Raymond Benavides working to get Home bipap   Pulmonary recommendations appreciated  Recommended pulmonary rehab  Had care team meeting with him and his wife was also admitted for COPD exacerbation  Hemodynamically stable to discharge home  Anxiety   Assessment & Plan    Stable         Discharging Physician / Practitioner: Ngoc Osullivan MD  PCP: Manisha Menendez MD  Admission Date:   Admission Orders     Ordered        01/27/19 2357  Inpatient Admission (expected length of stay for this patient is greater than two midnights)  Once             Discharge Date: 02/01/19    Resolved Problems  Date Reviewed: 2/1/2019    None          Consultations During Hospital Stay:  · Pulmonology      Complications:  None    Reason for Admission:  COPD exacerbation    Hospital Course:     Madiha Simon is a 79 y o  male with past medical history of severe COPD home O2 dependent, CHF, GERD patient who originally presented to the hospital on 1/27/2019 due to shortness of breath for last 2 days that was worsening prompting him to come to ER  Patient presented with  no leukocytosis, procalcitonin negative  CTA was negative for pulmonary embolism, did show nodular opacity within the lung questionable inflammatory cause  Patient had started on antibiotics but subsequently discontinued since patient had remained afebrile without leukocytosis and procalcitonin negative  Patient breathing did improve with nocturnal BiPAP use  Symptoms likely exacerbated by viral etiology  Patient was continued on breathing treatment as well as tapering steroids  Today patient feels much better  Appears to be at his normal baseline  O2 saturation 95% on 3-4 L nasal cannula at his baseline  Currently denying any new complaints  Pulmonary working on getting BiPAP machine at home  Had care team meeting with patient and his wife was also admitted for COPD exacerbation  Pulmonary recommended outpatient pulmonary rehab    Pt and his wife also suffers from severe COPD has living situation as well as other social and economical factors that needs to be managed by family support,  working with Respiratory therapist as well as family to provide support  Otherwise medically and hemodynamically stable to discharge home  No other events reported  Recommended follow-up with primary care provider, primary pulmonary outpatient   and wife both are refusing to have VNA services at home  Refer to earlier notes for further clarification  Please see above list of diagnoses and related plan for additional information  Condition at Discharge: stable     Discharge Day Visit / Exam:     Subjective:  Not in acute distress  O2 saturation 95% on 3-4 L nasal cannula  At his baseline  Speaking in full sentences  Denies new complaints  No other events reported  Vitals: Blood Pressure: 167/81 (02/01/19 0700)  Pulse: 76 (02/01/19 0700)  Temperature: 97 6 °F (36 4 °C) (02/01/19 0700)  Temp Source: Oral (02/01/19 0700)  Respirations: 18 (02/01/19 0700)  Height: 5' 10" (177 8 cm) (01/28/19 0115)  Weight - Scale: 74 kg (163 lb 2 3 oz) (02/01/19 0600)  SpO2: 95 % (02/01/19 1358)  Exam:   Physical Exam   Constitutional: He is oriented to person, place, and time  No distress  HENT:   Head: Normocephalic and atraumatic  Eyes: Pupils are equal, round, and reactive to light  EOM are normal    Neck: Normal range of motion  Cardiovascular: Normal rate and regular rhythm  Pulmonary/Chest: Effort normal  No respiratory distress  Decrease air movement bilaterally but improved compared to yesterday  Minimal wheezing appreciated  Abdominal: Soft  Bowel sounds are normal  He exhibits no distension  There is no tenderness  Musculoskeletal: Normal range of motion  He exhibits no edema  Neurological: He is alert and oriented to person, place, and time  Psychiatric: He has a normal mood and affect  Appears anxious     Discussion with Family:  Discussed with patient  Discussed with wife was also admitted with COPD exacerbation      Discharge instructions/Information to patient and family:   See after visit summary for information provided to patient and family  Provisions for Follow-Up Care:  See after visit summary for information related to follow-up care and any pertinent home health orders  Disposition:      Home with family support    For Discharges to [facility]:   · Not Applicable to this Patient - Not Applicable to this Patient    Planned Readmission:  High risk of readmission due to severe COPD  Discharge Statement:  I spent 35 minutes discharging the patient  This time was spent on the day of discharge  I had direct contact with the patient on the day of discharge  Greater than 50% of the total time was spent examining patient, answering all patient questions, arranging and discussing plan of care with patient as well as directly providing post-discharge instructions  Additional time then spent on discharge activities  Discharge Medications:  See after visit summary for reconciled discharge medications provided to patient and family        ** Please Note: This note has been constructed using a voice recognition system **

## 2019-02-01 NOTE — ASSESSMENT & PLAN NOTE
History of severe COPD  CTA negative for pulmonary embolism, nodular airspace opacities possible infectious versus inflammatory changes  Likely due to viral etiology  Antibiotics were stopped  Feeling better  Currently O2 saturation 95% on 3-4 L nasal cannula  Start tapering steroids  Nighttime BiPAP:  Dr Em Motley working to get Home bipap   Pulmonary recommendations appreciated  Recommended pulmonary rehab  Had care team meeting with him and his wife was also admitted for COPD exacerbation  Hemodynamically stable to discharge home

## 2019-02-01 NOTE — PLAN OF CARE
Problem: PHYSICAL THERAPY ADULT  Goal: Performs mobility at highest level of function for planned discharge setting  See evaluation for individualized goals  Treatment/Interventions: Functional transfer training, LE strengthening/ROM, Elevations, Therapeutic exercise, Endurance training, Patient/family training, Bed mobility, Gait training, Spoke to nursing, Spoke to case management          See flowsheet documentation for full assessment, interventions and recommendations  Outcome: Progressing  Prognosis: Good  Problem List: Decreased strength, Decreased endurance, Impaired balance, Decreased mobility  Assessment: Pt was found seated bedside to begin session on 4L O2  He was very anxious about ambulating and needed excessive cuing throughout to slow down and take his time  Pt was very impulsive following cuing and had a tendency to hold his breath with mobility and exercise  He was able to ambulate increased distances compared to LV but required a 5' rest break since her was out of breath and needed to sit down  His SpO2 was at 96% during rest break and was able to make it back to the room  Continued to educate the patient on the importance of proper breathing with mobility  He was able to perform seated TE as charted with continued SOB and freaquent rest breaks needed again  He had all needs met and felt good following session  He would benefit from continued PT in order to promote safe and functional mobility  Barriers to Discharge: Inaccessible home environment     Recommendation: Home PT     PT - OK to Discharge: Yes    See flowsheet documentation for full assessment

## 2019-02-01 NOTE — ESCALATED TEAM TX
LOS 4  Careteam mtg with SLIM, respiratory, Physical therapy, wife and Celestino on speaker phone  Discussion on recommendation outpatient pulmonary  Patient states his insurance will pay for 80% however he is responsible for 20% that he cannot afford  Patient expressed this is the only reason he cannot go to out patient pulmonary  Patient will  Reapply for medicaid again  Patient states he was previously denied medicaid due to being over the limit by $75   Patient lives a two story house his concentrator is on the second floor  Patient states he spends most of his time on the first floor  He would like a concentrator on first floor  BELL will follow with jose manuel to have them provide oxygen an alternative for when patient is on the first floor  Patient is currently using the portable when he is on the first floor and he states he finds that the portable does not work as welll as the concentrator  CM will follow up with Georgia Bucio

## 2019-02-01 NOTE — DISCHARGE INSTRUCTIONS
Prednisone (Per bocca)   Prednisone (PRED-ni-sone)  Farmaco usato per trattare molte malattie e condizioni, specialmente problemi legati caren infiammazioni  Questo farmaco è un corticosteroide  Dixon commerciali : Contrast Allergy PreMed Pack, Iris, predniSONE Intensol   Possono esistere altri dixon commerciali per questo farmaco   Non utilizzare questo farmaco will seguenti condizioni:   questo farmaco non può essere somministrato a tutti  Non usare questo farmaco se si sono avute reazioni allergiche al prednisone o se vi è penelope gravidanza in [facility] del farmaco   [place] a rilascio ritardato  · Assumere i farmaci conformemente a quanto prescritto robert medico  Potrà essere necessario modificare varie volte la posologia prima di trovare la dose [employer]  · È consigliabile assumere questo farmaco con cibo o latte  · La compressa a rilascio ritardato va deglutita intera, senza frantumarla, spezzarla o masticarla  · Misurare attentamente la soluzione orale mediante un misurino o un bicchierino graduato o penelope siringa per [employer]  · Dose mancata: Prendere penelope dose non appena ci si ricorda  Se è quasi ora per la dose successiva, aspettare e prendere penelope dose normale  Non prendere penelope dose addizionale per sostituire la dose [employer]  · Conservare il farmaco in un contenitore chiuso a temperatura ambiente, lontano da [employer], umidità e luce diretta  Non congelare la soluzione orale  Farmaci e cibi da evitare   Consultare il medico o il farmacista prima di usare qualsiasi altro medicinale, inclusi i farmaci da banco, le vitamine e i prodotti da erboristeria    · Riferire al medico se si assume anche qualcuno dei seguenti farmaci:  ¨ Aminoglutetimide, amfotericina B, carbamazepina, colestiramina, ciclosporina, digossina, isoniazide, chetoconazolo, fenobarbitale, fenitoina o rifampicina  ¨ Anticoagulanti, come warfarin  ¨ Farmaci FANS analgesici o per l'artrite, come aspirina, diclofenac, ibuprofene, naprossene, celecoxib  ¨ Diuretici (pillole contro la ritenzione idrica)  Leonardo Nuno per il diabete  ¨ Antibiotici appartenenti laina famiglia dei macrolidi, come azitromicina, claritromicina, eritromicina  ¨ Estrogeni, incluse pillole anticoncezionali o terapie ormonali sostitutive  · Questo farmaco può interferire con i vaccini  Consultare il medico prima di vaccinarsi contro l'influenza o effettuare altri vaccini  Avvertenze d'uso relative al farmaco   · Non è sicuro assumere questo [facility]  [place] danneggiare il feto  In wilbur di gravidanza informare immediatamente il medico   · Informare il medico se si sta allattando o se si soffre di problemi renali, insufficienza cardiaca, pressione bjorn, recente attacco cardiaco, diabete, glaucoma, osteoporosi o problemi laina tiroide  Informare il medico di eventuali infezioni  Inoltre informare il medico se si è sofferto di problemi mentali o emotivi (come la depressione) o problemi gastrici o intestinali (come ulcere o diverticoliti)  · Questo farmaco può causare i seguenti problemi:  ¨ Cambiamenti di umore o letitia comportamento  ¨ Pressione bjorn, ritenzione di liquidi, cambiamenti nei [employer] o di potassio letitia corpo  ¨ [place] o glaucoma (in [address] per periodi prolungati)  ¨ Indebolimento delle kristin o osteoporosi (in [address] per periodi prolungati)  ¨ Rallentamento dayron crescita nei bambini (in [address] per periodi prolungati)  ¨ Problemi muscolari (con dosi elevate, specialmente in presenza di miastenia gravis o problemi simili ai nervi e ai muscoli)  · Non interrompere improvvisamente l'assunzione del farmaco  Il medico dovrà ridurre gradualmente il dosaggio prima di interrompere completamente l'uso  · Questo farmaco potrebbe facilitare l'insorgenza di infezioni  Informare immediatamente il medico se si è esposti a varicella, morbillo o altre infezioni gravi   Informare il medico se in passato si sono avute infezioni gravi, come ad esempio tubercolosi o herpes  · Informare il medico di eventuali ulteriori motivi di stress o ansia andie propria libby  Potrebbe essere necessario modificare il dosaggio per un breve periodo  · Informare il medico o il dentista presso demetrice si è in [address] si sta assumendo questo farmaco  Questo farmaco può influire steve risultati di alcuni test medici  · Tenere tutti i medicinali fuori dalla portata Юлия Coad  Non condividere il farmaco con nessuno  Possibili effetti collaterali associati al farmaco   Chiamare immediatamente il medico se si notano i seguenti effetti collaterali:  · Reazione allergica: prurito o orticaria, gonfiore del viso e delle elizabeth, gonfiore o formicolio a livello di bocca o di gola, sensazione di costrizione al petto, difficoltà respiratorie  · Lentiggini scure, cambiamenti di [employer] pelle, sensazione di freddo, debolezza, stanchezza, nausea, vomito, perdita di peso  · Depressione, pensieri, sentimenti o comportamenti insoliti, difficoltà ad addormentarsi  · [place], brividi, tosse, mal di gola e dolori in tutto il corpo  · Dolore o debolezza muscolare  · Rapido aumento di [place], [address], delle caviglie o dei piedi  · Grave dolore allo stomaco, nausea, vomito o feci matthieu o petra  · [employer] pelle o escrescenze  · Problemi laina vista, dolore agli occhi, cefalea  In presenza dei seguenti effetti collaterali krista gravi, consultare il medico:   · Aumento dell'appetito  · Viso evelin, gonfio  · Aumento di peso sul collo, sul dorso, letitia seno, sul viso o andie libby  Se si notano altri effetti collaterali debbie si ritiene siano associati all'assunzione del farmaco, comunicarli al medico    Chiamare il medico per consigli sugli effetti collaterali   Gli effetti collaterali si possono riportare anche laina FDA al bao 1-800-FDA-1088  © 2017 [address] Information is for End User's use only and may not be sold, redistributed or otherwise used for commercial purposes  The above information is an  only  It is not intended as medical advice for individual conditions or treatments  Talk to your doctor, nurse or pharmacist before following any medical regimen to see if it is safe and effective for you

## 2019-02-01 NOTE — PLAN OF CARE
Problem: DISCHARGE PLANNING - CARE MANAGEMENT  Goal: Discharge to post-acute care or home with appropriate resources  INTERVENTIONS:  - Conduct assessment to determine patient/family and health care team treatment goals, and need for post-acute services based on payer coverage, community resources, and patient preferences, and barriers to discharge  - Address psychosocial, clinical, and financial barriers to discharge as identified in assessment in conjunction with the patient/family and health care team  - Arrange appropriate level of post-acute services according to patients   needs and preference and payer coverage in collaboration with the physician and health care team  - Communicate with and update the patient/family, physician, and health care team regarding progress on the discharge plan  - Arrange appropriate transportation to post-acute venues   Outcome: Progressing  LOS 4  Careteam mtg with SLIM, respiratory, Physical therapy, wife and Celestino on speaker phone  Discussion on recommendation outpatient pulmonary  Patient states his insurance will pay for 80% however he is responsible for 20% that he cannot afford  Patient expressed this is the only reason he cannot go to out patient pulmonary  Patient will  Reapply for medicaid again  Patient states he was previously denied medicaid due to being over the limit by $75   Patient lives a two story house his concentrator is on the second floor  Patient states he spends most of his time on the first floor  He would like a concentrator on first floor  CM will follow with jose manuel to have them provide oxygen an alternative for when patient is on the first floor  Patient is currently using the portable when he is on the first floor and he states he finds that the portable does not work as welll as the concentrator  CM will follow up with Oskar Prakash

## 2019-02-01 NOTE — PHYSICAL THERAPY NOTE
Physical Therapy Progress Note     02/01/19 1435   Pain Assessment   Pain Assessment No/denies pain   Pain Score No Pain   Restrictions/Precautions   Other Precautions Fall Risk;O2  (4L O2)   General   Family/Caregiver Present No   Cognition   Orientation Level Oriented X4   Transfers   Sit to Stand 5  Supervision   Additional items Verbal cues; Impulsive   Stand to Sit 5  Supervision   Additional items Verbal cues; Impulsive   Ambulation/Elevation   Gait pattern Decreased foot clearance; Inconsistent luís   Gait Assistance 5  Supervision   Additional items Assist x 1   Assistive Device None   Distance 60', 90', 60'   Stair Management Assistance Not tested   Balance   Static Sitting Good   Dynamic Sitting Good   Static Standing Fair +   Dynamic Standing Fair   Ambulatory Fair   Endurance Deficit   Endurance Deficit Yes   Endurance Deficit Description SOB   Activity Tolerance   Activity Tolerance Patient limited by fatigue   Nurse Made Aware yes, ok to see   Exercises   TKR Sitting;20 reps;AROM; Bilateral   Assessment   Prognosis Good   Problem List Decreased strength;Decreased endurance; Impaired balance;Decreased mobility   Assessment Pt was found seated bedside to begin session on 4L O2  He was very anxious about ambulating and needed excessive cuing throughout to slow down and take his time  Pt was very impulsive following cuing and had a tendency to hold his breath with mobility and exercise  He was able to ambulate increased distances compared to  but required a 5' rest break since her was out of breath and needed to sit down  His SpO2 was at 96% during rest break and was able to make it back to the room  Continued to educate the patient on the importance of proper breathing with mobility  He was able to perform seated TE as charted with continued SOB and freaquent rest breaks needed again  He had all needs met and felt good following session   He would benefit from continued PT in order to promote safe and functional mobility  Barriers to Discharge Inaccessible home environment   Goals   Patient Goals To go home  STG Expiration Date 02/10/19   Short Term Goal #1 1  increase strength 1/2 grade to improve overall functional mobility, 2  perform transfers mod (I) to safely perform ADLs, 3  ambulate >150' (I) to safely navigate home environment, 4  negotiate 18 stairs mod (I) to safely access shower   Plan   Treatment/Interventions Functional transfer training;LE strengthening/ROM; Elevations; Therapeutic exercise; Endurance training;Patient/family training;Bed mobility;Gait training;Spoke to nursing;Spoke to case management   PT Frequency Other (Comment)  (3-5x/wk)   Recommendation   Recommendation Home PT   PT - OK to Discharge Yes   Additional Comments when medically cleared     Dominic Shepherd PTA

## 2019-02-01 NOTE — ASSESSMENT & PLAN NOTE
History of severe COPD  CTA negative for pulmonary embolism, nodular airspace opacities possible infectious versus inflammatory changes  Likely due to viral etiology  Antibiotics were stopped  Feeling better  Currently O2 saturation 95% on 3-4 L nasal cannula  Start tapering steroids  Nighttime BiPAP:  Dr Barrett Espinal working to get Home bipap   Pulmonary recommendations appreciated  Recommended pulmonary rehab  Had care team meeting with him and his wife was also admitted for COPD exacerbation  Hemodynamically stable to discharge home

## 2019-02-01 NOTE — PLAN OF CARE
Problem: DISCHARGE PLANNING - CARE MANAGEMENT  Goal: Discharge to post-acute care or home with appropriate resources  INTERVENTIONS:  - Conduct assessment to determine patient/family and health care team treatment goals, and need for post-acute services based on payer coverage, community resources, and patient preferences, and barriers to discharge  - Address psychosocial, clinical, and financial barriers to discharge as identified in assessment in conjunction with the patient/family and health care team  - Arrange appropriate level of post-acute services according to patients   needs and preference and payer coverage in collaboration with the physician and health care team  - Communicate with and update the patient/family, physician, and health care team regarding progress on the discharge plan  - Arrange appropriate transportation to post-acute venues   Outcome: Progressing  CM phoned [address] and spoke to Gretel French at [phone]  [address] will send a representative to patient's house on Wednesday to assess patient's need for oxygen on the first floor  CM informed patient  Nurse and SLIM notified

## 2019-02-02 DIAGNOSIS — K21.9 GASTROESOPHAGEAL REFLUX DISEASE WITHOUT ESOPHAGITIS: ICD-10-CM

## 2019-02-02 LAB
BACTERIA BLD CULT: NORMAL
BACTERIA BLD CULT: NORMAL

## 2019-02-03 RX ORDER — RANITIDINE 150 MG/1
TABLET ORAL
Qty: 30 TABLET | Refills: 5 | Status: SHIPPED | OUTPATIENT
Start: 2019-02-03 | End: 2019-09-01 | Stop reason: SDUPTHER

## 2019-02-04 ENCOUNTER — TRANSITIONAL CARE MANAGEMENT (OUTPATIENT)
Dept: INTERNAL MEDICINE CLINIC | Facility: CLINIC | Age: 69
End: 2019-02-04

## 2019-02-13 ENCOUNTER — OFFICE VISIT (OUTPATIENT)
Dept: INTERNAL MEDICINE CLINIC | Facility: CLINIC | Age: 69
End: 2019-02-13
Payer: MEDICARE

## 2019-02-13 VITALS
SYSTOLIC BLOOD PRESSURE: 110 MMHG | DIASTOLIC BLOOD PRESSURE: 64 MMHG | BODY MASS INDEX: 24.34 KG/M2 | RESPIRATION RATE: 18 BRPM | HEIGHT: 70 IN | HEART RATE: 101 BPM | OXYGEN SATURATION: 95 % | WEIGHT: 170 LBS

## 2019-02-13 DIAGNOSIS — Z11.59 NEED FOR HEPATITIS C SCREENING TEST: ICD-10-CM

## 2019-02-13 DIAGNOSIS — D22.5 NEVUS OF BACK: ICD-10-CM

## 2019-02-13 DIAGNOSIS — J44.1 ACUTE EXACERBATION OF CHRONIC OBSTRUCTIVE PULMONARY DISEASE (COPD) (HCC): Primary | ICD-10-CM

## 2019-02-13 DIAGNOSIS — Z23 NEED FOR PNEUMOCOCCAL VACCINATION: ICD-10-CM

## 2019-02-13 DIAGNOSIS — I50.32 CHRONIC DIASTOLIC HEART FAILURE (HCC): ICD-10-CM

## 2019-02-13 DIAGNOSIS — J96.21 ACUTE ON CHRONIC RESPIRATORY FAILURE WITH HYPOXIA (HCC): ICD-10-CM

## 2019-02-13 DIAGNOSIS — J30.9 ALLERGIC RHINITIS, UNSPECIFIED SEASONALITY, UNSPECIFIED TRIGGER: ICD-10-CM

## 2019-02-13 DIAGNOSIS — N28.89 RENAL MASS, RIGHT: ICD-10-CM

## 2019-02-13 DIAGNOSIS — Z86.010 HISTORY OF COLON POLYPS: ICD-10-CM

## 2019-02-13 PROBLEM — Z86.0100 HISTORY OF COLON POLYPS: Status: ACTIVE | Noted: 2019-02-13

## 2019-02-13 PROBLEM — J18.9 HCAP (HEALTHCARE-ASSOCIATED PNEUMONIA): Status: RESOLVED | Noted: 2019-01-28 | Resolved: 2019-02-13

## 2019-02-13 PROBLEM — J10.1 INFLUENZA A: Status: RESOLVED | Noted: 2018-12-20 | Resolved: 2019-02-13

## 2019-02-13 PROCEDURE — 99495 TRANSJ CARE MGMT MOD F2F 14D: CPT | Performed by: PHYSICIAN ASSISTANT

## 2019-02-13 PROCEDURE — G0009 ADMIN PNEUMOCOCCAL VACCINE: HCPCS | Performed by: PHYSICIAN ASSISTANT

## 2019-02-13 PROCEDURE — 90670 PCV13 VACCINE IM: CPT | Performed by: PHYSICIAN ASSISTANT

## 2019-02-13 NOTE — PROGRESS NOTES
Assessment/Plan:      Patient Instructions   No change in current medications  Continue follow up with specialists  History of colon polyp with last colonoscopy 5+ yrs ago  Will refer to GI  With history of today of right renal mass in the past, will set up for CT of the abdomen and pelvis with attention to right kidney  Will discuss results when available  BMI Counseling: Body mass index is 24 39 kg/m²  Discussed the patient's BMI with him  The BMI is in the acceptable range  Return in about 8 weeks (around 4/13/2019) for Medicare Annual Wellness  Diagnoses and all orders for this visit:    Acute exacerbation of chronic obstructive pulmonary disease (COPD) ([facility] [address] )    Acute on chronic respiratory failure with hypoxia (HCC)    Allergic rhinitis, unspecified seasonality, unspecified trigger    Chronic diastolic [facility] failure ([facility] )    History of colon polyps  -     Ambulatory referral to Gastroenterology; Future    Need for hepatitis C screening test  -     Hepatitis C antibody; Future    Renal mass, right  -     CT abdomen pelvis wo contrast; Future    Need for pneumococcal vaccination  -     PNEUMOCOCCAL CONJUGATE VACCINE 13-VALENT GREATER THAN 6 MONTHS          Subjective:      Patient ID: Amilcar Wheeler is a 79 y o  male  Hospital follow-up/transition of care    Patient was hospitalized at [facility] [place] from 127/19 to 02/01/2019 due to an acute on chronic respiratory failure with hypoxia/acute exacerbation of COPD  CTA done at that time was negative for pulmonary embolism, nodular airspace opacities possible infectious versus inflammatory changes  This was felt likely due to viral etiology and antibiotics that were started were stopped  He was started on nighttime BiPAP by Pulmonary  He found this effective and is waiting for BiPAP to be delivered to his house  Pulmonary rehab was recommended    He also has chronic diastolic heart failure, this was stable, low-salt fluid-restricted diet was recommended patient gradually improved and was able to be discharged  During hospitalization it was also recognized that their living situation has social and economic factors that need to be managed by family support  Currently he stating he feels better, his breathing is back at baseline, he is on chronic O2 at 3 liters per minute  No complaint of chest pain  No leg swelling  He does complain of persistent runny nose  Patient is due to start his pneumonia immunizations  Patient also reports to me that prior to moving to this area he was told he had a right renal mass, he is stating that he was told it was a slow growing cancer, and should be monitored  We have not been aware of it until this time  Will set up for CT scanning  Patient also informed me in his past he had a colon polyp, his last colonoscopy 5 years ago and he is due for a repeat      TCM Call (since 1/13/2019)     None      TCM Call (since 1/13/2019)     None              ALLERGIES:  Allergies   Allergen Reactions    Penicillins Hives     Passed out    Codeine        CURRENT MEDICATIONS:    Current Outpatient Medications:     albuterol (2 5 mg/3 mL) 0 083 % nebulizer solution, Take 3 mL (2 5 mg total) by nebulization every 4 (four) hours as needed for wheezing, Disp: 1080 mL, Rfl: 3    albuterol (PROVENTIL HFA,VENTOLIN HFA) 90 mcg/act inhaler, Inhale 2 puffs every 6 (six) hours as needed for wheezing, Disp: , Rfl:     cholestyramine (QUESTRAN) 4 g packet, Take 1 packet (4 g total) by mouth 2 (two) times a day with meals, Disp: 60 packet, Rfl: 2    fluticasone-vilanterol (BREO ELLIPTA) 100-25 mcg/inh inhaler, Inhale 1 puff daily Rinse mouth after use , Disp: 1 Inhaler, Rfl: 3    loratadine (CLARITIN) 10 mg tablet, Take 10 mg by mouth daily, Disp: , Rfl:     predniSONE 10 mg tablet, Take 1 tablet (10 mg total) by mouth daily, Disp: 45 tablet, Rfl: 0    ranitidine (ZANTAC) 150 mg tablet, take 1 tablet by mouth at bedtime, Disp: 30 tablet, Rfl: 5    roflumilast (DALIRESP) 250 MCG tablet, Take 1 tablet (250 mcg total) by mouth daily, Disp: 30 tablet, Rfl: 5    senna (SENOKOT) 8 6 mg, Take 1 tablet (8 6 mg total) by mouth daily at bedtime, Disp: 120 each, Rfl: 0    sertraline (ZOLOFT) 50 mg tablet, Take 1 tablet (50 mg total) by mouth daily, Disp: 30 tablet, Rfl: 5    tiotropium (SPIRIVA RESPIMAT) 2 5 MCG/ACT AERS inhaler, Inhale 2 puffs daily, Disp: 1 Inhaler, Rfl: 3    ACTIVE PROBLEM LIST:  Patient Active Problem List   Diagnosis    Pulmonary emphysema (HCC)    Gastroesophageal reflux disease without esophagitis    Anxiety    Simple chronic bronchitis (HCC)    Shortness of breath on exertion    Abnormal ECG    PVCs (premature ventricular contractions)    Allergic rhinitis    Functional diarrhea    Diastolic dysfunction    Pulmonary hypertension (HCC)    Acute exacerbation of chronic obstructive pulmonary disease (COPD) (HCC)    Gastroesophageal reflux disease    Acute on chronic respiratory failure with hypoxia (HCC)    Tracheobronchitis    Chronic diastolic heart failure (HCC)    History of colon polyps    Renal mass, right       PAST MEDICAL HISTORY:  Past Medical History:   Diagnosis Date    Centrilobular emphysema ([address] )     COPD (chronic obstructive pulmonary disease) (HCC)     Hypoglycemia     On home oxygen therapy     Pneumonia     Pulmonary hypertension (HCC)     Respiratory failure (HCC)     SOB (shortness of breath)        PAST SURGICAL HISTORY:  Past Surgical History:   Procedure Laterality Date    CATARACT EXTRACTION      CHOLECYSTECTOMY      hernia    EYE SURGERY      NC ESOPHAGOGASTRODUODENOSCOPY TRANSORAL DIAGNOSTIC N/A 9/19/2018    Procedure: ESOPHAGOGASTRODUODENOSCOPY (EGD); Surgeon: Jennifer Sidhu MD;  Location: [facility];   Service: Gastroenterology    NC REPAIR ING HERNIA,5+Y/O,REDUCIBL Left 5/23/2018    Procedure: OPEN INGUINAL HERNIA REPAIR WITH MESH; Surgeon: Bossman Martínez MD;  Location: [facility];  Service: General       FAMILY HISTORY:  Family History   Problem Relation Age of Onset    Diabetes Mother     Hypertension Mother     Hyperlipidemia Mother        SOCIAL HISTORY:  Social History     Socioeconomic History    Marital status: /Civil Union     Spouse name: janet     Number of children: 11    Years of education: Not on file    Highest education level: Not on file   Occupational History    Occupation: retired    Social Needs    Financial resource strain: Not on file    Food insecurity:     Worry: Not on file     Inability: Not on file   [employer] needs:     Medical: Not on file     Non-medical: Not on file   Tobacco Use    Smoking status: Former Smoker     Years: 50 00     Types: Cigarettes     Last attempt to quit: 2013     Years since quittin 9    Smokeless tobacco: Never Used   Substance and Sexual Activity    Alcohol use: Yes     Alcohol/week: 0 6 oz     Types: 1 Glasses of wine per week     Comment: socialy    Drug use: No    Sexual activity: Never   Lifestyle    Physical activity:     Days per week: Not on file     Minutes per session: Not on file    Stress: Not on file   Relationships    Social connections:     Talks on phone: Not on file     Gets together: Not on file     Attends Mandaen service: Not on file     Active member of club or organization: Not on file     Attends meetings of clubs or organizations: Not on file     Relationship status: Not on file    Intimate partner violence:     Fear of current or ex partner: Not on file     Emotionally abused: Not on file     Physically abused: Not on file     Forced sexual activity: Not on file   Other Topics Concern    Not on file   Social History Narrative        Retired       Review of Systems   Constitutional: Negative for activity change, chills, fatigue and fever  HENT: Positive for rhinorrhea  Negative for congestion      Eyes: Negative for discharge  Respiratory: Positive for cough and shortness of breath  Negative for chest tightness and wheezing  Cardiovascular: Negative for chest pain, palpitations and leg swelling  Gastrointestinal: Negative for abdominal pain, constipation, diarrhea, nausea and vomiting  Genitourinary: Negative for difficulty urinating  Musculoskeletal: Negative for arthralgias and myalgias  Skin: Negative for rash  Allergic/Immunologic: Negative for immunocompromised state  Neurological: Negative for dizziness, syncope, weakness, light-headedness and headaches  Hematological: Negative for adenopathy  Does not bruise/bleed easily  Psychiatric/Behavioral: Negative for dysphoric mood  The patient is not nervous/anxious  Objective:  Vitals:    02/13/19 1327   BP: 110/64   Pulse: 101   Resp: 18   SpO2: 95%   Weight: 77 1 kg (170 lb)   Height: 5' 10" (1 778 m)     Body mass index is 24 39 kg/m²  Physical Exam   Constitutional: He is oriented to person, place, and time  He appears well-developed and well-nourished  No distress  On chronic O2   HENT:   Pale boggy nasal mucosa   Neck: No JVD present  Cardiovascular: Normal rate, regular rhythm and normal heart sounds  Pulmonary/Chest: Effort normal and breath sounds normal  He has no wheezes  Overall decreased breath sounds throughout, prolonged expiratory phase, forced expiratory wheezing is present   Abdominal: Soft  Bowel sounds are normal    Musculoskeletal: He exhibits no edema  Lymphadenopathy:     He has no cervical adenopathy  Neurological: He is alert and oriented to person, place, and time  Skin: Skin is warm and dry  No rash noted  Benign appearing nevus 1 x 1 centimeter, brown in color, mid to upper right back region   Psychiatric: He has a normal mood and affect  His behavior is normal    Nursing note and vitals reviewed  RESULTS:        This note was created with voice recognition software    Phonic, grammatical and spelling errors may be present within the note as a result

## 2019-02-13 NOTE — PATIENT INSTRUCTIONS
No change in current medications  Continue follow up with specialists  History of colon polyp with last colonoscopy 5+ yrs ago  Will refer to GI  With history of today of right renal mass in the past, will set up for CT of the abdomen and pelvis with attention to right kidney  Will discuss results when available

## 2019-02-21 ENCOUNTER — TELEPHONE (OUTPATIENT)
Dept: INTERNAL MEDICINE CLINIC | Facility: CLINIC | Age: 69
End: 2019-02-21

## 2019-02-21 NOTE — TELEPHONE ENCOUNTER
Patients wife called stating she received another call from one of the patients other dr's and he reported a left side kidney adrenal nodule  Patients wife would like to know if he should still go for CT scan or what the next step would be

## 2019-02-26 ENCOUNTER — TELEPHONE (OUTPATIENT)
Dept: PULMONOLOGY | Facility: CLINIC | Age: 69
End: 2019-02-26

## 2019-02-26 ENCOUNTER — APPOINTMENT (OUTPATIENT)
Dept: LAB | Facility: HOSPITAL | Age: 69
End: 2019-02-26
Payer: MEDICARE

## 2019-02-26 ENCOUNTER — HOSPITAL ENCOUNTER (OUTPATIENT)
Dept: CT IMAGING | Facility: HOSPITAL | Age: 69
Discharge: HOME | End: 2019-02-26
Payer: MEDICARE

## 2019-02-26 DIAGNOSIS — Z11.59 NEED FOR HEPATITIS C SCREENING TEST: ICD-10-CM

## 2019-02-26 DIAGNOSIS — J44.1 COPD EXACERBATION (HCC): Primary | ICD-10-CM

## 2019-02-26 DIAGNOSIS — N28.89 RENAL MASS, RIGHT: ICD-10-CM

## 2019-02-26 PROCEDURE — 86803 HEPATITIS C AB TEST: CPT

## 2019-02-26 PROCEDURE — 74176 CT ABD & PELVIS W/O CONTRAST: CPT

## 2019-02-26 PROCEDURE — 36415 COLL VENOUS BLD VENIPUNCTURE: CPT

## 2019-02-26 RX ORDER — PREDNISONE 20 MG/1
TABLET ORAL
Qty: 18 TABLET | Refills: 0 | Status: SHIPPED | OUTPATIENT
Start: 2019-02-26 | End: 2019-03-19 | Stop reason: SDUPTHER

## 2019-02-26 NOTE — TELEPHONE ENCOUNTER
I will send prednisone to the pharmacy for him  Did he ever get the Trilogy? Can always go to the ER if no improvement

## 2019-02-27 DIAGNOSIS — N28.89 RENAL MASS, RIGHT: Primary | ICD-10-CM

## 2019-02-27 LAB — HCV AB SER QL: NORMAL

## 2019-03-19 ENCOUNTER — HOSPITAL ENCOUNTER (OUTPATIENT)
Dept: RADIOLOGY | Facility: HOSPITAL | Age: 69
Discharge: HOME | End: 2019-03-19
Payer: MEDICARE

## 2019-03-19 ENCOUNTER — OFFICE VISIT (OUTPATIENT)
Dept: PULMONOLOGY | Facility: CLINIC | Age: 69
End: 2019-03-19
Payer: MEDICARE

## 2019-03-19 VITALS
HEART RATE: 94 BPM | BODY MASS INDEX: 24.34 KG/M2 | HEIGHT: 70 IN | WEIGHT: 170 LBS | TEMPERATURE: 98.5 F | OXYGEN SATURATION: 95 %

## 2019-03-19 DIAGNOSIS — J96.11 CHRONIC RESPIRATORY FAILURE WITH HYPOXIA AND HYPERCAPNIA (HCC): Primary | ICD-10-CM

## 2019-03-19 DIAGNOSIS — J96.12 CHRONIC RESPIRATORY FAILURE WITH HYPOXIA AND HYPERCAPNIA (HCC): Primary | ICD-10-CM

## 2019-03-19 DIAGNOSIS — J44.1 CHRONIC OBSTRUCTIVE PULMONARY DISEASE WITH ACUTE EXACERBATION (HCC): ICD-10-CM

## 2019-03-19 DIAGNOSIS — J44.1 COPD EXACERBATION (HCC): ICD-10-CM

## 2019-03-19 DIAGNOSIS — Z87.891 HISTORY OF TOBACCO ABUSE: ICD-10-CM

## 2019-03-19 DIAGNOSIS — R93.89 ABNORMAL CHEST CT: ICD-10-CM

## 2019-03-19 DIAGNOSIS — R09.81 NASAL CONGESTION: ICD-10-CM

## 2019-03-19 PROCEDURE — 99214 OFFICE O/P EST MOD 30 MIN: CPT | Performed by: PHYSICIAN ASSISTANT

## 2019-03-19 PROCEDURE — 71046 X-RAY EXAM CHEST 2 VIEWS: CPT

## 2019-03-19 RX ORDER — LEVOFLOXACIN 750 MG/1
750 TABLET ORAL EVERY 24 HOURS
Qty: 7 TABLET | Refills: 0 | Status: SHIPPED | OUTPATIENT
Start: 2019-03-19 | End: 2019-03-26

## 2019-03-19 RX ORDER — PREDNISONE 20 MG/1
TABLET ORAL
Qty: 18 TABLET | Refills: 0 | Status: ON HOLD | OUTPATIENT
Start: 2019-03-19 | End: 2019-04-09

## 2019-03-19 RX ORDER — PREDNISONE 10 MG/1
10 TABLET ORAL DAILY
Qty: 30 TABLET | Refills: 1 | Status: SHIPPED | OUTPATIENT
Start: 2019-04-03 | End: 2019-04-18 | Stop reason: HOSPADM

## 2019-03-19 RX ORDER — PREDNISONE 10 MG/1
10 TABLET ORAL DAILY
Qty: 30 TABLET | Refills: 1 | Status: CANCELLED | OUTPATIENT
Start: 2019-04-03

## 2019-03-19 RX ORDER — FLUTICASONE PROPIONATE 50 MCG
1 SPRAY, SUSPENSION (ML) NASAL DAILY
Qty: 1 BOTTLE | Refills: 3 | Status: SHIPPED | OUTPATIENT
Start: 2019-03-19 | End: 2019-12-16 | Stop reason: SDUPTHER

## 2019-03-19 NOTE — PROGRESS NOTES
Assessment:    1  Chronic respiratory failure with hypoxia and hypercapnia (HCC)     2  History of tobacco abuse     3  Chronic obstructive pulmonary disease with acute exacerbation (HCC)  XR chest pa & lateral    levofloxacin (LEVAQUIN) 750 mg tablet    predniSONE 10 mg tablet   4  Nasal congestion  fluticasone (FLONASE) 50 mcg/act nasal spray   5  Abnormal chest CT  CT chest without contrast   6  COPD exacerbation (HCC)  predniSONE 20 mg tablet       Plan:   Patient here for hospital follow-up following discharge from 01/27/2019 to 02/01/2019 for COPD exacerbation secondary to likely acute tracheobronchitis  Today saturating well on his baseline 3 L  Patient with very severe COPD/emphysema with FEV1 25% predicted  Continue Breo, Spiriva, albuterol p r n , daliresp, trilogy  Patient requires a Mechanical Ventilator to decrease the work of breathing and improve pulmonary status due to the severity of the chronic respiratory failure as a consequence of COPD  Patient will lead to a decline in health status increasing CO2 retention without the ventilator  He is benefitting from the ventilator  Patient no longer smoking  Presents today in COPD exacerbation  Has recently been around his sick grandchildren  Sent patient for chest x-ray, sent prednisone taper and Levaquin to the pharmacy  Advised him to give us a call should his symptoms worsen, he understands when it is appropriate to go to the ER or call 911  Encouraged continued use of his flutter valve while at home to help with mucus clearance  Advised patient importance of staying away from his sick grandchildren given his severe lung disease  He has become sick secondary to being in close contact with his grandchildren many times over this cold and flu season  Will also begin the patient on prednisone 10 mg daily following this prednisone taper as patient is becoming increasingly dependent on prednisone    Will refer patient to [facility] lung to assess for candidacy for lung transplant  Again have recommended pulmonary rehab for the patient, he is unable to afford it  Has been working with case management to try to find a way to afford it  Patient with abnormal chest CT on 01/27/2019 with nodular airspace opacities, likely inflammatory  Will follow up with repeat imaging in 3 months  Patient also complaining of nasal congestion, will send for Flonase as well  All of the patient's questions were answered to their satisfaction and understanding, which was verbalized  Patient was advised to call the office prior to next appointment should they have any questions or concerns prior  Worrisome symptoms that should prompt a visit to the ER or call to 911 were discussed such as chest pain, severe shortness of breath, cyanosis, throat closing, syncope, etc     Subjective:     Patient ID: Mabel Mcqueen is a 79 y o  male  Chief Complaint:  Alice Greco is a 56-year-old male former smoker arm with greater than 50 pack-year smoking history with past medical history of chronic hypercapnic/hypoxic respiratory failure, severe COPD, pulmonary hypertension, CHF who presents today for follow-up the following his recent hospitalization from 01/27/2019 to 02/02/2019 for acute on chronic respiratory failure and COPD exacerbation secondary to likely tracheobronchitis  He required additional prednisone taper mid February for increased shortness of breath, he finished this last week and again is becoming increasingly short of breath  Is also having increasing productive cough  No fevers or chills, however does feel more fatigued than usual   Has recently been around his sick grandchildren  He uses Breo, Spiriva, albuterol p r n , trilogy, daliresp  He will also be beginning prednisone 10 mg daily  Will refer him to [facility] for his very severe COPD      The following portions of the patient's history were reviewed in this encounter and updated as appropriate: Past medical, social, surgical, family    Review of Systems   Constitutional: Positive for fatigue  Negative for chills, diaphoresis and fever  HENT: Positive for congestion, rhinorrhea and sore throat  Negative for trouble swallowing and voice change  Respiratory: Positive for cough and shortness of breath  Cardiovascular: Negative for chest pain, palpitations and leg swelling  Gastrointestinal: Negative  Negative for abdominal pain, constipation, diarrhea, nausea and vomiting  Endocrine: Negative for cold intolerance and heat intolerance  Musculoskeletal: Negative for gait problem  Skin: Negative for color change  Neurological: Negative for syncope  All other systems reviewed and are negative  Objective:    Physical Exam   Constitutional: He is oriented to person, place, and time  He appears well-developed  No distress  Thin   HENT:   Head: Normocephalic and atraumatic  Right Ear: External ear normal    Left Ear: External ear normal    Nose: Nose normal    Mouth/Throat: Oropharynx is clear and moist  No oropharyngeal exudate  Eyes: Pupils are equal, round, and reactive to light  Conjunctivae are normal  Right eye exhibits no discharge  Left eye exhibits no discharge  No scleral icterus  Neck: Normal range of motion  Neck supple  No JVD present  No tracheal deviation present  Cardiovascular: Normal rate, regular rhythm, normal heart sounds and intact distal pulses  Exam reveals no gallop and no friction rub  No murmur heard  Pulmonary/Chest: Effort normal  No stridor  No respiratory distress  He has no wheezes  Saturating well on his baseline 3L without any evidence of labored breathing  + Rhonchi, decreased breath sounds  No wheeze  Abdominal: Soft  Bowel sounds are normal  He exhibits no distension  There is no tenderness  There is no guarding  Musculoskeletal: Normal range of motion  He exhibits no edema, tenderness or deformity     Neurological: He is alert and oriented to person, place, and time  No cranial nerve deficit  He exhibits normal muscle tone  Skin: Skin is warm and dry  No rash noted  He is not diaphoretic  No erythema  No pallor  Psychiatric: He has a normal mood and affect  His behavior is normal  Judgment and thought content normal    Nursing note and vitals reviewed

## 2019-03-19 NOTE — PATIENT INSTRUCTIONS
Follow-up for your next appointment in approximately 2 months  Please do not hesitate to call the office prior to your next appointment should you have any questions or concerns  Worrisome symptoms that should prompt a call to 911 or visit to the ER include chest pain, severe shortness of breath, cyanosis (blue discoloration of the skin), dizziness/light-headedness, passing out  Continue to follow up with your Primary Care Provider and any other specialists you see

## 2019-03-20 ENCOUNTER — TELEPHONE (OUTPATIENT)
Dept: CARDIOLOGY CLINIC | Facility: CLINIC | Age: 69
End: 2019-03-20

## 2019-03-20 NOTE — TELEPHONE ENCOUNTER
Pt wife called and stated should she keep the appointment for tomorrow   Pt has not had testing done that Dr Shamika Bonds ordered   Pt has been ill and is currently on a steroid treatment for the next 15 days    Pt wife would like a call back [phone]

## 2019-03-22 DIAGNOSIS — K21.9 GASTROESOPHAGEAL REFLUX DISEASE WITHOUT ESOPHAGITIS: ICD-10-CM

## 2019-03-22 RX ORDER — ESOMEPRAZOLE MAGNESIUM 40 MG/1
CAPSULE, DELAYED RELEASE ORAL
Qty: 30 CAPSULE | Refills: 3 | OUTPATIENT
Start: 2019-03-22

## 2019-03-26 ENCOUNTER — HOSPITAL ENCOUNTER (OUTPATIENT)
Dept: CT IMAGING | Facility: HOSPITAL | Age: 69
Discharge: HOME | End: 2019-03-26
Payer: MEDICARE

## 2019-03-26 DIAGNOSIS — N28.89 RENAL MASS, RIGHT: ICD-10-CM

## 2019-03-26 PROCEDURE — 74177 CT ABD & PELVIS W/CONTRAST: CPT

## 2019-03-26 RX ADMIN — IOHEXOL 100 ML: 350 INJECTION, SOLUTION INTRAVENOUS at 16:45

## 2019-04-01 ENCOUNTER — TELEPHONE (OUTPATIENT)
Dept: INTERNAL MEDICINE CLINIC | Facility: CLINIC | Age: 69
End: 2019-04-01

## 2019-04-01 ENCOUNTER — TELEPHONE (OUTPATIENT)
Dept: CARDIOLOGY CLINIC | Facility: CLINIC | Age: 69
End: 2019-04-01

## 2019-04-01 DIAGNOSIS — I27.20 PULMONARY HYPERTENSION (HCC): Primary | ICD-10-CM

## 2019-04-03 ENCOUNTER — APPOINTMENT (OUTPATIENT)
Dept: LAB | Facility: HOSPITAL | Age: 69
End: 2019-04-03
Payer: MEDICARE

## 2019-04-03 LAB
ANION GAP SERPL CALCULATED.3IONS-SCNC: 6 MMOL/L (ref 4–13)
BASOPHILS # BLD AUTO: 0.03 THOUSANDS/ΜL (ref 0–0.1)
BASOPHILS NFR BLD AUTO: 0 % (ref 0–1)
BUN SERPL-MCNC: 31 MG/DL (ref 5–25)
CALCIUM SERPL-MCNC: 8.6 MG/DL (ref 8.3–10.1)
CHLORIDE SERPL-SCNC: 108 MMOL/L (ref 100–108)
CO2 SERPL-SCNC: 30 MMOL/L (ref 21–32)
CREAT SERPL-MCNC: 0.87 MG/DL (ref 0.6–1.3)
EOSINOPHIL # BLD AUTO: 0.04 THOUSAND/ΜL (ref 0–0.61)
EOSINOPHIL NFR BLD AUTO: 0 % (ref 0–6)
ERYTHROCYTE [DISTWIDTH] IN BLOOD BY AUTOMATED COUNT: 14.7 % (ref 11.6–15.1)
GFR SERPL CREATININE-BSD FRML MDRD: 90 ML/MIN/1.73SQ M
GLUCOSE SERPL-MCNC: 131 MG/DL (ref 65–140)
HCT VFR BLD AUTO: 41.2 % (ref 36.5–49.3)
HGB BLD-MCNC: 13.1 G/DL (ref 12–17)
IMM GRANULOCYTES # BLD AUTO: 0.09 THOUSAND/UL (ref 0–0.2)
IMM GRANULOCYTES NFR BLD AUTO: 1 % (ref 0–2)
INR PPP: 0.94 (ref 0.86–1.17)
LYMPHOCYTES # BLD AUTO: 0.79 THOUSANDS/ΜL (ref 0.6–4.47)
LYMPHOCYTES NFR BLD AUTO: 6 % (ref 14–44)
MCH RBC QN AUTO: 30.3 PG (ref 26.8–34.3)
MCHC RBC AUTO-ENTMCNC: 31.8 G/DL (ref 31.4–37.4)
MCV RBC AUTO: 95 FL (ref 82–98)
MONOCYTES # BLD AUTO: 0.56 THOUSAND/ΜL (ref 0.17–1.22)
MONOCYTES NFR BLD AUTO: 4 % (ref 4–12)
NEUTROPHILS # BLD AUTO: 11.92 THOUSANDS/ΜL (ref 1.85–7.62)
NEUTS SEG NFR BLD AUTO: 89 % (ref 43–75)
NRBC BLD AUTO-RTO: 0 /100 WBCS
PLATELET # BLD AUTO: 185 THOUSANDS/UL (ref 149–390)
PMV BLD AUTO: 9.3 FL (ref 8.9–12.7)
POTASSIUM SERPL-SCNC: 4.5 MMOL/L (ref 3.5–5.3)
PROTHROMBIN TIME: 12.5 SECONDS (ref 11.8–14.2)
RBC # BLD AUTO: 4.33 MILLION/UL (ref 3.88–5.62)
SODIUM SERPL-SCNC: 144 MMOL/L (ref 136–145)
WBC # BLD AUTO: 13.43 THOUSAND/UL (ref 4.31–10.16)

## 2019-04-03 PROCEDURE — 85610 PROTHROMBIN TIME: CPT

## 2019-04-03 PROCEDURE — 36415 COLL VENOUS BLD VENIPUNCTURE: CPT

## 2019-04-03 PROCEDURE — 85025 COMPLETE CBC W/AUTO DIFF WBC: CPT

## 2019-04-03 PROCEDURE — 80048 BASIC METABOLIC PNL TOTAL CA: CPT

## 2019-04-08 ENCOUNTER — TELEPHONE (OUTPATIENT)
Dept: INPATIENT UNIT | Facility: HOSPITAL | Age: 69
End: 2019-04-08

## 2019-04-08 RX ORDER — SODIUM CHLORIDE 9 MG/ML
30 INJECTION, SOLUTION INTRAVENOUS CONTINUOUS
Status: CANCELLED | OUTPATIENT
Start: 2019-04-08

## 2019-04-09 ENCOUNTER — HOSPITAL ENCOUNTER (OUTPATIENT)
Dept: NON INVASIVE DIAGNOSTICS | Facility: HOSPITAL | Age: 69
Discharge: HOME | End: 2019-04-09
Attending: INTERNAL MEDICINE | Admitting: INTERNAL MEDICINE
Payer: MEDICARE

## 2019-04-09 VITALS
DIASTOLIC BLOOD PRESSURE: 93 MMHG | RESPIRATION RATE: 20 BRPM | BODY MASS INDEX: 25.77 KG/M2 | OXYGEN SATURATION: 93 % | SYSTOLIC BLOOD PRESSURE: 124 MMHG | WEIGHT: 180 LBS | HEART RATE: 83 BPM | HEIGHT: 70 IN | TEMPERATURE: 98 F

## 2019-04-09 DIAGNOSIS — I27.20 PULMONARY HYPERTENSION (HCC): Primary | ICD-10-CM

## 2019-04-09 DIAGNOSIS — I27.20 PROGRESSIVE PULMONARY HYPERTENSION (HCC): ICD-10-CM

## 2019-04-09 DIAGNOSIS — I50.32 CHRONIC HEART FAILURE WITH PRESERVED EJECTION FRACTION (HCC): Primary | ICD-10-CM

## 2019-04-09 PROCEDURE — 93464 EXERCISE W/HEMODYNAMIC MEAS: CPT | Performed by: INTERNAL MEDICINE

## 2019-04-09 PROCEDURE — 93451 RIGHT HEART CATH: CPT | Performed by: INTERNAL MEDICINE

## 2019-04-09 PROCEDURE — NC001 PR NO CHARGE: Performed by: INTERNAL MEDICINE

## 2019-04-09 PROCEDURE — C1894 INTRO/SHEATH, NON-LASER: HCPCS | Performed by: INTERNAL MEDICINE

## 2019-04-09 PROCEDURE — 82810 BLOOD GASES O2 SAT ONLY: CPT | Performed by: INTERNAL MEDICINE

## 2019-04-09 RX ORDER — SPIRONOLACTONE 25 MG/1
25 TABLET ORAL DAILY
Qty: 90 TABLET | Refills: 3 | Status: SHIPPED | OUTPATIENT
Start: 2019-04-09 | End: 2020-04-09 | Stop reason: SDUPTHER

## 2019-04-09 RX ORDER — LIDOCAINE HYDROCHLORIDE 10 MG/ML
INJECTION, SOLUTION INFILTRATION; PERINEURAL CODE/TRAUMA/SEDATION MEDICATION
Status: COMPLETED | OUTPATIENT
Start: 2019-04-09 | End: 2019-04-09

## 2019-04-09 RX ORDER — SODIUM CHLORIDE 9 MG/ML
30 INJECTION, SOLUTION INTRAVENOUS CONTINUOUS
Status: DISCONTINUED | OUTPATIENT
Start: 2019-04-09 | End: 2019-04-09

## 2019-04-09 RX ADMIN — LIDOCAINE HYDROCHLORIDE 4 ML: 10 INJECTION, SOLUTION INFILTRATION; PERINEURAL at 13:03

## 2019-04-09 RX ADMIN — SODIUM CHLORIDE 30 ML/HR: 0.9 INJECTION, SOLUTION INTRAVENOUS at 09:51

## 2019-04-10 ENCOUNTER — HOSPITAL ENCOUNTER (OUTPATIENT)
Dept: RADIOLOGY | Facility: HOSPITAL | Age: 69
Discharge: HOME | DRG: 193 | End: 2019-04-10
Payer: MEDICARE

## 2019-04-10 ENCOUNTER — TELEPHONE (OUTPATIENT)
Dept: PULMONOLOGY | Facility: CLINIC | Age: 69
End: 2019-04-10

## 2019-04-10 DIAGNOSIS — R05.9 COUGH: ICD-10-CM

## 2019-04-10 DIAGNOSIS — R05.9 COUGH: Primary | ICD-10-CM

## 2019-04-10 PROCEDURE — 71046 X-RAY EXAM CHEST 2 VIEWS: CPT

## 2019-04-11 ENCOUNTER — TELEPHONE (OUTPATIENT)
Dept: CARDIOLOGY CLINIC | Facility: CLINIC | Age: 69
End: 2019-04-11

## 2019-04-11 DIAGNOSIS — J41.0 SIMPLE CHRONIC BRONCHITIS (HCC): Primary | ICD-10-CM

## 2019-04-12 ENCOUNTER — TELEPHONE (OUTPATIENT)
Dept: PULMONOLOGY | Facility: CLINIC | Age: 69
End: 2019-04-12

## 2019-04-13 ENCOUNTER — APPOINTMENT (EMERGENCY)
Dept: RADIOLOGY | Facility: HOSPITAL | Age: 69
DRG: 193 | End: 2019-04-13
Payer: MEDICARE

## 2019-04-13 ENCOUNTER — HOSPITAL ENCOUNTER (INPATIENT)
Facility: HOSPITAL | Age: 69
LOS: 5 days | Discharge: HOME HEALTH | DRG: 193 | End: 2019-04-18
Attending: INTERNAL MEDICINE | Admitting: INTERNAL MEDICINE
Payer: MEDICARE

## 2019-04-13 DIAGNOSIS — J44.1 ACUTE EXACERBATION OF CHRONIC OBSTRUCTIVE PULMONARY DISEASE (COPD) (HCC): ICD-10-CM

## 2019-04-13 DIAGNOSIS — J96.21 ACUTE ON CHRONIC RESPIRATORY FAILURE WITH HYPOXIA (HCC): ICD-10-CM

## 2019-04-13 DIAGNOSIS — J18.9 PNEUMONIA: ICD-10-CM

## 2019-04-13 DIAGNOSIS — J44.1 COPD WITH ACUTE EXACERBATION (HCC): Primary | ICD-10-CM

## 2019-04-13 LAB
ALBUMIN SERPL BCP-MCNC: 3.6 G/DL (ref 3.5–5)
ALP SERPL-CCNC: 69 U/L (ref 46–116)
ALT SERPL W P-5'-P-CCNC: 26 U/L (ref 12–78)
ANION GAP SERPL CALCULATED.3IONS-SCNC: 5 MMOL/L (ref 4–13)
APTT PPP: 32 SECONDS (ref 26–38)
AST SERPL W P-5'-P-CCNC: 12 U/L (ref 5–45)
BASOPHILS # BLD AUTO: 0.03 THOUSANDS/ΜL (ref 0–0.1)
BASOPHILS NFR BLD AUTO: 0 % (ref 0–1)
BILIRUB DIRECT SERPL-MCNC: 0.4 MG/DL (ref 0–0.2)
BILIRUB SERPL-MCNC: 2.1 MG/DL (ref 0.2–1)
BUN SERPL-MCNC: 18 MG/DL (ref 5–25)
CALCIUM SERPL-MCNC: 9 MG/DL (ref 8.3–10.1)
CHLORIDE SERPL-SCNC: 99 MMOL/L (ref 100–108)
CO2 SERPL-SCNC: 32 MMOL/L (ref 21–32)
CREAT SERPL-MCNC: 0.83 MG/DL (ref 0.6–1.3)
EOSINOPHIL # BLD AUTO: 0.02 THOUSAND/ΜL (ref 0–0.61)
EOSINOPHIL NFR BLD AUTO: 0 % (ref 0–6)
ERYTHROCYTE [DISTWIDTH] IN BLOOD BY AUTOMATED COUNT: 14.2 % (ref 11.6–15.1)
GFR SERPL CREATININE-BSD FRML MDRD: 92 ML/MIN/1.73SQ M
GLUCOSE SERPL-MCNC: 124 MG/DL (ref 65–140)
HCT VFR BLD AUTO: 41.4 % (ref 36.5–49.3)
HGB BLD-MCNC: 13.4 G/DL (ref 12–17)
IMM GRANULOCYTES # BLD AUTO: 0.1 THOUSAND/UL (ref 0–0.2)
IMM GRANULOCYTES NFR BLD AUTO: 1 % (ref 0–2)
INR PPP: 1.01 (ref 0.86–1.17)
LACTATE SERPL-SCNC: 1.7 MMOL/L (ref 0.5–2)
LYMPHOCYTES # BLD AUTO: 2.47 THOUSANDS/ΜL (ref 0.6–4.47)
LYMPHOCYTES NFR BLD AUTO: 14 % (ref 14–44)
MAGNESIUM SERPL-MCNC: 2 MG/DL (ref 1.6–2.6)
MCH RBC QN AUTO: 30.5 PG (ref 26.8–34.3)
MCHC RBC AUTO-ENTMCNC: 32.4 G/DL (ref 31.4–37.4)
MCV RBC AUTO: 94 FL (ref 82–98)
MONOCYTES # BLD AUTO: 1.11 THOUSAND/ΜL (ref 0.17–1.22)
MONOCYTES NFR BLD AUTO: 6 % (ref 4–12)
NEUTROPHILS # BLD AUTO: 14.58 THOUSANDS/ΜL (ref 1.85–7.62)
NEUTS SEG NFR BLD AUTO: 79 % (ref 43–75)
NRBC BLD AUTO-RTO: 0 /100 WBCS
NT-PROBNP SERPL-MCNC: 153 PG/ML
PLATELET # BLD AUTO: 293 THOUSANDS/UL (ref 149–390)
PMV BLD AUTO: 9.4 FL (ref 8.9–12.7)
POTASSIUM SERPL-SCNC: 3.8 MMOL/L (ref 3.5–5.3)
PROT SERPL-MCNC: 7.6 G/DL (ref 6.4–8.2)
PROTHROMBIN TIME: 13.2 SECONDS (ref 11.8–14.2)
RBC # BLD AUTO: 4.4 MILLION/UL (ref 3.88–5.62)
SODIUM SERPL-SCNC: 136 MMOL/L (ref 136–145)
TROPONIN I SERPL-MCNC: <0.02 NG/ML
WBC # BLD AUTO: 18.31 THOUSAND/UL (ref 4.31–10.16)

## 2019-04-13 PROCEDURE — 87631 RESP VIRUS 3-5 TARGETS: CPT | Performed by: EMERGENCY MEDICINE

## 2019-04-13 PROCEDURE — 96375 TX/PRO/DX INJ NEW DRUG ADDON: CPT

## 2019-04-13 PROCEDURE — 80076 HEPATIC FUNCTION PANEL: CPT | Performed by: EMERGENCY MEDICINE

## 2019-04-13 PROCEDURE — 36415 COLL VENOUS BLD VENIPUNCTURE: CPT | Performed by: EMERGENCY MEDICINE

## 2019-04-13 PROCEDURE — 99285 EMERGENCY DEPT VISIT HI MDM: CPT | Performed by: EMERGENCY MEDICINE

## 2019-04-13 PROCEDURE — 84145 PROCALCITONIN (PCT): CPT | Performed by: PHYSICIAN ASSISTANT

## 2019-04-13 PROCEDURE — 80048 BASIC METABOLIC PNL TOTAL CA: CPT | Performed by: EMERGENCY MEDICINE

## 2019-04-13 PROCEDURE — 93005 ELECTROCARDIOGRAM TRACING: CPT

## 2019-04-13 PROCEDURE — 71046 X-RAY EXAM CHEST 2 VIEWS: CPT

## 2019-04-13 PROCEDURE — 94644 CONT INHLJ TX 1ST HOUR: CPT

## 2019-04-13 PROCEDURE — 85025 COMPLETE CBC W/AUTO DIFF WBC: CPT | Performed by: EMERGENCY MEDICINE

## 2019-04-13 PROCEDURE — 84484 ASSAY OF TROPONIN QUANT: CPT | Performed by: EMERGENCY MEDICINE

## 2019-04-13 PROCEDURE — 99285 EMERGENCY DEPT VISIT HI MDM: CPT

## 2019-04-13 PROCEDURE — 96365 THER/PROPH/DIAG IV INF INIT: CPT

## 2019-04-13 PROCEDURE — 85730 THROMBOPLASTIN TIME PARTIAL: CPT | Performed by: EMERGENCY MEDICINE

## 2019-04-13 PROCEDURE — 94640 AIRWAY INHALATION TREATMENT: CPT

## 2019-04-13 PROCEDURE — 87040 BLOOD CULTURE FOR BACTERIA: CPT | Performed by: EMERGENCY MEDICINE

## 2019-04-13 PROCEDURE — 94760 N-INVAS EAR/PLS OXIMETRY 1: CPT

## 2019-04-13 PROCEDURE — 96368 THER/DIAG CONCURRENT INF: CPT

## 2019-04-13 PROCEDURE — 99223 1ST HOSP IP/OBS HIGH 75: CPT | Performed by: PHYSICIAN ASSISTANT

## 2019-04-13 PROCEDURE — 83735 ASSAY OF MAGNESIUM: CPT | Performed by: EMERGENCY MEDICINE

## 2019-04-13 PROCEDURE — 83880 ASSAY OF NATRIURETIC PEPTIDE: CPT | Performed by: EMERGENCY MEDICINE

## 2019-04-13 PROCEDURE — 85610 PROTHROMBIN TIME: CPT | Performed by: EMERGENCY MEDICINE

## 2019-04-13 PROCEDURE — 94660 CPAP INITIATION&MGMT: CPT

## 2019-04-13 PROCEDURE — 83605 ASSAY OF LACTIC ACID: CPT | Performed by: EMERGENCY MEDICINE

## 2019-04-13 RX ORDER — SODIUM CHLORIDE FOR INHALATION 0.9 %
12 VIAL, NEBULIZER (ML) INHALATION ONCE
Status: COMPLETED | OUTPATIENT
Start: 2019-04-13 | End: 2019-04-13

## 2019-04-13 RX ORDER — ACETAMINOPHEN 325 MG/1
650 TABLET ORAL ONCE
Status: COMPLETED | OUTPATIENT
Start: 2019-04-13 | End: 2019-04-13

## 2019-04-13 RX ORDER — METHYLPREDNISOLONE SODIUM SUCCINATE 125 MG/2ML
80 INJECTION, POWDER, LYOPHILIZED, FOR SOLUTION INTRAMUSCULAR; INTRAVENOUS ONCE
Status: COMPLETED | OUTPATIENT
Start: 2019-04-13 | End: 2019-04-13

## 2019-04-13 RX ADMIN — SODIUM CHLORIDE 500 ML: 0.9 INJECTION, SOLUTION INTRAVENOUS at 19:29

## 2019-04-13 RX ADMIN — VANCOMYCIN HYDROCHLORIDE 1250 MG: 5 INJECTION, POWDER, LYOPHILIZED, FOR SOLUTION INTRAVENOUS at 23:54

## 2019-04-13 RX ADMIN — ALBUTEROL SULFATE 10 MG: 2.5 SOLUTION RESPIRATORY (INHALATION) at 19:30

## 2019-04-13 RX ADMIN — ISODIUM CHLORIDE 12 ML: 0.03 SOLUTION RESPIRATORY (INHALATION) at 19:30

## 2019-04-13 RX ADMIN — AZITHROMYCIN MONOHYDRATE 500 MG: 500 INJECTION, POWDER, LYOPHILIZED, FOR SOLUTION INTRAVENOUS at 19:58

## 2019-04-13 RX ADMIN — METHYLPREDNISOLONE SODIUM SUCCINATE 80 MG: 125 INJECTION, POWDER, FOR SOLUTION INTRAMUSCULAR; INTRAVENOUS at 19:25

## 2019-04-13 RX ADMIN — CEFEPIME 2000 MG: 2 INJECTION, POWDER, FOR SOLUTION INTRAVENOUS at 20:23

## 2019-04-13 RX ADMIN — IPRATROPIUM BROMIDE 1 MG: 0.5 SOLUTION RESPIRATORY (INHALATION) at 19:31

## 2019-04-13 RX ADMIN — ACETAMINOPHEN 650 MG: 325 TABLET, FILM COATED ORAL at 19:24

## 2019-04-14 LAB
ALBUMIN SERPL BCP-MCNC: 3.1 G/DL (ref 3.5–5)
ALP SERPL-CCNC: 57 U/L (ref 46–116)
ALT SERPL W P-5'-P-CCNC: 22 U/L (ref 12–78)
ANION GAP SERPL CALCULATED.3IONS-SCNC: 8 MMOL/L (ref 4–13)
AST SERPL W P-5'-P-CCNC: 13 U/L (ref 5–45)
BACTERIA UR QL AUTO: ABNORMAL /HPF
BASOPHILS # BLD MANUAL: 0 THOUSAND/UL (ref 0–0.1)
BASOPHILS NFR MAR MANUAL: 0 % (ref 0–1)
BILIRUB SERPL-MCNC: 1.9 MG/DL (ref 0.2–1)
BILIRUB UR QL STRIP: NEGATIVE
BUN SERPL-MCNC: 21 MG/DL (ref 5–25)
CALCIUM SERPL-MCNC: 8.7 MG/DL (ref 8.3–10.1)
CHLORIDE SERPL-SCNC: 102 MMOL/L (ref 100–108)
CLARITY UR: CLEAR
CO2 SERPL-SCNC: 27 MMOL/L (ref 21–32)
COLOR UR: YELLOW
CREAT SERPL-MCNC: 0.74 MG/DL (ref 0.6–1.3)
EOSINOPHIL # BLD MANUAL: 0 THOUSAND/UL (ref 0–0.4)
EOSINOPHIL NFR BLD MANUAL: 0 % (ref 0–6)
ERYTHROCYTE [DISTWIDTH] IN BLOOD BY AUTOMATED COUNT: 14.3 % (ref 11.6–15.1)
FLUAV AG SPEC QL: NOT DETECTED
FLUBV AG SPEC QL: NOT DETECTED
GFR SERPL CREATININE-BSD FRML MDRD: 96 ML/MIN/1.73SQ M
GLUCOSE SERPL-MCNC: 162 MG/DL (ref 65–140)
GLUCOSE UR STRIP-MCNC: NEGATIVE MG/DL
HCT VFR BLD AUTO: 37.2 % (ref 36.5–49.3)
HGB BLD-MCNC: 12.2 G/DL (ref 12–17)
HGB UR QL STRIP.AUTO: NEGATIVE
KETONES UR STRIP-MCNC: ABNORMAL MG/DL
LEUKOCYTE ESTERASE UR QL STRIP: NEGATIVE
LYMPHOCYTES # BLD AUTO: 0.61 THOUSAND/UL (ref 0.6–4.47)
LYMPHOCYTES # BLD AUTO: 3 % (ref 14–44)
MAGNESIUM SERPL-MCNC: 2.2 MG/DL (ref 1.6–2.6)
MCH RBC QN AUTO: 30.7 PG (ref 26.8–34.3)
MCHC RBC AUTO-ENTMCNC: 32.8 G/DL (ref 31.4–37.4)
MCV RBC AUTO: 94 FL (ref 82–98)
MONOCYTES # BLD AUTO: 0.2 THOUSAND/UL (ref 0–1.22)
MONOCYTES NFR BLD: 1 % (ref 4–12)
MUCOUS THREADS UR QL AUTO: ABNORMAL
NEUTROPHILS # BLD MANUAL: 19.57 THOUSAND/UL (ref 1.85–7.62)
NEUTS BAND NFR BLD MANUAL: 9 % (ref 0–8)
NEUTS SEG NFR BLD AUTO: 87 % (ref 43–75)
NITRITE UR QL STRIP: NEGATIVE
NON-SQ EPI CELLS URNS QL MICRO: ABNORMAL /HPF
NRBC BLD AUTO-RTO: 0 /100 WBCS
PH UR STRIP.AUTO: 6 [PH]
PHOSPHATE SERPL-MCNC: 2.7 MG/DL (ref 2.3–4.1)
PLATELET # BLD AUTO: 232 THOUSANDS/UL (ref 149–390)
PLATELET # BLD AUTO: 234 THOUSANDS/UL (ref 149–390)
PLATELET BLD QL SMEAR: ADEQUATE
PMV BLD AUTO: 9.3 FL (ref 8.9–12.7)
PMV BLD AUTO: 9.4 FL (ref 8.9–12.7)
POTASSIUM SERPL-SCNC: 4.4 MMOL/L (ref 3.5–5.3)
PROCALCITONIN SERPL-MCNC: 0.28 NG/ML
PROT SERPL-MCNC: 6.9 G/DL (ref 6.4–8.2)
PROT UR STRIP-MCNC: ABNORMAL MG/DL
RBC # BLD AUTO: 3.98 MILLION/UL (ref 3.88–5.62)
RBC #/AREA URNS AUTO: ABNORMAL /HPF
RSV B RNA SPEC QL NAA+PROBE: NOT DETECTED
SODIUM SERPL-SCNC: 137 MMOL/L (ref 136–145)
SP GR UR STRIP.AUTO: 1.02 (ref 1–1.03)
TOTAL CELLS COUNTED SPEC: 100
UROBILINOGEN UR QL STRIP.AUTO: 0.2 E.U./DL
WBC # BLD AUTO: 20.39 THOUSAND/UL (ref 4.31–10.16)
WBC #/AREA URNS AUTO: ABNORMAL /HPF

## 2019-04-14 PROCEDURE — 94760 N-INVAS EAR/PLS OXIMETRY 1: CPT

## 2019-04-14 PROCEDURE — 81001 URINALYSIS AUTO W/SCOPE: CPT | Performed by: EMERGENCY MEDICINE

## 2019-04-14 PROCEDURE — 84100 ASSAY OF PHOSPHORUS: CPT | Performed by: PHYSICIAN ASSISTANT

## 2019-04-14 PROCEDURE — 94640 AIRWAY INHALATION TREATMENT: CPT

## 2019-04-14 PROCEDURE — 99232 SBSQ HOSP IP/OBS MODERATE 35: CPT | Performed by: INTERNAL MEDICINE

## 2019-04-14 PROCEDURE — 85049 AUTOMATED PLATELET COUNT: CPT | Performed by: PHYSICIAN ASSISTANT

## 2019-04-14 PROCEDURE — 36415 COLL VENOUS BLD VENIPUNCTURE: CPT | Performed by: PHYSICIAN ASSISTANT

## 2019-04-14 PROCEDURE — 87070 CULTURE OTHR SPECIMN AEROBIC: CPT | Performed by: PHYSICIAN ASSISTANT

## 2019-04-14 PROCEDURE — 94660 CPAP INITIATION&MGMT: CPT

## 2019-04-14 PROCEDURE — 94664 DEMO&/EVAL PT USE INHALER: CPT

## 2019-04-14 PROCEDURE — 83735 ASSAY OF MAGNESIUM: CPT | Performed by: PHYSICIAN ASSISTANT

## 2019-04-14 PROCEDURE — 85007 BL SMEAR W/DIFF WBC COUNT: CPT | Performed by: PHYSICIAN ASSISTANT

## 2019-04-14 PROCEDURE — 87081 CULTURE SCREEN ONLY: CPT | Performed by: PHYSICIAN ASSISTANT

## 2019-04-14 PROCEDURE — 80053 COMPREHEN METABOLIC PANEL: CPT | Performed by: PHYSICIAN ASSISTANT

## 2019-04-14 PROCEDURE — 85027 COMPLETE CBC AUTOMATED: CPT | Performed by: PHYSICIAN ASSISTANT

## 2019-04-14 PROCEDURE — 87040 BLOOD CULTURE FOR BACTERIA: CPT | Performed by: PHYSICIAN ASSISTANT

## 2019-04-14 PROCEDURE — 87205 SMEAR GRAM STAIN: CPT | Performed by: PHYSICIAN ASSISTANT

## 2019-04-14 RX ORDER — FLUTICASONE FUROATE AND VILANTEROL 100; 25 UG/1; UG/1
1 POWDER RESPIRATORY (INHALATION) DAILY
Status: DISCONTINUED | OUTPATIENT
Start: 2019-04-14 | End: 2019-04-18 | Stop reason: HOSPADM

## 2019-04-14 RX ORDER — ACETAMINOPHEN 325 MG/1
650 TABLET ORAL EVERY 6 HOURS PRN
Status: DISCONTINUED | OUTPATIENT
Start: 2019-04-14 | End: 2019-04-18 | Stop reason: HOSPADM

## 2019-04-14 RX ORDER — LORATADINE 10 MG/1
10 TABLET ORAL DAILY
Status: DISCONTINUED | OUTPATIENT
Start: 2019-04-14 | End: 2019-04-18 | Stop reason: HOSPADM

## 2019-04-14 RX ORDER — BENZONATATE 100 MG/1
200 CAPSULE ORAL 3 TIMES DAILY PRN
Status: DISCONTINUED | OUTPATIENT
Start: 2019-04-14 | End: 2019-04-18 | Stop reason: HOSPADM

## 2019-04-14 RX ORDER — GUAIFENESIN 600 MG
600 TABLET, EXTENDED RELEASE 12 HR ORAL EVERY 12 HOURS SCHEDULED
Status: DISCONTINUED | OUTPATIENT
Start: 2019-04-14 | End: 2019-04-18 | Stop reason: HOSPADM

## 2019-04-14 RX ORDER — FAMOTIDINE 20 MG/1
40 TABLET, FILM COATED ORAL
Status: DISCONTINUED | OUTPATIENT
Start: 2019-04-14 | End: 2019-04-18 | Stop reason: HOSPADM

## 2019-04-14 RX ORDER — SPIRONOLACTONE 25 MG/1
25 TABLET ORAL DAILY
Status: DISCONTINUED | OUTPATIENT
Start: 2019-04-14 | End: 2019-04-18 | Stop reason: HOSPADM

## 2019-04-14 RX ORDER — IPRATROPIUM BROMIDE AND ALBUTEROL SULFATE 2.5; .5 MG/3ML; MG/3ML
3 SOLUTION RESPIRATORY (INHALATION)
Status: DISCONTINUED | OUTPATIENT
Start: 2019-04-14 | End: 2019-04-14

## 2019-04-14 RX ORDER — CHOLESTYRAMINE LIGHT 4 G/5.7G
4 POWDER, FOR SUSPENSION ORAL 2 TIMES DAILY
Status: DISCONTINUED | OUTPATIENT
Start: 2019-04-14 | End: 2019-04-18 | Stop reason: HOSPADM

## 2019-04-14 RX ORDER — ONDANSETRON 2 MG/ML
4 INJECTION INTRAMUSCULAR; INTRAVENOUS EVERY 6 HOURS PRN
Status: DISCONTINUED | OUTPATIENT
Start: 2019-04-14 | End: 2019-04-18 | Stop reason: HOSPADM

## 2019-04-14 RX ORDER — ALBUTEROL SULFATE 2.5 MG/3ML
2.5 SOLUTION RESPIRATORY (INHALATION) EVERY 4 HOURS PRN
Status: DISCONTINUED | OUTPATIENT
Start: 2019-04-14 | End: 2019-04-18 | Stop reason: HOSPADM

## 2019-04-14 RX ORDER — LEVALBUTEROL 1.25 MG/.5ML
1.25 SOLUTION, CONCENTRATE RESPIRATORY (INHALATION)
Status: DISCONTINUED | OUTPATIENT
Start: 2019-04-14 | End: 2019-04-18 | Stop reason: HOSPADM

## 2019-04-14 RX ORDER — FLUTICASONE PROPIONATE 50 MCG
1 SPRAY, SUSPENSION (ML) NASAL DAILY
Status: DISCONTINUED | OUTPATIENT
Start: 2019-04-14 | End: 2019-04-18 | Stop reason: HOSPADM

## 2019-04-14 RX ORDER — METHYLPREDNISOLONE SODIUM SUCCINATE 40 MG/ML
40 INJECTION, POWDER, LYOPHILIZED, FOR SOLUTION INTRAMUSCULAR; INTRAVENOUS EVERY 6 HOURS
Status: DISCONTINUED | OUTPATIENT
Start: 2019-04-14 | End: 2019-04-15

## 2019-04-14 RX ADMIN — METHYLPREDNISOLONE SODIUM SUCCINATE 40 MG: 40 INJECTION, POWDER, FOR SOLUTION INTRAMUSCULAR; INTRAVENOUS at 14:25

## 2019-04-14 RX ADMIN — FLUTICASONE PROPIONATE 1 SPRAY: 50 SPRAY, METERED NASAL at 08:54

## 2019-04-14 RX ADMIN — METHYLPREDNISOLONE SODIUM SUCCINATE 40 MG: 40 INJECTION, POWDER, FOR SOLUTION INTRAMUSCULAR; INTRAVENOUS at 21:09

## 2019-04-14 RX ADMIN — GUAIFENESIN 600 MG: 600 TABLET, EXTENDED RELEASE ORAL at 09:54

## 2019-04-14 RX ADMIN — ENOXAPARIN SODIUM 40 MG: 40 INJECTION SUBCUTANEOUS at 09:01

## 2019-04-14 RX ADMIN — METHYLPREDNISOLONE SODIUM SUCCINATE 40 MG: 40 INJECTION, POWDER, FOR SOLUTION INTRAMUSCULAR; INTRAVENOUS at 09:09

## 2019-04-14 RX ADMIN — LORATADINE 10 MG: 10 TABLET ORAL at 09:54

## 2019-04-14 RX ADMIN — IPRATROPIUM BROMIDE 0.5 MG: 0.5 SOLUTION RESPIRATORY (INHALATION) at 20:15

## 2019-04-14 RX ADMIN — LEVALBUTEROL HYDROCHLORIDE 1.25 MG: 1.25 SOLUTION, CONCENTRATE RESPIRATORY (INHALATION) at 14:26

## 2019-04-14 RX ADMIN — FLUTICASONE FUROATE AND VILANTEROL TRIFENATATE 1 PUFF: 100; 25 POWDER RESPIRATORY (INHALATION) at 08:54

## 2019-04-14 RX ADMIN — GUAIFENESIN 600 MG: 600 TABLET, EXTENDED RELEASE ORAL at 03:14

## 2019-04-14 RX ADMIN — METHYLPREDNISOLONE SODIUM SUCCINATE 40 MG: 40 INJECTION, POWDER, FOR SOLUTION INTRAMUSCULAR; INTRAVENOUS at 03:14

## 2019-04-14 RX ADMIN — IPRATROPIUM BROMIDE 0.5 MG: 0.5 SOLUTION RESPIRATORY (INHALATION) at 08:57

## 2019-04-14 RX ADMIN — SERTRALINE HYDROCHLORIDE 50 MG: 50 TABLET ORAL at 09:18

## 2019-04-14 RX ADMIN — FAMOTIDINE 40 MG: 20 TABLET ORAL at 03:14

## 2019-04-14 RX ADMIN — IPRATROPIUM BROMIDE 0.5 MG: 0.5 SOLUTION RESPIRATORY (INHALATION) at 14:26

## 2019-04-14 RX ADMIN — LEVALBUTEROL HYDROCHLORIDE 1.25 MG: 1.25 SOLUTION, CONCENTRATE RESPIRATORY (INHALATION) at 08:57

## 2019-04-14 RX ADMIN — LEVALBUTEROL HYDROCHLORIDE 1.25 MG: 1.25 SOLUTION, CONCENTRATE RESPIRATORY (INHALATION) at 20:15

## 2019-04-14 RX ADMIN — CHOLESTYRAMINE 4 G: 4 POWDER, FOR SUSPENSION ORAL at 09:16

## 2019-04-14 RX ADMIN — GUAIFENESIN 600 MG: 600 TABLET, EXTENDED RELEASE ORAL at 21:06

## 2019-04-14 RX ADMIN — FAMOTIDINE 40 MG: 20 TABLET ORAL at 21:06

## 2019-04-14 RX ADMIN — ROFLUMILAST 250 MCG: 250 TABLET ORAL at 14:42

## 2019-04-14 RX ADMIN — SPIRONOLACTONE 25 MG: 25 TABLET ORAL at 09:18

## 2019-04-14 RX ADMIN — CHOLESTYRAMINE 4 G: 4 POWDER, FOR SUSPENSION ORAL at 18:36

## 2019-04-15 ENCOUNTER — APPOINTMENT (INPATIENT)
Dept: CT IMAGING | Facility: HOSPITAL | Age: 69
DRG: 193 | End: 2019-04-15
Payer: MEDICARE

## 2019-04-15 LAB
ANION GAP SERPL CALCULATED.3IONS-SCNC: 7 MMOL/L (ref 4–13)
APTT PPP: 34 SECONDS (ref 26–38)
ATRIAL RATE: 105 BPM
BASOPHILS # BLD AUTO: 0.02 THOUSANDS/ΜL (ref 0–0.1)
BASOPHILS NFR BLD AUTO: 0 % (ref 0–1)
BUN SERPL-MCNC: 26 MG/DL (ref 5–25)
CALCIUM SERPL-MCNC: 9 MG/DL (ref 8.3–10.1)
CHLORIDE SERPL-SCNC: 104 MMOL/L (ref 100–108)
CO2 SERPL-SCNC: 29 MMOL/L (ref 21–32)
CREAT SERPL-MCNC: 0.69 MG/DL (ref 0.6–1.3)
EOSINOPHIL # BLD AUTO: 0 THOUSAND/ΜL (ref 0–0.61)
EOSINOPHIL NFR BLD AUTO: 0 % (ref 0–6)
ERYTHROCYTE [DISTWIDTH] IN BLOOD BY AUTOMATED COUNT: 14.1 % (ref 11.6–15.1)
GFR SERPL CREATININE-BSD FRML MDRD: 99 ML/MIN/1.73SQ M
GLUCOSE SERPL-MCNC: 138 MG/DL (ref 65–140)
HCT VFR BLD AUTO: 36 % (ref 36.5–49.3)
HGB BLD-MCNC: 11.6 G/DL (ref 12–17)
IMM GRANULOCYTES # BLD AUTO: 0.15 THOUSAND/UL (ref 0–0.2)
IMM GRANULOCYTES NFR BLD AUTO: 1 % (ref 0–2)
INR PPP: 1.07 (ref 0.86–1.17)
L PNEUMO1 AG UR QL IA.RAPID: NEGATIVE
LYMPHOCYTES # BLD AUTO: 0.83 THOUSANDS/ΜL (ref 0.6–4.47)
LYMPHOCYTES NFR BLD AUTO: 4 % (ref 14–44)
MCH RBC QN AUTO: 30.4 PG (ref 26.8–34.3)
MCHC RBC AUTO-ENTMCNC: 32.2 G/DL (ref 31.4–37.4)
MCV RBC AUTO: 94 FL (ref 82–98)
MONOCYTES # BLD AUTO: 0.51 THOUSAND/ΜL (ref 0.17–1.22)
MONOCYTES NFR BLD AUTO: 3 % (ref 4–12)
NEUTROPHILS # BLD AUTO: 17.19 THOUSANDS/ΜL (ref 1.85–7.62)
NEUTS SEG NFR BLD AUTO: 92 % (ref 43–75)
NRBC BLD AUTO-RTO: 0 /100 WBCS
P AXIS: 71 DEGREES
PLATELET # BLD AUTO: 251 THOUSANDS/UL (ref 149–390)
PMV BLD AUTO: 9.8 FL (ref 8.9–12.7)
POTASSIUM SERPL-SCNC: 4.3 MMOL/L (ref 3.5–5.3)
PR INTERVAL: 150 MS
PROCALCITONIN SERPL-MCNC: 0.38 NG/ML
PROTHROMBIN TIME: 13.8 SECONDS (ref 11.8–14.2)
QRS AXIS: 123 DEGREES
QRSD INTERVAL: 102 MS
QT INTERVAL: 332 MS
QTC INTERVAL: 438 MS
RBC # BLD AUTO: 3.82 MILLION/UL (ref 3.88–5.62)
S PNEUM AG UR QL: NEGATIVE
SODIUM SERPL-SCNC: 140 MMOL/L (ref 136–145)
T WAVE AXIS: 73 DEGREES
VENTRICULAR RATE: 105 BPM
WBC # BLD AUTO: 18.7 THOUSAND/UL (ref 4.31–10.16)

## 2019-04-15 PROCEDURE — 85610 PROTHROMBIN TIME: CPT | Performed by: INTERNAL MEDICINE

## 2019-04-15 PROCEDURE — 85730 THROMBOPLASTIN TIME PARTIAL: CPT | Performed by: INTERNAL MEDICINE

## 2019-04-15 PROCEDURE — 80048 BASIC METABOLIC PNL TOTAL CA: CPT | Performed by: INTERNAL MEDICINE

## 2019-04-15 PROCEDURE — 85025 COMPLETE CBC W/AUTO DIFF WBC: CPT | Performed by: INTERNAL MEDICINE

## 2019-04-15 PROCEDURE — 87449 NOS EACH ORGANISM AG IA: CPT | Performed by: PHYSICIAN ASSISTANT

## 2019-04-15 PROCEDURE — 94760 N-INVAS EAR/PLS OXIMETRY 1: CPT

## 2019-04-15 PROCEDURE — 71250 CT THORAX DX C-: CPT

## 2019-04-15 PROCEDURE — 99222 1ST HOSP IP/OBS MODERATE 55: CPT | Performed by: PHYSICIAN ASSISTANT

## 2019-04-15 PROCEDURE — 84145 PROCALCITONIN (PCT): CPT | Performed by: PHYSICIAN ASSISTANT

## 2019-04-15 PROCEDURE — 94640 AIRWAY INHALATION TREATMENT: CPT

## 2019-04-15 PROCEDURE — 94660 CPAP INITIATION&MGMT: CPT

## 2019-04-15 PROCEDURE — 93010 ELECTROCARDIOGRAM REPORT: CPT | Performed by: INTERNAL MEDICINE

## 2019-04-15 PROCEDURE — 99232 SBSQ HOSP IP/OBS MODERATE 35: CPT | Performed by: INTERNAL MEDICINE

## 2019-04-15 RX ORDER — METHYLPREDNISOLONE SODIUM SUCCINATE 40 MG/ML
40 INJECTION, POWDER, LYOPHILIZED, FOR SOLUTION INTRAMUSCULAR; INTRAVENOUS EVERY 12 HOURS SCHEDULED
Status: DISCONTINUED | OUTPATIENT
Start: 2019-04-15 | End: 2019-04-16

## 2019-04-15 RX ORDER — SODIUM CHLORIDE FOR INHALATION 0.9 %
3 VIAL, NEBULIZER (ML) INHALATION
Status: DISCONTINUED | OUTPATIENT
Start: 2019-04-15 | End: 2019-04-18 | Stop reason: HOSPADM

## 2019-04-15 RX ADMIN — LEVALBUTEROL HYDROCHLORIDE 1.25 MG: 1.25 SOLUTION, CONCENTRATE RESPIRATORY (INHALATION) at 07:45

## 2019-04-15 RX ADMIN — LORATADINE 10 MG: 10 TABLET ORAL at 10:07

## 2019-04-15 RX ADMIN — METHYLPREDNISOLONE SODIUM SUCCINATE 40 MG: 40 INJECTION, POWDER, FOR SOLUTION INTRAMUSCULAR; INTRAVENOUS at 21:33

## 2019-04-15 RX ADMIN — ENOXAPARIN SODIUM 40 MG: 40 INJECTION SUBCUTANEOUS at 10:07

## 2019-04-15 RX ADMIN — SPIRONOLACTONE 25 MG: 25 TABLET ORAL at 10:06

## 2019-04-15 RX ADMIN — METHYLPREDNISOLONE SODIUM SUCCINATE 40 MG: 40 INJECTION, POWDER, FOR SOLUTION INTRAMUSCULAR; INTRAVENOUS at 10:07

## 2019-04-15 RX ADMIN — CHOLESTYRAMINE 4 G: 4 POWDER, FOR SUSPENSION ORAL at 10:06

## 2019-04-15 RX ADMIN — ISODIUM CHLORIDE 3 ML: 0.03 SOLUTION RESPIRATORY (INHALATION) at 13:42

## 2019-04-15 RX ADMIN — ISODIUM CHLORIDE 3 ML: 0.03 SOLUTION RESPIRATORY (INHALATION) at 20:08

## 2019-04-15 RX ADMIN — FAMOTIDINE 40 MG: 20 TABLET ORAL at 21:33

## 2019-04-15 RX ADMIN — METHYLPREDNISOLONE SODIUM SUCCINATE 40 MG: 40 INJECTION, POWDER, FOR SOLUTION INTRAMUSCULAR; INTRAVENOUS at 02:06

## 2019-04-15 RX ADMIN — GUAIFENESIN 600 MG: 600 TABLET, EXTENDED RELEASE ORAL at 21:33

## 2019-04-15 RX ADMIN — ROFLUMILAST 250 MCG: 250 TABLET ORAL at 10:06

## 2019-04-15 RX ADMIN — GUAIFENESIN 600 MG: 600 TABLET, EXTENDED RELEASE ORAL at 10:05

## 2019-04-15 RX ADMIN — SERTRALINE HYDROCHLORIDE 50 MG: 50 TABLET ORAL at 10:05

## 2019-04-15 RX ADMIN — CHOLESTYRAMINE 4 G: 4 POWDER, FOR SUSPENSION ORAL at 17:13

## 2019-04-15 RX ADMIN — LEVALBUTEROL HYDROCHLORIDE 1.25 MG: 1.25 SOLUTION, CONCENTRATE RESPIRATORY (INHALATION) at 13:42

## 2019-04-15 RX ADMIN — IPRATROPIUM BROMIDE 0.5 MG: 0.5 SOLUTION RESPIRATORY (INHALATION) at 07:45

## 2019-04-15 RX ADMIN — LEVALBUTEROL HYDROCHLORIDE 1.25 MG: 1.25 SOLUTION, CONCENTRATE RESPIRATORY (INHALATION) at 02:09

## 2019-04-15 RX ADMIN — FLUTICASONE FUROATE AND VILANTEROL TRIFENATATE 1 PUFF: 100; 25 POWDER RESPIRATORY (INHALATION) at 10:14

## 2019-04-15 RX ADMIN — IPRATROPIUM BROMIDE 0.5 MG: 0.5 SOLUTION RESPIRATORY (INHALATION) at 02:09

## 2019-04-15 RX ADMIN — LEVALBUTEROL HYDROCHLORIDE 1.25 MG: 1.25 SOLUTION, CONCENTRATE RESPIRATORY (INHALATION) at 20:07

## 2019-04-15 RX ADMIN — FLUTICASONE PROPIONATE 1 SPRAY: 50 SPRAY, METERED NASAL at 10:13

## 2019-04-15 RX ADMIN — TIOTROPIUM BROMIDE 18 MCG: 18 CAPSULE ORAL; RESPIRATORY (INHALATION) at 17:12

## 2019-04-16 LAB
ANION GAP SERPL CALCULATED.3IONS-SCNC: 8 MMOL/L (ref 4–13)
BACTERIA SPT RESP CULT: ABNORMAL
BACTERIA SPT RESP CULT: ABNORMAL
BASOPHILS # BLD AUTO: 0 THOUSANDS/ΜL (ref 0–0.1)
BASOPHILS NFR BLD AUTO: 0 % (ref 0–1)
BUN SERPL-MCNC: 31 MG/DL (ref 5–25)
CALCIUM SERPL-MCNC: 9 MG/DL (ref 8.3–10.1)
CHLORIDE SERPL-SCNC: 107 MMOL/L (ref 100–108)
CO2 SERPL-SCNC: 29 MMOL/L (ref 21–32)
CREAT SERPL-MCNC: 0.97 MG/DL (ref 0.6–1.3)
EOSINOPHIL # BLD AUTO: 0 THOUSAND/ΜL (ref 0–0.61)
EOSINOPHIL NFR BLD AUTO: 0 % (ref 0–6)
ERYTHROCYTE [DISTWIDTH] IN BLOOD BY AUTOMATED COUNT: 14.3 % (ref 11.6–15.1)
GFR SERPL CREATININE-BSD FRML MDRD: 81 ML/MIN/1.73SQ M
GLUCOSE SERPL-MCNC: 143 MG/DL (ref 65–140)
GRAM STN SPEC: ABNORMAL
HCT VFR BLD AUTO: 38 % (ref 36.5–49.3)
HGB BLD-MCNC: 11.8 G/DL (ref 12–17)
IMM GRANULOCYTES # BLD AUTO: 0.13 THOUSAND/UL (ref 0–0.2)
IMM GRANULOCYTES NFR BLD AUTO: 1 % (ref 0–2)
LYMPHOCYTES # BLD AUTO: 0.81 THOUSANDS/ΜL (ref 0.6–4.47)
LYMPHOCYTES NFR BLD AUTO: 6 % (ref 14–44)
MCH RBC QN AUTO: 29.9 PG (ref 26.8–34.3)
MCHC RBC AUTO-ENTMCNC: 31.1 G/DL (ref 31.4–37.4)
MCV RBC AUTO: 96 FL (ref 82–98)
MONOCYTES # BLD AUTO: 0.45 THOUSAND/ΜL (ref 0.17–1.22)
MONOCYTES NFR BLD AUTO: 3 % (ref 4–12)
MRSA NOSE QL CULT: NORMAL
NEUTROPHILS # BLD AUTO: 11.75 THOUSANDS/ΜL (ref 1.85–7.62)
NEUTS SEG NFR BLD AUTO: 90 % (ref 43–75)
NRBC BLD AUTO-RTO: 0 /100 WBCS
PLATELET # BLD AUTO: 264 THOUSANDS/UL (ref 149–390)
PMV BLD AUTO: 9.7 FL (ref 8.9–12.7)
POTASSIUM SERPL-SCNC: 5.2 MMOL/L (ref 3.5–5.3)
RBC # BLD AUTO: 3.95 MILLION/UL (ref 3.88–5.62)
SODIUM SERPL-SCNC: 144 MMOL/L (ref 136–145)
WBC # BLD AUTO: 13.14 THOUSAND/UL (ref 4.31–10.16)

## 2019-04-16 PROCEDURE — 87205 SMEAR GRAM STAIN: CPT | Performed by: INTERNAL MEDICINE

## 2019-04-16 PROCEDURE — G8979 MOBILITY GOAL STATUS: HCPCS

## 2019-04-16 PROCEDURE — 99232 SBSQ HOSP IP/OBS MODERATE 35: CPT | Performed by: GENERAL PRACTICE

## 2019-04-16 PROCEDURE — 85025 COMPLETE CBC W/AUTO DIFF WBC: CPT | Performed by: INTERNAL MEDICINE

## 2019-04-16 PROCEDURE — 87116 MYCOBACTERIA CULTURE: CPT | Performed by: INTERNAL MEDICINE

## 2019-04-16 PROCEDURE — 87206 SMEAR FLUORESCENT/ACID STAI: CPT | Performed by: INTERNAL MEDICINE

## 2019-04-16 PROCEDURE — 94760 N-INVAS EAR/PLS OXIMETRY 1: CPT

## 2019-04-16 PROCEDURE — 87070 CULTURE OTHR SPECIMN AEROBIC: CPT | Performed by: INTERNAL MEDICINE

## 2019-04-16 PROCEDURE — G8978 MOBILITY CURRENT STATUS: HCPCS

## 2019-04-16 PROCEDURE — 94640 AIRWAY INHALATION TREATMENT: CPT

## 2019-04-16 PROCEDURE — 80048 BASIC METABOLIC PNL TOTAL CA: CPT | Performed by: INTERNAL MEDICINE

## 2019-04-16 PROCEDURE — 94660 CPAP INITIATION&MGMT: CPT

## 2019-04-16 PROCEDURE — 97163 PT EVAL HIGH COMPLEX 45 MIN: CPT

## 2019-04-16 RX ORDER — METHYLPREDNISOLONE SODIUM SUCCINATE 40 MG/ML
20 INJECTION, POWDER, LYOPHILIZED, FOR SOLUTION INTRAMUSCULAR; INTRAVENOUS EVERY 12 HOURS SCHEDULED
Status: DISCONTINUED | OUTPATIENT
Start: 2019-04-16 | End: 2019-04-17

## 2019-04-16 RX ADMIN — METHYLPREDNISOLONE SODIUM SUCCINATE 40 MG: 40 INJECTION, POWDER, FOR SOLUTION INTRAMUSCULAR; INTRAVENOUS at 08:30

## 2019-04-16 RX ADMIN — CHOLESTYRAMINE 4 G: 4 POWDER, FOR SUSPENSION ORAL at 17:40

## 2019-04-16 RX ADMIN — ENOXAPARIN SODIUM 40 MG: 40 INJECTION SUBCUTANEOUS at 08:29

## 2019-04-16 RX ADMIN — GUAIFENESIN 600 MG: 600 TABLET, EXTENDED RELEASE ORAL at 21:00

## 2019-04-16 RX ADMIN — GUAIFENESIN 600 MG: 600 TABLET, EXTENDED RELEASE ORAL at 08:29

## 2019-04-16 RX ADMIN — TIOTROPIUM BROMIDE 18 MCG: 18 CAPSULE ORAL; RESPIRATORY (INHALATION) at 08:30

## 2019-04-16 RX ADMIN — ISODIUM CHLORIDE 3 ML: 0.03 SOLUTION RESPIRATORY (INHALATION) at 20:34

## 2019-04-16 RX ADMIN — METHYLPREDNISOLONE SODIUM SUCCINATE 20 MG: 40 INJECTION, POWDER, FOR SOLUTION INTRAMUSCULAR; INTRAVENOUS at 21:00

## 2019-04-16 RX ADMIN — ROFLUMILAST 250 MCG: 250 TABLET ORAL at 08:30

## 2019-04-16 RX ADMIN — LEVALBUTEROL HYDROCHLORIDE 1.25 MG: 1.25 SOLUTION, CONCENTRATE RESPIRATORY (INHALATION) at 20:35

## 2019-04-16 RX ADMIN — FLUTICASONE PROPIONATE 1 SPRAY: 50 SPRAY, METERED NASAL at 08:30

## 2019-04-16 RX ADMIN — FAMOTIDINE 40 MG: 20 TABLET ORAL at 21:00

## 2019-04-16 RX ADMIN — SPIRONOLACTONE 25 MG: 25 TABLET ORAL at 08:29

## 2019-04-16 RX ADMIN — ISODIUM CHLORIDE 3 ML: 0.03 SOLUTION RESPIRATORY (INHALATION) at 01:43

## 2019-04-16 RX ADMIN — ISODIUM CHLORIDE 3 ML: 0.03 SOLUTION RESPIRATORY (INHALATION) at 14:34

## 2019-04-16 RX ADMIN — SERTRALINE HYDROCHLORIDE 50 MG: 50 TABLET ORAL at 08:29

## 2019-04-16 RX ADMIN — LEVALBUTEROL HYDROCHLORIDE 1.25 MG: 1.25 SOLUTION, CONCENTRATE RESPIRATORY (INHALATION) at 01:42

## 2019-04-16 RX ADMIN — LEVALBUTEROL HYDROCHLORIDE 1.25 MG: 1.25 SOLUTION, CONCENTRATE RESPIRATORY (INHALATION) at 14:34

## 2019-04-16 RX ADMIN — ISODIUM CHLORIDE 3 ML: 0.03 SOLUTION RESPIRATORY (INHALATION) at 08:24

## 2019-04-16 RX ADMIN — LEVALBUTEROL HYDROCHLORIDE 1.25 MG: 1.25 SOLUTION, CONCENTRATE RESPIRATORY (INHALATION) at 08:24

## 2019-04-16 RX ADMIN — FLUTICASONE FUROATE AND VILANTEROL TRIFENATATE 1 PUFF: 100; 25 POWDER RESPIRATORY (INHALATION) at 08:30

## 2019-04-16 RX ADMIN — CHOLESTYRAMINE 4 G: 4 POWDER, FOR SUSPENSION ORAL at 08:29

## 2019-04-16 RX ADMIN — LORATADINE 10 MG: 10 TABLET ORAL at 08:29

## 2019-04-17 LAB
ANION GAP SERPL CALCULATED.3IONS-SCNC: 6 MMOL/L (ref 4–13)
BASOPHILS # BLD AUTO: 0 THOUSANDS/ΜL (ref 0–0.1)
BASOPHILS NFR BLD AUTO: 0 % (ref 0–1)
BUN SERPL-MCNC: 24 MG/DL (ref 5–25)
CALCIUM SERPL-MCNC: 8.9 MG/DL (ref 8.3–10.1)
CHLORIDE SERPL-SCNC: 104 MMOL/L (ref 100–108)
CO2 SERPL-SCNC: 31 MMOL/L (ref 21–32)
CREAT SERPL-MCNC: 0.85 MG/DL (ref 0.6–1.3)
EOSINOPHIL # BLD AUTO: 0 THOUSAND/ΜL (ref 0–0.61)
EOSINOPHIL NFR BLD AUTO: 0 % (ref 0–6)
ERYTHROCYTE [DISTWIDTH] IN BLOOD BY AUTOMATED COUNT: 14.1 % (ref 11.6–15.1)
GFR SERPL CREATININE-BSD FRML MDRD: 91 ML/MIN/1.73SQ M
GLUCOSE SERPL-MCNC: 116 MG/DL (ref 65–140)
HCT VFR BLD AUTO: 37.1 % (ref 36.5–49.3)
HGB BLD-MCNC: 11.8 G/DL (ref 12–17)
IMM GRANULOCYTES # BLD AUTO: 0.09 THOUSAND/UL (ref 0–0.2)
IMM GRANULOCYTES NFR BLD AUTO: 1 % (ref 0–2)
LYMPHOCYTES # BLD AUTO: 0.96 THOUSANDS/ΜL (ref 0.6–4.47)
LYMPHOCYTES NFR BLD AUTO: 11 % (ref 14–44)
MCH RBC QN AUTO: 30 PG (ref 26.8–34.3)
MCHC RBC AUTO-ENTMCNC: 31.8 G/DL (ref 31.4–37.4)
MCV RBC AUTO: 94 FL (ref 82–98)
MONOCYTES # BLD AUTO: 0.57 THOUSAND/ΜL (ref 0.17–1.22)
MONOCYTES NFR BLD AUTO: 6 % (ref 4–12)
NEUTROPHILS # BLD AUTO: 7.48 THOUSANDS/ΜL (ref 1.85–7.62)
NEUTS SEG NFR BLD AUTO: 82 % (ref 43–75)
NRBC BLD AUTO-RTO: 0 /100 WBCS
PLATELET # BLD AUTO: 263 THOUSANDS/UL (ref 149–390)
PMV BLD AUTO: 9.3 FL (ref 8.9–12.7)
POTASSIUM SERPL-SCNC: 4.9 MMOL/L (ref 3.5–5.3)
RBC # BLD AUTO: 3.93 MILLION/UL (ref 3.88–5.62)
SODIUM SERPL-SCNC: 141 MMOL/L (ref 136–145)
WBC # BLD AUTO: 9.1 THOUSAND/UL (ref 4.31–10.16)

## 2019-04-17 PROCEDURE — 85025 COMPLETE CBC W/AUTO DIFF WBC: CPT | Performed by: INTERNAL MEDICINE

## 2019-04-17 PROCEDURE — 94640 AIRWAY INHALATION TREATMENT: CPT

## 2019-04-17 PROCEDURE — 94760 N-INVAS EAR/PLS OXIMETRY 1: CPT

## 2019-04-17 PROCEDURE — 80048 BASIC METABOLIC PNL TOTAL CA: CPT | Performed by: INTERNAL MEDICINE

## 2019-04-17 PROCEDURE — 94660 CPAP INITIATION&MGMT: CPT

## 2019-04-17 PROCEDURE — 99232 SBSQ HOSP IP/OBS MODERATE 35: CPT | Performed by: GENERAL PRACTICE

## 2019-04-17 RX ORDER — PREDNISONE 20 MG/1
40 TABLET ORAL DAILY
Status: DISCONTINUED | OUTPATIENT
Start: 2019-04-17 | End: 2019-04-18 | Stop reason: HOSPADM

## 2019-04-17 RX ADMIN — LEVALBUTEROL HYDROCHLORIDE 1.25 MG: 1.25 SOLUTION, CONCENTRATE RESPIRATORY (INHALATION) at 21:25

## 2019-04-17 RX ADMIN — SPIRONOLACTONE 25 MG: 25 TABLET ORAL at 09:44

## 2019-04-17 RX ADMIN — FLUTICASONE PROPIONATE 1 SPRAY: 50 SPRAY, METERED NASAL at 09:47

## 2019-04-17 RX ADMIN — TIOTROPIUM BROMIDE 18 MCG: 18 CAPSULE ORAL; RESPIRATORY (INHALATION) at 09:44

## 2019-04-17 RX ADMIN — LEVALBUTEROL HYDROCHLORIDE 1.25 MG: 1.25 SOLUTION, CONCENTRATE RESPIRATORY (INHALATION) at 13:53

## 2019-04-17 RX ADMIN — BENZONATATE 200 MG: 100 CAPSULE ORAL at 14:49

## 2019-04-17 RX ADMIN — LORATADINE 10 MG: 10 TABLET ORAL at 09:44

## 2019-04-17 RX ADMIN — ENOXAPARIN SODIUM 40 MG: 40 INJECTION SUBCUTANEOUS at 09:45

## 2019-04-17 RX ADMIN — GUAIFENESIN 600 MG: 600 TABLET, EXTENDED RELEASE ORAL at 09:44

## 2019-04-17 RX ADMIN — PREDNISONE 40 MG: 20 TABLET ORAL at 09:43

## 2019-04-17 RX ADMIN — ISODIUM CHLORIDE 3 ML: 0.03 SOLUTION RESPIRATORY (INHALATION) at 02:35

## 2019-04-17 RX ADMIN — GUAIFENESIN 600 MG: 600 TABLET, EXTENDED RELEASE ORAL at 21:54

## 2019-04-17 RX ADMIN — ISODIUM CHLORIDE 3 ML: 0.03 SOLUTION RESPIRATORY (INHALATION) at 13:53

## 2019-04-17 RX ADMIN — SERTRALINE HYDROCHLORIDE 50 MG: 50 TABLET ORAL at 09:44

## 2019-04-17 RX ADMIN — CHOLESTYRAMINE 4 G: 4 POWDER, FOR SUSPENSION ORAL at 09:43

## 2019-04-17 RX ADMIN — ISODIUM CHLORIDE 3 ML: 0.03 SOLUTION RESPIRATORY (INHALATION) at 08:09

## 2019-04-17 RX ADMIN — FLUTICASONE FUROATE AND VILANTEROL TRIFENATATE 1 PUFF: 100; 25 POWDER RESPIRATORY (INHALATION) at 09:47

## 2019-04-17 RX ADMIN — ROFLUMILAST 250 MCG: 250 TABLET ORAL at 09:43

## 2019-04-17 RX ADMIN — FAMOTIDINE 40 MG: 20 TABLET ORAL at 21:54

## 2019-04-17 RX ADMIN — CHOLESTYRAMINE 4 G: 4 POWDER, FOR SUSPENSION ORAL at 19:23

## 2019-04-17 RX ADMIN — LEVALBUTEROL HYDROCHLORIDE 1.25 MG: 1.25 SOLUTION, CONCENTRATE RESPIRATORY (INHALATION) at 02:35

## 2019-04-17 RX ADMIN — LEVALBUTEROL HYDROCHLORIDE 1.25 MG: 1.25 SOLUTION, CONCENTRATE RESPIRATORY (INHALATION) at 08:09

## 2019-04-17 RX ADMIN — ISODIUM CHLORIDE 3 ML: 0.03 SOLUTION RESPIRATORY (INHALATION) at 21:25

## 2019-04-18 ENCOUNTER — TRANSITIONAL CARE MANAGEMENT (OUTPATIENT)
Dept: INTERNAL MEDICINE CLINIC | Facility: CLINIC | Age: 69
End: 2019-04-18

## 2019-04-18 ENCOUNTER — TELEPHONE (OUTPATIENT)
Dept: PULMONOLOGY | Facility: CLINIC | Age: 69
End: 2019-04-18

## 2019-04-18 VITALS
OXYGEN SATURATION: 96 % | RESPIRATION RATE: 20 BRPM | WEIGHT: 177.25 LBS | DIASTOLIC BLOOD PRESSURE: 76 MMHG | HEIGHT: 70 IN | TEMPERATURE: 98.2 F | BODY MASS INDEX: 25.38 KG/M2 | SYSTOLIC BLOOD PRESSURE: 116 MMHG | HEART RATE: 67 BPM

## 2019-04-18 DIAGNOSIS — Z71.89 COMPLEX CARE COORDINATION: Primary | ICD-10-CM

## 2019-04-18 PROBLEM — J44.1 ACUTE EXACERBATION OF CHRONIC OBSTRUCTIVE PULMONARY DISEASE (COPD) (HCC): Status: RESOLVED | Noted: 2018-08-14 | Resolved: 2019-04-18

## 2019-04-18 PROBLEM — J96.21 ACUTE ON CHRONIC RESPIRATORY FAILURE WITH HYPOXIA (HCC): Status: RESOLVED | Noted: 2018-12-19 | Resolved: 2019-04-18

## 2019-04-18 LAB
BACTERIA SPT RESP CULT: ABNORMAL
BACTERIA SPT RESP CULT: ABNORMAL
GRAM STN SPEC: ABNORMAL
PROCALCITONIN SERPL-MCNC: 0.07 NG/ML

## 2019-04-18 PROCEDURE — 94760 N-INVAS EAR/PLS OXIMETRY 1: CPT

## 2019-04-18 PROCEDURE — 99239 HOSP IP/OBS DSCHRG MGMT >30: CPT | Performed by: GENERAL PRACTICE

## 2019-04-18 PROCEDURE — 94640 AIRWAY INHALATION TREATMENT: CPT

## 2019-04-18 PROCEDURE — 84145 PROCALCITONIN (PCT): CPT | Performed by: GENERAL PRACTICE

## 2019-04-18 PROCEDURE — 94660 CPAP INITIATION&MGMT: CPT

## 2019-04-18 RX ORDER — PREDNISONE 10 MG/1
40 TABLET ORAL DAILY
Qty: 50 TABLET | Refills: 0 | Status: SHIPPED | OUTPATIENT
Start: 2019-04-19 | End: 2019-05-08 | Stop reason: HOSPADM

## 2019-04-18 RX ORDER — GUAIFENESIN 600 MG
600 TABLET, EXTENDED RELEASE 12 HR ORAL EVERY 12 HOURS SCHEDULED
Qty: 60 TABLET | Refills: 0 | Status: SHIPPED | OUTPATIENT
Start: 2019-04-18

## 2019-04-18 RX ORDER — PREDNISONE 10 MG/1
40 TABLET ORAL DAILY
Qty: 30 TABLET | Refills: 0 | Status: SHIPPED | OUTPATIENT
Start: 2019-04-19 | End: 2019-04-18

## 2019-04-18 RX ORDER — BENZONATATE 200 MG/1
200 CAPSULE ORAL 3 TIMES DAILY PRN
Qty: 20 CAPSULE | Refills: 0 | Status: SHIPPED | OUTPATIENT
Start: 2019-04-18 | End: 2019-06-25 | Stop reason: ALTCHOICE

## 2019-04-18 RX ORDER — PREDNISONE 10 MG/1
10 TABLET ORAL DAILY
Qty: 45 TABLET | Refills: 0 | Status: SHIPPED | OUTPATIENT
Start: 2019-04-18 | End: 2019-04-18 | Stop reason: HOSPADM

## 2019-04-18 RX ADMIN — FLUTICASONE PROPIONATE 1 SPRAY: 50 SPRAY, METERED NASAL at 08:17

## 2019-04-18 RX ADMIN — ENOXAPARIN SODIUM 40 MG: 40 INJECTION SUBCUTANEOUS at 08:15

## 2019-04-18 RX ADMIN — LEVALBUTEROL HYDROCHLORIDE 1.25 MG: 1.25 SOLUTION, CONCENTRATE RESPIRATORY (INHALATION) at 01:47

## 2019-04-18 RX ADMIN — PREDNISONE 40 MG: 20 TABLET ORAL at 08:15

## 2019-04-18 RX ADMIN — ISODIUM CHLORIDE 3 ML: 0.03 SOLUTION RESPIRATORY (INHALATION) at 01:47

## 2019-04-18 RX ADMIN — SERTRALINE HYDROCHLORIDE 50 MG: 50 TABLET ORAL at 08:15

## 2019-04-18 RX ADMIN — LEVALBUTEROL HYDROCHLORIDE 1.25 MG: 1.25 SOLUTION, CONCENTRATE RESPIRATORY (INHALATION) at 13:54

## 2019-04-18 RX ADMIN — SPIRONOLACTONE 25 MG: 25 TABLET ORAL at 08:20

## 2019-04-18 RX ADMIN — LEVALBUTEROL HYDROCHLORIDE 1.25 MG: 1.25 SOLUTION, CONCENTRATE RESPIRATORY (INHALATION) at 08:14

## 2019-04-18 RX ADMIN — GUAIFENESIN 600 MG: 600 TABLET, EXTENDED RELEASE ORAL at 08:15

## 2019-04-18 RX ADMIN — ISODIUM CHLORIDE 3 ML: 0.03 SOLUTION RESPIRATORY (INHALATION) at 13:54

## 2019-04-18 RX ADMIN — CHOLESTYRAMINE 4 G: 4 POWDER, FOR SUSPENSION ORAL at 08:15

## 2019-04-18 RX ADMIN — ISODIUM CHLORIDE 3 ML: 0.03 SOLUTION RESPIRATORY (INHALATION) at 08:14

## 2019-04-18 RX ADMIN — FLUTICASONE FUROATE AND VILANTEROL TRIFENATATE 1 PUFF: 100; 25 POWDER RESPIRATORY (INHALATION) at 08:17

## 2019-04-18 RX ADMIN — LORATADINE 10 MG: 10 TABLET ORAL at 08:15

## 2019-04-18 RX ADMIN — ROFLUMILAST 250 MCG: 250 TABLET ORAL at 08:17

## 2019-04-18 RX ADMIN — TIOTROPIUM BROMIDE 18 MCG: 18 CAPSULE ORAL; RESPIRATORY (INHALATION) at 08:17

## 2019-04-19 ENCOUNTER — PATIENT OUTREACH (OUTPATIENT)
Dept: INTERNAL MEDICINE CLINIC | Facility: CLINIC | Age: 69
End: 2019-04-19

## 2019-04-19 LAB
BACTERIA BLD CULT: NORMAL

## 2019-04-22 DIAGNOSIS — J41.0 SIMPLE CHRONIC BRONCHITIS (HCC): Primary | ICD-10-CM

## 2019-04-22 RX ORDER — LEVALBUTEROL INHALATION SOLUTION 1.25 MG/3ML
1.25 SOLUTION RESPIRATORY (INHALATION) EVERY 6 HOURS PRN
Qty: 120 VIAL | Refills: 3 | Status: SHIPPED | OUTPATIENT
Start: 2019-04-22 | End: 2019-09-13 | Stop reason: SDUPTHER

## 2019-04-22 RX ORDER — AZITHROMYCIN 250 MG/1
TABLET, FILM COATED ORAL
Qty: 12 TABLET | Refills: 3 | Status: SHIPPED | OUTPATIENT
Start: 2019-04-22 | End: 2019-08-28 | Stop reason: SDUPTHER

## 2019-04-23 ENCOUNTER — OFFICE VISIT (OUTPATIENT)
Dept: INTERNAL MEDICINE CLINIC | Facility: CLINIC | Age: 69
End: 2019-04-23
Payer: MEDICARE

## 2019-04-23 VITALS
HEART RATE: 106 BPM | OXYGEN SATURATION: 96 % | RESPIRATION RATE: 20 BRPM | SYSTOLIC BLOOD PRESSURE: 118 MMHG | HEIGHT: 70 IN | WEIGHT: 172 LBS | DIASTOLIC BLOOD PRESSURE: 68 MMHG | BODY MASS INDEX: 24.62 KG/M2

## 2019-04-23 DIAGNOSIS — J43.2 CENTRILOBULAR EMPHYSEMA (HCC): ICD-10-CM

## 2019-04-23 DIAGNOSIS — J44.1 ACUTE EXACERBATION OF CHRONIC OBSTRUCTIVE PULMONARY DISEASE (COPD) (HCC): Primary | ICD-10-CM

## 2019-04-23 DIAGNOSIS — F41.9 ANXIETY: ICD-10-CM

## 2019-04-23 DIAGNOSIS — L98.9 SKIN LESION OF BACK: ICD-10-CM

## 2019-04-23 DIAGNOSIS — I50.32 CHRONIC DIASTOLIC HEART FAILURE (HCC): ICD-10-CM

## 2019-04-23 PROBLEM — I50.30 (HFPEF) HEART FAILURE WITH PRESERVED EJECTION FRACTION (HCC): Status: ACTIVE | Noted: 2019-04-23

## 2019-04-23 PROCEDURE — 99495 TRANSJ CARE MGMT MOD F2F 14D: CPT | Performed by: PHYSICIAN ASSISTANT

## 2019-04-26 ENCOUNTER — PATIENT OUTREACH (OUTPATIENT)
Dept: INTERNAL MEDICINE CLINIC | Facility: CLINIC | Age: 69
End: 2019-04-26

## 2019-04-30 ENCOUNTER — OFFICE VISIT (OUTPATIENT)
Dept: PULMONOLOGY | Facility: CLINIC | Age: 69
End: 2019-04-30
Payer: MEDICARE

## 2019-04-30 VITALS
SYSTOLIC BLOOD PRESSURE: 120 MMHG | HEART RATE: 111 BPM | RESPIRATION RATE: 16 BRPM | DIASTOLIC BLOOD PRESSURE: 72 MMHG | WEIGHT: 172 LBS | TEMPERATURE: 97 F | HEIGHT: 70 IN | OXYGEN SATURATION: 95 % | BODY MASS INDEX: 24.62 KG/M2

## 2019-04-30 DIAGNOSIS — Z01.818 ENCOUNTER FOR PRE-TRANSPLANT EVALUATION FOR LUNG TRANSPLANT: Primary | ICD-10-CM

## 2019-04-30 PROCEDURE — 99215 OFFICE O/P EST HI 40 MIN: CPT | Performed by: INTERNAL MEDICINE

## 2019-05-02 ENCOUNTER — OFFICE VISIT (OUTPATIENT)
Dept: PULMONOLOGY | Facility: CLINIC | Age: 69
End: 2019-05-02
Payer: MEDICARE

## 2019-05-02 ENCOUNTER — OFFICE VISIT (OUTPATIENT)
Dept: CARDIOLOGY CLINIC | Facility: CLINIC | Age: 69
End: 2019-05-02
Payer: MEDICARE

## 2019-05-02 VITALS
HEART RATE: 99 BPM | DIASTOLIC BLOOD PRESSURE: 70 MMHG | HEIGHT: 70 IN | SYSTOLIC BLOOD PRESSURE: 110 MMHG | BODY MASS INDEX: 24.34 KG/M2 | WEIGHT: 170 LBS | OXYGEN SATURATION: 93 %

## 2019-05-02 VITALS
HEIGHT: 70 IN | WEIGHT: 170 LBS | BODY MASS INDEX: 24.34 KG/M2 | HEART RATE: 105 BPM | SYSTOLIC BLOOD PRESSURE: 120 MMHG | DIASTOLIC BLOOD PRESSURE: 64 MMHG | OXYGEN SATURATION: 97 %

## 2019-05-02 DIAGNOSIS — R93.89 ABNORMAL CHEST CT: ICD-10-CM

## 2019-05-02 DIAGNOSIS — J43.2 CENTRILOBULAR EMPHYSEMA (HCC): ICD-10-CM

## 2019-05-02 DIAGNOSIS — I27.20 PULMONARY HYPERTENSION (HCC): Primary | ICD-10-CM

## 2019-05-02 DIAGNOSIS — J96.11 CHRONIC RESPIRATORY FAILURE WITH HYPOXIA AND HYPERCAPNIA (HCC): ICD-10-CM

## 2019-05-02 DIAGNOSIS — J44.9 COPD, VERY SEVERE (HCC): Primary | ICD-10-CM

## 2019-05-02 DIAGNOSIS — J96.12 CHRONIC RESPIRATORY FAILURE WITH HYPOXIA AND HYPERCAPNIA (HCC): ICD-10-CM

## 2019-05-02 DIAGNOSIS — J30.2 SEASONAL ALLERGIES: ICD-10-CM

## 2019-05-02 PROCEDURE — 99214 OFFICE O/P EST MOD 30 MIN: CPT | Performed by: PHYSICIAN ASSISTANT

## 2019-05-02 PROCEDURE — 99215 OFFICE O/P EST HI 40 MIN: CPT | Performed by: INTERNAL MEDICINE

## 2019-05-02 RX ORDER — MONTELUKAST SODIUM 10 MG/1
10 TABLET ORAL
Qty: 30 TABLET | Refills: 3 | Status: SHIPPED | OUTPATIENT
Start: 2019-05-02 | End: 2019-09-01 | Stop reason: SDUPTHER

## 2019-05-02 RX ORDER — LEVALBUTEROL TARTRATE 45 UG/1
1-2 AEROSOL, METERED ORAL EVERY 4 HOURS PRN
Qty: 1 INHALER | Refills: 3 | Status: SHIPPED | OUTPATIENT
Start: 2019-05-02

## 2019-05-06 ENCOUNTER — TELEPHONE (OUTPATIENT)
Dept: PULMONOLOGY | Facility: CLINIC | Age: 69
End: 2019-05-06

## 2019-05-06 ENCOUNTER — PATIENT OUTREACH (OUTPATIENT)
Dept: INTERNAL MEDICINE CLINIC | Facility: CLINIC | Age: 69
End: 2019-05-06

## 2019-05-06 ENCOUNTER — HOSPITAL ENCOUNTER (INPATIENT)
Facility: HOSPITAL | Age: 69
LOS: 2 days | Discharge: HOME | DRG: 191 | End: 2019-05-08
Attending: EMERGENCY MEDICINE | Admitting: FAMILY MEDICINE
Payer: MEDICARE

## 2019-05-06 ENCOUNTER — APPOINTMENT (EMERGENCY)
Dept: RADIOLOGY | Facility: HOSPITAL | Age: 69
DRG: 191 | End: 2019-05-06
Payer: MEDICARE

## 2019-05-06 DIAGNOSIS — R06.00 EXERTIONAL DYSPNEA: ICD-10-CM

## 2019-05-06 DIAGNOSIS — J44.1 ACUTE EXACERBATION OF CHRONIC OBSTRUCTIVE PULMONARY DISEASE (COPD) (HCC): Primary | ICD-10-CM

## 2019-05-06 LAB
ALBUMIN SERPL BCP-MCNC: 2.8 G/DL (ref 3.5–5)
ALP SERPL-CCNC: 59 U/L (ref 46–116)
ALT SERPL W P-5'-P-CCNC: 29 U/L (ref 12–78)
ANION GAP SERPL CALCULATED.3IONS-SCNC: 9 MMOL/L (ref 4–13)
AST SERPL W P-5'-P-CCNC: 19 U/L (ref 5–45)
ATRIAL RATE: 108 BPM
BASE EX.OXY STD BLDV CALC-SCNC: 91.9 % (ref 60–80)
BASE EXCESS BLDV CALC-SCNC: -2 MMOL/L
BASOPHILS # BLD AUTO: 0.02 THOUSANDS/ΜL (ref 0–0.1)
BASOPHILS NFR BLD AUTO: 0 % (ref 0–1)
BILIRUB DIRECT SERPL-MCNC: 0.11 MG/DL (ref 0–0.2)
BILIRUB SERPL-MCNC: 0.4 MG/DL (ref 0.2–1)
BILIRUB UR QL STRIP: NEGATIVE
BUN SERPL-MCNC: 13 MG/DL (ref 5–25)
CALCIUM SERPL-MCNC: 8.5 MG/DL (ref 8.3–10.1)
CHLORIDE SERPL-SCNC: 102 MMOL/L (ref 100–108)
CLARITY UR: CLEAR
CO2 SERPL-SCNC: 26 MMOL/L (ref 21–32)
COLOR UR: YELLOW
CREAT SERPL-MCNC: 0.88 MG/DL (ref 0.6–1.3)
EOSINOPHIL # BLD AUTO: 0.11 THOUSAND/ΜL (ref 0–0.61)
EOSINOPHIL NFR BLD AUTO: 1 % (ref 0–6)
ERYTHROCYTE [DISTWIDTH] IN BLOOD BY AUTOMATED COUNT: 13.6 % (ref 11.6–15.1)
GFR SERPL CREATININE-BSD FRML MDRD: 90 ML/MIN/1.73SQ M
GLUCOSE SERPL-MCNC: 170 MG/DL (ref 65–140)
GLUCOSE UR STRIP-MCNC: NEGATIVE MG/DL
HCO3 BLDV-SCNC: 22.7 MMOL/L (ref 24–30)
HCT VFR BLD AUTO: 34.8 % (ref 36.5–49.3)
HGB BLD-MCNC: 11.4 G/DL (ref 12–17)
HGB UR QL STRIP.AUTO: NEGATIVE
IMM GRANULOCYTES # BLD AUTO: 0.03 THOUSAND/UL (ref 0–0.2)
IMM GRANULOCYTES NFR BLD AUTO: 0 % (ref 0–2)
KETONES UR STRIP-MCNC: NEGATIVE MG/DL
LACTATE SERPL-SCNC: 1.6 MMOL/L (ref 0.5–2)
LACTATE SERPL-SCNC: 3.5 MMOL/L (ref 0.5–2)
LEUKOCYTE ESTERASE UR QL STRIP: NEGATIVE
LIPASE SERPL-CCNC: 66 U/L (ref 73–393)
LYMPHOCYTES # BLD AUTO: 0.75 THOUSANDS/ΜL (ref 0.6–4.47)
LYMPHOCYTES NFR BLD AUTO: 10 % (ref 14–44)
MCH RBC QN AUTO: 29.9 PG (ref 26.8–34.3)
MCHC RBC AUTO-ENTMCNC: 32.8 G/DL (ref 31.4–37.4)
MCV RBC AUTO: 91 FL (ref 82–98)
MONOCYTES # BLD AUTO: 0.59 THOUSAND/ΜL (ref 0.17–1.22)
MONOCYTES NFR BLD AUTO: 8 % (ref 4–12)
NEUTROPHILS # BLD AUTO: 6.14 THOUSANDS/ΜL (ref 1.85–7.62)
NEUTS SEG NFR BLD AUTO: 81 % (ref 43–75)
NITRITE UR QL STRIP: NEGATIVE
NRBC BLD AUTO-RTO: 0 /100 WBCS
NT-PROBNP SERPL-MCNC: 61 PG/ML
O2 CT BLDV-SCNC: 16.4 ML/DL
P AXIS: 53 DEGREES
PCO2 BLDV: 38.2 MM HG (ref 42–50)
PH BLDV: 7.39 [PH] (ref 7.3–7.4)
PH UR STRIP.AUTO: 6.5 [PH]
PLATELET # BLD AUTO: 191 THOUSANDS/UL (ref 149–390)
PLATELET # BLD AUTO: 205 THOUSANDS/UL (ref 149–390)
PMV BLD AUTO: 9.4 FL (ref 8.9–12.7)
PMV BLD AUTO: 9.7 FL (ref 8.9–12.7)
PO2 BLDV: 67.1 MM HG (ref 35–45)
POTASSIUM SERPL-SCNC: 3.2 MMOL/L (ref 3.5–5.3)
PR INTERVAL: 208 MS
PROCALCITONIN SERPL-MCNC: <0.05 NG/ML
PROT SERPL-MCNC: 7.4 G/DL (ref 6.4–8.2)
PROT UR STRIP-MCNC: NEGATIVE MG/DL
QRS AXIS: 82 DEGREES
QRSD INTERVAL: 110 MS
QT INTERVAL: 340 MS
QTC INTERVAL: 455 MS
RBC # BLD AUTO: 3.81 MILLION/UL (ref 3.88–5.62)
SODIUM SERPL-SCNC: 137 MMOL/L (ref 136–145)
SP GR UR STRIP.AUTO: <=1.005 (ref 1–1.03)
T WAVE AXIS: 62 DEGREES
TROPONIN I SERPL-MCNC: <0.02 NG/ML
TSH SERPL DL<=0.05 MIU/L-ACNC: 0.2 UIU/ML (ref 0.36–3.74)
UROBILINOGEN UR QL STRIP.AUTO: 0.2 E.U./DL
VENTRICULAR RATE: 108 BPM
WBC # BLD AUTO: 7.64 THOUSAND/UL (ref 4.31–10.16)

## 2019-05-06 PROCEDURE — 94660 CPAP INITIATION&MGMT: CPT

## 2019-05-06 PROCEDURE — 84145 PROCALCITONIN (PCT): CPT | Performed by: FAMILY MEDICINE

## 2019-05-06 PROCEDURE — 83690 ASSAY OF LIPASE: CPT | Performed by: EMERGENCY MEDICINE

## 2019-05-06 PROCEDURE — 94664 DEMO&/EVAL PT USE INHALER: CPT

## 2019-05-06 PROCEDURE — 83605 ASSAY OF LACTIC ACID: CPT | Performed by: EMERGENCY MEDICINE

## 2019-05-06 PROCEDURE — 80076 HEPATIC FUNCTION PANEL: CPT | Performed by: EMERGENCY MEDICINE

## 2019-05-06 PROCEDURE — 99284 EMERGENCY DEPT VISIT MOD MDM: CPT | Performed by: EMERGENCY MEDICINE

## 2019-05-06 PROCEDURE — 94760 N-INVAS EAR/PLS OXIMETRY 1: CPT

## 2019-05-06 PROCEDURE — 85025 COMPLETE CBC W/AUTO DIFF WBC: CPT | Performed by: EMERGENCY MEDICINE

## 2019-05-06 PROCEDURE — 80048 BASIC METABOLIC PNL TOTAL CA: CPT | Performed by: EMERGENCY MEDICINE

## 2019-05-06 PROCEDURE — 84145 PROCALCITONIN (PCT): CPT | Performed by: EMERGENCY MEDICINE

## 2019-05-06 PROCEDURE — 99223 1ST HOSP IP/OBS HIGH 75: CPT | Performed by: FAMILY MEDICINE

## 2019-05-06 PROCEDURE — 96374 THER/PROPH/DIAG INJ IV PUSH: CPT

## 2019-05-06 PROCEDURE — 99285 EMERGENCY DEPT VISIT HI MDM: CPT

## 2019-05-06 PROCEDURE — 71046 X-RAY EXAM CHEST 2 VIEWS: CPT

## 2019-05-06 PROCEDURE — 36415 COLL VENOUS BLD VENIPUNCTURE: CPT | Performed by: EMERGENCY MEDICINE

## 2019-05-06 PROCEDURE — 82805 BLOOD GASES W/O2 SATURATION: CPT | Performed by: EMERGENCY MEDICINE

## 2019-05-06 PROCEDURE — 83880 ASSAY OF NATRIURETIC PEPTIDE: CPT | Performed by: EMERGENCY MEDICINE

## 2019-05-06 PROCEDURE — 96361 HYDRATE IV INFUSION ADD-ON: CPT

## 2019-05-06 PROCEDURE — 84443 ASSAY THYROID STIM HORMONE: CPT | Performed by: EMERGENCY MEDICINE

## 2019-05-06 PROCEDURE — 84484 ASSAY OF TROPONIN QUANT: CPT | Performed by: EMERGENCY MEDICINE

## 2019-05-06 PROCEDURE — 85049 AUTOMATED PLATELET COUNT: CPT | Performed by: FAMILY MEDICINE

## 2019-05-06 PROCEDURE — 93010 ELECTROCARDIOGRAM REPORT: CPT | Performed by: INTERNAL MEDICINE

## 2019-05-06 PROCEDURE — 87040 BLOOD CULTURE FOR BACTERIA: CPT | Performed by: EMERGENCY MEDICINE

## 2019-05-06 PROCEDURE — 81003 URINALYSIS AUTO W/O SCOPE: CPT | Performed by: EMERGENCY MEDICINE

## 2019-05-06 PROCEDURE — 93005 ELECTROCARDIOGRAM TRACING: CPT

## 2019-05-06 RX ORDER — LEVALBUTEROL 1.25 MG/.5ML
1.25 SOLUTION, CONCENTRATE RESPIRATORY (INHALATION) ONCE
Status: COMPLETED | OUTPATIENT
Start: 2019-05-06 | End: 2019-05-06

## 2019-05-06 RX ORDER — GUAIFENESIN 600 MG
600 TABLET, EXTENDED RELEASE 12 HR ORAL EVERY 12 HOURS SCHEDULED
Status: DISCONTINUED | OUTPATIENT
Start: 2019-05-06 | End: 2019-05-08 | Stop reason: HOSPADM

## 2019-05-06 RX ORDER — FLUTICASONE PROPIONATE 50 MCG
1 SPRAY, SUSPENSION (ML) NASAL DAILY
Status: DISCONTINUED | OUTPATIENT
Start: 2019-05-07 | End: 2019-05-08 | Stop reason: HOSPADM

## 2019-05-06 RX ORDER — LEVALBUTEROL 1.25 MG/.5ML
1.25 SOLUTION, CONCENTRATE RESPIRATORY (INHALATION)
Status: DISCONTINUED | OUTPATIENT
Start: 2019-05-06 | End: 2019-05-08 | Stop reason: HOSPADM

## 2019-05-06 RX ORDER — MONTELUKAST SODIUM 10 MG/1
10 TABLET ORAL
Status: DISCONTINUED | OUTPATIENT
Start: 2019-05-06 | End: 2019-05-08 | Stop reason: HOSPADM

## 2019-05-06 RX ORDER — METHYLPREDNISOLONE SODIUM SUCCINATE 40 MG/ML
40 INJECTION, POWDER, LYOPHILIZED, FOR SOLUTION INTRAMUSCULAR; INTRAVENOUS EVERY 8 HOURS
Status: DISCONTINUED | OUTPATIENT
Start: 2019-05-06 | End: 2019-05-08 | Stop reason: HOSPADM

## 2019-05-06 RX ORDER — ALBUTEROL SULFATE 2.5 MG/3ML
2.5 SOLUTION RESPIRATORY (INHALATION) EVERY 6 HOURS PRN
Status: DISCONTINUED | OUTPATIENT
Start: 2019-05-06 | End: 2019-05-08 | Stop reason: HOSPADM

## 2019-05-06 RX ORDER — SPIRONOLACTONE 25 MG/1
25 TABLET ORAL DAILY
Status: DISCONTINUED | OUTPATIENT
Start: 2019-05-07 | End: 2019-05-08 | Stop reason: HOSPADM

## 2019-05-06 RX ORDER — LEVALBUTEROL 1.25 MG/.5ML
1.25 SOLUTION, CONCENTRATE RESPIRATORY (INHALATION) EVERY 6 HOURS PRN
Status: DISCONTINUED | OUTPATIENT
Start: 2019-05-06 | End: 2019-05-06

## 2019-05-06 RX ORDER — METHYLPREDNISOLONE SODIUM SUCCINATE 125 MG/2ML
125 INJECTION, POWDER, LYOPHILIZED, FOR SOLUTION INTRAMUSCULAR; INTRAVENOUS ONCE
Status: COMPLETED | OUTPATIENT
Start: 2019-05-06 | End: 2019-05-06

## 2019-05-06 RX ORDER — HEPARIN SODIUM 5000 [USP'U]/ML
5000 INJECTION, SOLUTION INTRAVENOUS; SUBCUTANEOUS EVERY 8 HOURS SCHEDULED
Status: DISCONTINUED | OUTPATIENT
Start: 2019-05-06 | End: 2019-05-08 | Stop reason: HOSPADM

## 2019-05-06 RX ADMIN — MONTELUKAST SODIUM 10 MG: 10 TABLET, FILM COATED ORAL at 21:14

## 2019-05-06 RX ADMIN — METHYLPREDNISOLONE SODIUM SUCCINATE 125 MG: 125 INJECTION, POWDER, FOR SOLUTION INTRAMUSCULAR; INTRAVENOUS at 12:42

## 2019-05-06 RX ADMIN — LEVALBUTEROL HYDROCHLORIDE 1.25 MG: 1.25 SOLUTION, CONCENTRATE RESPIRATORY (INHALATION) at 19:56

## 2019-05-06 RX ADMIN — LEVALBUTEROL HYDROCHLORIDE 1.25 MG: 1.25 SOLUTION, CONCENTRATE RESPIRATORY (INHALATION) at 13:01

## 2019-05-06 RX ADMIN — GUAIFENESIN 600 MG: 600 TABLET, EXTENDED RELEASE ORAL at 21:14

## 2019-05-06 RX ADMIN — SODIUM CHLORIDE 500 ML: 0.9 INJECTION, SOLUTION INTRAVENOUS at 14:03

## 2019-05-06 RX ADMIN — LEVALBUTEROL HYDROCHLORIDE 1.25 MG: 1.25 SOLUTION, CONCENTRATE RESPIRATORY (INHALATION) at 14:02

## 2019-05-06 RX ADMIN — HEPARIN SODIUM 5000 UNITS: 5000 INJECTION INTRAVENOUS; SUBCUTANEOUS at 21:14

## 2019-05-06 RX ADMIN — METHYLPREDNISOLONE SODIUM SUCCINATE 40 MG: 40 INJECTION, POWDER, FOR SOLUTION INTRAMUSCULAR; INTRAVENOUS at 20:16

## 2019-05-06 RX ADMIN — IPRATROPIUM BROMIDE 0.5 MG: 0.5 SOLUTION RESPIRATORY (INHALATION) at 19:56

## 2019-05-07 LAB
ANION GAP SERPL CALCULATED.3IONS-SCNC: 9 MMOL/L (ref 4–13)
BASOPHILS # BLD AUTO: 0 THOUSANDS/ΜL (ref 0–0.1)
BASOPHILS NFR BLD AUTO: 0 % (ref 0–1)
BUN SERPL-MCNC: 15 MG/DL (ref 5–25)
CALCIUM SERPL-MCNC: 8.6 MG/DL (ref 8.3–10.1)
CHLORIDE SERPL-SCNC: 102 MMOL/L (ref 100–108)
CO2 SERPL-SCNC: 27 MMOL/L (ref 21–32)
CREAT SERPL-MCNC: 0.74 MG/DL (ref 0.6–1.3)
EOSINOPHIL # BLD AUTO: 0 THOUSAND/ΜL (ref 0–0.61)
EOSINOPHIL NFR BLD AUTO: 0 % (ref 0–6)
ERYTHROCYTE [DISTWIDTH] IN BLOOD BY AUTOMATED COUNT: 13.6 % (ref 11.6–15.1)
GFR SERPL CREATININE-BSD FRML MDRD: 96 ML/MIN/1.73SQ M
GLUCOSE SERPL-MCNC: 123 MG/DL (ref 65–140)
HCT VFR BLD AUTO: 35.3 % (ref 36.5–49.3)
HGB BLD-MCNC: 11.6 G/DL (ref 12–17)
IMM GRANULOCYTES # BLD AUTO: 0.03 THOUSAND/UL (ref 0–0.2)
IMM GRANULOCYTES NFR BLD AUTO: 1 % (ref 0–2)
LYMPHOCYTES # BLD AUTO: 0.6 THOUSANDS/ΜL (ref 0.6–4.47)
LYMPHOCYTES NFR BLD AUTO: 12 % (ref 14–44)
MCH RBC QN AUTO: 29.7 PG (ref 26.8–34.3)
MCHC RBC AUTO-ENTMCNC: 32.9 G/DL (ref 31.4–37.4)
MCV RBC AUTO: 90 FL (ref 82–98)
MONOCYTES # BLD AUTO: 0.35 THOUSAND/ΜL (ref 0.17–1.22)
MONOCYTES NFR BLD AUTO: 7 % (ref 4–12)
NEUTROPHILS # BLD AUTO: 3.94 THOUSANDS/ΜL (ref 1.85–7.62)
NEUTS SEG NFR BLD AUTO: 80 % (ref 43–75)
NRBC BLD AUTO-RTO: 0 /100 WBCS
PLATELET # BLD AUTO: 221 THOUSANDS/UL (ref 149–390)
PMV BLD AUTO: 9.7 FL (ref 8.9–12.7)
POTASSIUM SERPL-SCNC: 4.1 MMOL/L (ref 3.5–5.3)
PROCALCITONIN SERPL-MCNC: <0.05 NG/ML
PROCALCITONIN SERPL-MCNC: <0.05 NG/ML
RBC # BLD AUTO: 3.91 MILLION/UL (ref 3.88–5.62)
SODIUM SERPL-SCNC: 138 MMOL/L (ref 136–145)
WBC # BLD AUTO: 4.92 THOUSAND/UL (ref 4.31–10.16)

## 2019-05-07 PROCEDURE — 94760 N-INVAS EAR/PLS OXIMETRY 1: CPT

## 2019-05-07 PROCEDURE — 94640 AIRWAY INHALATION TREATMENT: CPT

## 2019-05-07 PROCEDURE — 84145 PROCALCITONIN (PCT): CPT | Performed by: FAMILY MEDICINE

## 2019-05-07 PROCEDURE — 94660 CPAP INITIATION&MGMT: CPT

## 2019-05-07 PROCEDURE — 85025 COMPLETE CBC W/AUTO DIFF WBC: CPT | Performed by: FAMILY MEDICINE

## 2019-05-07 PROCEDURE — 99232 SBSQ HOSP IP/OBS MODERATE 35: CPT | Performed by: INTERNAL MEDICINE

## 2019-05-07 PROCEDURE — 80048 BASIC METABOLIC PNL TOTAL CA: CPT | Performed by: FAMILY MEDICINE

## 2019-05-07 RX ORDER — CHOLESTYRAMINE LIGHT 4 G/5.7G
4 POWDER, FOR SUSPENSION ORAL 2 TIMES DAILY
Status: DISCONTINUED | OUTPATIENT
Start: 2019-05-07 | End: 2019-05-08 | Stop reason: HOSPADM

## 2019-05-07 RX ADMIN — IPRATROPIUM BROMIDE 0.5 MG: 0.5 SOLUTION RESPIRATORY (INHALATION) at 07:31

## 2019-05-07 RX ADMIN — METHYLPREDNISOLONE SODIUM SUCCINATE 40 MG: 40 INJECTION, POWDER, FOR SOLUTION INTRAMUSCULAR; INTRAVENOUS at 17:29

## 2019-05-07 RX ADMIN — MONTELUKAST SODIUM 10 MG: 10 TABLET, FILM COATED ORAL at 21:13

## 2019-05-07 RX ADMIN — IPRATROPIUM BROMIDE 0.5 MG: 0.5 SOLUTION RESPIRATORY (INHALATION) at 19:49

## 2019-05-07 RX ADMIN — METHYLPREDNISOLONE SODIUM SUCCINATE 40 MG: 40 INJECTION, POWDER, FOR SOLUTION INTRAMUSCULAR; INTRAVENOUS at 10:04

## 2019-05-07 RX ADMIN — SERTRALINE HYDROCHLORIDE 100 MG: 50 TABLET ORAL at 09:58

## 2019-05-07 RX ADMIN — GUAIFENESIN 600 MG: 600 TABLET, EXTENDED RELEASE ORAL at 09:58

## 2019-05-07 RX ADMIN — HEPARIN SODIUM 5000 UNITS: 5000 INJECTION INTRAVENOUS; SUBCUTANEOUS at 14:18

## 2019-05-07 RX ADMIN — CHOLESTYRAMINE 4 G: 4 POWDER, FOR SUSPENSION ORAL at 17:29

## 2019-05-07 RX ADMIN — GUAIFENESIN 600 MG: 600 TABLET, EXTENDED RELEASE ORAL at 21:13

## 2019-05-07 RX ADMIN — HEPARIN SODIUM 5000 UNITS: 5000 INJECTION INTRAVENOUS; SUBCUTANEOUS at 05:15

## 2019-05-07 RX ADMIN — LEVALBUTEROL HYDROCHLORIDE 1.25 MG: 1.25 SOLUTION, CONCENTRATE RESPIRATORY (INHALATION) at 19:49

## 2019-05-07 RX ADMIN — FLUTICASONE PROPIONATE 1 SPRAY: 50 SPRAY, METERED NASAL at 09:59

## 2019-05-07 RX ADMIN — METHYLPREDNISOLONE SODIUM SUCCINATE 40 MG: 40 INJECTION, POWDER, FOR SOLUTION INTRAMUSCULAR; INTRAVENOUS at 04:00

## 2019-05-07 RX ADMIN — HEPARIN SODIUM 5000 UNITS: 5000 INJECTION INTRAVENOUS; SUBCUTANEOUS at 21:14

## 2019-05-07 RX ADMIN — IPRATROPIUM BROMIDE 0.5 MG: 0.5 SOLUTION RESPIRATORY (INHALATION) at 13:24

## 2019-05-07 RX ADMIN — CHOLESTYRAMINE 4 G: 4 POWDER, FOR SUSPENSION ORAL at 11:56

## 2019-05-07 RX ADMIN — LEVALBUTEROL HYDROCHLORIDE 1.25 MG: 1.25 SOLUTION, CONCENTRATE RESPIRATORY (INHALATION) at 13:24

## 2019-05-07 RX ADMIN — SPIRONOLACTONE 25 MG: 25 TABLET ORAL at 09:58

## 2019-05-07 RX ADMIN — LEVALBUTEROL HYDROCHLORIDE 1.25 MG: 1.25 SOLUTION, CONCENTRATE RESPIRATORY (INHALATION) at 07:31

## 2019-05-08 VITALS
RESPIRATION RATE: 16 BRPM | BODY MASS INDEX: 24.62 KG/M2 | HEIGHT: 70 IN | DIASTOLIC BLOOD PRESSURE: 73 MMHG | OXYGEN SATURATION: 96 % | WEIGHT: 172 LBS | TEMPERATURE: 97.7 F | HEART RATE: 80 BPM | SYSTOLIC BLOOD PRESSURE: 119 MMHG

## 2019-05-08 LAB
ANION GAP SERPL CALCULATED.3IONS-SCNC: 8 MMOL/L (ref 4–13)
BASOPHILS # BLD AUTO: 0.01 THOUSANDS/ΜL (ref 0–0.1)
BASOPHILS NFR BLD AUTO: 0 % (ref 0–1)
BUN SERPL-MCNC: 20 MG/DL (ref 5–25)
CALCIUM SERPL-MCNC: 9.4 MG/DL (ref 8.3–10.1)
CHLORIDE SERPL-SCNC: 102 MMOL/L (ref 100–108)
CO2 SERPL-SCNC: 29 MMOL/L (ref 21–32)
CREAT SERPL-MCNC: 0.88 MG/DL (ref 0.6–1.3)
EOSINOPHIL # BLD AUTO: 0 THOUSAND/ΜL (ref 0–0.61)
EOSINOPHIL NFR BLD AUTO: 0 % (ref 0–6)
ERYTHROCYTE [DISTWIDTH] IN BLOOD BY AUTOMATED COUNT: 13.6 % (ref 11.6–15.1)
GFR SERPL CREATININE-BSD FRML MDRD: 90 ML/MIN/1.73SQ M
GLUCOSE SERPL-MCNC: 123 MG/DL (ref 65–140)
GLUCOSE SERPL-MCNC: 134 MG/DL (ref 65–140)
HCT VFR BLD AUTO: 37.4 % (ref 36.5–49.3)
HGB BLD-MCNC: 12.1 G/DL (ref 12–17)
IMM GRANULOCYTES # BLD AUTO: 0.05 THOUSAND/UL (ref 0–0.2)
IMM GRANULOCYTES NFR BLD AUTO: 1 % (ref 0–2)
LYMPHOCYTES # BLD AUTO: 0.72 THOUSANDS/ΜL (ref 0.6–4.47)
LYMPHOCYTES NFR BLD AUTO: 8 % (ref 14–44)
MAGNESIUM SERPL-MCNC: 2.5 MG/DL (ref 1.6–2.6)
MCH RBC QN AUTO: 29.7 PG (ref 26.8–34.3)
MCHC RBC AUTO-ENTMCNC: 32.4 G/DL (ref 31.4–37.4)
MCV RBC AUTO: 92 FL (ref 82–98)
MONOCYTES # BLD AUTO: 0.39 THOUSAND/ΜL (ref 0.17–1.22)
MONOCYTES NFR BLD AUTO: 5 % (ref 4–12)
NEUTROPHILS # BLD AUTO: 7.44 THOUSANDS/ΜL (ref 1.85–7.62)
NEUTS SEG NFR BLD AUTO: 86 % (ref 43–75)
NRBC BLD AUTO-RTO: 0 /100 WBCS
PHOSPHATE SERPL-MCNC: 3.5 MG/DL (ref 2.3–4.1)
PLATELET # BLD AUTO: 272 THOUSANDS/UL (ref 149–390)
PMV BLD AUTO: 9.6 FL (ref 8.9–12.7)
POTASSIUM SERPL-SCNC: 4.5 MMOL/L (ref 3.5–5.3)
RBC # BLD AUTO: 4.08 MILLION/UL (ref 3.88–5.62)
SODIUM SERPL-SCNC: 139 MMOL/L (ref 136–145)
WBC # BLD AUTO: 8.61 THOUSAND/UL (ref 4.31–10.16)

## 2019-05-08 PROCEDURE — 85025 COMPLETE CBC W/AUTO DIFF WBC: CPT | Performed by: INTERNAL MEDICINE

## 2019-05-08 PROCEDURE — 94760 N-INVAS EAR/PLS OXIMETRY 1: CPT

## 2019-05-08 PROCEDURE — 94640 AIRWAY INHALATION TREATMENT: CPT

## 2019-05-08 PROCEDURE — 83735 ASSAY OF MAGNESIUM: CPT | Performed by: INTERNAL MEDICINE

## 2019-05-08 PROCEDURE — 99239 HOSP IP/OBS DSCHRG MGMT >30: CPT | Performed by: INTERNAL MEDICINE

## 2019-05-08 PROCEDURE — 84100 ASSAY OF PHOSPHORUS: CPT | Performed by: INTERNAL MEDICINE

## 2019-05-08 PROCEDURE — 82948 REAGENT STRIP/BLOOD GLUCOSE: CPT

## 2019-05-08 PROCEDURE — 80048 BASIC METABOLIC PNL TOTAL CA: CPT | Performed by: INTERNAL MEDICINE

## 2019-05-08 RX ORDER — PREDNISONE 10 MG/1
10 TABLET ORAL DAILY
Qty: 18 TABLET | Refills: 0 | Status: SHIPPED | OUTPATIENT
Start: 2019-05-08 | End: 2019-05-21 | Stop reason: SDUPTHER

## 2019-05-08 RX ADMIN — CHOLESTYRAMINE 4 G: 4 POWDER, FOR SUSPENSION ORAL at 17:58

## 2019-05-08 RX ADMIN — METHYLPREDNISOLONE SODIUM SUCCINATE 40 MG: 40 INJECTION, POWDER, FOR SOLUTION INTRAMUSCULAR; INTRAVENOUS at 09:45

## 2019-05-08 RX ADMIN — ALBUTEROL SULFATE 2.5 MG: 2.5 SOLUTION RESPIRATORY (INHALATION) at 02:02

## 2019-05-08 RX ADMIN — GUAIFENESIN 600 MG: 600 TABLET, EXTENDED RELEASE ORAL at 08:40

## 2019-05-08 RX ADMIN — LEVALBUTEROL HYDROCHLORIDE 1.25 MG: 1.25 SOLUTION, CONCENTRATE RESPIRATORY (INHALATION) at 07:12

## 2019-05-08 RX ADMIN — SPIRONOLACTONE 25 MG: 25 TABLET ORAL at 08:40

## 2019-05-08 RX ADMIN — LEVALBUTEROL HYDROCHLORIDE 1.25 MG: 1.25 SOLUTION, CONCENTRATE RESPIRATORY (INHALATION) at 13:25

## 2019-05-08 RX ADMIN — HEPARIN SODIUM 5000 UNITS: 5000 INJECTION INTRAVENOUS; SUBCUTANEOUS at 05:44

## 2019-05-08 RX ADMIN — METHYLPREDNISOLONE SODIUM SUCCINATE 40 MG: 40 INJECTION, POWDER, FOR SOLUTION INTRAMUSCULAR; INTRAVENOUS at 02:15

## 2019-05-08 RX ADMIN — CHOLESTYRAMINE 4 G: 4 POWDER, FOR SUSPENSION ORAL at 08:40

## 2019-05-08 RX ADMIN — IPRATROPIUM BROMIDE 0.5 MG: 0.5 SOLUTION RESPIRATORY (INHALATION) at 07:12

## 2019-05-08 RX ADMIN — FLUTICASONE PROPIONATE 1 SPRAY: 50 SPRAY, METERED NASAL at 08:41

## 2019-05-08 RX ADMIN — IPRATROPIUM BROMIDE 0.5 MG: 0.5 SOLUTION RESPIRATORY (INHALATION) at 13:25

## 2019-05-08 RX ADMIN — SERTRALINE HYDROCHLORIDE 100 MG: 50 TABLET ORAL at 08:40

## 2019-05-09 ENCOUNTER — TRANSITIONAL CARE MANAGEMENT (OUTPATIENT)
Dept: INTERNAL MEDICINE CLINIC | Facility: CLINIC | Age: 69
End: 2019-05-09

## 2019-05-10 NOTE — DISCHARGE INSTRUCTIONS
"Oncology Rooming Note    May 10, 2019 1:18 PM   Chilo Oneil is a 70 year old male who presents for:    Chief Complaint   Patient presents with     Oncology Clinic Visit     Follow up malignant neoplasm of lower lobe of left lung. Review results for imaging and biopsy.      Initial Vitals: /61 (BP Location: Right arm, Patient Position: Sitting, Cuff Size: Adult Large)   Pulse 93   Temp 97.5  F (36.4  C) (Tympanic)   Resp 18   Ht 1.905 m (6' 3\")   Wt 64.9 kg (143 lb)   SpO2 97%   BMI 17.87 kg/m   Estimated body mass index is 17.87 kg/m  as calculated from the following:    Height as of this encounter: 1.905 m (6' 3\").    Weight as of this encounter: 64.9 kg (143 lb). Body surface area is 1.85 meters squared.  Extreme Pain (8) Comment: Data Unavailable   No LMP for male patient.  Allergies reviewed: Yes  Medications reviewed: Yes    Medications: Medication refills not needed today.  Pharmacy name entered into EPIC: [facility]. [place], [place] - [address] [place] [address]    Clinical concerns: Follow up malignant neoplasm of lower lobe of left lung. Review results for imaging and biopsy. C/o Significant abdominal pains left worse than right. Left sided shoulder pains 8/10.    Dorothy Hart, DENISE            " Please return if he developed worsening or other concerning symptoms immediately otherwise try these medications and follow up as instructed as discussed      Chronic Bronchitis   WHAT YOU NEED TO KNOW:   Chronic bronchitis is a long-term swelling and irritation in the air passages in your lungs  The irritation may damage your lungs  The lung damage often gets worse over time, and it is usually permanent  Chronic bronchitis is part of a group of lung diseases called chronic obstructive pulmonary disease (COPD)  DISCHARGE INSTRUCTIONS:   Call 911 for any of the following:   · You have new chest pain or tightness  · You become confused, dizzy, or feel like you may faint  · You have a new or increased gray or blue tint to your nail beds, fingers, or lips  Return to the emergency department if:   · You become tired easily from trying to get enough breath  · The amount or color of your sputum changes, or your sputum becomes too hard to cough up  Contact your healthcare provider if:   · You have a fever  · You use your inhalers more often than usual      · You have new or increased swelling in your legs, ankles, or abdomen  · You run out of breath easily when you talk or do your usual exercise or activities  · You have questions or concerns about your condition or care  Medicines: You may  need any of the following:  · Inhalers  help you breathe easier and cough less  An inhaler gives your medicine in a mist form so that you can breathe it into your lungs  Ask your healthcare provider to show you how to use your inhaler correctly  · Steroids  help decrease inflammation in your airway so that you can breathe easier  You may need to go home on oxygen: You may need extra oxygen if your blood oxygen level is lower than it should be  You will be instructed on how to use oxygen in your home     How to use an inhaler:   · Shake the inhaler well to make sure you get the correct amount of medicine per puff  Remove the cover from your inhaler's mouthpiece  If you use a spacer, connect your inhaler to the flat end of the spacer  · Breathe out as much air from your lungs as you can  Put the mouthpiece in your mouth past your front teeth and rest it on the top of your tongue  Do not block the mouthpiece opening with your tongue  · Breathe in through your mouth at a slow and steady rate  As you do this, press the inhaler to release the puff of medicine  Finish breathing in slowly and deeply as you inhale the medicine  When your lungs are full, hold your breath for 10 seconds  Then breathe out slowly through puckered lips or through your nose  · If you need to take more puffs, wait at least 1 minute between each puff  · Rinse your mouth with water after you use the inhaler  This may keep you from getting a mouth infection or irritation  · Follow the instructions that come with your inhaler to clean it  You should clean your inhaler at least once a week  Ways to help you breathe better:   · Take deep breaths and cough  10 times each hour  This will decrease your risk for a lung infection  Take a deep breath and hold it for as long as you can  Let the air out and then cough strongly  Deep breaths help open your airway  You may be given an incentive spirometer to help you take deep breaths  Put the plastic piece in your mouth and take a slow, deep breath  Then let the air out and cough  Repeat these steps 10 times every hour  · Pursed-lip breathing  can be used any time you feel short of breath  Pursed-lip breathing can be especially helpful before you start an activity:     ¨ Breathe in through your nose  Use the muscles in your abdomen to help fill your lungs with air  ¨ Slowly breathe out through your mouth with your lips slightly puckered  You should make a quiet hissing sound as you breathe out  ¨ Try to take twice as long to breathe out as it did to breathe in   This helps you get rid of as much air from your lungs as possible  ¨ Repeat this exercise several times  Once you are used to doing pursed-lip breathing, you can do it any time you need more air  · Diaphragmatic breathing  can help strengthen some of the muscles you use to breathe:     ¨ Place one hand on your stomach just below your ribs  Place your other hand in the middle of your chest over your breastbone  ¨ Breathe in slowly through your nose, as deeply as you can  ¨ Breathe out slowly through pursed lips  As you breathe out, tighten the muscles in your stomach  Use your hand to gently push in and up while tightening the muscles  ¨ Diaphragmatic breathing takes practice  You may need to practice this many times a day  Slowly increase the amount of time you spend during each practice session  Self-care:   · Sleep with your upper body raised  This will help you breathe easier  You can use foam wedges or elevate the head of your bed  Many devices are available to help raise your upper body while in bed  Use a device that will tilt your whole body, or bend your body at the waist  The device should not bend your body at the upper back or neck  · Prevent the spread of germs:      [facility] your hands often with soap and water  Carry germ-killing gel with you  You can use the gel to clean your hands when soap and water are not available  ¨ Do not touch your eyes, nose, or mouth unless you have washed your hands first      ¨ Always cover your mouth when you cough  Cough into a tissue or your shirtsleeve so you do not spread germs from your hands  ¨ Try to avoid people who have a cold or the flu  If you are sick, stay away from others as much as possible  · Do not smoke  Nicotine and other substances can cause lung damage  Do not use e-cigarettes or smokeless tobacco without first talking to your healthcare provider  They still contain nicotine   Ask your healthcare provider for information if you currently smoke and need help to quit  · Avoid lung irritants  Stay out of high altitudes and places with high humidity  Stay inside, or cover your mouth and nose with a scarf when you are outside during cold weather  Stay inside on days when air pollution or pollen counts are high  Do not use aerosol sprays such as deodorant, bug spray, and hair spray  · Drink more liquids  This will help to keep your air passages moist and help you cough up mucus  Ask how much liquid to drink each day and which liquids are best for you  · Ask your healthcare provider about the flu and pneumonia vaccines  All adults should get the flu (influenza) vaccine every year as soon as it becomes available  The pneumonia vaccine is given to adults aged 72 or older to prevent pneumococcal disease, such as pneumonia  Adults aged 23 to 59 years who are at high risk for pneumococcal disease also should get the pneumococcal vaccine  It may need to be repeated 1 or 5 years later  Follow up with your healthcare provider as directed:  Write down your questions so you remember to ask them during your visits  © 2017 [facility] Information is for End User's use only and may not be sold, redistributed or otherwise used for commercial purposes  All illustrations and images included in CareNotes® are the copyrighted property of A D A M , Inc  or Harry Hernandez  The above information is an  only  It is not intended as medical advice for individual conditions or treatments  Talk to your doctor, nurse or pharmacist before following any medical regimen to see if it is safe and effective for you  La BPCO (broncopneumopatia cronica ostruttiva)   ASSISTENZA AMBULATORIALE:   La BPCO (broncopneumopatia cronica ostruttiva)  è penelope malattia polmonare debbie rende difficile la respirazione  Normalmente è il risultato di melissa polmonari causati da ifeanyi di irritazione e infiammazione   La BPCO limita il flusso di [place] Abdoulaye  Fumo, inquinamento, genetica o un'anamnesi di infezioni polmonari possono aumentare il rischio di BPCO  I sintomi comuni includono:   · Respiro affannoso     · Tosse secca     · Accessi di tosse debbie fanno risalire il muco indu polmoni  · Respiro sibilante e sensazione di costrizione al petto  Chiamare il 911 se:   · Si avverte un senso di stordimento, si ha il respiro affannoso e dolore al petto  Rivolgersi immediatamente a un medico in [address] condizioni:   · Si è confusi, si hanno le vertigini o ci si sente svenire  · Si sentono il braccio o la gamba caldi, sensibili e doloranti  L'aspetto può diventare gonfio e rose  · Si tossisce con espettorato contenente tracce di sangue  Chiamare il medico se:   · Si ha il respiro più affannoso del solito  · È necessario assumere più farmaco del solito per controllare i sintomi  · Si tossisce o si ha il respiro affannoso più del solito  · Si espettora più muco o muco di un colore diverso o con un odore diverso  · Si aumenta di peso di più di 3 libbre in [place]  · Si ha la febbre, naso gocciolante o congestionato e mal di gola o altri sintomi del raffreddore o dell'influenza  · La pelle, le labbra o le unghie iniziano a diventare lora  · Si nota gonfiore caren gambe o caren caviglie  · Si è molto stanchi o deboli per [employer] giorno  · Si notano cambiamenti di umore o cambiamenti andie capacità di pensare o concentrarsi  · Si hanno domande o dubbi sulla propria patologia o sulla jose eduardo  Il trattamento dayron BPCO  può includere farmaci debbie aiutino a ridurre il gonfiore e l'infiammazione nei polmoni  I farmaci possono anche aiutare ad aprire le vie respiratorie o a curare l'infezione  Potrebbe essere necessaria la riabilitazione polmonare per aiutare a gestire i sintomi e migliorare la [employer]   Può essere necessaria la somministrazione di ossigeno per respirare meglio  Facilitare la respirazione:   · Usare la respirazione a labbra arricciate ogni volta debbie si ha la sensazione debbie manca il respiro  inalare profondamente attraverso il naso  Espirare lentamente attraverso la bocca con le labbra socchiuse per un tempo due volte superiore a quello impiegato per l'inalazione  Si può praticare questo metodo di respirazione anche mentre si eseguono piegamenti, sollevamenti, mentre si salgono le scale o si pratica esercizio fisico  Questo rallenta la respirazione e [employer] a far entrare e uscire più aria nei e indu polmoni  · Non fumare ed evitare altre persone debbie fumano  La nicotina e altre sostanze possono causare melissa o irritazione ai polmoni e rendere più difficile la respirazione  Non usare sigarette elettroniche o tabacco senza fumo  Anche questi prodotti contengono nicotina  Chiedere informazioni al medico se si fuma e si ha bisogno di aiuto per [employer]  Per assistenza e ulteriori informazioni:  [employer]  gov  Phone: 9- 787 - 741-7733  Web Address: [employer] smokefree  gov      · Fare attenzione a, ad evitare, tutto ciò debbie peggiora i sintomi  Evitare le altitudini [employer] e i posti con elevata umidità  Rimanere in un luogo chiuso o coprirsi la bocca e il naso con penelope sciarpa quando si esce e fa freddo  Rimanere in [address]  [address] presenza di pollini larissa'aria sono elevati  Non usare bombolette spray come deodoranti, insetticidi e lacche per capelli  [employer] BPCO e prevenzione dei peggioramenti:  BPCO è penelope condizione grave debbie letitia tempo peggiora  Il peggioramento significa debbie i sintomi dayron BPCO diventano improvvisamente più gravi  È importante prevenire i peggioramenti  Il peggioramento può causare ulteriori melissa ai polmoni  La BPCO non può essere curata, ma è possibile fare qualcosa per sentirsi meglio ed evitare il peggioramento:  · Proteggersi indu germi    I germi Joaquina Ismael Deisy Lint infezione  L'infezione ai polmoni può provocare penelope maggiore produzione di muco e rendere più difficile la respirazione  Un'infezione può provocare anche gonfiore caren vie respiratorie, impedendo l'ingresso dell'aria  Attenendosi a quanto riportato di seguito è possibile ridurre il rischio di infezioni:     ¨ Lavare spesso le elizabeth con acqua e sapone  Portare con sé un gel germicida da usare per [employer] [place] [employer] quando non si può disporre di acqua e sapone  ¨ Non toccare gli occhi, il naso o la bocca senza prima lavarsi le elizabeth  ¨ Coprire sempre la bocca quando si tossisce  Tossire in un [facility] o [facility] in [address] [place] non diffondere germi con le elizabeth  ¨ Cercare di [employer] persone debbie hanno il raffreddore o l'influenza  Se si è malati, stare il più possibile lontano dagli altri  · Evelyn più liquidi  Stoney Seal a lubrificare le vie respiratorie e a tossire espellendo muco  Chiedere qual è la quantità di liquidi da assumere ogni giorno e quali sono i tipi di liquidi più adatti  · Fare esercizio fisico tutti i giorni:  Svolgere attività fisiche per [employer] 20 minuti al giorno per [place] ad [employer] proprie energie e a ridurre il respiro affannoso  stabilire con il proprio medico un programma di esercizio fisico idoneo  · Chiedere informazioni steve vaccini  Il medico può consigliare di fare il normale vaccino antinfluenzale o contro la polmonite  Per [employer] persona debbie soffre di BPCO la polmonite può diventare letale  Informarsi sugli altri vaccini di demetrice si potrebbe avere bisogno  Chiedere al medico informazioni steve vaccini antinfluenzali e contro la polmonite  Tutti gli adulti dovrebbero vaccinarsi contro l'influenza ogni anno non appena il vaccino è disponibile  La vaccinazione contro la polmonite viene eseguita johansen persone adulte a partire indu 67 ifeanyi per prevenire le malattie pneumococciche, come la polmonite   Anche gli adulti di età compresa tra 19 e 64 ifeanyi ad elevato rischio di malattia pneumococcica dovrebbero sottoporsi a questa vaccinazione  Può essere necessario ripetere la vaccinazione dopo 1 o 5 ifeanyi  Riabilitazione polmonare:  il medico può consigliare un programma per favorire la gestione dei sintomi e migliorare la [employer]  Il programma può includere counseling nutrizionale e attività fisica, come passeggiate, per rafforzare i polmoni  Kenrick Coppersmith circa le opzioni per un futuro trattamento:  chiedere informazioni sulla possibilità di redigere dichiarazioni di trattamento anticipate e testamenti biologici  Baylee Ace documenti aiutano a decidere e indicare per [employer] proprie scelte relative al trattamento e caren cure di fine [address]  È consigliabile compilarli quando ci si sente bene e si può pensare con chiarezza a ciò debbie si desidera fare  Le informazioni contenute si possono poi conservare per un uso futuro in [employer] ricovero ospedaliero o di malattia grave  Presentarsi caren visite di controllo robert proprio medico, come consigliato:  potrebbe essere necessario sottoporsi ad ulteriori esami  Il medico può consigliare penelope visita con [employer]  Trascrivere eventuali domande in [employer] rickirbyarsetremaine henna le visite  © 2017 [address] Information is for End User's use only and may not be sold, redistributed or otherwise used for commercial purposes  All illustrations and images included in CareNotes® are the copyrighted property of A D A M , Inc  or Harry Hernandez  The above information is an  only  It is not intended as medical advice for individual conditions or treatments  Talk to your doctor, nurse or pharmacist before following any medical regimen to see if it is safe and effective for you

## 2019-05-11 LAB
BACTERIA BLD CULT: NORMAL
BACTERIA BLD CULT: NORMAL

## 2019-05-13 DIAGNOSIS — J44.9 CHRONIC OBSTRUCTIVE PULMONARY DISEASE, UNSPECIFIED COPD TYPE (HCC): ICD-10-CM

## 2019-05-13 RX ORDER — FLUTICASONE FUROATE AND VILANTEROL TRIFENATATE 100; 25 UG/1; UG/1
POWDER RESPIRATORY (INHALATION)
Qty: 1 INHALER | Refills: 5 | Status: SHIPPED | OUTPATIENT
Start: 2019-05-13 | End: 2019-08-01 | Stop reason: SDUPTHER

## 2019-05-14 ENCOUNTER — OFFICE VISIT (OUTPATIENT)
Dept: INTERNAL MEDICINE CLINIC | Facility: CLINIC | Age: 69
End: 2019-05-14
Payer: MEDICARE

## 2019-05-14 VITALS
TEMPERATURE: 97.5 F | RESPIRATION RATE: 16 BRPM | HEIGHT: 70 IN | WEIGHT: 170 LBS | HEART RATE: 82 BPM | DIASTOLIC BLOOD PRESSURE: 80 MMHG | OXYGEN SATURATION: 97 % | SYSTOLIC BLOOD PRESSURE: 118 MMHG | BODY MASS INDEX: 24.34 KG/M2

## 2019-05-14 DIAGNOSIS — J43.2 CENTRILOBULAR EMPHYSEMA (HCC): ICD-10-CM

## 2019-05-14 DIAGNOSIS — J44.1 ACUTE EXACERBATION OF CHRONIC OBSTRUCTIVE PULMONARY DISEASE (COPD) (HCC): Primary | ICD-10-CM

## 2019-05-14 DIAGNOSIS — I50.32 CHRONIC DIASTOLIC HEART FAILURE (HCC): ICD-10-CM

## 2019-05-14 PROCEDURE — 99495 TRANSJ CARE MGMT MOD F2F 14D: CPT | Performed by: PHYSICIAN ASSISTANT

## 2019-05-15 ENCOUNTER — HOSPITAL ENCOUNTER (OUTPATIENT)
Dept: PULMONOLOGY | Facility: HOSPITAL | Age: 69
Discharge: HOME | End: 2019-05-15
Attending: INTERNAL MEDICINE
Payer: MEDICARE

## 2019-05-15 DIAGNOSIS — J41.0 SIMPLE CHRONIC BRONCHITIS (HCC): ICD-10-CM

## 2019-05-15 PROCEDURE — 94618 PULMONARY STRESS TESTING: CPT | Performed by: INTERNAL MEDICINE

## 2019-05-15 PROCEDURE — 94618 PULMONARY STRESS TESTING: CPT

## 2019-05-16 ENCOUNTER — PATIENT OUTREACH (OUTPATIENT)
Dept: INTERNAL MEDICINE CLINIC | Facility: CLINIC | Age: 69
End: 2019-05-16

## 2019-05-21 ENCOUNTER — HOSPITAL ENCOUNTER (OUTPATIENT)
Dept: RADIOLOGY | Facility: HOSPITAL | Age: 69
Discharge: HOME | End: 2019-05-21
Payer: MEDICARE

## 2019-05-21 ENCOUNTER — TELEPHONE (OUTPATIENT)
Dept: PULMONOLOGY | Facility: CLINIC | Age: 69
End: 2019-05-21

## 2019-05-21 ENCOUNTER — OFFICE VISIT (OUTPATIENT)
Dept: PULMONOLOGY | Facility: CLINIC | Age: 69
End: 2019-05-21
Payer: MEDICARE

## 2019-05-21 VITALS
SYSTOLIC BLOOD PRESSURE: 120 MMHG | OXYGEN SATURATION: 92 % | HEIGHT: 70 IN | HEART RATE: 92 BPM | WEIGHT: 172 LBS | DIASTOLIC BLOOD PRESSURE: 70 MMHG | BODY MASS INDEX: 24.62 KG/M2

## 2019-05-21 DIAGNOSIS — R06.02 SHORTNESS OF BREATH ON EXERTION: ICD-10-CM

## 2019-05-21 DIAGNOSIS — J44.9 COPD, VERY SEVERE (HCC): Primary | ICD-10-CM

## 2019-05-21 DIAGNOSIS — R93.89 ABNORMAL CHEST CT: ICD-10-CM

## 2019-05-21 DIAGNOSIS — J41.0 SIMPLE CHRONIC BRONCHITIS (HCC): ICD-10-CM

## 2019-05-21 DIAGNOSIS — J44.9 COPD, VERY SEVERE (HCC): ICD-10-CM

## 2019-05-21 PROCEDURE — 99214 OFFICE O/P EST MOD 30 MIN: CPT | Performed by: PHYSICIAN ASSISTANT

## 2019-05-21 PROCEDURE — 71046 X-RAY EXAM CHEST 2 VIEWS: CPT

## 2019-05-21 RX ORDER — PREDNISONE 10 MG/1
20 TABLET ORAL DAILY
Qty: 60 TABLET | Refills: 0 | Status: SHIPPED | OUTPATIENT
Start: 2019-05-21 | End: 2019-06-25 | Stop reason: ALTCHOICE

## 2019-05-22 ENCOUNTER — OFFICE VISIT (OUTPATIENT)
Dept: GASTROENTEROLOGY | Facility: CLINIC | Age: 69
End: 2019-05-22
Payer: MEDICARE

## 2019-05-22 VITALS
WEIGHT: 170.5 LBS | HEART RATE: 94 BPM | BODY MASS INDEX: 24.46 KG/M2 | DIASTOLIC BLOOD PRESSURE: 80 MMHG | SYSTOLIC BLOOD PRESSURE: 122 MMHG

## 2019-05-22 DIAGNOSIS — K58.0 IRRITABLE BOWEL SYNDROME WITH DIARRHEA: Primary | ICD-10-CM

## 2019-05-22 DIAGNOSIS — J18.9 PNEUMONIA OF LEFT LUNG DUE TO INFECTIOUS ORGANISM, UNSPECIFIED PART OF LUNG: Primary | ICD-10-CM

## 2019-05-22 DIAGNOSIS — K21.9 GASTROESOPHAGEAL REFLUX DISEASE WITHOUT ESOPHAGITIS: ICD-10-CM

## 2019-05-22 PROCEDURE — 99214 OFFICE O/P EST MOD 30 MIN: CPT | Performed by: PHYSICIAN ASSISTANT

## 2019-05-22 RX ORDER — CEFDINIR 300 MG/1
300 CAPSULE ORAL EVERY 12 HOURS SCHEDULED
Qty: 14 CAPSULE | Refills: 0 | Status: SHIPPED | OUTPATIENT
Start: 2019-05-22 | End: 2019-05-29

## 2019-05-22 RX ORDER — MONTELUKAST SODIUM 4 MG/1
2 TABLET, CHEWABLE ORAL 2 TIMES DAILY
Qty: 120 TABLET | Refills: 0 | Status: SHIPPED | OUTPATIENT
Start: 2019-05-22 | End: 2019-06-14 | Stop reason: SDUPTHER

## 2019-05-29 ENCOUNTER — OFFICE VISIT (OUTPATIENT)
Dept: PULMONOLOGY | Facility: CLINIC | Age: 69
End: 2019-05-29
Payer: MEDICARE

## 2019-05-29 ENCOUNTER — APPOINTMENT (OUTPATIENT)
Dept: LAB | Facility: HOSPITAL | Age: 69
End: 2019-05-29
Attending: INTERNAL MEDICINE
Payer: MEDICARE

## 2019-05-29 VITALS
DIASTOLIC BLOOD PRESSURE: 60 MMHG | HEIGHT: 70 IN | BODY MASS INDEX: 24.91 KG/M2 | SYSTOLIC BLOOD PRESSURE: 110 MMHG | OXYGEN SATURATION: 95 % | HEART RATE: 82 BPM | WEIGHT: 174 LBS

## 2019-05-29 DIAGNOSIS — R39.11 URINARY HESITANCY: ICD-10-CM

## 2019-05-29 DIAGNOSIS — J96.11 CHRONIC RESPIRATORY FAILURE WITH HYPOXIA AND HYPERCAPNIA (HCC): ICD-10-CM

## 2019-05-29 DIAGNOSIS — J96.12 CHRONIC RESPIRATORY FAILURE WITH HYPOXIA AND HYPERCAPNIA (HCC): ICD-10-CM

## 2019-05-29 DIAGNOSIS — R39.11 URINARY HESITANCY: Primary | ICD-10-CM

## 2019-05-29 DIAGNOSIS — J18.9 PNEUMONIA OF LEFT LUNG DUE TO INFECTIOUS ORGANISM, UNSPECIFIED PART OF LUNG: Primary | ICD-10-CM

## 2019-05-29 DIAGNOSIS — B37.0 THRUSH: ICD-10-CM

## 2019-05-29 DIAGNOSIS — R93.89 ABNORMAL CHEST CT: ICD-10-CM

## 2019-05-29 DIAGNOSIS — J44.9 COPD, VERY SEVERE (HCC): ICD-10-CM

## 2019-05-29 LAB
BACTERIA UR QL AUTO: NORMAL /HPF
BILIRUB UR QL STRIP: NEGATIVE
CLARITY UR: CLEAR
COLOR UR: YELLOW
GLUCOSE UR STRIP-MCNC: NEGATIVE MG/DL
HGB UR QL STRIP.AUTO: NEGATIVE
KETONES UR STRIP-MCNC: NEGATIVE MG/DL
LEUKOCYTE ESTERASE UR QL STRIP: NEGATIVE
NITRITE UR QL STRIP: NEGATIVE
NON-SQ EPI CELLS URNS QL MICRO: NORMAL /HPF
PH UR STRIP.AUTO: 5.5 [PH]
PROT UR STRIP-MCNC: NEGATIVE MG/DL
RBC #/AREA URNS AUTO: NORMAL /HPF
SP GR UR STRIP.AUTO: 1.01 (ref 1–1.03)
UROBILINOGEN UR QL STRIP.AUTO: 0.2 E.U./DL
WBC #/AREA URNS AUTO: NORMAL /HPF

## 2019-05-29 PROCEDURE — 87086 URINE CULTURE/COLONY COUNT: CPT

## 2019-05-29 PROCEDURE — 99214 OFFICE O/P EST MOD 30 MIN: CPT | Performed by: PHYSICIAN ASSISTANT

## 2019-05-29 PROCEDURE — 81001 URINALYSIS AUTO W/SCOPE: CPT

## 2019-05-29 RX ORDER — CEFDINIR 300 MG/1
300 CAPSULE ORAL EVERY 12 HOURS SCHEDULED
Qty: 6 CAPSULE | Refills: 0 | Status: SHIPPED | OUTPATIENT
Start: 2019-05-29 | End: 2019-06-01

## 2019-05-29 RX ORDER — TAMSULOSIN HYDROCHLORIDE 0.4 MG/1
0.4 CAPSULE ORAL
Qty: 30 CAPSULE | Refills: 5 | Status: SHIPPED | OUTPATIENT
Start: 2019-05-29 | End: 2019-12-16 | Stop reason: SDUPTHER

## 2019-05-29 RX ORDER — CLOTRIMAZOLE 10 MG/1
10 LOZENGE ORAL; TOPICAL
Qty: 70 TABLET | Refills: 0 | Status: SHIPPED | OUTPATIENT
Start: 2019-05-29 | End: 2019-07-30 | Stop reason: ALTCHOICE

## 2019-05-30 LAB — BACTERIA UR CULT: NORMAL

## 2019-06-02 DIAGNOSIS — K59.1 FUNCTIONAL DIARRHEA: ICD-10-CM

## 2019-06-03 RX ORDER — CHOLESTYRAMINE 4 G/9G
POWDER, FOR SUSPENSION ORAL
Qty: 60 PACKET | Refills: 2 | Status: SHIPPED | OUTPATIENT
Start: 2019-06-03 | End: 2019-06-25 | Stop reason: ALTCHOICE

## 2019-06-04 LAB
MYCOBACTERIUM SPEC CULT: NORMAL
RHODAMINE-AURAMINE STN SPEC: NORMAL

## 2019-06-13 ENCOUNTER — OFFICE VISIT (OUTPATIENT)
Dept: CARDIOLOGY CLINIC | Facility: CLINIC | Age: 69
End: 2019-06-13
Payer: MEDICARE

## 2019-06-13 VITALS
BODY MASS INDEX: 25.77 KG/M2 | OXYGEN SATURATION: 96 % | DIASTOLIC BLOOD PRESSURE: 72 MMHG | WEIGHT: 180 LBS | HEIGHT: 70 IN | HEART RATE: 96 BPM | SYSTOLIC BLOOD PRESSURE: 120 MMHG

## 2019-06-13 DIAGNOSIS — I50.32 CHRONIC HEART FAILURE WITH PRESERVED EJECTION FRACTION (HCC): Primary | ICD-10-CM

## 2019-06-13 DIAGNOSIS — J43.9 PULMONARY EMPHYSEMA, UNSPECIFIED EMPHYSEMA TYPE (HCC): ICD-10-CM

## 2019-06-13 DIAGNOSIS — I27.20 PULMONARY HYPERTENSION (HCC): ICD-10-CM

## 2019-06-13 PROCEDURE — 99214 OFFICE O/P EST MOD 30 MIN: CPT | Performed by: INTERNAL MEDICINE

## 2019-06-14 DIAGNOSIS — K58.0 IRRITABLE BOWEL SYNDROME WITH DIARRHEA: ICD-10-CM

## 2019-06-14 RX ORDER — MONTELUKAST SODIUM 4 MG/1
1 TABLET, CHEWABLE ORAL 2 TIMES DAILY
Qty: 180 TABLET | Refills: 3 | Status: SHIPPED | OUTPATIENT
Start: 2019-06-14 | End: 2019-07-03

## 2019-06-20 ENCOUNTER — PATIENT OUTREACH (OUTPATIENT)
Dept: FAMILY MEDICINE CLINIC | Facility: CLINIC | Age: 69
End: 2019-06-20

## 2019-06-25 ENCOUNTER — OFFICE VISIT (OUTPATIENT)
Dept: INTERNAL MEDICINE CLINIC | Facility: CLINIC | Age: 69
End: 2019-06-25
Payer: MEDICARE

## 2019-06-25 VITALS
HEART RATE: 85 BPM | WEIGHT: 182 LBS | SYSTOLIC BLOOD PRESSURE: 100 MMHG | HEIGHT: 70 IN | BODY MASS INDEX: 26.05 KG/M2 | RESPIRATION RATE: 18 BRPM | DIASTOLIC BLOOD PRESSURE: 62 MMHG | OXYGEN SATURATION: 95 %

## 2019-06-25 DIAGNOSIS — K21.9 GASTROESOPHAGEAL REFLUX DISEASE WITHOUT ESOPHAGITIS: ICD-10-CM

## 2019-06-25 DIAGNOSIS — J43.9 PULMONARY EMPHYSEMA, UNSPECIFIED EMPHYSEMA TYPE (HCC): ICD-10-CM

## 2019-06-25 DIAGNOSIS — L72.3 SEBACEOUS CYST: ICD-10-CM

## 2019-06-25 DIAGNOSIS — Z13.6 SCREENING FOR HEART DISEASE: ICD-10-CM

## 2019-06-25 DIAGNOSIS — Z00.00 ENCOUNTER FOR MEDICARE ANNUAL WELLNESS EXAM: Primary | ICD-10-CM

## 2019-06-25 DIAGNOSIS — K59.1 FUNCTIONAL DIARRHEA: ICD-10-CM

## 2019-06-25 DIAGNOSIS — D22.5 NEVUS OF BACK: ICD-10-CM

## 2019-06-25 DIAGNOSIS — M79.10 MYALGIA: ICD-10-CM

## 2019-06-25 PROBLEM — J40 TRACHEOBRONCHITIS: Status: RESOLVED | Noted: 2018-12-22 | Resolved: 2019-06-25

## 2019-06-25 PROCEDURE — G0439 PPPS, SUBSEQ VISIT: HCPCS | Performed by: PHYSICIAN ASSISTANT

## 2019-06-25 PROCEDURE — 99214 OFFICE O/P EST MOD 30 MIN: CPT | Performed by: PHYSICIAN ASSISTANT

## 2019-06-28 DIAGNOSIS — J44.9 CHRONIC OBSTRUCTIVE PULMONARY DISEASE, UNSPECIFIED COPD TYPE (HCC): ICD-10-CM

## 2019-07-02 ENCOUNTER — APPOINTMENT (OUTPATIENT)
Dept: LAB | Facility: CLINIC | Age: 69
End: 2019-07-02
Payer: MEDICARE

## 2019-07-02 DIAGNOSIS — J43.9 PULMONARY EMPHYSEMA, UNSPECIFIED EMPHYSEMA TYPE (HCC): ICD-10-CM

## 2019-07-02 DIAGNOSIS — Z13.6 SCREENING FOR CARDIOVASCULAR CONDITION: ICD-10-CM

## 2019-07-02 DIAGNOSIS — K21.9 GASTROESOPHAGEAL REFLUX DISEASE WITHOUT ESOPHAGITIS: ICD-10-CM

## 2019-07-02 DIAGNOSIS — M79.10 MYALGIA: ICD-10-CM

## 2019-07-02 DIAGNOSIS — K59.1 FUNCTIONAL DIARRHEA: ICD-10-CM

## 2019-07-02 DIAGNOSIS — Z13.6 SCREENING FOR HEART DISEASE: ICD-10-CM

## 2019-07-02 LAB
25(OH)D3 SERPL-MCNC: 12.1 NG/ML (ref 30–100)
ALBUMIN SERPL BCP-MCNC: 3.8 G/DL (ref 3.5–5)
ALP SERPL-CCNC: 86 U/L (ref 46–116)
ALT SERPL W P-5'-P-CCNC: 19 U/L (ref 12–78)
ANION GAP SERPL CALCULATED.3IONS-SCNC: 3 MMOL/L (ref 4–13)
AST SERPL W P-5'-P-CCNC: 20 U/L (ref 5–45)
BASOPHILS # BLD AUTO: 0.06 THOUSANDS/ΜL (ref 0–0.1)
BASOPHILS NFR BLD AUTO: 1 % (ref 0–1)
BILIRUB SERPL-MCNC: 1.05 MG/DL (ref 0.2–1)
BUN SERPL-MCNC: 26 MG/DL (ref 5–25)
CALCIUM SERPL-MCNC: 9 MG/DL (ref 8.3–10.1)
CHLORIDE SERPL-SCNC: 101 MMOL/L (ref 100–108)
CHOLEST SERPL-MCNC: 181 MG/DL (ref 50–200)
CO2 SERPL-SCNC: 32 MMOL/L (ref 21–32)
CREAT SERPL-MCNC: 0.77 MG/DL (ref 0.6–1.3)
EOSINOPHIL # BLD AUTO: 0.25 THOUSAND/ΜL (ref 0–0.61)
EOSINOPHIL NFR BLD AUTO: 4 % (ref 0–6)
ERYTHROCYTE [DISTWIDTH] IN BLOOD BY AUTOMATED COUNT: 15.9 % (ref 11.6–15.1)
GFR SERPL CREATININE-BSD FRML MDRD: 94 ML/MIN/1.73SQ M
GLUCOSE P FAST SERPL-MCNC: 89 MG/DL (ref 65–99)
HCT VFR BLD AUTO: 42.1 % (ref 36.5–49.3)
HDLC SERPL-MCNC: 60 MG/DL (ref 40–60)
HGB BLD-MCNC: 13.5 G/DL (ref 12–17)
IMM GRANULOCYTES # BLD AUTO: 0.02 THOUSAND/UL (ref 0–0.2)
IMM GRANULOCYTES NFR BLD AUTO: 0 % (ref 0–2)
LDLC SERPL CALC-MCNC: 96 MG/DL (ref 0–100)
LYMPHOCYTES # BLD AUTO: 2.64 THOUSANDS/ΜL (ref 0.6–4.47)
LYMPHOCYTES NFR BLD AUTO: 37 % (ref 14–44)
MCH RBC QN AUTO: 29.7 PG (ref 26.8–34.3)
MCHC RBC AUTO-ENTMCNC: 32.1 G/DL (ref 31.4–37.4)
MCV RBC AUTO: 93 FL (ref 82–98)
MONOCYTES # BLD AUTO: 0.78 THOUSAND/ΜL (ref 0.17–1.22)
MONOCYTES NFR BLD AUTO: 11 % (ref 4–12)
NEUTROPHILS # BLD AUTO: 3.3 THOUSANDS/ΜL (ref 1.85–7.62)
NEUTS SEG NFR BLD AUTO: 47 % (ref 43–75)
NONHDLC SERPL-MCNC: 121 MG/DL
NRBC BLD AUTO-RTO: 0 /100 WBCS
PLATELET # BLD AUTO: 258 THOUSANDS/UL (ref 149–390)
PMV BLD AUTO: 10.2 FL (ref 8.9–12.7)
POTASSIUM SERPL-SCNC: 4.3 MMOL/L (ref 3.5–5.3)
PROT SERPL-MCNC: 7.5 G/DL (ref 6.4–8.2)
RBC # BLD AUTO: 4.55 MILLION/UL (ref 3.88–5.62)
SODIUM SERPL-SCNC: 136 MMOL/L (ref 136–145)
T4 FREE SERPL-MCNC: 0.75 NG/DL (ref 0.76–1.46)
TRIGL SERPL-MCNC: 124 MG/DL
TSH SERPL DL<=0.05 MIU/L-ACNC: 0.84 UIU/ML (ref 0.36–3.74)
WBC # BLD AUTO: 7.05 THOUSAND/UL (ref 4.31–10.16)

## 2019-07-02 PROCEDURE — 80061 LIPID PANEL: CPT

## 2019-07-02 PROCEDURE — 82306 VITAMIN D 25 HYDROXY: CPT

## 2019-07-02 PROCEDURE — 85025 COMPLETE CBC W/AUTO DIFF WBC: CPT

## 2019-07-02 PROCEDURE — 80053 COMPREHEN METABOLIC PANEL: CPT

## 2019-07-02 PROCEDURE — 84443 ASSAY THYROID STIM HORMONE: CPT

## 2019-07-02 PROCEDURE — 84439 ASSAY OF FREE THYROXINE: CPT

## 2019-07-02 PROCEDURE — 36415 COLL VENOUS BLD VENIPUNCTURE: CPT

## 2019-07-03 ENCOUNTER — OFFICE VISIT (OUTPATIENT)
Dept: GASTROENTEROLOGY | Facility: CLINIC | Age: 69
End: 2019-07-03
Payer: MEDICARE

## 2019-07-03 ENCOUNTER — PATIENT OUTREACH (OUTPATIENT)
Dept: INTERNAL MEDICINE CLINIC | Facility: CLINIC | Age: 69
End: 2019-07-03

## 2019-07-03 VITALS
HEART RATE: 90 BPM | HEIGHT: 70 IN | DIASTOLIC BLOOD PRESSURE: 80 MMHG | WEIGHT: 181.6 LBS | SYSTOLIC BLOOD PRESSURE: 110 MMHG | BODY MASS INDEX: 26 KG/M2

## 2019-07-03 DIAGNOSIS — K58.0 IRRITABLE BOWEL SYNDROME WITH DIARRHEA: Primary | ICD-10-CM

## 2019-07-03 DIAGNOSIS — E55.9 VITAMIN D DEFICIENCY: Primary | ICD-10-CM

## 2019-07-03 PROCEDURE — 99213 OFFICE O/P EST LOW 20 MIN: CPT | Performed by: PHYSICIAN ASSISTANT

## 2019-07-03 RX ORDER — ERGOCALCIFEROL 1.25 MG/1
50000 CAPSULE ORAL WEEKLY
Qty: 4 CAPSULE | Refills: 5 | Status: SHIPPED | OUTPATIENT
Start: 2019-07-03 | End: 2019-11-07 | Stop reason: SDUPTHER

## 2019-07-03 NOTE — LETTER
July 3, 2019     MD ADAN Salda[address]  [address] [address]    Patient: Torsten Stewart   YOB: 1950   Date of Visit: 7/3/2019       Dear Dr Diana Eason: Thank you for referring Torsten Stewart to me for evaluation  Below are my notes for this consultation  If you have questions, please do not hesitate to call me  I look forward to following your patient along with you  Sincerely,        Bekah Escobar PA-C        CC: No Recipients  Jorge Addison  7/3/2019 10:34 AM  Sign at close encounter  [facility] - Outpatient Follow-up Note  Torsten Stewart 81 y o  male MRN: [id number]  Encounter: [id number]          ASSESSMENT AND PLAN:      1  Irritable bowel syndrome with diarrhea  Will start patient on fiber supplementation b i d     Will start patient on align probiotic  Dietary modifications and fiber handout given  If patient is still symptomatic despite fiber and probiotic will consider the use of low-dose Viberzi     ______________________________________________________________________    SUBJECTIVE:    82-year-old male who is here with irritable bowel syndrome diarrhea predominance  Patient reports that Questran and Colestid aggravated his bloating and gas  Patient reports that he is requesting another medication that will not aggravate his bloating and gas  He reports that when his bloating become so severe it pushes on his lungs and he already has breathing issues and is on chronic oxygen therapy  Patient reports he has loose stool every time he eats food  He is status post lap choly  Patient denies any significant abdominal pain at the present time  Patient denies any unintentional weight loss  Patient denies any melena or rectal bleeding  He reports that his primary care doctor did recommend simethicone/B no  REVIEW OF SYSTEMS IS OTHERWISE NEGATIVE        Historical Information   Past Medical History:   Diagnosis Date    BiPAP (biphasic positive airway pressure) dependence     Centrilobular emphysema (HCC)     COPD (chronic obstructive pulmonary disease) (HCC)     Hypoglycemia     On home oxygen therapy     Pneumonia     Pulmonary hypertension (HCC)     Respiratory failure (HCC)     SOB (shortness of breath)      Past Surgical History:   Procedure Laterality Date    CATARACT EXTRACTION      CHOLECYSTECTOMY      hernia    EYE SURGERY      CA ESOPHAGOGASTRODUODENOSCOPY TRANSORAL DIAGNOSTIC N/A 2018    Procedure: ESOPHAGOGASTRODUODENOSCOPY (EGD); Surgeon: Sariah Toth MD;  Location: [facility];   Service: Gastroenterology    CA REPAIR [address] HERNIA,5+Y/O,REDUCIBL Left 2018    Procedure: OPEN INGUINAL HERNIA REPAIR WITH MESH;  Surgeon: Rory Perez MD;  Location: [facility];  Service: General     Social History   Social History     Substance and Sexual Activity   Alcohol Use Yes    Alcohol/week: 1 0 standard drinks    Types: 1 Glasses of wine per week    Comment: socialy     Social History     Substance and Sexual Activity   Drug Use Never     Social History     Tobacco Use   Smoking Status Former Smoker    Packs/day: 0 00    Years: 50 00    Pack years: 0 00    Types: Cigarettes    Last attempt to quit: 2013    Years since quittin 3   Smokeless Tobacco Never Used     Family History   Problem Relation Age of Onset    Diabetes Mother     Hypertension Mother     Hyperlipidemia Mother        Meds/Allergies       Current Outpatient Medications:     azithromycin (ZITHROMAX) 250 mg tablet    BREO ELLIPTA 100-25 MCG/INH inhaler    clotrimazole (MYCELEX) 10 mg bassem    ergocalciferol (VITAMIN D2) 50,000 units    fluticasone (FLONASE) 50 mcg/act nasal spray    guaiFENesin (MUCINEX) 600 mg 12 hr tablet    ipratropium (ATROVENT) 0 02 % nebulizer solution    levalbuterol (XOPENEX HFA) 45 mcg/act inhaler    levalbuterol (XOPENEX) 1 25 mg/3 mL nebulizer solution    loratadine (CLARITIN) 10 mg tablet    montelukast (SINGULAIR) 10 mg tablet    ranitidine (ZANTAC) 150 mg tablet    sertraline (ZOLOFT) 50 mg tablet    spironolactone (ALDACTONE) 25 mg tablet    tamsulosin (FLOMAX) 0 4 mg    Allergies   Allergen Reactions    Penicillins Hives     Passed out    Codeine Dizziness           Objective     Blood pressure 110/80, pulse 90, height 5' 10" (1 778 m), weight 82 4 kg (181 lb 9 6 oz)  Body mass index is 26 06 kg/m²  PHYSICAL EXAM:      General Appearance:   Alert, cooperative, no distress   HEENT:   Normocephalic, atraumatic, anicteric      Neck:  Supple, symmetrical, trachea midline   Lungs:   Clear to auscultation bilaterally; no rales, rhonchi or wheezing; respirations unlabored    Heart[de-identified]   Regular rate and rhythm; no murmur, rub, or gallop  Abdomen:   Soft, non-tender, non-distended; normal bowel sounds; no masses, no organomegaly    Genitalia:   Deferred    Rectal:   Deferred    Extremities:  No cyanosis, clubbing or edema    Pulses:  2+ and symmetric    Skin:  No jaundice, rashes, or lesions    Lymph nodes:  No palpable cervical lymphadenopathy        Lab Results:   No visits with results within 1 Day(s) from this visit     Latest known visit with results is:   Appointment on 07/02/2019   Component Date Value    WBC 07/02/2019 7 05     RBC 07/02/2019 4 55     Hemoglobin 07/02/2019 13 5     Hematocrit 07/02/2019 42 1     MCV 07/02/2019 93     MCH 07/02/2019 29 7     MCHC 07/02/2019 32 1     RDW 07/02/2019 15 9*    MPV 07/02/2019 10 2     Platelets 11/07/6436 258     nRBC 07/02/2019 0     Neutrophils Relative 07/02/2019 47     Immat GRANS % 07/02/2019 0     Lymphocytes Relative 07/02/2019 37     Monocytes Relative 07/02/2019 11     Eosinophils Relative 07/02/2019 4     Basophils Relative 07/02/2019 1     Neutrophils Absolute 07/02/2019 3 30     Immature Grans Absolute 07/02/2019 0 02     Lymphocytes Absolute 07/02/2019 2 64     Monocytes Absolute 07/02/2019 0 78     Eosinophils Absolute 07/02/2019 0 25     Basophils Absolute 07/02/2019 0 06     Sodium 07/02/2019 136     Potassium 07/02/2019 4 3     Chloride 07/02/2019 101     CO2 07/02/2019 32     ANION GAP 07/02/2019 3*    BUN 07/02/2019 26*    Creatinine 07/02/2019 0 77     Glucose, Fasting 07/02/2019 89     Calcium 07/02/2019 9 0     AST 07/02/2019 20     ALT 07/02/2019 19     Alkaline Phosphatase 07/02/2019 86     Total Protein 07/02/2019 7 5     Albumin 07/02/2019 3 8     Total Bilirubin 07/02/2019 1 05*    eGFR 07/02/2019 94     Cholesterol 07/02/2019 181     Triglycerides 07/02/2019 124     HDL, Direct 07/02/2019 60     LDL Calculated 07/02/2019 96     Non-HDL-Chol (CHOL-HDL) 07/02/2019 121     Vit D, 25-Hydroxy 07/02/2019 12 1*    Free T4 07/02/2019 0 75*    TSH 3RD GENERATON 07/02/2019 0 844          Radiology Results:   No results found

## 2019-07-03 NOTE — PROGRESS NOTES
[facility] - Outpatient Follow-up Note  Luigi Rogers 81 y o  male MRN: [id number]  Encounter: [id number]          ASSESSMENT AND PLAN:      1  Irritable bowel syndrome with diarrhea  Will start patient on fiber supplementation b i d     Will start patient on align probiotic  Dietary modifications and fiber handout given  If patient is still symptomatic despite fiber and probiotic will consider the use of low-dose Viberzi     ______________________________________________________________________    SUBJECTIVE:    24-year-old male who is here with irritable bowel syndrome diarrhea predominance  Patient reports that Questran and Colestid aggravated his bloating and gas  Patient reports that he is requesting another medication that will not aggravate his bloating and gas  He reports that when his bloating become so severe it pushes on his lungs and he already has breathing issues and is on chronic oxygen therapy  Patient reports he has loose stool every time he eats food  He is status post lap choly  Patient denies any significant abdominal pain at the present time  Patient denies any unintentional weight loss  Patient denies any melena or rectal bleeding  He reports that his primary care doctor did recommend simethicone/B no  REVIEW OF SYSTEMS IS OTHERWISE NEGATIVE        Historical Information   Past Medical History:   Diagnosis Date    BiPAP (biphasic positive airway pressure) dependence     Centrilobular emphysema (HCC)     COPD (chronic obstructive pulmonary disease) (HCC)     Hypoglycemia     On home oxygen therapy     Pneumonia     Pulmonary hypertension (HCC)     Respiratory failure (HCC)     SOB (shortness of breath)      Past Surgical History:   Procedure Laterality Date    CATARACT EXTRACTION      CHOLECYSTECTOMY      hernia    EYE SURGERY      [place] ESOPHAGOGASTRODUODENOSCOPY TRANSORAL DIAGNOSTIC N/A 9/19/2018    Procedure: ESOPHAGOGASTRODUODENOSCOPY (EGD); Surgeon: Jayde Clay MD;  Location: [facility]; Service: Gastroenterology    MI REPAIR ING HERNIA,5+Y/O,REDUCIBL Left 2018    Procedure: OPEN INGUINAL HERNIA REPAIR WITH MESH;  Surgeon: Cyndi Bradley MD;  Location: [facility];  Service: General     Social History   Social History     Substance and Sexual Activity   Alcohol Use Yes    Alcohol/week: 1 0 standard drinks    Types: 1 Glasses of wine per week    Comment: socialy     Social History     Substance and Sexual Activity   Drug Use Never     Social History     Tobacco Use   Smoking Status Former Smoker    Packs/day: 0 00    Years: 50 00    Pack years: 0 00    Types: Cigarettes    Last attempt to quit: 2013    Years since quittin 3   Smokeless Tobacco Never Used     Family History   Problem Relation Age of Onset    Diabetes Mother     Hypertension Mother     Hyperlipidemia Mother        Meds/Allergies       Current Outpatient Medications:     azithromycin (ZITHROMAX) 250 mg tablet    BREO ELLIPTA 100-25 MCG/INH inhaler    clotrimazole (MYCELEX) 10 mg bsasem    ergocalciferol (VITAMIN D2) 50,000 units    fluticasone (FLONASE) 50 mcg/act nasal spray    guaiFENesin (MUCINEX) 600 mg 12 hr tablet    ipratropium (ATROVENT) 0 02 % nebulizer solution    levalbuterol (XOPENEX HFA) 45 mcg/act inhaler    levalbuterol (XOPENEX) 1 25 mg/3 mL nebulizer solution    loratadine (CLARITIN) 10 mg tablet    montelukast (SINGULAIR) 10 mg tablet    ranitidine (ZANTAC) 150 mg tablet    sertraline (ZOLOFT) 50 mg tablet    spironolactone (ALDACTONE) 25 mg tablet    tamsulosin (FLOMAX) 0 4 mg    Allergies   Allergen Reactions    Penicillins Hives     Passed out    Codeine Dizziness           Objective     Blood pressure 110/80, pulse 90, height 5' 10" (1 778 m), weight 82 4 kg (181 lb 9 6 oz)  Body mass index is 26 06 kg/m²        PHYSICAL EXAM:      General Appearance:   Alert, cooperative, no distress   HEENT:   Normocephalic, atraumatic, anicteric      Neck:  Supple, symmetrical, trachea midline   Lungs:   Clear to auscultation bilaterally; no rales, rhonchi or wheezing; respirations unlabored    Heart[de-identified]   Regular rate and rhythm; no murmur, rub, or gallop  Abdomen:   Soft, non-tender, non-distended; normal bowel sounds; no masses, no organomegaly    Genitalia:   Deferred    Rectal:   Deferred    Extremities:  No cyanosis, clubbing or edema    Pulses:  2+ and symmetric    Skin:  No jaundice, rashes, or lesions    Lymph nodes:  No palpable cervical lymphadenopathy        Lab Results:   No visits with results within 1 Day(s) from this visit     Latest known visit with results is:   Appointment on 07/02/2019   Component Date Value    WBC 07/02/2019 7 05     RBC 07/02/2019 4 55     Hemoglobin 07/02/2019 13 5     Hematocrit 07/02/2019 42 1     MCV 07/02/2019 93     MCH 07/02/2019 29 7     MCHC 07/02/2019 32 1     RDW 07/02/2019 15 9*    MPV 07/02/2019 10 2     Platelets 20/95/7006 258     nRBC 07/02/2019 0     Neutrophils Relative 07/02/2019 47     Immat GRANS % 07/02/2019 0     Lymphocytes Relative 07/02/2019 37     Monocytes Relative 07/02/2019 11     Eosinophils Relative 07/02/2019 4     Basophils Relative 07/02/2019 1     Neutrophils Absolute 07/02/2019 3 30     Immature Grans Absolute 07/02/2019 0 02     Lymphocytes Absolute 07/02/2019 2 64     Monocytes Absolute 07/02/2019 0 78     Eosinophils Absolute 07/02/2019 0 25     Basophils Absolute 07/02/2019 0 06     Sodium 07/02/2019 136     Potassium 07/02/2019 4 3     Chloride 07/02/2019 101     CO2 07/02/2019 32     ANION GAP 07/02/2019 3*    BUN 07/02/2019 26*    Creatinine 07/02/2019 0 77     Glucose, Fasting 07/02/2019 89     Calcium 07/02/2019 9 0     AST 07/02/2019 20     ALT 07/02/2019 19     Alkaline Phosphatase 07/02/2019 86     Total Protein 07/02/2019 7 5     Albumin 07/02/2019 3 8     Total Bilirubin 07/02/2019 1 05*    eGFR 07/02/2019 94     Cholesterol 07/02/2019 181     Triglycerides 07/02/2019 124     HDL, Direct 07/02/2019 60     LDL Calculated 07/02/2019 96     Non-HDL-Chol (CHOL-HDL) 07/02/2019 121     Vit D, 25-Hydroxy 07/02/2019 12 1*    Free T4 07/02/2019 0 75*    TSH 3RD GENERATON 07/02/2019 0 844          Radiology Results:   No results found

## 2019-07-03 NOTE — PATIENT INSTRUCTIONS
Nutrition Tips for Relief of Diarrhea   WHAT YOU NEED TO KNOW:   There are diet changes you can make to help relieve or stop diarrhea  These changes include limiting or avoiding foods and liquids that are high in sugar, fat, fiber, and lactose  Lactose is a sugar found in milk products  Milk products can cause diarrhea in people who are lactose intolerant  You should also drink extra liquids to replace fluids that are lost when you have diarrhea  Diarrhea can lead to dehydration  DISCHARGE INSTRUCTIONS:   Foods to limit or avoid:   · Dairy:      ¨ Whole milk    ¨ Half-and-half, cream, and sour cream    ¨ Regular (whole milk) ice cream    · Grains:      ¨ Whole wheat and whole grain breads, pasta, cereals, and crackers    ¨ Brown and wild rice    ¨ Breads and cereals with seeds or nuts    ¨ Popcorn    · Fruit and vegetables:      ¨ All raw fruits, except bananas and melon    ¨ Dried fruits, including prunes and raisins    ¨ Canned fruit in heavy syrup    ¨ Prune juice and any fruit juice with pulp    ¨ Raw vegetables, except lettuce     ¨ Fried vegetables    ¨ Corn, raw and cooked broccoli, cabbage, cauliflower, and fareed greens    · Protein:      ¨ Fried meat, poultry, and fish    ¨ High-fat luncheon meats, such as bologna    ¨ Fatty meats, such as sausage, perkins, and hot dogs    ¨ Beans and nuts    · Liquids:      ¨ Sodas and fruit-flavored drinks    ¨ Drinks that contain caffeine, such as energy drinks, coffee, and tea     ¨ Drinks that contain alcohol or sugar alcohol, such as sorbitol  Foods and liquids you may eat or drink:  Most people can tolerate the foods and liquids listed below  If any of them make your symptoms worse, stop eating or drinking them until you feel better  If you are lactose intolerant, avoid milk products    · Dairy:      ¨ Skim or low-fat milk or evaporated milk    ¨ Soy milk or buttermilk     ¨ Low-fat, part-skim, and aged cheese    ¨ Yogurt, low-fat ice cream, or sherbert    · Grains: (Choose foods with less than 2 grams of dietary fiber per serving )     ¨ White or refined flour breads, bagels, pasta, and crackers    ¨ Cold or hot cereals made from white or refined flour such as puffed rice, cornflakes, or cream of wheat    ¨ White rice    · Fruit and vegetables:      ¨ Bananas or melon    ¨ Fruit juice without pulp, except prune juice    ¨ Canned fruit in juice or light syrup    ¨ Lettuce and most well-cooked vegetables without seeds or skins     ¨ Strained vegetable juice    · Protein:      ¨ Tender, well-cooked meat, poultry, or fish    ¨ Well-cooked eggs or soy foods (cooked without added fat)    ¨ Smooth nut butters    · Fats:  (Limit fats to less than 8 teaspoons a day)     ¨ Oil, butter, or margarine, or mayonnaise    ¨ Cream cheese or salad dressings    · Liquids:      ¨ For infants, breast milk or formula    ¨ Oral rehydration solution     ¨ Decaffeinated coffee or caffeine-free teas    ¨ Soft drinks without caffeine  Other guidelines to follow:   · Drink liquids as directed  You may need to drink more liquids than usual to prevent dehydration  Ask how much liquid to drink each day and which liquids are best for you  You may need to drink an oral rehydration solution (ORS)  An ORS helps replace fluids and electrolytes that you lose when you have diarrhea  · Eat small meals or snacks every 3 to 4 hours  instead of large meals  Continue eating even if you still have diarrhea  Your diarrhea will continue for a few days but should gradually go away  © 2017 [employer] Information is for End User's use only and may not be sold, redistributed or otherwise used for commercial purposes  All illustrations and images included in CareNotes® are the copyrighted property of [employer] , [employer]  or Harry Hernandez  The above information is an  only  It is not intended as medical advice for individual conditions or treatments   Talk to your doctor, nurse or pharmacist before following any medical regimen to see if it is safe and effective for you

## 2019-07-10 ENCOUNTER — PATIENT OUTREACH (OUTPATIENT)
Dept: INTERNAL MEDICINE CLINIC | Facility: CLINIC | Age: 69
End: 2019-07-10

## 2019-07-10 NOTE — PROGRESS NOTES
Spoke to Doreen Boyle (Petros's wife)  She stated that Shmuel Mejias has been feeling fairly well  Tolerating his medications  Also tolerating his oxygen at 3L via nc  Denies pain, discomfort, cough, or SOB  Was having c/o diarhea a few days ago but it has resolved  Has been eating well  Discussed symptoms monitoring for COPD and importance of notifying care management and or PCP with any changes  Also discussed appropriate use of ED and alternatives if appropriate  Educated on good medication management and importance of adherence  Also educated on cleaning and maintenance of neb equipment and proper way of using nebulizer  She verbalized understanding  Continues to have contact information to outpatient care management if need be  Will follow up

## 2019-07-15 ENCOUNTER — HOSPITAL ENCOUNTER (OUTPATIENT)
Dept: CT IMAGING | Facility: HOSPITAL | Age: 69
Discharge: HOME | End: 2019-07-15
Payer: MEDICARE

## 2019-07-15 DIAGNOSIS — R93.89 ABNORMAL CHEST CT: ICD-10-CM

## 2019-07-15 PROCEDURE — 71250 CT THORAX DX C-: CPT

## 2019-07-16 ENCOUNTER — OFFICE VISIT (OUTPATIENT)
Dept: SURGERY | Facility: CLINIC | Age: 69
End: 2019-07-16
Payer: MEDICARE

## 2019-07-16 VITALS
BODY MASS INDEX: 26.34 KG/M2 | SYSTOLIC BLOOD PRESSURE: 130 MMHG | HEIGHT: 70 IN | HEART RATE: 70 BPM | DIASTOLIC BLOOD PRESSURE: 70 MMHG | TEMPERATURE: 98 F | RESPIRATION RATE: 14 BRPM | WEIGHT: 184 LBS

## 2019-07-16 DIAGNOSIS — L98.9 SKIN LESION: ICD-10-CM

## 2019-07-16 DIAGNOSIS — L98.9 SCALP LESION: Primary | ICD-10-CM

## 2019-07-16 PROCEDURE — 99213 OFFICE O/P EST LOW 20 MIN: CPT | Performed by: SURGERY

## 2019-07-16 RX ORDER — SODIUM CHLORIDE, SODIUM LACTATE, POTASSIUM CHLORIDE, CALCIUM CHLORIDE 600; 310; 30; 20 MG/100ML; MG/100ML; MG/100ML; MG/100ML
125 INJECTION, SOLUTION INTRAVENOUS
Status: CANCELLED | OUTPATIENT
Start: 2019-07-16 | End: 2019-07-17

## 2019-07-16 NOTE — H&P (VIEW-ONLY)
Follow Up - General Surgery   Princess Spaulding 81 y o  male MRN: [id number]  Unit/Bed#:  Encounter: [id number]    Assessment/Plan     Assessment:  Scalp lesion, suspicious for sebaceous cyst, skin lesion from the back  Plan:  I advised the patient to undergo excision of the scalp and back lesions under mild sedation in the near future  I discussed the operative procedure, risks, benefits and alternatives with the patient, he understood and agreed to proceed  History of Present Illness     HPI:  Princess Spaulding is a 81 y o  male who presents to my office for evaluation of a scalp lesion and a back lesion  The patient noted the scalp lesion for approximately a year, has been increasing in size and causing itching and discomfort on and off  Denied having any redness or drainage  In addition the patient noted a dark blue skin lesion on the back, increasing in size and causing itching as well  Patient is well known to me from inguinal hernia repair  Review of Systems   All other systems reviewed and are negative  Historical Information   Past Medical History:   Diagnosis Date    BiPAP (biphasic positive airway pressure) dependence     Centrilobular emphysema (HCC)     COPD (chronic obstructive pulmonary disease) (HCC)     Hypoglycemia     On home oxygen therapy     Pneumonia     Pulmonary hypertension (HCC)     Respiratory failure (HCC)     SOB (shortness of breath)      Past Surgical History:   Procedure Laterality Date    CATARACT EXTRACTION      CHOLECYSTECTOMY      hernia    EYE SURGERY      [place] ESOPHAGOGASTRODUODENOSCOPY TRANSORAL DIAGNOSTIC N/A 9/19/2018    Procedure: ESOPHAGOGASTRODUODENOSCOPY (EGD); Surgeon: Sariah Toth MD;  Location: [facility];   Service: Gastroenterology    [place] REPAIR [address] HERNIA,5+Y/O,REDUCIBL Left 5/23/2018    Procedure: OPEN INGUINAL HERNIA REPAIR WITH MESH;  Surgeon: Rory Perez MD;  Location: [facility];  Service: General     Social History   Social History     Substance and Sexual Activity   Alcohol Use Yes    Alcohol/week: 1 0 standard drinks    Types: 1 Glasses of wine per week    Comment: socialy     Social History     Substance and Sexual Activity   Drug Use Never     Social History     Tobacco Use   Smoking Status Former Smoker    Packs/day: 0 00    Years: 50 00    Pack years: 0 00    Types: Cigarettes    Last attempt to quit: 2013    Years since quittin 3   Smokeless Tobacco Never Used     Family History: non-contributory    Meds/Allergies   all medications and allergies reviewed     Current Outpatient Medications:     azithromycin (ZITHROMAX) 250 mg tablet, One tab PO Mon, Wed, Fri, Disp: 12 tablet, Rfl: 3    BREO ELLIPTA 100-25 MCG/INH inhaler, INHALE 1 PUFF ONCE  RINSE MOUTH AFTER USE, Disp: 1 Inhaler, Rfl: 5    clotrimazole (MYCELEX) 10 mg bassem, Take 1 tablet (10 mg total) by mouth 5 (five) times a day, Disp: 70 tablet, Rfl: 0    ergocalciferol (VITAMIN D2) 50,000 units, Take 1 capsule (50,000 Units total) by mouth once a week, Disp: 4 capsule, Rfl: 5    fluticasone (FLONASE) 50 mcg/act nasal spray, 1 spray into each nostril daily, Disp: 1 Bottle, Rfl: 3    guaiFENesin (MUCINEX) 600 mg 12 hr tablet, Take 1 tablet (600 mg total) by mouth every 12 (twelve) hours, Disp: 60 tablet, Rfl: 0    ipratropium (ATROVENT) 0 02 % nebulizer solution, Take 1 vial (0 5 mg total) by nebulization every 6 (six) hours as needed for wheezing or shortness of breath, Disp: 120 vial, Rfl: 3    levalbuterol (XOPENEX HFA) 45 mcg/act inhaler, Inhale 1-2 puffs every 4 (four) hours as needed for wheezing or shortness of breath, Disp: 1 Inhaler, Rfl: 3    levalbuterol (XOPENEX) 1 25 mg/3 mL nebulizer solution, Take 1 vial (1 25 mg total) by nebulization every 6 (six) hours as needed for wheezing or shortness of breath, Disp: 120 vial, Rfl: 3    loratadine (CLARITIN) 10 mg tablet, Take 10 mg by mouth daily, Disp: , Rfl:    montelukast (SINGULAIR) 10 mg tablet, Take 1 tablet (10 mg total) by mouth daily at bedtime, Disp: 30 tablet, Rfl: 3    ranitidine (ZANTAC) 150 mg tablet, take 1 tablet by mouth at bedtime, Disp: 30 tablet, Rfl: 5    sertraline (ZOLOFT) 50 mg tablet, Take 2 tablets (100 mg total) by mouth daily, Disp: 30 tablet, Rfl: 5    spironolactone (ALDACTONE) 25 mg tablet, Take 1 tablet (25 mg total) by mouth daily, Disp: 90 tablet, Rfl: 3    tamsulosin (FLOMAX) 0 4 mg, Take 1 capsule (0 4 mg total) by mouth daily with dinner, Disp: 30 capsule, Rfl: 5  Allergies   Allergen Reactions    Penicillins Hives     Passed out    Codeine Dizziness       Objective     Current Vitals:   Blood Pressure: 130/70 (07/16/19 1124)  Pulse: 70 (07/16/19 1124)  Temperature: 98 °F (36 7 °C) (07/16/19 1124)  Temp Source: Tympanic (07/16/19 1124)  Respirations: 14(oxygen level (3)) (07/16/19 1124)  Height: 5' 10" (177 8 cm) (07/16/19 1124)  Weight - Scale: 83 5 kg (184 lb) (07/16/19 1124)      Invasive Devices     None                 Physical Exam   Constitutional: He is oriented to person, place, and time  He appears well-developed and well-nourished  No distress  Patient with oxygen at home  HENT:   Head: Normocephalic  Mouth/Throat: No oropharyngeal exudate  There is a 2 5 cm lump on the anterior aspect of the right parietal region, no redness or tenderness to palpation  Eyes: Pupils are equal, round, and reactive to light  No scleral icterus  Neck: Normal range of motion  Neck supple  Cardiovascular: Normal rate and regular rhythm  No murmur heard  Pulmonary/Chest: Effort normal and breath sounds normal    Abdominal: Soft  Bowel sounds are normal  He exhibits no mass  There is no tenderness  Musculoskeletal: He exhibits no edema or deformity  Lymphadenopathy:     He has no cervical adenopathy  Neurological: He is alert and oriented to person, place, and time  No cranial nerve deficit  Skin: No erythema  No pallor  Psychiatric: He has a normal mood and affect  His behavior is normal    Nursing note and vitals reviewed

## 2019-07-16 NOTE — PRE-PROCEDURE INSTRUCTIONS
Pre-Surgery Instructions:   Medication Instructions    azithromycin (ZITHROMAX) 250 mg tablet Patient was instructed by Physician and understands   BREO ELLIPTA 100-25 MCG/INH inhaler Patient was instructed by Physician and understands   ergocalciferol (VITAMIN D2) 50,000 units Patient was instructed by Physician and understands   fluticasone (FLONASE) 50 mcg/act nasal spray Patient was instructed by Physician and understands   ipratropium (ATROVENT) 0 02 % nebulizer solution Patient was instructed by Physician and understands   [facility]) 45 mcg/act inhaler Patient was instructed by Physician and understands   levalbuterol (XOPENEX) 1 25 mg/3 mL nebulizer solution Patient was instructed by Physician and understands   loratadine (CLARITIN) 10 mg tablet Patient was instructed by Physician and understands   montelukast (SINGULAIR) 10 mg tablet Patient was instructed by Physician and understands   ranitidine (ZANTAC) 150 mg tablet Patient was instructed by Physician and understands   sertraline (ZOLOFT) 50 mg tablet Patient was instructed by Physician and understands   spironolactone (ALDACTONE) 25 mg tablet Patient was instructed by Physician and understands   tamsulosin (FLOMAX) 0 4 mg Patient was instructed by Physician and understands

## 2019-07-16 NOTE — PROGRESS NOTES
Follow Up - General Surgery   Donaldo Esptiia 81 y o  male MRN: [id number]  Unit/Bed#:  Encounter: [id number]    Assessment/Plan     Assessment:  Scalp lesion, suspicious for sebaceous cyst, skin lesion from the back  Plan:  I advised the patient to undergo excision of the scalp and back lesions under mild sedation in the near future  I discussed the operative procedure, risks, benefits and alternatives with the patient, he understood and agreed to proceed  History of Present Illness     HPI:  Donaldo Espitia is a 81 y o  male who presents to my office for evaluation of a scalp lesion and a back lesion  The patient noted the scalp lesion for approximately a year, has been increasing in size and causing itching and discomfort on and off  Denied having any redness or drainage  In addition the patient noted a dark blue skin lesion on the back, increasing in size and causing itching as well  Patient is well known to me from inguinal hernia repair  Review of Systems   All other systems reviewed and are negative  Historical Information   Past Medical History:   Diagnosis Date    BiPAP (biphasic positive airway pressure) dependence     Centrilobular emphysema (HCC)     COPD (chronic obstructive pulmonary disease) (HCC)     Hypoglycemia     On home oxygen therapy     Pneumonia     Pulmonary hypertension (HCC)     Respiratory failure (HCC)     SOB (shortness of breath)      Past Surgical History:   Procedure Laterality Date    CATARACT EXTRACTION      CHOLECYSTECTOMY      hernia    EYE SURGERY      SD ESOPHAGOGASTRODUODENOSCOPY TRANSORAL DIAGNOSTIC N/A 9/19/2018    Procedure: ESOPHAGOGASTRODUODENOSCOPY (EGD); Surgeon: Zackery Bowen MD;  Location: [facility];   Service: Gastroenterology    SD REPAIR [address] HERNIA,5+Y/O,REDUCIBL Left 5/23/2018    Procedure: OPEN INGUINAL HERNIA REPAIR WITH MESH;  Surgeon: Brandon Szymanski MD;  [facility]: [facility];  Service: General     Social History   Social History     Substance and Sexual Activity   Alcohol Use Yes    Alcohol/week: 1 0 standard drinks    Types: 1 Glasses of wine per week    Comment: socialy     Social History     Substance and Sexual Activity   Drug Use Never     Social History     Tobacco Use   Smoking Status Former Smoker    Packs/day: 0 00    Years: 50 00    Pack years: 0 00    Types: Cigarettes    Last attempt to quit: 2013    Years since quittin 3   Smokeless Tobacco Never Used     Family History: non-contributory    Meds/Allergies   all medications and allergies reviewed     Current Outpatient Medications:     azithromycin (ZITHROMAX) 250 mg tablet, One tab PO Mon, Wed, Fri, Disp: 12 tablet, Rfl: 3    BREO ELLIPTA 100-25 MCG/INH inhaler, INHALE 1 PUFF ONCE  RINSE MOUTH AFTER USE, Disp: 1 Inhaler, Rfl: 5    clotrimazole (MYCELEX) 10 mg bassem, Take 1 tablet (10 mg total) by mouth 5 (five) times a day, Disp: 70 tablet, Rfl: 0    ergocalciferol (VITAMIN D2) 50,000 units, Take 1 capsule (50,000 Units total) by mouth once a week, Disp: 4 capsule, Rfl: 5    fluticasone (FLONASE) 50 mcg/act nasal spray, 1 spray into each nostril daily, Disp: 1 Bottle, Rfl: 3    guaiFENesin (MUCINEX) 600 mg 12 hr tablet, Take 1 tablet (600 mg total) by mouth every 12 (twelve) hours, Disp: 60 tablet, Rfl: 0    ipratropium (ATROVENT) 0 02 % nebulizer solution, Take 1 vial (0 5 mg total) by nebulization every 6 (six) hours as needed for wheezing or shortness of breath, Disp: 120 vial, Rfl: 3    levalbuterol (XOPENEX HFA) 45 mcg/act inhaler, Inhale 1-2 puffs every 4 (four) hours as needed for wheezing or shortness of breath, Disp: 1 Inhaler, Rfl: 3    levalbuterol (XOPENEX) 1 25 mg/3 mL nebulizer solution, Take 1 vial (1 25 mg total) by nebulization every 6 (six) hours as needed for wheezing or shortness of breath, Disp: 120 vial, Rfl: 3    loratadine (CLARITIN) 10 mg tablet, Take 10 mg by mouth daily, Disp: , Rfl:    montelukast (SINGULAIR) 10 mg tablet, Take 1 tablet (10 mg total) by mouth daily at bedtime, Disp: 30 tablet, Rfl: 3    ranitidine (ZANTAC) 150 mg tablet, take 1 tablet by mouth at bedtime, Disp: 30 tablet, Rfl: 5    sertraline (ZOLOFT) 50 mg tablet, Take 2 tablets (100 mg total) by mouth daily, Disp: 30 tablet, Rfl: 5    spironolactone (ALDACTONE) 25 mg tablet, Take 1 tablet (25 mg total) by mouth daily, Disp: 90 tablet, Rfl: 3    tamsulosin (FLOMAX) 0 4 mg, Take 1 capsule (0 4 mg total) by mouth daily with dinner, Disp: 30 capsule, Rfl: 5  Allergies   Allergen Reactions    Penicillins Hives     Passed out    Codeine Dizziness       Objective     Current Vitals:   Blood Pressure: 130/70 (07/16/19 1124)  Pulse: 70 (07/16/19 1124)  Temperature: 98 °F (36 7 °C) (07/16/19 1124)  Temp Source: Tympanic (07/16/19 1124)  Respirations: 14(oxygen level (3)) (07/16/19 1124)  Height: 5' 10" (177 8 cm) (07/16/19 1124)  Weight - Scale: 83 5 kg (184 lb) (07/16/19 1124)      Invasive Devices     None                 Physical Exam   Constitutional: He is oriented to person, place, and time  He appears well-developed and well-nourished  No distress  Patient with oxygen at home  HENT:   Head: Normocephalic  Mouth/Throat: No oropharyngeal exudate  There is a 2 5 cm lump on the anterior aspect of the right parietal region, no redness or tenderness to palpation  Eyes: Pupils are equal, round, and reactive to light  No scleral icterus  Neck: Normal range of motion  Neck supple  Cardiovascular: Normal rate and regular rhythm  No murmur heard  Pulmonary/Chest: Effort normal and breath sounds normal    Abdominal: Soft  Bowel sounds are normal  He exhibits no mass  There is no tenderness  Musculoskeletal: He exhibits no edema or deformity  Lymphadenopathy:     He has no cervical adenopathy  Neurological: He is alert and oriented to person, place, and time  No cranial nerve deficit  Skin: No erythema  No pallor  Psychiatric: He has a normal mood and affect  His behavior is normal    Nursing note and vitals reviewed

## 2019-07-18 ENCOUNTER — TELEPHONE (OUTPATIENT)
Dept: PULMONOLOGY | Facility: CLINIC | Age: 69
End: 2019-07-18

## 2019-07-18 NOTE — TELEPHONE ENCOUNTER
Patient will need follow-up chest CT, scheduled for sick visit with [facility] tomorrow

## 2019-07-19 ENCOUNTER — OFFICE VISIT (OUTPATIENT)
Dept: PULMONOLOGY | Facility: CLINIC | Age: 69
End: 2019-07-19
Payer: MEDICARE

## 2019-07-19 VITALS
WEIGHT: 182 LBS | HEIGHT: 70 IN | HEART RATE: 84 BPM | OXYGEN SATURATION: 95 % | DIASTOLIC BLOOD PRESSURE: 84 MMHG | SYSTOLIC BLOOD PRESSURE: 124 MMHG | BODY MASS INDEX: 26.05 KG/M2

## 2019-07-19 DIAGNOSIS — I27.20 PULMONARY HYPERTENSION (HCC): ICD-10-CM

## 2019-07-19 DIAGNOSIS — J41.0 SIMPLE CHRONIC BRONCHITIS (HCC): Primary | ICD-10-CM

## 2019-07-19 DIAGNOSIS — R93.89 ABNORMAL CHEST CT: ICD-10-CM

## 2019-07-19 DIAGNOSIS — J18.9 PNEUMONIA OF BOTH LUNGS DUE TO INFECTIOUS ORGANISM, UNSPECIFIED PART OF LUNG: ICD-10-CM

## 2019-07-19 PROCEDURE — 99214 OFFICE O/P EST MOD 30 MIN: CPT | Performed by: PHYSICIAN ASSISTANT

## 2019-07-19 RX ORDER — CEFDINIR 300 MG/1
300 CAPSULE ORAL EVERY 12 HOURS SCHEDULED
Qty: 20 CAPSULE | Refills: 0 | Status: SHIPPED | OUTPATIENT
Start: 2019-07-19 | End: 2019-07-30 | Stop reason: ALTCHOICE

## 2019-07-19 RX ORDER — PREDNISONE 10 MG/1
10 TABLET ORAL DAILY
Qty: 30 TABLET | Refills: 5 | Status: SHIPPED | OUTPATIENT
Start: 2019-07-19 | End: 2019-08-08 | Stop reason: SDUPTHER

## 2019-07-19 NOTE — PROGRESS NOTES
Assessment:    1  Simple chronic bronchitis (HCC)  predniSONE 10 mg tablet   2  Pulmonary hypertension (HCC)  predniSONE 10 mg tablet   3  Abnormal chest CT  CT chest wo contrast   4  Pneumonia of both lungs due to infectious organism, unspecified part of lung  cefdinir (OMNICEF) 300 mg capsule         Plan:     Patient is here today for follow-up  Over the last several days he has had increasing cough and shortness of Breath  Does have 2 sick grandchildren at home  No increased O2 need  He was recently treated in May for a left-sided pneumonia, was also treated in April for pneumonia, had findings on CT scan at that time which were concerning for possible ERYN  He had a recent CT scan a few days ago which showed previous pneumonia nearly completely resolved  There are new patchy opacities in the right upper lobe and left lower lobe  He continues with Breo, nebulizer treatments 4 times per day, azithromycin MWF, Claritin, Mucinex, Singulair  He was unable to tolerate Daliresp due to feeling jittery  He was recently titrated off prednisone but did notice an increase in his shortness of breath  Will restart his prednisone 10 mg daily, will continue all other medications as above  He will continue with trilogy at night, continue 3 L O2 continuous  No increased O2 requirement  Given increased symptoms and sick contacts at home will again treat him with STEPHANIA LERMA  Bronchoscopy had been deferred in the past due to his severe COPD and risk for complications  He did have a negative AFB done in April  He does improve with antibiotics, does have several sick contacts at home currently, lives with his grandchildren who were sick frequently  Will repeat a CT scan in 3 months  If these opacities continued to wax and wane, would need a bronchoscopy for BAL/cultures  He will follow up with us in 3 months or sooner if necessary  Subjective:     Patient ID: Kathi Shaffer is a 81 y o  male     Chief Complaint:  Patient is a 17-year-old male former smoker with greater than 50 pack year smoking history with past medical history of chronic respiratory failure on oxygen, very severe COPD, pulmonary hypertension, CHF  He has had several hospitalizations over the past year for COPD exacerbations  He was treated for pneumonia in May  He had a prior CT scan in April which showed findings that were suspicious for ERYN  Bronchoscopy was deferred due to his severe lung disease and risk for complications  He is here today for follow-up  Over the past several days he has had increasing cough  He continues with his Breo, nebulizer treatments 4 times per day, azithromycin Monday Wednesday Friday, Mucinex, Claritin, Singulair  He was unable to tolerate Daliresp  He has been titrated off prednisone  Cough   Associated symptoms include shortness of breath  Shortness of Breath         Review of Systems   Constitutional: Negative  HENT: Negative  Respiratory: Positive for cough and shortness of breath  Cardiovascular: Negative  Gastrointestinal: Negative  Genitourinary: Negative  Musculoskeletal: Negative  Skin: Negative  Allergic/Immunologic: Negative  Neurological: Negative  Psychiatric/Behavioral: Negative  Objective:  /84   Pulse 84   Ht 5' 10" (1 778 m)   Wt 82 6 kg (182 lb)   SpO2 95% Comment: with 3 lts of oxygen  BMI 26 11 kg/m²   Physical Exam   Constitutional: He is oriented to person, place, and time  He appears well-developed and well-nourished  No distress  HENT:   Mouth/Throat: Oropharynx is clear and moist    Eyes: Pupils are equal, round, and reactive to light  Cardiovascular: Normal rate and regular rhythm  No murmur heard  Pulmonary/Chest: Effort normal  No respiratory distress  He has decreased breath sounds  He has no wheezes  He has no rhonchi  He has no rales  Abdominal: Soft  Musculoskeletal: Normal range of motion  Neurological: He is alert and oriented to person, place, and time  Skin: Skin is warm and dry  Psychiatric: He has a normal mood and affect   His behavior is normal  Judgment and thought content normal

## 2019-07-21 DIAGNOSIS — F41.9 ANXIETY: ICD-10-CM

## 2019-07-24 ENCOUNTER — TELEPHONE (OUTPATIENT)
Dept: SURGERY | Facility: CLINIC | Age: 69
End: 2019-07-24

## 2019-07-24 NOTE — TELEPHONE ENCOUNTER
PT'S IS ON 10MG ONCE A DAY OF PREDNISONE FOR  WOULD THIS WARRANT A RESCHEDULE FOR HIS SURGERY ON 7/26   PLEASE ADVISE

## 2019-07-26 ENCOUNTER — HOSPITAL ENCOUNTER (OUTPATIENT)
Facility: HOSPITAL | Age: 69
Setting detail: OUTPATIENT SURGERY
Discharge: HOME | End: 2019-07-26
Attending: SURGERY | Admitting: SURGERY
Payer: MEDICARE

## 2019-07-26 ENCOUNTER — ANESTHESIA EVENT (OUTPATIENT)
Dept: PERIOP | Facility: HOSPITAL | Age: 69
End: 2019-07-26
Payer: MEDICARE

## 2019-07-26 ENCOUNTER — PATIENT OUTREACH (OUTPATIENT)
Dept: INTERNAL MEDICINE CLINIC | Facility: CLINIC | Age: 69
End: 2019-07-26

## 2019-07-26 ENCOUNTER — ANESTHESIA (OUTPATIENT)
Dept: PERIOP | Facility: HOSPITAL | Age: 69
End: 2019-07-26
Payer: MEDICARE

## 2019-07-26 VITALS
BODY MASS INDEX: 26.34 KG/M2 | HEART RATE: 66 BPM | TEMPERATURE: 97.5 F | RESPIRATION RATE: 18 BRPM | HEIGHT: 70 IN | OXYGEN SATURATION: 97 % | WEIGHT: 184 LBS | DIASTOLIC BLOOD PRESSURE: 73 MMHG | SYSTOLIC BLOOD PRESSURE: 120 MMHG

## 2019-07-26 DIAGNOSIS — L98.9 SCALP LESION: ICD-10-CM

## 2019-07-26 DIAGNOSIS — L98.9 SKIN LESION: ICD-10-CM

## 2019-07-26 PROCEDURE — 11402 EXC TR-EXT B9+MARG 1.1-2 CM: CPT | Performed by: SURGERY

## 2019-07-26 PROCEDURE — 88305 TISSUE EXAM BY PATHOLOGIST: CPT | Performed by: PATHOLOGY

## 2019-07-26 RX ORDER — SODIUM CHLORIDE, SODIUM LACTATE, POTASSIUM CHLORIDE, CALCIUM CHLORIDE 600; 310; 30; 20 MG/100ML; MG/100ML; MG/100ML; MG/100ML
125 INJECTION, SOLUTION INTRAVENOUS
Status: COMPLETED | OUTPATIENT
Start: 2019-07-26 | End: 2019-07-26

## 2019-07-26 RX ORDER — SODIUM CHLORIDE, SODIUM LACTATE, POTASSIUM CHLORIDE, CALCIUM CHLORIDE 600; 310; 30; 20 MG/100ML; MG/100ML; MG/100ML; MG/100ML
INJECTION, SOLUTION INTRAVENOUS CONTINUOUS PRN
Status: DISCONTINUED | OUTPATIENT
Start: 2019-07-26 | End: 2019-07-26 | Stop reason: SURG

## 2019-07-26 RX ORDER — ONDANSETRON 2 MG/ML
4 INJECTION INTRAMUSCULAR; INTRAVENOUS ONCE AS NEEDED
Status: DISCONTINUED | OUTPATIENT
Start: 2019-07-26 | End: 2019-07-26 | Stop reason: HOSPADM

## 2019-07-26 RX ORDER — CLINDAMYCIN PHOSPHATE 900 MG/50ML
INJECTION INTRAVENOUS AS NEEDED
Status: DISCONTINUED | OUTPATIENT
Start: 2019-07-26 | End: 2019-07-26 | Stop reason: SURG

## 2019-07-26 RX ORDER — KETAMINE HYDROCHLORIDE 50 MG/ML
INJECTION, SOLUTION, CONCENTRATE INTRAMUSCULAR; INTRAVENOUS AS NEEDED
Status: DISCONTINUED | OUTPATIENT
Start: 2019-07-26 | End: 2019-07-26 | Stop reason: SURG

## 2019-07-26 RX ORDER — ACETAMINOPHEN 325 MG/1
975 TABLET ORAL EVERY 8 HOURS PRN
Status: DISCONTINUED | OUTPATIENT
Start: 2019-07-26 | End: 2019-07-26 | Stop reason: HOSPADM

## 2019-07-26 RX ORDER — PROPOFOL 10 MG/ML
INJECTION, EMULSION INTRAVENOUS AS NEEDED
Status: DISCONTINUED | OUTPATIENT
Start: 2019-07-26 | End: 2019-07-26 | Stop reason: SURG

## 2019-07-26 RX ORDER — CLINDAMYCIN PHOSPHATE 900 MG/50ML
900 INJECTION INTRAVENOUS
Status: DISCONTINUED | OUTPATIENT
Start: 2019-07-26 | End: 2019-07-26 | Stop reason: HOSPADM

## 2019-07-26 RX ORDER — LIDOCAINE HYDROCHLORIDE 10 MG/ML
INJECTION, SOLUTION INFILTRATION; PERINEURAL AS NEEDED
Status: DISCONTINUED | OUTPATIENT
Start: 2019-07-26 | End: 2019-07-26 | Stop reason: HOSPADM

## 2019-07-26 RX ORDER — MIDAZOLAM HYDROCHLORIDE 1 MG/ML
INJECTION INTRAMUSCULAR; INTRAVENOUS AS NEEDED
Status: DISCONTINUED | OUTPATIENT
Start: 2019-07-26 | End: 2019-07-26 | Stop reason: SURG

## 2019-07-26 RX ORDER — SODIUM CHLORIDE, SODIUM LACTATE, POTASSIUM CHLORIDE, CALCIUM CHLORIDE 600; 310; 30; 20 MG/100ML; MG/100ML; MG/100ML; MG/100ML
125 INJECTION, SOLUTION INTRAVENOUS CONTINUOUS
Status: CANCELLED | OUTPATIENT
Start: 2019-07-26

## 2019-07-26 RX ORDER — FENTANYL CITRATE/PF 50 MCG/ML
50 SYRINGE (ML) INJECTION
Status: DISCONTINUED | OUTPATIENT
Start: 2019-07-26 | End: 2019-07-26 | Stop reason: HOSPADM

## 2019-07-26 RX ADMIN — SODIUM CHLORIDE, SODIUM LACTATE, POTASSIUM CHLORIDE, AND CALCIUM CHLORIDE: .6; .31; .03; .02 INJECTION, SOLUTION INTRAVENOUS at 07:52

## 2019-07-26 RX ADMIN — PROPOFOL 20 MG: 10 INJECTION, EMULSION INTRAVENOUS at 08:25

## 2019-07-26 RX ADMIN — SODIUM CHLORIDE, SODIUM LACTATE, POTASSIUM CHLORIDE, AND CALCIUM CHLORIDE 125 ML/HR: .6; .31; .03; .02 INJECTION, SOLUTION INTRAVENOUS at 07:27

## 2019-07-26 RX ADMIN — CLINDAMYCIN PHOSPHATE 900 MG: 18 INJECTION, SOLUTION INTRAMUSCULAR; INTRAVENOUS at 08:06

## 2019-07-26 RX ADMIN — MIDAZOLAM HYDROCHLORIDE 1 MG: 1 INJECTION, SOLUTION INTRAMUSCULAR; INTRAVENOUS at 08:08

## 2019-07-26 RX ADMIN — PROPOFOL 30 MG: 10 INJECTION, EMULSION INTRAVENOUS at 08:10

## 2019-07-26 RX ADMIN — KETAMINE HYDROCHLORIDE 20 MG: 50 INJECTION INTRAMUSCULAR; INTRAVENOUS at 08:08

## 2019-07-26 NOTE — INTERVAL H&P NOTE
H&P reviewed  After examining the patient I find no changes in the patients condition since the H&P had been written  /76   Pulse 77 Comment: normal sinus  Temp 97 8 °F (36 6 °C) (Temporal)   Resp 20   Ht 5' 10" (1 778 m)   Wt 83 5 kg (184 lb)   SpO2 98%   BMI 26 40 kg/m²

## 2019-07-26 NOTE — DISCHARGE INSTRUCTIONS
No diet restriction for this surgery  May shower every day  Remove dressings in 3 days  Remove strips in 7 days  Call office to make an appointment in 2 weeks to remove scalp stitches [phone]  Call office with any issues regarding the surgery  Resume home medications  Apply ice to the incisions  May take regular Tylenol or ibuprofen for pain

## 2019-07-26 NOTE — PROGRESS NOTES
Attempted to outreach patient and daughter Satinder Bottom after outpatient surgery  Voicemail left; waiting return call

## 2019-07-26 NOTE — OP NOTE
OPERATIVE REPORT  PATIENT NAME: Caryn Dill    :  1950  MRN: [id number]  Pt Location: [facility]    SURGERY DATE: 2019    Surgeon(s) and Role:     * Tyson Carrasquillo MD - Primary    Assistant:  Sunita Sebastian PA-C    Preop Diagnosis:  Scalp lesion [L98 9]  Skin lesion [L98 9]    Post-Op Diagnosis Codes:     * Scalp lesion [L98 9]     * Skin lesion [L98 9]    Procedure(s) (LRB):  SCALP LESION EXCISION BIOPSY (N/A)  BACK SKIN LESION EXCISION BIOPSY (N/A)    Specimen(s):  ID Type Source Tests Collected by Time Destination   1 : SKIN LESION OF BACK Tissue Back TISSUE EXAM Tyson Carrasquillo MD 2019 [id number]        Estimated Blood Loss:   Minimal    Drains:  None    Anesthesia Type:   IV Sedation with Anesthesia    Operative Indications:  Scalp lesion [L98 9]  Skin lesion [L98 9]    Operative Findings:  Skin lesion on the back measures approximately 1 5 cm, closest margin was 2 mm  Scalp lesion was not on the scalp but it was in the skull, under the fascia, no attempts was made to open the fascia to remove the lesion  Patient will be referred to Neurosurgery  Complications:   None    Procedure and Technique:  The patient was identified in the patient was placed in the operating table in a left lateral decubitus  Time-out was called the patient was identified as was surgical site  The back was prepped and draped in sterile usual fashion with ChloraPrep   1% lidocaine was infiltrated over the skin lesion of the back, an elliptical incision was made with a scalpel, taken down through the subcutaneous tissue with scalpel dissection until a specimen was completely removed  Hemostasis was accomplished with cautery  The skin was closed with 4 O Vicryl in an interrupted fashion  Sterile dressing were applied  Our attention was directed to the is scalp lesion, this point the scalp was prepped and draped in sterile usual fashion with Betadine solution  Some lidocaine was infiltrated    This point the scalp incision was made with scalpel, further dissection and the scalp was noted that the lesion was coming from the skull on the anterior aspect of the left parietal bone, the lesion measure approximately 1 cm, it feels like a ridge  At this point no further dissection was performed  The incision was closed with 2 0 nylon in the interrupted vertical mattress fashion  Neosporin was placed over the incision  At the end the case instrument, needles, and sponges counts were correct  Patient tolerated the procedure well     I was present for the entire procedure, A qualified resident physician was not available and A physician assistant was required during the procedure for retraction tissue handling,dissection and suturing    Patient Disposition:  PACU  and hemodynamically stable    SIGNATURE: Yonatan Méndez MD  DATE: July 26, 2019  TIME: 8:34 AM

## 2019-07-26 NOTE — ANESTHESIA POSTPROCEDURE EVALUATION
Post-Op Assessment Note    CV Status:  Stable  Pain Score: 0    Pain management: adequate     Mental Status:  Awake   Hydration Status:  Stable   PONV Controlled:  None   Airway Patency:  Patent   Post Op Vitals Reviewed: Yes      Staff: Anesthesiologist, CRNA           /68 (07/26/19 0843)    Temp 97 5 °F (36 4 °C) (07/26/19 0843)    Pulse 72 (07/26/19 0843)   Resp 19 (07/26/19 0843)    SpO2 97 % (07/26/19 0843)

## 2019-07-26 NOTE — ANESTHESIA PREPROCEDURE EVALUATION
Review of Systems/Medical History  Patient summary reviewed  Chart reviewed      Cardiovascular  Exercise tolerance (METS): <4,    Pulmonary hypertension Pulmonary  Pneumonia, COPD severe- O2 dependent , Shortness of breath,   Comment: Severe COPD  3L home Oxygen  BiPAP at night  10/5  GI/Hepatic    GERD ,             Endo/Other     GYN       Hematology   Musculoskeletal    Arthritis     Neurology   Psychology   Anxiety,              Physical Exam    Airway    Mallampati score: I  TM Distance: >3 FB       Dental   upper dentures and lower dentures,     Cardiovascular  Rhythm: regular, Rate: normal,     Pulmonary  Decreased breath sounds, No wheezes,     Other Findings        Anesthesia Plan  ASA Score- 4     Anesthesia Type- IV sedation with anesthesia with ASA Monitors  Additional Monitors:   Airway Plan:         Plan Factors-Patient not instructed to abstain from smoking on day of procedure  Patient did not smoke on day of surgery  Induction- intravenous  Postoperative Plan-     Informed Consent- Anesthetic plan and risks discussed with patient  I personally reviewed this patient with the CRNA  Discussed and agreed on the Anesthesia Plan with the CRNA  Markell Flores

## 2019-08-01 DIAGNOSIS — J44.9 CHRONIC OBSTRUCTIVE PULMONARY DISEASE, UNSPECIFIED COPD TYPE (HCC): ICD-10-CM

## 2019-08-02 ENCOUNTER — PATIENT OUTREACH (OUTPATIENT)
Dept: INTERNAL MEDICINE CLINIC | Facility: CLINIC | Age: 69
End: 2019-08-02

## 2019-08-02 RX ORDER — FLUTICASONE FUROATE AND VILANTEROL TRIFENATATE 100; 25 UG/1; UG/1
POWDER RESPIRATORY (INHALATION)
Qty: 1 INHALER | Refills: 5 | Status: SHIPPED | OUTPATIENT
Start: 2019-08-02 | End: 2020-02-10

## 2019-08-02 NOTE — PROGRESS NOTES
Left message with emergency contact Carisa Molina  She stated that Hudson Pereira is taking a nap at the moment right now  She is requesting a call back  Will follow up

## 2019-08-08 DIAGNOSIS — I27.20 PULMONARY HYPERTENSION (HCC): ICD-10-CM

## 2019-08-08 DIAGNOSIS — J41.0 SIMPLE CHRONIC BRONCHITIS (HCC): ICD-10-CM

## 2019-08-08 RX ORDER — PREDNISONE 10 MG/1
10 TABLET ORAL DAILY
Qty: 90 TABLET | Refills: 3 | Status: SHIPPED | OUTPATIENT
Start: 2019-08-08 | End: 2020-01-14 | Stop reason: SDUPTHER

## 2019-08-09 ENCOUNTER — OFFICE VISIT (OUTPATIENT)
Dept: SURGERY | Facility: CLINIC | Age: 69
End: 2019-08-09

## 2019-08-09 VITALS
DIASTOLIC BLOOD PRESSURE: 62 MMHG | HEIGHT: 70 IN | BODY MASS INDEX: 26.28 KG/M2 | TEMPERATURE: 97.7 F | WEIGHT: 183.6 LBS | SYSTOLIC BLOOD PRESSURE: 108 MMHG | HEART RATE: 68 BPM

## 2019-08-09 DIAGNOSIS — L98.9 SCALP LESION: ICD-10-CM

## 2019-08-09 DIAGNOSIS — L98.9 SKIN LESION: Primary | ICD-10-CM

## 2019-08-09 PROCEDURE — 99024 POSTOP FOLLOW-UP VISIT: CPT | Performed by: PHYSICIAN ASSISTANT

## 2019-08-09 NOTE — PROGRESS NOTES
Office Visit - General Surgery  Carmen Sumner MRN: [id number]  Encounter: [id number]    Assessment and Plan    Problem List Items Addressed This Visit        Musculoskeletal and Integument    Scalp lesion     -status post removal  -patient doing well  -sutures are removed  -incision is clean, dry, intact         Skin lesion - Primary     -status post removal  -incision site is clean, dry, intact  -no further workup  -reviewed pathology that was negative for any malignancy             Disposition:  DC from surgical services  Chief Complaint:  Carmen Sumner is a 81 y o  male who presents for Post-op (scalp lesion/mass excision)    Subjective  Patient is doing well  Offering no complaints  States he has noticed no new issues regarding his wounds  Denies any new complaints  Wounds are clean, dry, intact    Past Medical History  Past Medical History:   Diagnosis Date    Allergic rhinitis     BiPAP (biphasic positive airway pressure) dependence     Centrilobular emphysema (HCC)     COPD (chronic obstructive pulmonary disease) (HCC)     Enlarged prostate     GERD (gastroesophageal reflux disease)     HL (hearing loss)     Hypoglycemia     On home oxygen therapy     Pneumonia     Pulmonary hypertension (HCC)     Respiratory failure (HCC)     SOB (shortness of breath)     Stroke (HCC)     Mini       Past Surgical History  Past Surgical History:   Procedure Laterality Date    CATARACT EXTRACTION      CHOLECYSTECTOMY      hernia    EYE SURGERY      MASS EXCISION N/A 7/26/2019    Procedure: BACK SKIN LESION EXCISION BIOPSY;  Surgeon: Leilani Ellis MD;  [facility];  Service: General    OK ESOPHAGOGASTRODUODENOSCOPY TRANSORAL DIAGNOSTIC N/A 9/19/2018    Procedure: ESOPHAGOGASTRODUODENOSCOPY (EGD); Surgeon: Wendy Mendez MD;  [facility];   Service: Gastroenterology    OK EXC SKIN BENIG 2 1-3 CM REMAINDR BODY N/A 7/26/2019    Procedure: SCALP LESION EXCISION BIOPSY;  Surgeon: Breezy Crawford MD;  [facility];  Service: General    MS REPAIR ING HERNIA,5+Y/O,REDUCIBL Left 2018    Procedure: OPEN INGUINAL HERNIA REPAIR WITH MESH;  Surgeon: Breezy Crawford MD;  [facility];  Service: General       Family History  Family History   Problem Relation Age of Onset    Diabetes Mother     Hypertension Mother     Hyperlipidemia Mother        Social History  Social History     Socioeconomic History    Marital status: /Civil Union     Spouse name: janet     Number of children: 11    Years of education: None    Highest education level: None   Occupational History    Occupation: retired    Social Needs    Financial resource strain: None    Food insecurity:     Worry: None     Inability: None    Transportation needs:     Medical: None     Non-medical: None   Tobacco Use    Smoking status: Former Smoker     Packs/day: 0 00     Years: 50 00     Pack years: 0 00     Types: Cigarettes     Last attempt to quit: 2013     Years since quittin 4    Smokeless tobacco: Never Used   Substance and Sexual Activity    Alcohol use:  Yes     Alcohol/week: 1 0 standard drinks     Types: 1 Glasses of wine per week     Comment: socialy    Drug use: Never    Sexual activity: Never   Lifestyle    Physical activity:     Days per week: None     Minutes per session: None    Stress: None   Relationships    Social connections:     Talks on phone: None     Gets together: None     Attends Faith service: None     Active member of club or organization: None     Attends meetings of clubs or organizations: None     Relationship status: None    Intimate partner violence:     Fear of current or ex partner: None     Emotionally abused: None     Physically abused: None     Forced sexual activity: None   Other Topics Concern    None   Social History Narrative        Retired        Medications  Current Outpatient Medications on File Prior to Visit Medication Sig Dispense Refill    azithromycin (ZITHROMAX) 250 mg tablet One tab PO Mon, Wed, Fri 12 tablet 3    BREO ELLIPTA 100-25 MCG/INH inhaler INHALE 1 PUFF ONCE DAILY RINSE MOUTH AFTER USE 1 Inhaler 5    ergocalciferol (VITAMIN D2) 50,000 units Take 1 capsule (50,000 Units total) by mouth once a week 4 capsule 5    fluticasone (FLONASE) 50 mcg/act nasal spray 1 spray into each nostril daily 1 Bottle 3    guaiFENesin (MUCINEX) 600 mg 12 hr tablet Take 1 tablet (600 mg total) by mouth every 12 (twelve) hours 60 tablet 0    ipratropium (ATROVENT) 0 02 % nebulizer solution Take 1 vial (0 5 mg total) by nebulization every 6 (six) hours as needed for wheezing or shortness of breath 120 vial 3    levalbuterol (XOPENEX HFA) 45 mcg/act inhaler Inhale 1-2 puffs every 4 (four) hours as needed for wheezing or shortness of breath 1 Inhaler 3    levalbuterol (XOPENEX) 1 25 mg/3 mL nebulizer solution Take 1 vial (1 25 mg total) by nebulization every 6 (six) hours as needed for wheezing or shortness of breath 120 vial 3    loratadine (CLARITIN) 10 mg tablet Take 10 mg by mouth daily      montelukast (SINGULAIR) 10 mg tablet Take 1 tablet (10 mg total) by mouth daily at bedtime 30 tablet 3    predniSONE 10 mg tablet Take 1 tablet (10 mg total) by mouth daily 90 tablet 3    ranitidine (ZANTAC) 150 mg tablet take 1 tablet by mouth at bedtime 30 tablet 5    sertraline (ZOLOFT) 50 mg tablet take 2 tablets by mouth once daily 60 tablet 5    spironolactone (ALDACTONE) 25 mg tablet Take 1 tablet (25 mg total) by mouth daily 90 tablet 3    tamsulosin (FLOMAX) 0 4 mg Take 1 capsule (0 4 mg total) by mouth daily with dinner 30 capsule 5     No current facility-administered medications on file prior to visit  Allergies  Allergies   Allergen Reactions    Codeine Dizziness    Penicillins Hives     Passed out       Review of Systems   All other systems reviewed and are negative        Objective  Vitals:    08/09/19 1151   BP: 108/62   Pulse: 68   Temp: 97 7 °F (36 5 °C)       Physical Exam   Constitutional: He is oriented to person, place, and time  He appears well-developed and well-nourished  HENT:   Incision site is clean, dry, intact   Eyes: Pupils are equal, round, and reactive to light  EOM are normal    Neck: Normal range of motion  Neck supple  Cardiovascular: Normal rate, regular rhythm, normal heart sounds and intact distal pulses  Pulmonary/Chest: Effort normal and breath sounds normal    Abdominal: Soft  Bowel sounds are normal  He exhibits no distension  There is no tenderness  Musculoskeletal: Normal range of motion  Neurological: He is alert and oriented to person, place, and time  Skin:   Surgical site on the right posterior flank ranging into the right back; is clean, dry, intact  Steri-Strips are removed

## 2019-08-09 NOTE — ASSESSMENT & PLAN NOTE
-status post removal  -incision site is clean, dry, intact  -no further workup  -reviewed pathology that was negative for any malignancy

## 2019-08-14 ENCOUNTER — TELEPHONE (OUTPATIENT)
Dept: INTERNAL MEDICINE CLINIC | Facility: CLINIC | Age: 69
End: 2019-08-14

## 2019-08-14 ENCOUNTER — HOSPITAL ENCOUNTER (OUTPATIENT)
Dept: CT IMAGING | Facility: HOSPITAL | Age: 69
Discharge: HOME | End: 2019-08-14
Payer: MEDICARE

## 2019-08-14 ENCOUNTER — TELEPHONE (OUTPATIENT)
Dept: SURGERY | Facility: CLINIC | Age: 69
End: 2019-08-14

## 2019-08-14 ENCOUNTER — TRANSCRIBE ORDERS (OUTPATIENT)
Dept: NEUROSURGERY | Facility: CLINIC | Age: 69
End: 2019-08-14

## 2019-08-14 DIAGNOSIS — G93.0 CYST OF BRAIN: Primary | ICD-10-CM

## 2019-08-14 DIAGNOSIS — H90.6 MIXED CONDUCTIVE AND SENSORINEURAL HEARING LOSS OF BOTH EARS: ICD-10-CM

## 2019-08-14 PROCEDURE — 70480 CT ORBIT/EAR/FOSSA W/O DYE: CPT

## 2019-08-14 NOTE — TELEPHONE ENCOUNTER
Dr Solis Fernando confirmed he wanted this study? I just prefer not ordering tests when I will not continue to follow the patient? The patient did not mention this to me during our visit  I just want to avoid ordering unnecessary imaging  If Dr Solis Fernando confirmed that he was going to order the MRI then I will place order for patient

## 2019-08-14 NOTE — TELEPHONE ENCOUNTER
Patient's wife called and said that patient went to Dr Kristie Wen to have a pimple like cyst removed from his head but, Dr Kristie Wen could not remove it due to it was attached to his skull  Patient was referred to Neurosurgeon but they won't schedule patient until he has an MRI of his head (R) side  Patient is having a CT done today and wife wanted to know if you could order MRI for today? Please advise    Bailey Barron Del contact# [phone]

## 2019-08-14 NOTE — TELEPHONE ENCOUNTER
Pt's wife called stating Hedy Murray had told them to consult with a neuro surgeon, they attempted to schedule a appointment within our network but were told that they need a mri or the head first  Would you be willing to sign off on this while Dr Hedy Murray is away?  Please advise

## 2019-08-15 NOTE — TELEPHONE ENCOUNTER
Dr Giovanny Trujillo was unaware that this was needed by the patient for his consultation with the neurological surgeon  They won't see the patient without it being ordered  They need it to establish a baseline

## 2019-08-15 NOTE — TELEPHONE ENCOUNTER
I really dont know why after reviewing the chart why we are ordering this study and I'd rather at this point have Dr Adriano Khanna continue this work-up as I will no longer be following the patient  Im sorry for the inconvenience, but this is where I will stand on this decision  This is either a family doctor work-up or Dr Adriano Khanna can order the study

## 2019-08-16 ENCOUNTER — PATIENT OUTREACH (OUTPATIENT)
Dept: INTERNAL MEDICINE CLINIC | Facility: CLINIC | Age: 69
End: 2019-08-16

## 2019-08-16 NOTE — PROGRESS NOTES
Multiple attempts made to contact patient in regards to health  Voicemail left x3 with no return call  Will close from care management at this time  Letter mailed

## 2019-08-16 NOTE — LETTER
Date: 08/16/19    Dear Wilberto Christianson,   My name is Antonio Sams; I am a registered nurse care manager working with [facility]  I have not been able to reach you and would like to set a time that I can talk with you over the phone or in-person  My work is to help patients that have complex medical conditions get the care they need  This includes patients who may have been in the hospital or emergency room  I have enclosed information for you  Please call me with any questions you may have  I look forward to meeting with you    Sincerely,  Antonio Sams  [phone]

## 2019-08-19 NOTE — TELEPHONE ENCOUNTER
Lieutenant Gay, noted, Dr Vibha Mendoza the patient needs a mri with contrast of the head to be seen by neurological surgeon

## 2019-08-28 DIAGNOSIS — J41.0 SIMPLE CHRONIC BRONCHITIS (HCC): ICD-10-CM

## 2019-08-28 RX ORDER — AZITHROMYCIN 250 MG/1
TABLET, FILM COATED ORAL
Qty: 12 TABLET | Refills: 3 | Status: SHIPPED | OUTPATIENT
Start: 2019-08-28 | End: 2020-02-07

## 2019-09-01 DIAGNOSIS — J30.2 SEASONAL ALLERGIES: ICD-10-CM

## 2019-09-01 DIAGNOSIS — K21.9 GASTROESOPHAGEAL REFLUX DISEASE WITHOUT ESOPHAGITIS: ICD-10-CM

## 2019-09-02 RX ORDER — RANITIDINE 150 MG/1
TABLET ORAL
Qty: 30 TABLET | Refills: 5 | Status: SHIPPED | OUTPATIENT
Start: 2019-09-02 | End: 2020-04-02

## 2019-09-03 ENCOUNTER — OFFICE VISIT (OUTPATIENT)
Dept: SURGERY | Facility: CLINIC | Age: 69
End: 2019-09-03

## 2019-09-03 VITALS
DIASTOLIC BLOOD PRESSURE: 66 MMHG | WEIGHT: 185 LBS | TEMPERATURE: 97.8 F | BODY MASS INDEX: 26.48 KG/M2 | SYSTOLIC BLOOD PRESSURE: 110 MMHG | RESPIRATION RATE: 16 BRPM | HEIGHT: 70 IN | HEART RATE: 78 BPM

## 2019-09-03 DIAGNOSIS — Z48.89 POSTOPERATIVE VISIT: Primary | ICD-10-CM

## 2019-09-03 PROCEDURE — 99024 POSTOP FOLLOW-UP VISIT: CPT | Performed by: SURGERY

## 2019-09-03 PROCEDURE — 1124F ACP DISCUSS-NO DSCNMKR DOCD: CPT | Performed by: SURGERY

## 2019-09-03 RX ORDER — MONTELUKAST SODIUM 10 MG/1
TABLET ORAL
Qty: 30 TABLET | Refills: 3 | Status: SHIPPED | OUTPATIENT
Start: 2019-09-03 | End: 2020-04-02

## 2019-09-03 NOTE — PROGRESS NOTES
Assessment/Plan:          Subjective:      Patient ID: Princess Spaulding is a 81 y o  male  Triage Notes: Pt states no pain or discomfort  HPI        Review of Systems      Objective: There were no vitals taken for this visit           Physical Exam

## 2019-09-03 NOTE — PROGRESS NOTES
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, no further workup or intervention is needed since the patient had this for over 35 years  Plan:  The patient is discharged from my care and I will be glad to see him if any problem arises in the future  History of Present Illness     HPI:  Donaldo Espitia is a 81 y o  male who presents to my office for follow-up after excision of skin lesion from the back which turned out to be seborrheic keratosis  He offers no complaints about the back and scalp incisions  Historical Information   Past Medical History:   Diagnosis Date    Allergic rhinitis     BiPAP (biphasic positive airway pressure) dependence     Centrilobular emphysema (HCC)     COPD (chronic obstructive pulmonary disease) (HCC)     Enlarged prostate     GERD (gastroesophageal reflux disease)     HL (hearing loss)     Hypoglycemia     On home oxygen therapy     Pneumonia     Pulmonary hypertension (HCC)     Respiratory failure (HCC)     SOB (shortness of breath)     Stroke (HCC)     Mini     Past Surgical History:   Procedure Laterality Date    CATARACT EXTRACTION      CHOLECYSTECTOMY      hernia    EYE SURGERY      MASS EXCISION N/A 7/26/2019    Procedure: BACK SKIN LESION EXCISION BIOPSY;  Surgeon: Brandon Szymanski MD;  Location: [facility];  Service: General    ID ESOPHAGOGASTRODUODENOSCOPY TRANSORAL DIAGNOSTIC N/A 9/19/2018    Procedure: ESOPHAGOGASTRODUODENOSCOPY (EGD); Surgeon: Zackery Bowen MD;  Location: [facility];   Service: Gastroenterology    ID EXC SKIN BENIG 2 1-3 CM REMAINDR BODY N/A 7/26/2019    Procedure: SCALP LESION EXCISION BIOPSY;  Surgeon: Brandon Szymanski MD;  Location: [facility];  Service: [address] [place] [address] HERNIA,5+Y/O,REDUCIBL Left 2018    Procedure: OPEN INGUINAL HERNIA REPAIR WITH MESH;  Surgeon: Jordan Ferro MD;  Location: [facility];  Service: General     Social History   Social History     Substance and Sexual Activity   Alcohol Use Yes    Alcohol/week: 1 0 standard drinks    Types: 1 Glasses of wine per week    Comment: socialy     Social History     Substance and Sexual Activity   Drug Use Never     Social History     Tobacco Use   Smoking Status Former Smoker    Packs/day: 0 00    Years: 50 00    Pack years: 0 00    Types: Cigarettes    Last attempt to quit: 2013    Years since quittin 5   Smokeless Tobacco Never Used     Family History: non-contributory    Meds/Allergies   all medications and allergies reviewed     Current Outpatient Medications:     azithromycin (ZITHROMAX) 250 mg tablet, One tab PO Mon, Wed, Fri, Disp: 12 tablet, Rfl: 3    BREO ELLIPTA 100-25 MCG/INH inhaler, INHALE 1 PUFF ONCE DAILY RINSE MOUTH AFTER USE, Disp: 1 Inhaler, Rfl: 5    ergocalciferol (VITAMIN D2) 50,000 units, Take 1 capsule (50,000 Units total) by mouth once a week, Disp: 4 capsule, Rfl: 5    fluticasone (FLONASE) 50 mcg/act nasal spray, 1 spray into each nostril daily, Disp: 1 Bottle, Rfl: 3    guaiFENesin (MUCINEX) 600 mg 12 hr tablet, Take 1 tablet (600 mg total) by mouth every 12 (twelve) hours, Disp: 60 tablet, Rfl: 0    ipratropium (ATROVENT) 0 02 % nebulizer solution, Take 1 vial (0 5 mg total) by nebulization every 6 (six) hours as needed for wheezing or shortness of breath, Disp: 120 vial, Rfl: 3    levalbuterol (XOPENEX HFA) 45 mcg/act inhaler, Inhale 1-2 puffs every 4 (four) hours as needed for wheezing or shortness of breath, Disp: 1 Inhaler, Rfl: 3    levalbuterol (XOPENEX) 1 25 mg/3 mL nebulizer solution, Take 1 vial (1 25 mg total) by nebulization every 6 (six) hours as needed for wheezing or shortness of breath, Disp: 120 vial, Rfl: 3    loratadine (CLARITIN) 10 mg tablet, Take 10 mg by mouth daily, Disp: , Rfl:     montelukast (SINGULAIR) 10 mg tablet, take 1 tablet by mouth at bedtime, Disp: 30 tablet, Rfl: 3    predniSONE 10 mg tablet, Take 1 tablet (10 mg total) by mouth daily, Disp: 90 tablet, Rfl: 3    ranitidine (ZANTAC) 150 mg tablet, take 1 tablet by mouth at bedtime, Disp: 30 tablet, Rfl: 5    sertraline (ZOLOFT) 50 mg tablet, take 2 tablets by mouth once daily, Disp: 60 tablet, Rfl: 5    spironolactone (ALDACTONE) 25 mg tablet, Take 1 tablet (25 mg total) by mouth daily, Disp: 90 tablet, Rfl: 3    tamsulosin (FLOMAX) 0 4 mg, Take 1 capsule (0 4 mg total) by mouth daily with dinner, Disp: 30 capsule, Rfl: 5  Allergies   Allergen Reactions    Codeine Dizziness    Penicillins Hives     Passed out       Objective     Current Vitals:   Blood Pressure: 110/66 (09/03/19 1007)  Pulse: 78 (09/03/19 1007)  Temperature: 97 8 °F (36 6 °C) (09/03/19 1007)  Temp Source: Tympanic (09/03/19 1007)  Respirations: 16(oxygen level (3)) (09/03/19 1007)  Height: 5' 10" (177 8 cm) (09/03/19 1007)  Weight - Scale: 83 9 kg (185 lb) (09/03/19 1007)      Invasive Devices     None                 Physical Exam   Skin:   Is scalp incision is well-healed  Back incisions well healed  Nursing note and vitals reviewed

## 2019-09-09 ENCOUNTER — TELEPHONE (OUTPATIENT)
Dept: GASTROENTEROLOGY | Facility: CLINIC | Age: 69
End: 2019-09-09

## 2019-09-13 DIAGNOSIS — J41.0 SIMPLE CHRONIC BRONCHITIS (HCC): ICD-10-CM

## 2019-09-13 RX ORDER — LEVALBUTEROL INHALATION SOLUTION 1.25 MG/3ML
1.25 SOLUTION RESPIRATORY (INHALATION) EVERY 6 HOURS PRN
Qty: 120 VIAL | Refills: 3 | Status: SHIPPED | OUTPATIENT
Start: 2019-09-13 | End: 2020-03-18 | Stop reason: SDUPTHER

## 2019-09-16 ENCOUNTER — TELEPHONE (OUTPATIENT)
Dept: GASTROENTEROLOGY | Facility: CLINIC | Age: 69
End: 2019-09-16

## 2019-09-16 NOTE — TELEPHONE ENCOUNTER
Spoke to Mrs Morales she does not understand why her  cannot direct scheld a colonoscopy     She did not scheld an an office appt      Please phone her @ [phone]

## 2019-10-01 ENCOUNTER — TELEPHONE (OUTPATIENT)
Dept: PULMONOLOGY | Facility: CLINIC | Age: 69
End: 2019-10-01

## 2019-10-01 DIAGNOSIS — J40 BRONCHITIS: Primary | ICD-10-CM

## 2019-10-01 RX ORDER — PREDNISONE 20 MG/1
TABLET ORAL
Qty: 18 TABLET | Refills: 0 | Status: SHIPPED | OUTPATIENT
Start: 2019-10-01 | End: 2020-01-14 | Stop reason: ALTCHOICE

## 2019-10-01 NOTE — TELEPHONE ENCOUNTER
Hes already on an antibiotic 3 times a week,   Will send prednisone tapering, and will see him early next week, with one of us here, and also has a ct chest ordered, ask when hes getting that done

## 2019-10-03 NOTE — ASSESSMENT & PLAN NOTE
07-Oct-2019 Secondary to the flu  Patient had been discharged home on azithromycin but that return with worsening shortness of breath found to be flu a positive  Patient completed Tamiflu but remains on high dose steroids for exacerbation of his COPD  · Decrease Solu-Medrol to 40 mg Q 12 from 80 mg Q 12    Continue to monitor overnight for worsening exacerbation  · Continue aggressive pulmonary toilet with Breo and nebulized

## 2019-10-07 ENCOUNTER — HOSPITAL ENCOUNTER (OUTPATIENT)
Dept: CT IMAGING | Facility: HOSPITAL | Age: 69
Discharge: HOME | End: 2019-10-07
Payer: MEDICARE

## 2019-10-07 DIAGNOSIS — R93.89 ABNORMAL CHEST CT: ICD-10-CM

## 2019-10-07 PROCEDURE — 71250 CT THORAX DX C-: CPT

## 2019-10-10 ENCOUNTER — OFFICE VISIT (OUTPATIENT)
Dept: PULMONOLOGY | Facility: CLINIC | Age: 69
End: 2019-10-10
Payer: MEDICARE

## 2019-10-10 VITALS
OXYGEN SATURATION: 97 % | SYSTOLIC BLOOD PRESSURE: 124 MMHG | HEART RATE: 81 BPM | HEIGHT: 70 IN | WEIGHT: 187 LBS | BODY MASS INDEX: 26.77 KG/M2 | DIASTOLIC BLOOD PRESSURE: 84 MMHG

## 2019-10-10 DIAGNOSIS — J40 BRONCHITIS: Primary | ICD-10-CM

## 2019-10-10 DIAGNOSIS — J43.9 PULMONARY EMPHYSEMA, UNSPECIFIED EMPHYSEMA TYPE (HCC): ICD-10-CM

## 2019-10-10 DIAGNOSIS — I27.20 PULMONARY HYPERTENSION (HCC): ICD-10-CM

## 2019-10-10 DIAGNOSIS — J96.11 CHRONIC RESPIRATORY FAILURE WITH HYPOXIA AND HYPERCAPNIA (HCC): ICD-10-CM

## 2019-10-10 DIAGNOSIS — J18.9 PNEUMONIA DUE TO INFECTIOUS ORGANISM, UNSPECIFIED LATERALITY, UNSPECIFIED PART OF LUNG: ICD-10-CM

## 2019-10-10 DIAGNOSIS — J96.12 CHRONIC RESPIRATORY FAILURE WITH HYPOXIA AND HYPERCAPNIA (HCC): ICD-10-CM

## 2019-10-10 PROCEDURE — 99214 OFFICE O/P EST MOD 30 MIN: CPT | Performed by: PHYSICIAN ASSISTANT

## 2019-10-10 RX ORDER — LEVOFLOXACIN 500 MG/1
500 TABLET, FILM COATED ORAL EVERY 24 HOURS
Qty: 5 TABLET | Refills: 0 | Status: SHIPPED | OUTPATIENT
Start: 2019-10-10 | End: 2019-10-15

## 2019-10-10 NOTE — PROGRESS NOTES
Assessment:    1  Bronchitis  levofloxacin (LEVAQUIN) 500 mg tablet   2  Pulmonary emphysema, unspecified emphysema type ([facility] )     3  Chronic respiratory failure with hypoxia and hypercapnia (HCC)     4  Pneumonia due to infectious organism, unspecified laterality, unspecified part of lung     5  Pulmonary hypertension ([facility] )           Plan:   Patient presents today for follow-up  Reports that his breathing remains stable  Continues on his 3 L of oxygen  Using the trilogy at night and is compliant  He will continue with his pulmonary regimen of Breo, nebulizer treatments 4 times daily, azithromycin Monday Wednesday Friday, Claritin, Mucinex, Singulair, prednisone 10 mg daily  Patient has been having waxing and waning infiltrates seen on CT scan concerning for potential ERYN  He did have a chest CT done earlier this week however has not been officially read by radiologist   Will follow up on results with the patient  If these opacities persist, will likely need to proceed with a bronchoscopy for BAL/cultures  Patient is aware that he would be extremely high risk for this procedure given his severe underlying pulmonary disease  Patient follows with Cardiology as well as Dr Paula Cabrera for pulmonary hypertension  Patient going on vacation tomorrow and had exposure to multiple sick contacts this week  Requesting antibiotics to have on hand in case he develops acute sickness while away  Did send 5 day prescription for Levaquin for acute bronchitis  Provided extensive counseling to the patient regarding antibiotic therapy as he has received numerous courses of antibiotics throughout the year  Advised him of potential side effects of the antibiotic (especially with his established pulmonary regimen) including infectious diarrhea, irregular heart rhythms that could potentially be life-threatening, antibiotic resistance, etc   Patient verbalized understanding      All of the patient's questions were answered to their satisfaction and understanding, which was verbalized  Patient was advised to call the office prior to next appointment should they have any questions or concerns prior  Patient made aware of worrisome symptoms that should prompt a visit to the ER or call to 911 such as chest pain, severe shortness of breath, cyanosis, throat closing, syncope, etc     Subjective:     Patient ID: Dalila Chambers is a 81 y o  male  Chief Complaint:  Alen Delgado is a 17-year-old male former smoker with greater than 50 pack year smoking history quit over 5 years ago and past medical history including chronic respiratory failure with hypoxia and hypercapnia on home oxygen and trilogy noninvasive ventilation, very severe COPD, pulmonary hypertension, recurrent pneumonia, CHF  He presents today for follow-up  Reports that his breathing feels to be at baseline  He completed a course of Omnicef since his last visit with improvement in his symptoms  He also went for a follow-up chest CT to determine if there was resolution of the waxing waning infiltrates  Unfortunately the CT scan has not yet been read  We will follow up on the results  If there are persistent infiltrates, patient may need to go for bronchoscopy  Although his breathing does feel to be at his baseline, he does have concern regarding recent sick contacts and going on vacation tomorrow  Pulmonary regimen: Breo, Xopenex and Atrovent via the nebulizer 4 times daily, azithromycin MWF, Mucinex, Claritin, Singulair  3 L O2 continuously and trilogy every night  Unable to tolerate daliresp in the past and did not do well when titrated off his daily prednisone  The following portions of the patient's history were reviewed in this encounter and updated as appropriate: Past medical, social, surgical, family, allergies    Review of Systems   Constitutional: Positive for fatigue  Negative for chills and fever  HENT: Positive for congestion   Negative for sore throat and trouble swallowing  Respiratory: Positive for cough, shortness of breath and wheezing  Cardiovascular: Negative for chest pain and leg swelling  Neurological: Negative for dizziness, syncope and light-headedness  All other systems reviewed and are negative  Objective:    Physical Exam   Constitutional: He is oriented to person, place, and time  He appears well-developed and well-nourished  No distress  HENT:   Head: Normocephalic and atraumatic  Right Ear: External ear normal    Left Ear: External ear normal    Nose: Nose normal    Mouth/Throat: Oropharynx is clear and moist  No oropharyngeal exudate  Eyes: Conjunctivae and EOM are normal  Right eye exhibits no discharge  Left eye exhibits no discharge  No scleral icterus  Neck: Normal range of motion  Neck supple  No tracheal deviation present  Cardiovascular: Normal rate, regular rhythm and normal heart sounds  Exam reveals no gallop and no friction rub  No murmur heard  Pulmonary/Chest: Effort normal  No stridor  No respiratory distress  He has decreased breath sounds  He has no wheezes  Comfortable on 3L   Abdominal: Soft  He exhibits no distension  There is no tenderness  There is no guarding  Musculoskeletal: Normal range of motion  He exhibits no edema, tenderness or deformity  Neurological: He is alert and oriented to person, place, and time  No cranial nerve deficit  He exhibits normal muscle tone  Skin: Skin is warm and dry  No rash noted  He is not diaphoretic  No erythema  No pallor  Psychiatric: He has a normal mood and affect  His behavior is normal  Judgment and thought content normal    Nursing note and vitals reviewed  Lab Review:   No visits with results within 2 Month(s) from this visit     Latest known visit with results is:   Admission on 07/26/2019, Discharged on 07/26/2019   Component Date Value    Case Report 07/26/2019                      Value:Surgical Pathology Report Case: [id number]                                   Authorizing Provider:  Denny Curtis MD         Collected:           07/26/2019 0828              Ordering Location:     [address] [place] [address] Received:            07/26/2019 [address] [place] [address]                                     Operating Room                                                               Pathologist:           Davey Morrison MD                                                             Specimen:    Back, SKIN LESION OF BACK                                                                  Final Diagnosis 07/26/2019                      Value: This result contains rich text formatting which cannot be displayed here   Additional Information 07/26/2019                      Value: This result contains rich text formatting which cannot be displayed here  Shekhar Moose Gross Description 07/26/2019                      Value: This result contains rich text formatting which cannot be displayed here      Clinical Information 07/26/2019                      Value:Scalp lesion  Skin lesion

## 2019-10-14 ENCOUNTER — TELEPHONE (OUTPATIENT)
Dept: PULMONOLOGY | Facility: CLINIC | Age: 69
End: 2019-10-14

## 2019-10-23 ENCOUNTER — TELEPHONE (OUTPATIENT)
Dept: GASTROENTEROLOGY | Facility: CLINIC | Age: 69
End: 2019-10-23

## 2019-11-04 ENCOUNTER — OFFICE VISIT (OUTPATIENT)
Dept: INTERNAL MEDICINE CLINIC | Facility: CLINIC | Age: 69
End: 2019-11-04
Payer: MEDICARE

## 2019-11-04 VITALS
BODY MASS INDEX: 27.35 KG/M2 | HEART RATE: 93 BPM | OXYGEN SATURATION: 94 % | DIASTOLIC BLOOD PRESSURE: 66 MMHG | HEIGHT: 70 IN | WEIGHT: 191 LBS | SYSTOLIC BLOOD PRESSURE: 124 MMHG

## 2019-11-04 DIAGNOSIS — J43.9 PULMONARY EMPHYSEMA, UNSPECIFIED EMPHYSEMA TYPE (HCC): ICD-10-CM

## 2019-11-04 DIAGNOSIS — Z12.5 SCREENING FOR PROSTATE CANCER: ICD-10-CM

## 2019-11-04 DIAGNOSIS — K21.9 GASTROESOPHAGEAL REFLUX DISEASE WITHOUT ESOPHAGITIS: ICD-10-CM

## 2019-11-04 DIAGNOSIS — Z23 NEED FOR INFLUENZA VACCINATION: ICD-10-CM

## 2019-11-04 DIAGNOSIS — E55.9 VITAMIN D DEFICIENCY: ICD-10-CM

## 2019-11-04 DIAGNOSIS — R39.11 URINARY HESITANCY: Primary | ICD-10-CM

## 2019-11-04 PROCEDURE — G0008 ADMIN INFLUENZA VIRUS VAC: HCPCS | Performed by: PHYSICIAN ASSISTANT

## 2019-11-04 PROCEDURE — 99214 OFFICE O/P EST MOD 30 MIN: CPT | Performed by: PHYSICIAN ASSISTANT

## 2019-11-04 PROCEDURE — 90662 IIV NO PRSV INCREASED AG IM: CPT | Performed by: PHYSICIAN ASSISTANT

## 2019-11-04 NOTE — PATIENT INSTRUCTIONS
No change in medications  Follow up in 3 months, sooner as needed  Refer to urology  Labs for next visit

## 2019-11-04 NOTE — PROGRESS NOTES
Assessment/Plan:   Patient Instructions   No change in medications  Follow up in 3 months, sooner as needed  Refer to urology  Labs for next visit  Unfortunately, patient is at high risk for hospitalizations secondary to his severe COPD which is oxygen dependent  Quality Measures:       Return in about 3 months (around 2/4/2020) for Next scheduled follow up-Dr Gifty Roy  Diagnoses and all orders for this visit:    Urinary hesitancy  -     Ambulatory referral to Urology; Future    Screening for prostate cancer  -     PSA, Total Screen; Future    Gastroesophageal reflux disease without esophagitis    Pulmonary emphysema, unspecified emphysema type (HCC)  -     Comprehensive metabolic panel; Future    Vitamin D deficiency  -     Vitamin D 25 hydroxy; Future    Need for influenza vaccination  -     influenza vaccine, [id number], high-dose, PF 0 5 mL (FLUZONE HIGH-DOSE)          Subjective:      Patient ID: Elisabeth Villa is a 81 y o  male  Follow-up    No labs done for this visit  Severe COPD:  Oxygen dependent  At this time states he feels he is stable  No increased shortness of breath from his baseline  No change in his cough  Patient's main focus today is urinary hesitancy, he states ever since his hernia surgery  He notes that when he goes to urinate, he can urinate but then stops and then eventually restarts again  He occasionally does have some dysuria which does not last   No blood noted in the urine  No fever, chills, flank pain  Vitamin-D deficiency:  Patient was to be taking weekly high-dose vitamin D2 supplementation, but stopped doing it due to its cost   He now is taking a daily vitamin-D supplement however is not aware of the dose  GERD:  Symptoms are stable  Is on chronic H2 blocker  He feels that this is helpful  Patient also concerned about a lesion that was removed or attempted to be removed from his scalp    Surgery notes indicate that the lesion was under his fascia and was part of his skull, had been present for over 35 years, therefore the surgeon did not feel it was necessary to be removed  Surgeon also remarks in a note dated 09/03/2019 that no further workup was necessary  Patient and his wife was present were concerned about and confused about previous talk that patient needed an MRI and then referral to Neurosurgery  This is not indicated        ALLERGIES:  Allergies   Allergen Reactions    Codeine Dizziness    Penicillins Hives     Passed out       CURRENT MEDICATIONS:    Current Outpatient Medications:     azithromycin (ZITHROMAX) 250 mg tablet, One tab PO Mon, Wed, Fri, Disp: 12 tablet, Rfl: 3    BREO ELLIPTA 100-25 MCG/INH inhaler, INHALE 1 PUFF ONCE DAILY RINSE MOUTH AFTER USE, Disp: 1 Inhaler, Rfl: 5    ergocalciferol (VITAMIN D2) 50,000 units, Take 1 capsule (50,000 Units total) by mouth once a week, Disp: 4 capsule, Rfl: 5    fluticasone (FLONASE) 50 mcg/act nasal spray, 1 spray into each nostril daily, Disp: 1 Bottle, Rfl: 3    ipratropium (ATROVENT) 0 02 % nebulizer solution, Take 1 vial (0 5 mg total) by nebulization every 6 (six) hours as needed for wheezing or shortness of breath, Disp: 120 vial, Rfl: 3    levalbuterol (XOPENEX HFA) 45 mcg/act inhaler, Inhale 1-2 puffs every 4 (four) hours as needed for wheezing or shortness of breath, Disp: 1 Inhaler, Rfl: 3    levalbuterol (XOPENEX) 1 25 mg/3 mL nebulizer solution, Take 1 vial (1 25 mg total) by nebulization every 6 (six) hours as needed for wheezing or shortness of breath, Disp: 120 vial, Rfl: 3    loratadine (CLARITIN) 10 mg tablet, Take 10 mg by mouth daily, Disp: , Rfl:     montelukast (SINGULAIR) 10 mg tablet, take 1 tablet by mouth at bedtime, Disp: 30 tablet, Rfl: 3    predniSONE 10 mg tablet, Take 1 tablet (10 mg total) by mouth daily, Disp: 90 tablet, Rfl: 3    predniSONE 20 mg tablet, 2 tab for 5 dasy and one tab for 5 days and half tab for 5 dasy, Disp: 18 tablet, Rfl: 0    ranitidine (ZANTAC) 150 mg tablet, take 1 tablet by mouth at bedtime, Disp: 30 tablet, Rfl: 5    sertraline (ZOLOFT) 50 mg tablet, take 2 tablets by mouth once daily, Disp: 60 tablet, Rfl: 5    spironolactone (ALDACTONE) 25 mg tablet, Take 1 tablet (25 mg total) by mouth daily, Disp: 90 tablet, Rfl: 3    tamsulosin (FLOMAX) 0 4 mg, Take 1 capsule (0 4 mg total) by mouth daily with dinner, Disp: 30 capsule, Rfl: 5    guaiFENesin (MUCINEX) 600 mg 12 hr tablet, Take 1 tablet (600 mg total) by mouth every 12 (twelve) hours (Patient not taking: Reported on 11/4/2019), Disp: 60 tablet, Rfl: 0    ACTIVE PROBLEM LIST:  Patient Active Problem List   Diagnosis    Pulmonary emphysema ([facility] [address] )    Gastroesophageal reflux disease without esophagitis    Anxiety    Simple chronic bronchitis (HCC)    Shortness of breath on exertion    Abnormal ECG    PVCs (premature ventricular contractions)    Allergic rhinitis    Functional diarrhea    Diastolic dysfunction    Pulmonary hypertension (HCC)    Acute exacerbation of chronic obstructive pulmonary disease (COPD) (HCC)    Chronic diastolic heart failure (HCC)    History of colon polyps    Renal mass, right    (HFpEF) heart failure with preserved ejection fraction (HCC)    Scalp lesion    Skin lesion       PAST MEDICAL HISTORY:  Past Medical History:   Diagnosis Date    Allergic rhinitis     BiPAP (biphasic positive airway pressure) dependence     Centrilobular emphysema (HCC)     COPD (chronic obstructive pulmonary disease) (HCC)     Enlarged prostate     GERD (gastroesophageal reflux disease)     HL (hearing loss)     Hypoglycemia     On home oxygen therapy     Pneumonia     Pulmonary hypertension (HCC)     Respiratory failure (HCC)     SOB (shortness of breath)     Stroke (HCC)     Mini       PAST SURGICAL HISTORY:  Past Surgical History:   Procedure Laterality Date    CATARACT EXTRACTION      CHOLECYSTECTOMY      hernia    EYE SURGERY      MASS EXCISION N/A 2019    Procedure: BACK SKIN LESION EXCISION BIOPSY;  Surgeon: Donovan Roy MD;  [facility];  Service: General    LA ESOPHAGOGASTRODUODENOSCOPY TRANSORAL DIAGNOSTIC N/A 2018    Procedure: ESOPHAGOGASTRODUODENOSCOPY (EGD); Surgeon: Keren Mckeon MD;  Location: [facility]; Service: Gastroenterology    LA EXC SKIN BENIG 2 1-3 CM REMAINDR BODY N/A 2019    Procedure: SCALP LESION EXCISION BIOPSY;  Surgeon: Donovan Roy MD;  [facility];  Service: General    LA REPAIR ING HERNIA,5+Y/O,REDUCIBL Left 2018    Procedure: OPEN INGUINAL HERNIA REPAIR WITH MESH;  Surgeon: Donovan Roy MD;  [facility];  Service: General       FAMILY HISTORY:  Family History   Problem Relation Age of Onset    Diabetes Mother     Hypertension Mother     Hyperlipidemia Mother        SOCIAL HISTORY:  Social History     Socioeconomic History    Marital status: /Civil Union     Spouse name: janet     Number of children: 5    Years of education: Not on file    Highest education level: Not on file   Occupational History    Occupation: retired    Social Needs    Financial resource strain: Not on file    Food insecurity:     Worry: Not on file     Inability: Not on file   [place] needs:     Medical: Not on file     Non-medical: Not on file   Tobacco Use    Smoking status: Former Smoker     Packs/day: 0 00     Years: 50 00     Pack years: 0 00     Types: Cigarettes     Last attempt to quit: 2013     Years since quittin 6    Smokeless tobacco: Never Used   Substance and Sexual Activity    Alcohol use:  Yes     Alcohol/week: 1 0 standard drinks     Types: 1 Glasses of wine per week     Comment: socialy    Drug use: Never    Sexual activity: Never   Lifestyle    Physical activity:     Days per week: Not on file     Minutes per session: Not on file    Stress: Not on file   Relationships    Social connections:     Talks on phone: Not on file     Gets together: Not on file     Attends Denominational service: Not on file     Active member of club or organization: Not on file     Attends meetings of clubs or organizations: Not on file     Relationship status: Not on file    Intimate partner violence:     Fear of current or ex partner: Not on file     Emotionally abused: Not on file     Physically abused: Not on file     Forced sexual activity: Not on file   Other Topics Concern    Not on file   Social History Narrative        Retired       Review of Systems   Constitutional: Negative for activity change, chills, fatigue and fever  HENT: Positive for postnasal drip  Negative for congestion, rhinorrhea and sneezing  Eyes: Negative for discharge  Respiratory: Positive for shortness of breath  Negative for cough, chest tightness and wheezing  Cardiovascular: Negative for chest pain, palpitations and leg swelling  Gastrointestinal: Negative for abdominal pain, constipation, diarrhea, nausea and vomiting  Endocrine: Negative for polydipsia, polyphagia and polyuria  Genitourinary: Negative for difficulty urinating  Musculoskeletal: Negative for arthralgias and myalgias  Skin: Negative for rash  Allergic/Immunologic: Negative for immunocompromised state  Neurological: Negative for dizziness, syncope, weakness, light-headedness and headaches  Hematological: Negative for adenopathy  Does not bruise/bleed easily  Psychiatric/Behavioral: Negative for dysphoric mood, sleep disturbance and suicidal ideas  The patient is not nervous/anxious  Objective:  Vitals:    11/04/19 1339   BP: 124/66   BP Location: Left arm   Patient Position: Sitting   Cuff Size: Standard   Pulse: 93   SpO2: 94%   Weight: 86 6 kg (191 lb)   Height: 5' 10" (1 778 m)     Body mass index is 27 41 kg/m²  Physical Exam   Constitutional: He is oriented to person, place, and time  He appears well-developed and well-nourished  No distress     Nasal oxygen in place   HENT:   Head: Normocephalic  Neck: Neck supple  No JVD present  Carotid bruit is not present  Cardiovascular: Normal rate, regular rhythm and normal heart sounds  Heart tones are very distant   Pulmonary/Chest: Effort normal and breath sounds normal    Distant breath sounds throughout which are decreased  Prolonged expiratory phase, very faint end expiratory wheezes   Abdominal: Soft  There is no tenderness  Musculoskeletal: He exhibits no edema  Lymphadenopathy:     He has no cervical adenopathy  Neurological: He is alert and oriented to person, place, and time  Skin: Skin is warm and dry  No rash noted  Psychiatric: He has a normal mood and affect  His behavior is normal    Nursing note and vitals reviewed  RESULTS:    No results found for this or any previous visit (from the past 1008 hour(s))  This note was created with voice recognition software  Phonic, grammatical and spelling errors may be present within the note as a result

## 2019-11-06 ENCOUNTER — APPOINTMENT (OUTPATIENT)
Dept: LAB | Facility: HOSPITAL | Age: 69
End: 2019-11-06
Payer: MEDICARE

## 2019-11-06 DIAGNOSIS — J43.9 PULMONARY EMPHYSEMA, UNSPECIFIED EMPHYSEMA TYPE (HCC): ICD-10-CM

## 2019-11-06 DIAGNOSIS — Z12.5 SCREENING FOR PROSTATE CANCER: ICD-10-CM

## 2019-11-06 DIAGNOSIS — E55.9 VITAMIN D DEFICIENCY: ICD-10-CM

## 2019-11-06 LAB
25(OH)D3 SERPL-MCNC: 18.7 NG/ML (ref 30–100)
ALBUMIN SERPL BCP-MCNC: 3.8 G/DL (ref 3.5–5)
ALP SERPL-CCNC: 67 U/L (ref 46–116)
ALT SERPL W P-5'-P-CCNC: 25 U/L (ref 12–78)
ANION GAP SERPL CALCULATED.3IONS-SCNC: 5 MMOL/L (ref 4–13)
AST SERPL W P-5'-P-CCNC: 24 U/L (ref 5–45)
BILIRUB SERPL-MCNC: 1.1 MG/DL (ref 0.2–1)
BUN SERPL-MCNC: 28 MG/DL (ref 5–25)
CALCIUM SERPL-MCNC: 8.7 MG/DL (ref 8.3–10.1)
CHLORIDE SERPL-SCNC: 103 MMOL/L (ref 100–108)
CO2 SERPL-SCNC: 31 MMOL/L (ref 21–32)
CREAT SERPL-MCNC: 0.76 MG/DL (ref 0.6–1.3)
GFR SERPL CREATININE-BSD FRML MDRD: 94 ML/MIN/1.73SQ M
GLUCOSE P FAST SERPL-MCNC: 84 MG/DL (ref 65–99)
POTASSIUM SERPL-SCNC: 4.4 MMOL/L (ref 3.5–5.3)
PROT SERPL-MCNC: 7.1 G/DL (ref 6.4–8.2)
PSA SERPL-MCNC: 1.6 NG/ML (ref 0–4)
SODIUM SERPL-SCNC: 139 MMOL/L (ref 136–145)

## 2019-11-06 PROCEDURE — G0103 PSA SCREENING: HCPCS

## 2019-11-06 PROCEDURE — 80053 COMPREHEN METABOLIC PANEL: CPT

## 2019-11-06 PROCEDURE — 82306 VITAMIN D 25 HYDROXY: CPT

## 2019-11-06 PROCEDURE — 36415 COLL VENOUS BLD VENIPUNCTURE: CPT

## 2019-11-07 DIAGNOSIS — E55.9 VITAMIN D DEFICIENCY: ICD-10-CM

## 2019-11-07 RX ORDER — ERGOCALCIFEROL 1.25 MG/1
50000 CAPSULE ORAL WEEKLY
Qty: 4 CAPSULE | Refills: 5 | Status: SHIPPED | OUTPATIENT
Start: 2019-11-07

## 2019-11-22 DIAGNOSIS — J20.9 ACUTE BRONCHITIS, UNSPECIFIED ORGANISM: Primary | ICD-10-CM

## 2019-11-22 RX ORDER — CEFDINIR 300 MG/1
300 CAPSULE ORAL EVERY 12 HOURS SCHEDULED
Qty: 14 CAPSULE | Refills: 0 | Status: SHIPPED | OUTPATIENT
Start: 2019-11-22 | End: 2019-11-29

## 2019-12-04 ENCOUNTER — TELEPHONE (OUTPATIENT)
Dept: PULMONOLOGY | Facility: CLINIC | Age: 69
End: 2019-12-04

## 2019-12-04 DIAGNOSIS — J18.9 PNEUMONIA DUE TO INFECTIOUS ORGANISM, UNSPECIFIED LATERALITY, UNSPECIFIED PART OF LUNG: Primary | ICD-10-CM

## 2019-12-04 RX ORDER — CEFDINIR 300 MG/1
300 CAPSULE ORAL EVERY 12 HOURS SCHEDULED
Qty: 14 CAPSULE | Refills: 0 | Status: SHIPPED | OUTPATIENT
Start: 2019-12-04 | End: 2019-12-11

## 2019-12-12 NOTE — PROGRESS NOTES
Assessment and plan:       1  BPH with LUTS  - Patient continues to have bothersome frequency and urgency as well as need for double voiding  - He has trialed tamsulosin for a short amount of time in the past without great success  - Patient has severe COPD and is reportedly unable to undergo general anesthesia  - Plan will be to re-initiate tamsulosin for short amount of time  When he returns for symptom assessment in 6 weeks, patient will undergo cystoscopy and transrectal ultrasound to evaluate for bladder outlet obstruction procedures  - If it is recommended that the patient needs a bladder outlet obstruction procedures such as the TURP or Urolift, he will require medical clearance prior to this procedure  2  Prostate cancer screening  - Recent PSA was 1 6  - Prostate examination today is benign        Stef Nichols PA-C      Chief Complaint     Chief Complaint   Patient presents with    Urinary Frequency         History of Present Illness     Ronen Rodriguez is a 81 y o  male patient establishing care with [facility] for urinary frequency  He was referred by his primary care team to whom a copy of today's note will be sent in the name of continuity of care  Patient was previously given a prescription of tamsulosin which he tried for short period of time without any improvement  Recent PSA drawn 11/06/2019 was 1 6  Patient does have a history of colon polyps and is due for repeat colonoscopy  He is seeing Dr Johnathan Manriquez later this week to be set up  He reports that he currently has a lesion on his anus  He is emptying adequately today with a PVR of 28 mL  AUA symptom score is 24 with an overall bother score of 5  He reports that he often requires double or triple voiding to empty his bladder completely  He has urgency that may result in incontinence on occasion        Laboratory     Lab Results   Component Value Date    CREATININE 0 76 11/06/2019 Lab Results   Component Value Date    PSA 1 6 11/06/2019       Recent Results (from the past 1 hour(s))   POCT Measure PVR    Collection Time: 12/16/19  9:19 AM   Result Value Ref Range    POST-VOID RESIDUAL VOLUME, ML POC 28 mL         Review of Systems     Review of Systems   Constitutional: Negative for chills and fever  HENT: Negative  Eyes: Negative  Respiratory: Positive for shortness of breath (COPD)  Cardiovascular: Negative for chest pain  Gastrointestinal: Negative for constipation, diarrhea, nausea and vomiting  Genitourinary: Positive for frequency and urgency  Negative for difficulty urinating, dysuria, enuresis, flank pain and hematuria  Musculoskeletal: Negative  AUA SYMPTOM SCORE      Most Recent Value   AUA SYMPTOM SCORE   How often have you had a sensation of not emptying your bladder completely after you finished urinating? 5   How often have you had to urinate again less than two hours after you finished urinating? 5   How often have you found you stopped and started again several times when you urinate? 5   How often have you found it difficult to postpone urination? 5   How often have you had a weak urinary stream?  1   How often have you had to push or strain to begin urination? 0   How many times did you most typically get up to urinate from the time you went to bed at night until the time you got up in the morning? 3   Quality of Life: If you were to spend the rest of your life with your urinary condition just the way it is now, how would you feel about that?  5   AUA SYMPTOM SCORE  24                Allergies     Allergies   Allergen Reactions    Codeine Dizziness    Penicillins Hives     Passed out       Physical Exam     Physical Exam   Constitutional: He is oriented to person, place, and time  He appears well-developed and well-nourished  No distress  HENT:   Head: Normocephalic and atraumatic     Right Ear: External ear normal    Left Ear: External ear normal    Nose: Nose normal    Eyes: Right eye exhibits no discharge  Left eye exhibits no discharge  No scleral icterus  Cardiovascular: Normal rate  Pulmonary/Chest: Effort normal    Genitourinary:   Genitourinary Comments: Digital rectal exam revealed approximately 50 g prostate is smooth, nontender, and without nodules  External hemorrhoid is present on the right side of his anus  Musculoskeletal:   Ambulates independently   Neurological: He is alert and oriented to person, place, and time  Skin: Skin is warm and dry  He is not diaphoretic  Psychiatric: He has a normal mood and affect  His behavior is normal  Judgment and thought content normal    Nursing note and vitals reviewed          Vital Signs     Vitals:    12/16/19 0908   BP: 122/60   Pulse: 76   Weight: 83 9 kg (185 lb)   Height: 5' 10" (1 778 m)         Current Medications       Current Outpatient Medications:     azithromycin (ZITHROMAX) 250 mg tablet, One tab PO Mon, Wed, Fri, Disp: 12 tablet, Rfl: 3    BREO ELLIPTA 100-25 MCG/INH inhaler, INHALE 1 PUFF ONCE DAILY RINSE MOUTH AFTER USE, Disp: 1 Inhaler, Rfl: 5    ergocalciferol (VITAMIN D2) 50,000 units, Take 1 capsule (50,000 Units total) by mouth once a week, Disp: 4 capsule, Rfl: 5    fluticasone (FLONASE) 50 mcg/act nasal spray, 1 spray into each nostril daily, Disp: 1 Bottle, Rfl: 3    guaiFENesin (MUCINEX) 600 mg 12 hr tablet, Take 1 tablet (600 mg total) by mouth every 12 (twelve) hours, Disp: 60 tablet, Rfl: 0    ipratropium (ATROVENT) 0 02 % nebulizer solution, Take 1 vial (0 5 mg total) by nebulization every 6 (six) hours as needed for wheezing or shortness of breath, Disp: 120 vial, Rfl: 3    levalbuterol (XOPENEX HFA) 45 mcg/act inhaler, Inhale 1-2 puffs every 4 (four) hours as needed for wheezing or shortness of breath, Disp: 1 Inhaler, Rfl: 3    levalbuterol (XOPENEX) 1 25 mg/3 mL nebulizer solution, Take 1 vial (1 25 mg total) by nebulization every 6 (six) hours as needed for wheezing or shortness of breath, Disp: 120 vial, Rfl: 3    loratadine (CLARITIN) 10 mg tablet, Take 10 mg by mouth daily, Disp: , Rfl:     montelukast (SINGULAIR) 10 mg tablet, take 1 tablet by mouth at bedtime, Disp: 30 tablet, Rfl: 3    predniSONE 10 mg tablet, Take 1 tablet (10 mg total) by mouth daily, Disp: 90 tablet, Rfl: 3    ranitidine (ZANTAC) 150 mg tablet, take 1 tablet by mouth at bedtime, Disp: 30 tablet, Rfl: 5    sertraline (ZOLOFT) 50 mg tablet, take 2 tablets by mouth once daily, Disp: 60 tablet, Rfl: 5    spironolactone (ALDACTONE) 25 mg tablet, Take 1 tablet (25 mg total) by mouth daily, Disp: 90 tablet, Rfl: 3    tamsulosin (FLOMAX) 0 4 mg, Take 1 capsule (0 4 mg total) by mouth daily with dinner, Disp: 30 capsule, Rfl: 5    predniSONE 20 mg tablet, 2 tab for 5 dasy and one tab for 5 days and half tab for 5 dasy (Patient not taking: Reported on 12/16/2019), Disp: 18 tablet, Rfl: 0      Active Problems     Patient Active Problem List   Diagnosis    [facility]monary emph[facility]ma ([facility] [address] )    Gastroesophageal reflux disease without esophagitis    Anxiety    Simple chronic bronchitis (HCC)    Shortness of breath on exertion    Abnormal ECG    PVCs (premature ventricular contractions)    Allergic rhinitis    Functional diarrhea    Diastolic dysfunction    Pulmonary hypertension (HCC)    Acute exacerbation of chronic obstructive pulmonary disease (COPD) (HCC)    Chronic diastolic heart failure (HCC)    History of colon polyps    Renal mass, right    (HFpEF) heart failure with preserved ejection fraction (HCC)    Scalp lesion    Skin lesion         Past Medical History     Past Medical History:   Diagnosis Date    Allergic rhinitis     BiPAP (biphasic positive airway pressure) dependence     Centrilobular emphysema (HCC)     COPD (chronic obstructive pulmonary disease) (HCC)     Enlarged prostate     GERD (gastroesophageal reflux disease)     HL (hearing loss)     Hypoglycemia     On home oxygen therapy     Pneumonia     Pulmonary hypertension (HCC)     Respiratory failure (HCC)     SOB (shortness of breath)     Stroke [facility])     Mini         Surgical History     Past Surgical History:   Procedure Laterality Date    CATARACT EXTRACTION      CHOLECYSTECTOMY      hernia    EYE SURGERY      MASS EXCISION N/A 7/26/2019    Procedure: BACK SKIN LESION EXCISION BIOPSY;  Surgeon: Pat Chaudhari MD;  [facility];  Service: General    FL ESOPHAGOGASTRODUODENOSCOPY TRANSORAL DIAGNOSTIC N/A 9/19/2018    Procedure: ESOPHAGOGASTRODUODENOSCOPY (EGD); Surgeon: Gerard Shanks MD;  [facility];   Service: Gastroenterology    FL EXC SKIN BENIG 2 1-3 CM REMAINDR BODY N/A 7/26/2019    Procedure: SCALP LESION EXCISION BIOPSY;  Surgeon: Pat Chaudhari MD;  [facility];  Service: General    FL REPAIR ING HERNIA,5+Y/O,REDUCIBL Left 5/23/2018    Procedure: OPEN INGUINAL HERNIA REPAIR WITH MESH;  Surgeon: Pat Chaudhari MD;  [facility];  Service: General         Family History     Family History   Problem Relation Age of Onset    Diabetes Mother     Hypertension Mother     Hyperlipidemia Mother          Social History     Social History       Radiology

## 2019-12-16 ENCOUNTER — CONSULT (OUTPATIENT)
Dept: UROLOGY | Facility: CLINIC | Age: 69
End: 2019-12-16
Payer: MEDICARE

## 2019-12-16 VITALS
HEIGHT: 70 IN | DIASTOLIC BLOOD PRESSURE: 60 MMHG | BODY MASS INDEX: 26.48 KG/M2 | WEIGHT: 185 LBS | SYSTOLIC BLOOD PRESSURE: 122 MMHG | HEART RATE: 76 BPM

## 2019-12-16 DIAGNOSIS — R35.0 URINARY FREQUENCY: Primary | ICD-10-CM

## 2019-12-16 DIAGNOSIS — R09.81 NASAL CONGESTION: ICD-10-CM

## 2019-12-16 DIAGNOSIS — R39.11 URINARY HESITANCY: ICD-10-CM

## 2019-12-16 LAB — POST-VOID RESIDUAL VOLUME, ML POC: 28 ML

## 2019-12-16 PROCEDURE — 99213 OFFICE O/P EST LOW 20 MIN: CPT | Performed by: PHYSICIAN ASSISTANT

## 2019-12-16 PROCEDURE — 51798 US URINE CAPACITY MEASURE: CPT | Performed by: PHYSICIAN ASSISTANT

## 2019-12-16 RX ORDER — TAMSULOSIN HYDROCHLORIDE 0.4 MG/1
0.4 CAPSULE ORAL
Qty: 30 CAPSULE | Refills: 5 | Status: SHIPPED | OUTPATIENT
Start: 2019-12-16 | End: 2020-03-09 | Stop reason: SDUPTHER

## 2019-12-17 RX ORDER — FLUTICASONE PROPIONATE 50 MCG
SPRAY, SUSPENSION (ML) NASAL
Qty: 16 G | Refills: 3 | Status: SHIPPED | OUTPATIENT
Start: 2019-12-17 | End: 2020-07-29

## 2019-12-18 ENCOUNTER — OFFICE VISIT (OUTPATIENT)
Dept: GASTROENTEROLOGY | Facility: CLINIC | Age: 69
End: 2019-12-18
Payer: MEDICARE

## 2019-12-18 VITALS
DIASTOLIC BLOOD PRESSURE: 76 MMHG | WEIGHT: 188.8 LBS | HEIGHT: 70 IN | BODY MASS INDEX: 27.03 KG/M2 | SYSTOLIC BLOOD PRESSURE: 124 MMHG | HEART RATE: 54 BPM

## 2019-12-18 DIAGNOSIS — K59.1 FUNCTIONAL DIARRHEA: ICD-10-CM

## 2019-12-18 DIAGNOSIS — K21.9 GASTROESOPHAGEAL REFLUX DISEASE WITHOUT ESOPHAGITIS: Primary | ICD-10-CM

## 2019-12-18 DIAGNOSIS — Z86.010 HISTORY OF COLON POLYPS: ICD-10-CM

## 2019-12-18 PROCEDURE — 99214 OFFICE O/P EST MOD 30 MIN: CPT | Performed by: PHYSICIAN ASSISTANT

## 2019-12-18 NOTE — PROGRESS NOTES
[facility]) [facility]  Rhys Rank 81 y o  male MRN: [id number]       CC: Follow-up    HPI:  Mr Libra Lopez is a 79year old male with history of severe COPD on supplemental O2 and chronic GERD who presents today for follow-up  He was last seen by us in July for symptoms of diarrhea  Patient reports that he has been taking colestipol twice daily and taking probiotic  Patient reports that he is feeling well from this standpoint  Patient reports that his GERD has also been under control with H2 blocker  Patient is here to follow-up as patient's wife reports he has a history of colon polyps  His last colonoscopy was around 5 years ago, per patient's wife, while living in [facility]  Patient denies any symptoms of unintentional weight loss, or overt GI bleeding  Patient's last EGD was with Dr Claudetta Pay in September 2018  Mild gastritis seen at that time  Biopsies were negative for H pylori or changes suggesting celiac disease  Review of Systems:    CONSTITUTIONAL: Denies any fever, chills, or rigors  Good appetite, and no recent weight loss  HEENT: No earache or tinnitus  Denies hearing loss or visual disturbances  CARDIOVASCULAR: No chest pain or palpitations  RESPIRATORY: Denies any cough, hemoptysis, shortness of breath or dyspnea on exertion  GASTROINTESTINAL: As noted in the History of Present Illness  GENITOURINARY: No problems with urination  Denies any hematuria or dysuria  NEUROLOGIC: No dizziness or vertigo, denies headaches  MUSCULOSKELETAL: Denies any muscle or joint pain  SKIN: Denies skin rashes or itching  ENDOCRINE: Denies excessive thirst  Denies intolerance to heat or cold  PSYCHOSOCIAL: Denies depression or anxiety  Denies any recent memory loss         Current Outpatient Medications   Medication Sig Dispense Refill    azithromycin (ZITHROMAX) 250 mg tablet One tab PO Mon, Wed, Fri 12 tablet 3    BREO ELLIPTA 100-25 MCG/INH inhaler INHALE 1 PUFF ONCE DAILY RINSE MOUTH AFTER USE 1 Inhaler 5    ergocalciferol (VITAMIN D2) 50,000 units Take 1 capsule (50,000 Units total) by mouth once a week 4 capsule 5    fluticasone (FLONASE) 50 mcg/act nasal spray instill 1 spray into each nostril once daily 16 g 3    guaiFENesin (MUCINEX) 600 mg 12 hr tablet Take 1 tablet (600 mg total) by mouth every 12 (twelve) hours 60 tablet 0    ipratropium (ATROVENT) 0 02 % nebulizer solution Take 1 vial (0 5 mg total) by nebulization every 6 (six) hours as needed for wheezing or shortness of breath 120 vial 3    levalbuterol (XOPENEX HFA) 45 mcg/act inhaler Inhale 1-2 puffs every 4 (four) hours as needed for wheezing or shortness of breath 1 Inhaler 3    levalbuterol (XOPENEX) 1 25 mg/3 mL nebulizer solution Take 1 vial (1 25 mg total) by nebulization every 6 (six) hours as needed for wheezing or shortness of breath 120 vial 3    loratadine (CLARITIN) 10 mg tablet Take 10 mg by mouth daily      montelukast (SINGULAIR) 10 mg tablet take 1 tablet by mouth at bedtime 30 tablet 3    predniSONE 10 mg tablet Take 1 tablet (10 mg total) by mouth daily 90 tablet 3    ranitidine (ZANTAC) 150 mg tablet take 1 tablet by mouth at bedtime 30 tablet 5    sertraline (ZOLOFT) 50 mg tablet take 2 tablets by mouth once daily 60 tablet 5    spironolactone (ALDACTONE) 25 mg tablet Take 1 tablet (25 mg total) by mouth daily 90 tablet 3    tamsulosin (FLOMAX) 0 4 mg Take 1 capsule (0 4 mg total) by mouth daily with dinner 30 capsule 5    predniSONE 20 mg tablet 2 tab for 5 dasy and one tab for 5 days and half tab for 5 dasy (Patient not taking: Reported on 12/18/2019) 18 tablet 0     No current facility-administered medications for this visit        Past Medical History:   Diagnosis Date    Allergic rhinitis     BiPAP (biphasic positive airway pressure) dependence     Centrilobular emphysema (HCC)     COPD (chronic obstructive pulmonary disease) (HCC)     Enlarged prostate     GERD (gastroesophageal reflux disease)     HL (hearing loss)     Hypoglycemia     On home oxygen therapy     Pneumonia     Pulmonary hypertension (HCC)     Respiratory failure (HCC)     SOB (shortness of breath)     Stroke [facility])     Mini     Past Surgical History:   Procedure Laterality Date    CATARACT EXTRACTION      CHOLECYSTECTOMY      hernia    EYE SURGERY      MASS EXCISION N/A 2019    Procedure: BACK SKIN LESION EXCISION BIOPSY;  Surgeon: Donovan Roy MD;  [facility];  Service: General    NH ESOPHAGOGASTRODUODENOSCOPY TRANSORAL DIAGNOSTIC N/A 2018    Procedure: ESOPHAGOGASTRODUODENOSCOPY (EGD); Surgeon: Keren Mckeon MD;  [facility]; Service: Gastroenterology    NH EXC SKIN BENIG 2 1-3 CM REMAINDR BODY N/A 2019    Procedure: SCALP LESION EXCISION BIOPSY;  Surgeon: Donovan Roy MD;  [facility];  Service: General    NH REPAIR ING HERNIA,5+Y/O,REDUCIBL Left 2018    Procedure: OPEN INGUINAL HERNIA REPAIR WITH MESH;  Surgeon: Donovan Roy MD;  [facility];  Service: General     Social History     Socioeconomic History    Marital status: /Civil Union     Spouse name: janet     Number of children: 11    Years of education: None    Highest education level: None   Occupational History    Occupation: retired    Social Needs    Financial resource strain: None    Food insecurity:     Worry: None     Inability: None    Transportation needs:     Medical: None     Non-medical: None   Tobacco Use    Smoking status: Former Smoker     Packs/day: 0 00     Years: 50 00     Pack years: 0 00     Types: Cigarettes     Last attempt to quit: 2013     Years since quittin 8    Smokeless tobacco: Never Used   Substance and Sexual Activity    Alcohol use:  Yes     Alcohol/week: 1 0 standard drinks     Types: 1 Glasses of wine per week     Comment: socialy    Drug use: Never    Sexual activity: Never   Lifestyle    Physical activity:     Days per week: None     Minutes per session: None    Stress: None   Relationships    Social connections:     Talks on phone: None     Gets together: None     Attends Zoroastrianism service: None     Active member of club or organization: None     Attends meetings of clubs or organizations: None     Relationship status: None    Intimate partner violence:     Fear of current or ex partner: None     Emotionally abused: None     Physically abused: None     Forced sexual activity: None   Other Topics Concern    None   Social History Narrative        Retired     Family History   Problem Relation Age of Onset    Diabetes Mother     Hypertension Mother     Hyperlipidemia Mother             PHYSICAL EXAM:    Vitals:    12/18/19 0924   BP: 124/76   Pulse: (!) 54   Weight: 85 6 kg (188 lb 12 8 oz)   Height: 5' 10" (1 778 m)     General Appearance:   Alert and oriented x 3   Cooperative, and in no respiratory distress   HEENT:   Normocephalic, atraumatic, anicteric      Neck:  Supple, symmetrical, trachea midline   Lungs:   Clear to auscultation bilaterally    Heart[de-identified]   Regular rate and rhythm   Abdomen:   Soft, non-tender, non-distended; normal bowel sounds; no masses, no organomegaly    Genitalia:   Deferred    Rectal:   Deferred    Extremities:  No cyanosis, clubbing or edema    Pulses:  2+ and symmetric all extremities    Skin:  Skin color, texture, turgor normal, no rashes or lesions    Lymph nodes:  No palpable cervical or supraclavicular lymphadenopathy        Lab Results:   Results from last 6 Months   Lab Units 07/02/19  1104   WBC Thousand/uL 7 05   HEMOGLOBIN g/dL 13 5   HEMATOCRIT % 42 1   PLATELETS Thousands/uL 258   NEUTROS PCT % 47   LYMPHS PCT % 37   MONOS PCT % 11   EOS PCT % 4     Results from last 6 Months   Lab Units 11/06/19  1031   POTASSIUM mmol/L 4 4   CHLORIDE mmol/L 103   CO2 mmol/L 31   BUN mg/dL 28*   CREATININE mg/dL 0 76   CALCIUM mg/dL 8 7   ALK PHOS U/L 67   ALT U/L 25   AST U/L 24               Imaging Studies: I have personally reviewed pertinent imaging studies  Ct Chest Wo Contrast    Result Date: 10/11/2019  Impression: Resolution of right middle lobe atelectasis  Decrease in left lower lobe atelectasis with mild residual scarring  No acute disease  Nothing to suggest malignancy  Emphysema  Workstation performed: [id number]       ASSESSMENT and PLAN:      1) History of colon polyps, need for colonoscopy - Patient reports history of colon polyps  Fortunately, his diarrhea is currently controlled  He was last seen by his pulmonologist in October  He has severe COPD  - I explained to him that he is likely high risk for colonoscopy, but we will contact his pulmonologist regarding this as the patient is very adamant that he would like colonoscopy to be performed  - I also offered to do a colo guard or fit testing, however we will likely reserve this until we contact pulmonary    2) Chronic diarrhea - Currently has resolved since being on a regimen colestipol and probiotic daily  Patient cannot take Fernando Aquas since his gallbladder is removed  3) GERD - Continue H2 blocker as needed

## 2020-01-14 ENCOUNTER — OFFICE VISIT (OUTPATIENT)
Dept: INTERNAL MEDICINE CLINIC | Facility: CLINIC | Age: 70
End: 2020-01-14
Payer: MEDICARE

## 2020-01-14 VITALS
HEART RATE: 80 BPM | SYSTOLIC BLOOD PRESSURE: 130 MMHG | RESPIRATION RATE: 18 BRPM | BODY MASS INDEX: 26.92 KG/M2 | HEIGHT: 70 IN | DIASTOLIC BLOOD PRESSURE: 78 MMHG | OXYGEN SATURATION: 94 % | WEIGHT: 188 LBS

## 2020-01-14 DIAGNOSIS — J44.1 COPD EXACERBATION (HCC): Primary | ICD-10-CM

## 2020-01-14 DIAGNOSIS — I27.20 PULMONARY HYPERTENSION (HCC): ICD-10-CM

## 2020-01-14 DIAGNOSIS — J41.0 SIMPLE CHRONIC BRONCHITIS (HCC): ICD-10-CM

## 2020-01-14 PROCEDURE — 99214 OFFICE O/P EST MOD 30 MIN: CPT | Performed by: INTERNAL MEDICINE

## 2020-01-14 RX ORDER — PREDNISONE 10 MG/1
10 TABLET ORAL DAILY
Qty: 30 TABLET | Refills: 1 | Status: SHIPPED | OUTPATIENT
Start: 2020-01-14 | End: 2020-03-10 | Stop reason: SDUPTHER

## 2020-01-14 RX ORDER — LEVOFLOXACIN 500 MG/1
500 TABLET, FILM COATED ORAL EVERY 24 HOURS
Qty: 10 TABLET | Refills: 0 | Status: SHIPPED | OUTPATIENT
Start: 2020-01-14 | End: 2020-01-24

## 2020-01-14 NOTE — PROGRESS NOTES
Assessment/Plan:     Exacerbation of his COPD  He and his wife are high risk for hospitalization  Therefore, will start antibiotics with Levaquin which she usually responds well to  They will hold the Zithromax  Increased steroids for a few days  Continue nebulizer  Consider taking guaifenesin to loosen the secretions  Quality Measures:       Return if symptoms worsen or fail to improve  No problem-specific Assessment & Plan notes found for this encounter  Diagnoses and all orders for this visit:    COPD exacerbation ([facility] [address] )  -     levofloxacin (LEVAQUIN) 500 mg tablet; Take 1 tablet (500 mg total) by mouth every 24 hours for 10 days    Simple chronic bronchitis (HCC)  -     predniSONE 10 mg tablet; Take 1 tablet (10 mg total) by mouth daily    Pulmonary hypertension (HCC)  -     predniSONE 10 mg tablet; Take 1 tablet (10 mg total) by mouth daily          Subjective:      Patient ID: Valentina Young is a 81 y o  male  Patient comes in today with his wife complaining of a few days of increasing respiratory it is secretions  Productive cough  Colored sputum  Using the nebulizer more  They are actually doing better  He has not been sick since May  His wife reports they used to be hospitalized at least once a month up [facility]        ALLERGIES:  Allergies   Allergen Reactions    Codeine Dizziness    Penicillins Hives     Passed out       CURRENT MEDICATIONS:    Current Outpatient Medications:     azithromycin (ZITHROMAX) 250 mg tablet, One tab PO Mon, Wed, Fri, Disp: 12 tablet, Rfl: 3    BREO ELLIPTA 100-25 MCG/INH inhaler, INHALE 1 PUFF ONCE DAILY RINSE MOUTH AFTER USE, Disp: 1 Inhaler, Rfl: 5    ergocalciferol (VITAMIN D2) 50,000 units, Take 1 capsule (50,000 Units total) by mouth once a week, Disp: 4 capsule, Rfl: 5    fluticasone (FLONASE) 50 mcg/act nasal spray, instill 1 spray into each nostril once daily, Disp: 16 g, Rfl: 3    guaiFENesin (MUCINEX) 600 mg 12 hr tablet, Take 1 diastolic heart failure (HCC)    History of colon polyps    Renal mass, right    (HFpEF) heart failure with preserved ejection fraction (HCC)    Scalp lesion    Skin lesion       PAST MEDICAL HISTORY:  Past Medical History:   Diagnosis Date    Allergic rhinitis     BiPAP (biphasic positive airway pressure) dependence     Centrilobular emphysema (HCC)     COPD (chronic obstructive pulmonary disease) (HCC)     Enlarged prostate     GERD (gastroesophageal reflux disease)     HL (hearing loss)     Hypoglycemia     On home oxygen therapy     Pneumonia     Pulmonary hypertension (HCC)     Respiratory failure (HCC)     SOB (shortness of breath)     Stroke (HCC)     Mini       PAST SURGICAL HISTORY:  Past Surgical History:   Procedure Laterality Date    CATARACT EXTRACTION      CHOLECYSTECTOMY      hernia    EYE SURGERY      MASS EXCISION N/A 7/26/2019    Procedure: BACK SKIN LESION EXCISION BIOPSY;  Surgeon: Lauren Cavazos MD;  [facility];  Service: General    NV ESOPHAGOGASTRODUODENOSCOPY TRANSORAL DIAGNOSTIC N/A 9/19/2018    Procedure: ESOPHAGOGASTRODUODENOSCOPY (EGD); Surgeon: Aleksandra Wu MD;  [facility];   Service: Gastroenterology    NV EXC SKIN BENIG 2 1-3 CM REMAINDR BODY N/A 7/26/2019    Procedure: SCALP LESION EXCISION BIOPSY;  Surgeon: Lauren Cavazos MD;  [facility];  Service: General    NV REPAIR ING HERNIA,5+Y/O,REDUCIBL Left 5/23/2018    Procedure: OPEN INGUINAL HERNIA REPAIR WITH MESH;  Surgeon: Lauren Cavazos MD;  [facility];  Service: General       FAMILY HISTORY:  Family History   Problem Relation Age of Onset    Diabetes Mother     Hypertension Mother     Hyperlipidemia Mother        SOCIAL HISTORY:  Social History     Socioeconomic History    Marital status: /Civil Union     Spouse name: janet     Number of children: 5    Years of education: Not on file    Highest education level: Not on file   Occupational History    Occupation: retired    Social Needs    Financial resource strain: Not on file    Food insecurity:     Worry: Not on file     Inability: Not on file   uTaP needs:     Medical: Not on file     Non-medical: Not on file   Tobacco Use    Smoking status: Former Smoker     Packs/day: 0 00     Years: 50 00     Pack years: 0 00     Types: Cigarettes     Last attempt to quit: 2013     Years since quittin 8    Smokeless tobacco: Never Used   Substance and Sexual Activity    Alcohol use: Yes     Alcohol/week: 1 0 standard drinks     Types: 1 Glasses of wine per week     Comment: socialy    Drug use: Never    Sexual activity: Never   Lifestyle    Physical activity:     Days per week: Not on file     Minutes per session: Not on file    Stress: Not on file   Relationships    Social connections:     Talks on phone: Not on file     Gets together: Not on file     Attends Hoahaoism service: Not on file     Active member of club or organization: Not on file     Attends meetings of clubs or organizations: Not on file     Relationship status: Not on file    Intimate partner violence:     Fear of current or ex partner: Not on file     Emotionally abused: Not on file     Physically abused: Not on file     Forced sexual activity: Not on file   Other Topics Concern    Not on file   Social History Narrative        Retired       Review of Systems   Constitutional: Negative for fever  Respiratory: Positive for cough  Objective:  Vitals:    20 1257   BP: 130/78   BP Location: Left arm   Patient Position: Sitting   Cuff Size: Adult   Pulse: 80   Resp: 18   SpO2: 94%   Weight: 85 3 kg (188 lb)   Height: 5' 10" (1 778 m)     Body mass index is 26 98 kg/m²  Physical Exam   Constitutional: He is oriented to person, place, and time  He appears well-developed and well-nourished     HENT:   Right Ear: External ear normal    Left Ear: External ear normal    Nose: Nose normal  Mouth/Throat: Oropharynx is clear and moist  No oropharyngeal exudate  Eyes: Conjunctivae are normal    Cardiovascular: Normal rate, regular rhythm and normal heart sounds  Pulmonary/Chest: Effort normal    Decreased breath sounds  Productive cough  Lymphadenopathy:     He has no cervical adenopathy  Neurological: He is alert and oriented to person, place, and time  Skin: He is not diaphoretic  Nursing note and vitals reviewed  RESULTS:    Recent Results (from the past 1008 hour(s))   POCT Measure PVR    Collection Time: 12/16/19  9:19 AM   Result Value Ref Range    POST-VOID RESIDUAL VOLUME, ML POC 28 mL       This note was created with voice recognition software  Phonic, grammatical and spelling errors may be present within the note as a result

## 2020-01-20 ENCOUNTER — DOCUMENTATION (OUTPATIENT)
Dept: UROLOGY | Facility: CLINIC | Age: 70
End: 2020-01-20

## 2020-02-06 DIAGNOSIS — F41.9 ANXIETY: ICD-10-CM

## 2020-02-07 DIAGNOSIS — J41.0 SIMPLE CHRONIC BRONCHITIS (HCC): ICD-10-CM

## 2020-02-07 RX ORDER — AZITHROMYCIN 250 MG/1
TABLET, FILM COATED ORAL
Qty: 12 TABLET | Refills: 3 | Status: SHIPPED | OUTPATIENT
Start: 2020-02-07 | End: 2020-03-27 | Stop reason: SDUPTHER

## 2020-02-10 DIAGNOSIS — J44.9 CHRONIC OBSTRUCTIVE PULMONARY DISEASE, UNSPECIFIED COPD TYPE (HCC): ICD-10-CM

## 2020-02-10 RX ORDER — FLUTICASONE FUROATE AND VILANTEROL TRIFENATATE 100; 25 UG/1; UG/1
POWDER RESPIRATORY (INHALATION)
Qty: 1 INHALER | Refills: 5 | Status: SHIPPED | OUTPATIENT
Start: 2020-02-10 | End: 2020-10-12

## 2020-02-13 ENCOUNTER — TELEPHONE (OUTPATIENT)
Dept: PULMONOLOGY | Facility: CLINIC | Age: 70
End: 2020-02-13

## 2020-02-13 DIAGNOSIS — R05.9 COUGH: Primary | ICD-10-CM

## 2020-02-13 RX ORDER — BENZONATATE 100 MG/1
100 CAPSULE ORAL 3 TIMES DAILY PRN
Qty: 90 CAPSULE | Refills: 1 | Status: SHIPPED | OUTPATIENT
Start: 2020-02-13 | End: 2020-04-02

## 2020-02-17 ENCOUNTER — OFFICE VISIT (OUTPATIENT)
Dept: INTERNAL MEDICINE CLINIC | Facility: CLINIC | Age: 70
End: 2020-02-17
Payer: MEDICARE

## 2020-02-17 ENCOUNTER — OFFICE VISIT (OUTPATIENT)
Dept: PULMONOLOGY | Facility: CLINIC | Age: 70
End: 2020-02-17
Payer: MEDICARE

## 2020-02-17 VITALS
HEIGHT: 70 IN | HEART RATE: 44 BPM | OXYGEN SATURATION: 96 % | BODY MASS INDEX: 26.77 KG/M2 | DIASTOLIC BLOOD PRESSURE: 80 MMHG | SYSTOLIC BLOOD PRESSURE: 144 MMHG | WEIGHT: 187 LBS

## 2020-02-17 VITALS
WEIGHT: 186 LBS | HEIGHT: 70 IN | SYSTOLIC BLOOD PRESSURE: 118 MMHG | DIASTOLIC BLOOD PRESSURE: 70 MMHG | HEART RATE: 87 BPM | OXYGEN SATURATION: 92 % | BODY MASS INDEX: 26.63 KG/M2 | RESPIRATION RATE: 18 BRPM

## 2020-02-17 DIAGNOSIS — J96.11 CHRONIC RESPIRATORY FAILURE WITH HYPOXIA AND HYPERCAPNIA (HCC): ICD-10-CM

## 2020-02-17 DIAGNOSIS — Z12.11 SCREEN FOR COLON CANCER: ICD-10-CM

## 2020-02-17 DIAGNOSIS — J44.9 COPD, VERY SEVERE (HCC): Primary | ICD-10-CM

## 2020-02-17 DIAGNOSIS — N40.0 BPH WITHOUT OBSTRUCTION/LOWER URINARY TRACT SYMPTOMS: ICD-10-CM

## 2020-02-17 DIAGNOSIS — K59.1 FUNCTIONAL DIARRHEA: ICD-10-CM

## 2020-02-17 DIAGNOSIS — J41.0 SIMPLE CHRONIC BRONCHITIS (HCC): ICD-10-CM

## 2020-02-17 DIAGNOSIS — Z23 NEED FOR PNEUMOCOCCAL VACCINATION: ICD-10-CM

## 2020-02-17 DIAGNOSIS — J96.12 CHRONIC RESPIRATORY FAILURE WITH HYPOXIA AND HYPERCAPNIA (HCC): ICD-10-CM

## 2020-02-17 DIAGNOSIS — J43.9 PULMONARY EMPHYSEMA, UNSPECIFIED EMPHYSEMA TYPE (HCC): Primary | ICD-10-CM

## 2020-02-17 PROCEDURE — 99214 OFFICE O/P EST MOD 30 MIN: CPT | Performed by: INTERNAL MEDICINE

## 2020-02-17 PROCEDURE — 3008F BODY MASS INDEX DOCD: CPT | Performed by: PHYSICIAN ASSISTANT

## 2020-02-17 PROCEDURE — 1036F TOBACCO NON-USER: CPT | Performed by: PHYSICIAN ASSISTANT

## 2020-02-17 PROCEDURE — 1160F RVW MEDS BY RX/DR IN RCRD: CPT | Performed by: PHYSICIAN ASSISTANT

## 2020-02-17 PROCEDURE — 4040F PNEUMOC VAC/ADMIN/RCVD: CPT | Performed by: INTERNAL MEDICINE

## 2020-02-17 PROCEDURE — 99214 OFFICE O/P EST MOD 30 MIN: CPT | Performed by: PHYSICIAN ASSISTANT

## 2020-02-17 PROCEDURE — 4040F PNEUMOC VAC/ADMIN/RCVD: CPT | Performed by: PHYSICIAN ASSISTANT

## 2020-02-17 PROCEDURE — G0009 ADMIN PNEUMOCOCCAL VACCINE: HCPCS | Performed by: INTERNAL MEDICINE

## 2020-02-17 PROCEDURE — 1036F TOBACCO NON-USER: CPT | Performed by: INTERNAL MEDICINE

## 2020-02-17 PROCEDURE — 1160F RVW MEDS BY RX/DR IN RCRD: CPT | Performed by: INTERNAL MEDICINE

## 2020-02-17 PROCEDURE — 3008F BODY MASS INDEX DOCD: CPT | Performed by: INTERNAL MEDICINE

## 2020-02-17 PROCEDURE — 90732 PPSV23 VACC 2 YRS+ SUBQ/IM: CPT | Performed by: INTERNAL MEDICINE

## 2020-02-17 RX ORDER — CHOLESTYRAMINE 4 G/9G
POWDER, FOR SUSPENSION ORAL
COMMUNITY
Start: 2020-02-08 | End: 2020-04-30 | Stop reason: SDUPTHER

## 2020-02-17 NOTE — PROGRESS NOTES
Assessment/Plan:     Chronic problems are stable  Continue present medications  Continue diet and exercise  Ordered labs for next visit  Quality Measures: BMI Counseling: Body mass index is 26 69 kg/m²  The BMI is above normal  Nutrition recommendations include encouraging healthy choices of fruits and vegetables  No follow-ups on file  No problem-specific Assessment & Plan notes found for this encounter  Diagnoses and all orders for this visit:    Need for pneumococcal vaccination  -     PNEUMOCOCCAL POLYSACCHARIDE VACCINE 23-VALENT =>1YO SQ IM    Screen for colon cancer  -     Ambulatory referral to Gastroenterology; Future    Pulmonary emphysema, unspecified emphysema type (HCC)    Functional diarrhea    Other orders  -     cholestyramine (QUESTRAN) 4 g packet          Subjective:      Patient ID: Jane Crowell is a 81 y o  male  Patient comes in today for follow-up with his wife  They just came from the pulmonary office  His lungs are doing very well  Breathing is stable on the present regimen  His GI symptoms have stabilized although he still has issues with constipation  Continues to follow with his specialist   He is seeing Urology for his prostate and is going to have another procedure/test done soon        ALLERGIES:  Allergies   Allergen Reactions    Codeine Dizziness    Penicillins Hives     Passed out       CURRENT MEDICATIONS:    Current Outpatient Medications:     azithromycin (ZITHROMAX) 250 mg tablet, TAKE 1 TABLET BY MOUTH  MONDAY,WEDNESDAY AND FRIDAY, Disp: 12 tablet, Rfl: 3    benzonatate (TESSALON PERLES) 100 mg capsule, Take 1 capsule (100 mg total) by mouth 3 (three) times a day as needed for cough, Disp: 90 capsule, Rfl: 1    BREO ELLIPTA 100-25 MCG/INH inhaler, inhale 1 puff by mouth and INTO THE LUNGS once daily Rinse mouth after use, Disp: 1 Inhaler, Rfl: 5    ergocalciferol (VITAMIN D2) 50,000 units, Take 1 capsule (50,000 Units total) by mouth once a week, Disp: 4 capsule, Rfl: 5    fluticasone (FLONASE) 50 mcg/act nasal spray, instill 1 spray into each nostril once daily, Disp: 16 g, Rfl: 3    guaiFENesin (MUCINEX) 600 mg 12 hr tablet, Take 1 tablet (600 mg total) by mouth every 12 (twelve) hours, Disp: 60 tablet, Rfl: 0    ipratropium (ATROVENT) 0 02 % nebulizer solution, Take 1 vial (0 5 mg total) by nebulization every 6 (six) hours as needed for wheezing or shortness of breath, Disp: 120 vial, Rfl: 3    levalbuterol (XOPENEX HFA) 45 mcg/act inhaler, Inhale 1-2 puffs every 4 (four) hours as needed for wheezing or shortness of breath, Disp: 1 Inhaler, Rfl: 3    levalbuterol (XOPENEX) 1 25 mg/3 mL nebulizer solution, Take 1 vial (1 25 mg total) by nebulization every 6 (six) hours as needed for wheezing or shortness of breath, Disp: 120 vial, Rfl: 3    montelukast (SINGULAIR) 10 mg tablet, take 1 tablet by mouth at bedtime, Disp: 30 tablet, Rfl: 3    predniSONE 10 mg tablet, Take 1 tablet (10 mg total) by mouth daily, Disp: 30 tablet, Rfl: 1    sertraline (ZOLOFT) 50 mg tablet, take 2 tablets by mouth once daily, Disp: 60 tablet, Rfl: 5    spironolactone (ALDACTONE) 25 mg tablet, Take 1 tablet (25 mg total) by mouth daily, Disp: 90 tablet, Rfl: 3    tamsulosin (FLOMAX) 0 4 mg, Take 1 capsule (0 4 mg total) by mouth daily with dinner, Disp: 30 capsule, Rfl: 5    loratadine (CLARITIN) 10 mg tablet, Take 10 mg by mouth daily, Disp: , Rfl:     ranitidine (ZANTAC) 150 mg tablet, take 1 tablet by mouth at bedtime (Patient not taking: Reported on 2/17/2020), Disp: 30 tablet, Rfl: 5    ACTIVE PROBLEM LIST:  Patient Active Problem List   Diagnosis    Pulmonary emphysema (HCC)    Gastroesophageal reflux disease without esophagitis    Anxiety    Simple chronic bronchitis (HCC)    Shortness of breath on exertion    Abnormal ECG    PVCs (premature ventricular contractions)    Allergic rhinitis    Functional diarrhea    Diastolic dysfunction  Pulmonary hypertension (HCC)    Acute exacerbation of chronic obstructive pulmonary disease (COPD) (HCC)    Chronic diastolic heart failure (HCC)    History of colon polyps    Renal mass, right    (HFpEF) heart failure with preserved ejection fraction (HCC)    Scalp lesion    Skin lesion       PAST MEDICAL HISTORY:  Past Medical History:   Diagnosis Date    Allergic rhinitis     BiPAP (biphasic positive airway pressure) dependence     Centrilobular emphysema (HCC)     COPD (chronic obstructive pulmonary disease) (HCC)     Enlarged prostate     GERD (gastroesophageal reflux disease)     HL (hearing loss)     Hypoglycemia     On home oxygen therapy     Pneumonia     Pulmonary hypertension (HCC)     Respiratory failure (HCC)     SOB (shortness of breath)     Stroke (HCC)     Mini       PAST SURGICAL HISTORY:  Past Surgical History:   Procedure Laterality Date    CATARACT EXTRACTION      CHOLECYSTECTOMY      hernia    EYE SURGERY      MASS EXCISION N/A 7/26/2019    Procedure: BACK SKIN LESION EXCISION BIOPSY;  Surgeon: Omar Eller MD;  [facility];  Service: General    OR ESOPHAGOGASTRODUODENOSCOPY TRANSORAL DIAGNOSTIC N/A 9/19/2018    Procedure: ESOPHAGOGASTRODUODENOSCOPY (EGD); Surgeon: Fitz Beck MD;  [facility];   Service: Gastroenterology    OR EXC SKIN BENIG 2 1-3 CM REMAINDR BODY N/A 7/26/2019    Procedure: SCALP LESION EXCISION BIOPSY;  Surgeon: Omar Eller MD;  [facility];  Service: General    OR REPAIR ING HERNIA,5+Y/O,REDUCIBL Left 5/23/2018    Procedure: OPEN INGUINAL HERNIA REPAIR WITH MESH;  Surgeon: Omar Eller MD;  [facility];  Service: General       FAMILY HISTORY:  Family History   Problem Relation Age of Onset    Diabetes Mother     Hypertension Mother     Hyperlipidemia Mother        SOCIAL HISTORY:  Social History     Socioeconomic History    Marital status: /Civil Union     Spouse name: janet  Number of children: 11    Years of education: Not on file    Highest education level: Not on file   Occupational History    Occupation: retired    Social Needs    Financial resource strain: Not on file    Food insecurity:     Worry: Not on file     Inability: Not on file   TRAFI needs:     Medical: Not on file     Non-medical: Not on file   Tobacco Use    Smoking status: Former Smoker     Packs/day: 0 00     Years: 50 00     Pack years: 0 00     Types: Cigarettes     Last attempt to quit: 2013     Years since quittin 9    Smokeless tobacco: Never Used   Substance and Sexual Activity    Alcohol use: Yes     Alcohol/week: 1 0 standard drinks     Types: 1 Glasses of wine per week     Comment: socialy    Drug use: Never    Sexual activity: Never   Lifestyle    Physical activity:     Days per week: Not on file     Minutes per session: Not on file    Stress: Not on file   Relationships    Social connections:     Talks on phone: Not on file     Gets together: Not on file     Attends Judaism service: Not on file     Active member of club or organization: Not on file     Attends meetings of clubs or organizations: Not on file     Relationship status: Not on file    Intimate partner violence:     Fear of current or ex partner: Not on file     Emotionally abused: Not on file     Physically abused: Not on file     Forced sexual activity: Not on file   Other Topics Concern    Not on file   Social History Narrative        Retired       Review of Systems   Respiratory: Negative for shortness of breath  Cardiovascular: Negative for chest pain  Gastrointestinal: Negative for abdominal pain  Objective:  Vitals:    20 1137   BP: 118/70   BP Location: Left arm   Patient Position: Sitting   Cuff Size: Standard   Pulse: 87   Resp: 18   SpO2: 92%   Weight: 84 4 kg (186 lb)   Height: 5' 10" (1 778 m)     Body mass index is 26 69 kg/m²       Physical Exam   Constitutional: He is oriented to person, place, and time  He appears well-developed and well-nourished  Cardiovascular: Normal rate, regular rhythm and normal heart sounds  Pulmonary/Chest: Effort normal and breath sounds normal    Abdominal: Soft  There is no tenderness  Musculoskeletal: He exhibits no edema  Neurological: He is alert and oriented to person, place, and time  Psychiatric: He has a normal mood and affect  Nursing note and vitals reviewed  RESULTS:    No results found for this or any previous visit (from the past 1008 hour(s))  This note was created with voice recognition software  Phonic, grammatical and spelling errors may be present within the note as a result

## 2020-02-17 NOTE — PROGRESS NOTES
Assessment/Plan:   Diagnoses and all orders for this visit:    COPD, very severe ([address] )    Chronic respiratory failure with hypoxia and hypercapnia ([address] )    Simple chronic bronchitis ([facility] [address] )     Patient is here today for follow-up   Overall stable with his breathing, chronic dyspnea on exertion  He has noted a mild increase in his cough, no increased O2 requirement  He continues with Breo, Xopenex, Atrovent , Singulair , Claritin, azithromycin MWF  Continues with 3 L nasal cannula continuous and trilogy noninvasive ventilator at night  He will restart Mucinex to help with the cough , had been taking this in the past   He has been unable to tolerate Daliresp in the past   He has been evaluated by Dr Alejandra Grandchild - he did not wish to be worked up for lung transplant but was interested in endobronchial valve  Last CT scan done October 2019 showed resolution of right middle lobe atelectasis, decrease in left lower lobe atelectasis with mild residual scarring , no acute disease   He will follow up with us in 6 months or sooner if necessary  Return in about 6 months (around 8/17/2020)  All questions are answered to the patient's satisfaction and understanding  He verbalizes understanding  He is encouraged to call with any further questions or concerns  Portions of the record may have been created with voice recognition software  Occasional wrong word or "sound a like" substitutions may have occurred due to the inherent limitations of voice recognition software  Read the chart carefully and recognize, using context, where substitutions have occurred  Electronically Signed by Danica Wadsworth PA-C    ______________________________________________________________________    Chief Complaint: No chief complaint on file        Patient ID: Holli Amaro is a 81 y o  y o  male has a past medical history of Allergic rhinitis, BiPAP (biphasic positive airway pressure) dependence, Centrilobular emphysema ([facility] [address] ), COPD (chronic obstructive pulmonary disease) ([facility] [address] ), Enlarged prostate, GERD (gastroesophageal reflux disease), HL (hearing loss), Hypoglycemia, On home oxygen therapy, Pneumonia, Pulmonary hypertension ([facility] [address] ), Respiratory failure ([facility] [address] ), SOB (shortness of breath), and Stroke ([facility] [address] )  2/17/2020  Patient presents today for follow-up visit  Patient is a 82 yo male former smoker with greater than 50 pack year smoking history, quit over 5 years ago  He has PMH of chronic respiratory failure on home O2 and Trilogy noninvasive ventilator, very severe COPD, pulmonary HTN, CHF  He is here today for follow up  Overall stable with his breathing  He has noted slightly increased cough over the last few days, nonproductive  No fever or chills, no increased SOB or increased O2 requirement  He continues with Breo, Xopenex, Atrovent, azithromycin MWF, Claritin, Singulair, using his O2 continuous at 3L, Trilogy at night  He has been unable to tolerate Daliresp  Review of Systems   Constitutional: Negative  HENT: Negative  Respiratory: Positive for cough and shortness of breath  Cardiovascular: Negative  Gastrointestinal: Negative  Genitourinary: Negative  Musculoskeletal: Negative  Skin: Negative  Allergic/Immunologic: Negative  Neurological: Negative  Psychiatric/Behavioral: Negative  Smoking history: He reports that he quit smoking about 6 years ago  His smoking use included cigarettes  He smoked 0 00 packs per day for 50 00 years   He has never used smokeless tobacco     The following portions of the patient's history were reviewed and updated as appropriate: allergies, current medications, past family history, past medical history, past social history, past surgical history and problem list     Immunization History   Administered Date(s) Administered    INFLUENZA 01/02/2019    Influenza, high dose seasonal 0 5 mL 01/02/2019, 11/04/2019    Pneumococcal Conjugate 13-Valent 02/13/2019    Pneumococcal Polysaccharide PPV23 02/17/2020     Current Outpatient Medications   Medication Sig Dispense Refill    azithromycin (ZITHROMAX) 250 mg tablet TAKE 1 TABLET BY MOUTH  MONDAY,WEDNESDAY AND FRIDAY 12 tablet 3    benzonatate (TESSALON PERLES) 100 mg capsule Take 1 capsule (100 mg total) by mouth 3 (three) times a day as needed for cough 90 capsule 1    BREO ELLIPTA 100-25 MCG/INH inhaler inhale 1 puff by mouth and INTO THE LUNGS once daily Rinse mouth after use 1 Inhaler 5    ergocalciferol (VITAMIN D2) 50,000 units Take 1 capsule (50,000 Units total) by mouth once a week 4 capsule 5    fluticasone (FLONASE) 50 mcg/act nasal spray instill 1 spray into each nostril once daily 16 g 3    guaiFENesin (MUCINEX) 600 mg 12 hr tablet Take 1 tablet (600 mg total) by mouth every 12 (twelve) hours 60 tablet 0    ipratropium (ATROVENT) 0 02 % nebulizer solution Take 1 vial (0 5 mg total) by nebulization every 6 (six) hours as needed for wheezing or shortness of breath 120 vial 3    levalbuterol (XOPENEX HFA) 45 mcg/act inhaler Inhale 1-2 puffs every 4 (four) hours as needed for wheezing or shortness of breath 1 Inhaler 3    levalbuterol (XOPENEX) 1 25 mg/3 mL nebulizer solution Take 1 vial (1 25 mg total) by nebulization every 6 (six) hours as needed for wheezing or shortness of breath 120 vial 3    loratadine (CLARITIN) 10 mg tablet Take 10 mg by mouth daily      montelukast (SINGULAIR) 10 mg tablet take 1 tablet by mouth at bedtime 30 tablet 3    predniSONE 10 mg tablet Take 1 tablet (10 mg total) by mouth daily 30 tablet 1    ranitidine (ZANTAC) 150 mg tablet take 1 tablet by mouth at bedtime (Patient not taking: Reported on 2/17/2020) 30 tablet 5    sertraline (ZOLOFT) 50 mg tablet take 2 tablets by mouth once daily 60 tablet 5    spironolactone (ALDACTONE) 25 mg tablet Take 1 tablet (25 mg total) by mouth daily 90 tablet 3    tamsulosin (FLOMAX) 0 4 mg Take 1 capsule (0 4 mg total) by mouth daily with dinner 30 capsule 5     No current facility-administered medications for this visit  Allergies: Codeine and Penicillins    Objective:  Vitals:    02/17/20 1012   BP: 144/80   Pulse: (!) 44   SpO2: 96%   Weight: 84 8 kg (187 lb)   Height: 5' 10" (1 778 m)   Oxygen Therapy  SpO2: 96 %(with 3 lts of oxygen)    Wt Readings from Last 3 Encounters:   02/17/20 84 4 kg (186 lb)   02/17/20 84 8 kg (187 lb)   01/14/20 85 3 kg (188 lb)     Body mass index is 26 83 kg/m²  Physical Exam   Constitutional: He is oriented to person, place, and time  He appears well-developed and well-nourished  No distress  HENT:   Mouth/Throat: Oropharynx is clear and moist    Eyes: Pupils are equal, round, and reactive to light  Cardiovascular: Normal rate, regular rhythm and normal heart sounds  No murmur heard  Pulmonary/Chest: Effort normal  No accessory muscle usage  No respiratory distress  He has decreased breath sounds  He has no wheezes  He has no rhonchi  He has no rales  On 3 L nasal cannula   Abdominal: Soft  There is no tenderness  Musculoskeletal: Normal range of motion  Neurological: He is alert and oriented to person, place, and time  Skin: Skin is warm and dry  No rash noted  Psychiatric: He has a normal mood and affect  Lab Review:   Lab Results   Component Value Date    K 4 4 11/06/2019     11/06/2019    CO2 31 11/06/2019    BUN 28 (H) 11/06/2019    CREATININE 0 76 11/06/2019    CALCIUM 8 7 11/06/2019     Lab Results   Component Value Date    WBC 7 05 07/02/2019    HGB 13 5 07/02/2019    HCT 42 1 07/02/2019    MCV 93 07/02/2019     07/02/2019       Diagnostics:  I have personally reviewed pertinent reports  Reviewed prior CT scan  Office Spirometry Results:     ESS:    No results found

## 2020-02-24 ENCOUNTER — HOSPITAL ENCOUNTER (INPATIENT)
Facility: HOSPITAL | Age: 70
LOS: 2 days | Discharge: HOME | DRG: 871 | End: 2020-02-27
Attending: EMERGENCY MEDICINE | Admitting: GENERAL PRACTICE
Payer: MEDICARE

## 2020-02-24 ENCOUNTER — APPOINTMENT (EMERGENCY)
Dept: RADIOLOGY | Facility: HOSPITAL | Age: 70
DRG: 871 | End: 2020-02-24
Payer: MEDICARE

## 2020-02-24 DIAGNOSIS — J18.9 PNEUMONIA: Primary | ICD-10-CM

## 2020-02-24 DIAGNOSIS — J44.1 COPD EXACERBATION (HCC): ICD-10-CM

## 2020-02-24 DIAGNOSIS — J18.9 COMMUNITY ACQUIRED PNEUMONIA, UNSPECIFIED LATERALITY: ICD-10-CM

## 2020-02-24 DIAGNOSIS — J41.0 SIMPLE CHRONIC BRONCHITIS (HCC): ICD-10-CM

## 2020-02-24 PROBLEM — R65.10 SIRS (SYSTEMIC INFLAMMATORY RESPONSE SYNDROME) (HCC): Status: ACTIVE | Noted: 2020-02-24

## 2020-02-24 LAB
ALBUMIN SERPL BCP-MCNC: 3.6 G/DL (ref 3.5–5)
ALP SERPL-CCNC: 62 U/L (ref 46–116)
ALT SERPL W P-5'-P-CCNC: 26 U/L (ref 12–78)
ANION GAP SERPL CALCULATED.3IONS-SCNC: 11 MMOL/L (ref 4–13)
AST SERPL W P-5'-P-CCNC: 23 U/L (ref 5–45)
BACTERIA UR QL AUTO: NORMAL /HPF
BASOPHILS # BLD AUTO: 0.03 THOUSANDS/ΜL (ref 0–0.1)
BASOPHILS NFR BLD AUTO: 0 % (ref 0–1)
BILIRUB SERPL-MCNC: 2.2 MG/DL (ref 0.2–1)
BILIRUB UR QL STRIP: NEGATIVE
BUN SERPL-MCNC: 21 MG/DL (ref 5–25)
CALCIUM SERPL-MCNC: 8.8 MG/DL (ref 8.3–10.1)
CHLORIDE SERPL-SCNC: 98 MMOL/L (ref 100–108)
CLARITY UR: CLEAR
CO2 SERPL-SCNC: 29 MMOL/L (ref 21–32)
COLOR UR: YELLOW
CREAT SERPL-MCNC: 0.77 MG/DL (ref 0.6–1.3)
EOSINOPHIL # BLD AUTO: 0.01 THOUSAND/ΜL (ref 0–0.61)
EOSINOPHIL NFR BLD AUTO: 0 % (ref 0–6)
ERYTHROCYTE [DISTWIDTH] IN BLOOD BY AUTOMATED COUNT: 13.8 % (ref 11.6–15.1)
FLUAV RNA NPH QL NAA+PROBE: NORMAL
FLUBV RNA NPH QL NAA+PROBE: NORMAL
GFR SERPL CREATININE-BSD FRML MDRD: 94 ML/MIN/1.73SQ M
GLUCOSE SERPL-MCNC: 95 MG/DL (ref 65–140)
GLUCOSE UR STRIP-MCNC: ABNORMAL MG/DL
HCT VFR BLD AUTO: 45.5 % (ref 36.5–49.3)
HGB BLD-MCNC: 14.4 G/DL (ref 12–17)
HGB UR QL STRIP.AUTO: NEGATIVE
IMM GRANULOCYTES # BLD AUTO: 0.11 THOUSAND/UL (ref 0–0.2)
IMM GRANULOCYTES NFR BLD AUTO: 1 % (ref 0–2)
KETONES UR STRIP-MCNC: ABNORMAL MG/DL
LACTATE SERPL-SCNC: 0.9 MMOL/L (ref 0.5–2)
LEUKOCYTE ESTERASE UR QL STRIP: NEGATIVE
LYMPHOCYTES # BLD AUTO: 2.16 THOUSANDS/ΜL (ref 0.6–4.47)
LYMPHOCYTES NFR BLD AUTO: 15 % (ref 14–44)
MCH RBC QN AUTO: 29.4 PG (ref 26.8–34.3)
MCHC RBC AUTO-ENTMCNC: 31.6 G/DL (ref 31.4–37.4)
MCV RBC AUTO: 93 FL (ref 82–98)
MONOCYTES # BLD AUTO: 1.96 THOUSAND/ΜL (ref 0.17–1.22)
MONOCYTES NFR BLD AUTO: 13 % (ref 4–12)
NEUTROPHILS # BLD AUTO: 10.65 THOUSANDS/ΜL (ref 1.85–7.62)
NEUTS SEG NFR BLD AUTO: 71 % (ref 43–75)
NITRITE UR QL STRIP: NEGATIVE
NON-SQ EPI CELLS URNS QL MICRO: NORMAL /HPF
NRBC BLD AUTO-RTO: 0 /100 WBCS
NT-PROBNP SERPL-MCNC: 126 PG/ML
PH UR STRIP.AUTO: 6 [PH]
PLATELET # BLD AUTO: 244 THOUSANDS/UL (ref 149–390)
PMV BLD AUTO: 9.4 FL (ref 8.9–12.7)
POTASSIUM SERPL-SCNC: 4 MMOL/L (ref 3.5–5.3)
PROCALCITONIN SERPL-MCNC: 0.16 NG/ML
PROT SERPL-MCNC: 8 G/DL (ref 6.4–8.2)
PROT UR STRIP-MCNC: ABNORMAL MG/DL
RBC # BLD AUTO: 4.9 MILLION/UL (ref 3.88–5.62)
RBC #/AREA URNS AUTO: NORMAL /HPF
RSV RNA NPH QL NAA+PROBE: NORMAL
SODIUM SERPL-SCNC: 138 MMOL/L (ref 136–145)
SP GR UR STRIP.AUTO: 1.02 (ref 1–1.03)
TROPONIN I SERPL-MCNC: <0.02 NG/ML
UROBILINOGEN UR QL STRIP.AUTO: 0.2 E.U./DL
WBC # BLD AUTO: 14.92 THOUSAND/UL (ref 4.31–10.16)
WBC #/AREA URNS AUTO: NORMAL /HPF

## 2020-02-24 PROCEDURE — 93005 ELECTROCARDIOGRAM TRACING: CPT

## 2020-02-24 PROCEDURE — 84484 ASSAY OF TROPONIN QUANT: CPT | Performed by: PHYSICIAN ASSISTANT

## 2020-02-24 PROCEDURE — 96374 THER/PROPH/DIAG INJ IV PUSH: CPT

## 2020-02-24 PROCEDURE — 80053 COMPREHEN METABOLIC PANEL: CPT | Performed by: PHYSICIAN ASSISTANT

## 2020-02-24 PROCEDURE — 87040 BLOOD CULTURE FOR BACTERIA: CPT | Performed by: INTERNAL MEDICINE

## 2020-02-24 PROCEDURE — 84145 PROCALCITONIN (PCT): CPT | Performed by: INTERNAL MEDICINE

## 2020-02-24 PROCEDURE — 87631 RESP VIRUS 3-5 TARGETS: CPT | Performed by: PHYSICIAN ASSISTANT

## 2020-02-24 PROCEDURE — 99285 EMERGENCY DEPT VISIT HI MDM: CPT

## 2020-02-24 PROCEDURE — 87449 NOS EACH ORGANISM AG IA: CPT | Performed by: INTERNAL MEDICINE

## 2020-02-24 PROCEDURE — 85025 COMPLETE CBC W/AUTO DIFF WBC: CPT | Performed by: PHYSICIAN ASSISTANT

## 2020-02-24 PROCEDURE — 36415 COLL VENOUS BLD VENIPUNCTURE: CPT | Performed by: PHYSICIAN ASSISTANT

## 2020-02-24 PROCEDURE — 94640 AIRWAY INHALATION TREATMENT: CPT

## 2020-02-24 PROCEDURE — 71046 X-RAY EXAM CHEST 2 VIEWS: CPT

## 2020-02-24 PROCEDURE — 99285 EMERGENCY DEPT VISIT HI MDM: CPT | Performed by: PHYSICIAN ASSISTANT

## 2020-02-24 PROCEDURE — 81001 URINALYSIS AUTO W/SCOPE: CPT | Performed by: INTERNAL MEDICINE

## 2020-02-24 PROCEDURE — 83880 ASSAY OF NATRIURETIC PEPTIDE: CPT | Performed by: PHYSICIAN ASSISTANT

## 2020-02-24 PROCEDURE — 94640 AIRWAY INHALATION TREATMENT: CPT | Performed by: PHYSICIAN ASSISTANT

## 2020-02-24 PROCEDURE — 83605 ASSAY OF LACTIC ACID: CPT | Performed by: PHYSICIAN ASSISTANT

## 2020-02-24 PROCEDURE — 99220 PR INITIAL OBSERVATION CARE/DAY 70 MINUTES: CPT | Performed by: INTERNAL MEDICINE

## 2020-02-24 RX ORDER — LEVOFLOXACIN 5 MG/ML
750 INJECTION, SOLUTION INTRAVENOUS ONCE
Status: COMPLETED | OUTPATIENT
Start: 2020-02-24 | End: 2020-02-24

## 2020-02-24 RX ORDER — LORATADINE 10 MG/1
10 TABLET ORAL DAILY
Status: DISCONTINUED | OUTPATIENT
Start: 2020-02-25 | End: 2020-02-27 | Stop reason: HOSPADM

## 2020-02-24 RX ORDER — ERGOCALCIFEROL 1.25 MG/1
50000 CAPSULE ORAL WEEKLY
Status: DISCONTINUED | OUTPATIENT
Start: 2020-02-24 | End: 2020-02-27 | Stop reason: HOSPADM

## 2020-02-24 RX ORDER — CHOLESTYRAMINE LIGHT 4 G/5.7G
4 POWDER, FOR SUSPENSION ORAL 2 TIMES DAILY
Status: DISCONTINUED | OUTPATIENT
Start: 2020-02-24 | End: 2020-02-27 | Stop reason: HOSPADM

## 2020-02-24 RX ORDER — MONTELUKAST SODIUM 10 MG/1
10 TABLET ORAL
Status: DISCONTINUED | OUTPATIENT
Start: 2020-02-24 | End: 2020-02-27 | Stop reason: HOSPADM

## 2020-02-24 RX ORDER — FLUTICASONE PROPIONATE 50 MCG
1 SPRAY, SUSPENSION (ML) NASAL DAILY
Status: DISCONTINUED | OUTPATIENT
Start: 2020-02-25 | End: 2020-02-27 | Stop reason: HOSPADM

## 2020-02-24 RX ORDER — LEVOFLOXACIN 5 MG/ML
750 INJECTION, SOLUTION INTRAVENOUS EVERY 24 HOURS
Status: DISCONTINUED | OUTPATIENT
Start: 2020-02-24 | End: 2020-02-24

## 2020-02-24 RX ORDER — METHYLPREDNISOLONE SODIUM SUCCINATE 40 MG/ML
40 INJECTION, POWDER, LYOPHILIZED, FOR SOLUTION INTRAMUSCULAR; INTRAVENOUS EVERY 8 HOURS SCHEDULED
Status: DISCONTINUED | OUTPATIENT
Start: 2020-02-24 | End: 2020-02-25

## 2020-02-24 RX ORDER — GUAIFENESIN 600 MG
600 TABLET, EXTENDED RELEASE 12 HR ORAL EVERY 12 HOURS SCHEDULED
Status: DISCONTINUED | OUTPATIENT
Start: 2020-02-24 | End: 2020-02-27 | Stop reason: HOSPADM

## 2020-02-24 RX ORDER — AZITHROMYCIN 250 MG/1
500 TABLET, FILM COATED ORAL EVERY 24 HOURS
Status: DISCONTINUED | OUTPATIENT
Start: 2020-02-24 | End: 2020-02-27

## 2020-02-24 RX ORDER — FLUTICASONE FUROATE AND VILANTEROL 100; 25 UG/1; UG/1
1 POWDER RESPIRATORY (INHALATION) DAILY
Status: DISCONTINUED | OUTPATIENT
Start: 2020-02-25 | End: 2020-02-27 | Stop reason: HOSPADM

## 2020-02-24 RX ORDER — METHYLPREDNISOLONE SODIUM SUCCINATE 125 MG/2ML
125 INJECTION, POWDER, LYOPHILIZED, FOR SOLUTION INTRAMUSCULAR; INTRAVENOUS ONCE
Status: COMPLETED | OUTPATIENT
Start: 2020-02-24 | End: 2020-02-24

## 2020-02-24 RX ORDER — SPIRONOLACTONE 25 MG/1
25 TABLET ORAL DAILY
Status: DISCONTINUED | OUTPATIENT
Start: 2020-02-25 | End: 2020-02-27 | Stop reason: HOSPADM

## 2020-02-24 RX ORDER — SERTRALINE HYDROCHLORIDE 100 MG/1
100 TABLET, FILM COATED ORAL DAILY
Status: DISCONTINUED | OUTPATIENT
Start: 2020-02-25 | End: 2020-02-27 | Stop reason: HOSPADM

## 2020-02-24 RX ORDER — BENZONATATE 100 MG/1
100 CAPSULE ORAL 3 TIMES DAILY PRN
Status: DISCONTINUED | OUTPATIENT
Start: 2020-02-24 | End: 2020-02-27 | Stop reason: HOSPADM

## 2020-02-24 RX ORDER — IPRATROPIUM BROMIDE AND ALBUTEROL SULFATE 2.5; .5 MG/3ML; MG/3ML
3 SOLUTION RESPIRATORY (INHALATION) ONCE
Status: COMPLETED | OUTPATIENT
Start: 2020-02-24 | End: 2020-02-24

## 2020-02-24 RX ORDER — LEVALBUTEROL 1.25 MG/.5ML
1.25 SOLUTION, CONCENTRATE RESPIRATORY (INHALATION) EVERY 6 HOURS PRN
Status: DISCONTINUED | OUTPATIENT
Start: 2020-02-24 | End: 2020-02-26

## 2020-02-24 RX ORDER — TAMSULOSIN HYDROCHLORIDE 0.4 MG/1
0.4 CAPSULE ORAL
Status: DISCONTINUED | OUTPATIENT
Start: 2020-02-24 | End: 2020-02-27 | Stop reason: HOSPADM

## 2020-02-24 RX ADMIN — MONTELUKAST SODIUM 10 MG: 10 TABLET, FILM COATED ORAL at 21:21

## 2020-02-24 RX ADMIN — METHYLPREDNISOLONE SODIUM SUCCINATE 40 MG: 40 INJECTION, POWDER, FOR SOLUTION INTRAMUSCULAR; INTRAVENOUS at 21:21

## 2020-02-24 RX ADMIN — ERGOCALCIFEROL 50000 UNITS: 1.25 CAPSULE ORAL at 21:28

## 2020-02-24 RX ADMIN — LEVOFLOXACIN 750 MG: 5 INJECTION, SOLUTION INTRAVENOUS at 15:13

## 2020-02-24 RX ADMIN — IPRATROPIUM BROMIDE AND ALBUTEROL SULFATE 3 ML: 2.5; .5 SOLUTION RESPIRATORY (INHALATION) at 17:06

## 2020-02-24 RX ADMIN — CEFTRIAXONE SODIUM 1000 MG: 10 INJECTION, POWDER, FOR SOLUTION INTRAVENOUS at 17:16

## 2020-02-24 RX ADMIN — IPRATROPIUM BROMIDE AND ALBUTEROL SULFATE 3 ML: 2.5; .5 SOLUTION RESPIRATORY (INHALATION) at 14:00

## 2020-02-24 RX ADMIN — AZITHROMYCIN MONOHYDRATE 500 MG: 250 TABLET ORAL at 17:16

## 2020-02-24 RX ADMIN — METHYLPREDNISOLONE SODIUM SUCCINATE 125 MG: 125 INJECTION, POWDER, FOR SOLUTION INTRAMUSCULAR; INTRAVENOUS at 14:00

## 2020-02-24 RX ADMIN — GUAIFENESIN 600 MG: 600 TABLET, EXTENDED RELEASE ORAL at 21:21

## 2020-02-24 RX ADMIN — TAMSULOSIN HYDROCHLORIDE 0.4 MG: 0.4 CAPSULE ORAL at 17:07

## 2020-02-24 NOTE — ASSESSMENT & PLAN NOTE
IV steroids  Respiratory protocol  May need pulmonary consult if no improvement by tomorrow or day after  I anticipate hospital stay of approximately 48 hours

## 2020-02-24 NOTE — ASSESSMENT & PLAN NOTE
Could be sepsis secondary to pneumonia/URTI  However white cell count is likely in the setting of chronic steroid use  Nevertheless will check lactic acid and continue to monitor closely  Antibiotics given in the ED Levaquin we will continue with these

## 2020-02-24 NOTE — ASSESSMENT & PLAN NOTE
Patient reports worsening shortness of breath over the last 2 days  Associated with productive cough yellow sputum  Likely COPD exacerbation with underlying infectious etiology

## 2020-02-24 NOTE — ASSESSMENT & PLAN NOTE
Will give Levaquin  Sputum cultures  Urine strep and Legionella  Will also order respiratory protocol as well as duo nebulizers and IV steroids  Will check procalcitonin

## 2020-02-24 NOTE — ED PROVIDER NOTES
History  Chief Complaint   Patient presents with    Shortness of Breath     pt presents to ed for sob that started last night  pt has hx of copd, use 3l at home  - chest pain     Karmen Males is a 81 y o  male w PMH COPD, stroke who presents for evaluation of sob  Patient complains of shortness of breath that began last evening  Wears 3 L nasal cannula at home  He wears CPAP at night to sleep  He began coughing and is bringing up purulent phlegm that is greenish yellow  He has not had a fever or chills  He has not had to increase his oxygen but did notice that his O2 saturations were 83-84%  Has had no chest pain or tightness  Has been using inhalers and nebulizers  He is on chronic steroids  He has a history of being admitted to the ICU for COPD in the past   Has a history of pneumonia requiring admission in the past             Prior to Admission Medications   Prescriptions Last Dose Informant Patient Reported? Taking?    BREO ELLIPTA 100-25 MCG/INH inhaler   No Yes   Sig: inhale 1 puff by mouth and INTO THE LUNGS once daily Rinse mouth after use   azithromycin (ZITHROMAX) 250 mg tablet   No Yes   Sig: TAKE 1 TABLET BY MOUTH  MONDAY,WEDNESDAY AND FRIDAY   benzonatate (TESSALON PERLES) 100 mg capsule   No Yes   Sig: Take 1 capsule (100 mg total) by mouth 3 (three) times a day as needed for cough   cholestyramine (QUESTRAN) 4 g packet   Yes Yes   ergocalciferol (VITAMIN D2) 50,000 units  Spouse/Significant Other No Yes   Sig: Take 1 capsule (50,000 Units total) by mouth once a week   fluticasone (FLONASE) 50 mcg/act nasal spray  Spouse/Significant Other No Yes   Sig: instill 1 spray into each nostril once daily   guaiFENesin (MUCINEX) 600 mg 12 hr tablet  Spouse/Significant Other No Yes   Sig: Take 1 tablet (600 mg total) by mouth every 12 (twelve) hours   ipratropium (ATROVENT) 0 02 % nebulizer solution  Spouse/Significant Other No Yes   Sig: Take 1 vial (0 5 mg total) by nebulization every 6 (six) hours as needed for wheezing or shortness of breath   levalbuterol (XOPENEX HFA) 45 mcg/act inhaler  Spouse/Significant Other No Yes   Sig: Inhale 1-2 puffs every 4 (four) hours as needed for wheezing or shortness of breath   levalbuterol (XOPENEX) 1 25 mg/3 mL nebulizer solution  Spouse/Significant Other No Yes   Sig: Take 1 vial (1 25 mg total) by nebulization every 6 (six) hours as needed for wheezing or shortness of breath   loratadine (CLARITIN) 10 mg tablet  Spouse/Significant Other Yes Yes   Sig: Take 10 mg by mouth daily   montelukast (SINGULAIR) 10 mg tablet  Spouse/Significant Other No Yes   Sig: take 1 tablet by mouth at bedtime   predniSONE 10 mg tablet   No Yes   Sig: Take 1 tablet (10 mg total) by mouth daily   ranitidine (ZANTAC) 150 mg tablet  Spouse/Significant Other No No   Sig: take 1 tablet by mouth at bedtime   Patient not taking: Reported on 2/17/2020   sertraline (ZOLOFT) 50 mg tablet   No Yes   Sig: take 2 tablets by mouth once daily   spironolactone (ALDACTONE) 25 mg tablet  Spouse/Significant Other No Yes   Sig: Take 1 tablet (25 mg total) by mouth daily   tamsulosin (FLOMAX) 0 4 mg  Spouse/Significant Other No Yes   Sig: Take 1 capsule (0 4 mg total) by mouth daily with dinner      Facility-Administered Medications: None       Past Medical History:   Diagnosis Date    Allergic rhinitis     BiPAP (biphasic positive airway pressure) dependence     Centrilobular emphysema (HCC)     COPD (chronic obstructive pulmonary disease) (HCC)     Enlarged prostate     GERD (gastroesophageal reflux disease)     HL (hearing loss)     Hypoglycemia     On home oxygen therapy     Pneumonia     Pulmonary hypertension (HCC)     Respiratory failure (HCC)     SOB (shortness of breath)     Stroke (HCC)     Mini       Past Surgical History:   Procedure Laterality Date    CATARACT EXTRACTION      CHOLECYSTECTOMY      hernia    EYE SURGERY      MASS EXCISION N/A 7/26/2019    Procedure: BACK SKIN LESION EXCISION BIOPSY;  Surgeon: Allison Malone MD;  Location: [facility];  Service: General    HI ESOPHAGOGASTRODUODENOSCOPY TRANSORAL DIAGNOSTIC N/A 2018    Procedure: ESOPHAGOGASTRODUODENOSCOPY (EGD); Surgeon: Rocky Giraldo MD;  Location: [facility]; Service: Gastroenterology    HI EXC SKIN BENIG 2 1-3 CM REMAINDR BODY N/A 2019    Procedure: SCALP LESION EXCISION BIOPSY;  Surgeon: Allison Malone MD;  Location: [facility];  Service: General    HI REPAIR [address] HERNIA,5+Y/O,REDUCIBL Left 2018    Procedure: OPEN INGUINAL HERNIA REPAIR WITH MESH;  Surgeon: Allison Malone MD;  Location: [facility];  Service: General       Family History   Problem Relation Age of Onset    Diabetes Mother     Hypertension Mother     Hyperlipidemia Mother      I have reviewed and agree with the history as documented  E-Cigarette/Vaping     E-Cigarette/Vaping Substances     Social History     Tobacco Use    Smoking status: Former Smoker     Packs/day: 0 00     Years: 50 00     Pack years: 0 00     Types: Cigarettes     Last attempt to quit: 2013     Years since quittin 9    Smokeless tobacco: Never Used   Substance Use Topics    Alcohol use: Yes     Alcohol/week: 1 0 standard drinks     Types: 1 Glasses of wine per week     Comment: socialy    Drug use: Never       Review of Systems   Constitutional: Negative for activity change, chills, diaphoresis, fatigue and fever  HENT: Negative for congestion and rhinorrhea  Eyes: Negative for pain  Respiratory: Positive for cough and shortness of breath  Negative for chest tightness and wheezing  Cardiovascular: Negative for chest pain and palpitations  Gastrointestinal: Negative for abdominal distention, constipation, diarrhea, nausea and vomiting  Genitourinary: Negative for difficulty urinating and dysuria  Musculoskeletal: Negative for arthralgias and myalgias     Neurological: Negative for dizziness, weakness, light-headedness and headaches  Psychiatric/Behavioral: The patient is not nervous/anxious  Physical Exam  Physical Exam   Constitutional: He is oriented to person, place, and time  He appears well-developed and well-nourished  No distress  HENT:   Head: Normocephalic and atraumatic  Eyes: Pupils are equal, round, and reactive to light  Neck: Normal range of motion  Neck supple  No tracheal deviation present  Cardiovascular: Normal rate, regular rhythm, normal heart sounds and intact distal pulses  Exam reveals no gallop and no friction rub  No murmur heard  Pulmonary/Chest: Effort normal  No respiratory distress  He has no wheezes  He has no rales  He exhibits no tenderness  Patient has slight conversational dyspnea  He is on 3 L which he wears at home  Saturations are around 90% on this  Has diminished breath sounds, particularly in expiratory phase  No audible wheezing  Abdominal: Soft  Bowel sounds are normal  He exhibits no distension and no mass  There is no tenderness  There is no guarding  Musculoskeletal: He exhibits no edema or deformity  Neurological: He is alert and oriented to person, place, and time  Skin: Skin is warm and dry  He is not diaphoretic  Psychiatric: He has a normal mood and affect  His behavior is normal    Nursing note and vitals reviewed        Vital Signs  ED Triage Vitals [02/24/20 1200]   Temperature Pulse Respirations Blood Pressure SpO2   97 9 °F (36 6 °C) 101 20 129/71 95 %      Temp Source Heart Rate Source Patient Position - Orthostatic VS BP Location FiO2 (%)   Oral Monitor Sitting Left arm --      Pain Score       No Pain           Vitals:    02/24/20 1430 02/24/20 1515 02/24/20 1530 02/24/20 1600   BP: 130/69  116/71 114/72   Pulse: 93 94 96 98   Patient Position - Orthostatic VS:             Visual Acuity      ED Medications  Medications   levofloxacin (LEVAQUIN) IVPB (premix) 750 mg (750 mg Intravenous New Bag 2/24/20 1513)   methylPREDNISolone sodium succinate (Solu-MEDROL) injection 125 mg (125 mg Intravenous Given 2/24/20 1400)   ipratropium-albuterol (DUO-NEB) 0 5-2 5 mg/3 mL inhalation solution 3 mL (3 mL Nebulization Given 2/24/20 1400)       Diagnostic Studies  Results Reviewed     Procedure Component Value Units Date/Time    Blood culture [[id number]]     Lab Status:  No result Specimen:  Blood     Blood culture [[id number]]     Lab Status:  No result Specimen:  Blood     Sputum culture and Gram stain [[id number]]     Lab Status:  No result Specimen:  Sputum     UA w Reflex to Microscopic w Reflex to Culture [[id number]]     Lab Status:  No result Specimen:  Urine     Influenza A/B and RSV PCR [[id number]]  (Normal) Collected:  02/24/20 1356    Lab Status:  Final result Specimen:  Nose Updated:  02/24/20 1453     INFLUENZA A PCR None Detected     INFLUENZA B PCR None Detected     RSV PCR None Detected    NT-BNP PRO [[id number]]  (Abnormal) Collected:  02/24/20 1359    Lab Status:  Final result Specimen:  Blood from Arm, Right Updated:  02/24/20 1435     NT-proBNP 126 pg/mL     Lactic acid, plasma [[id number]]  (Normal) Collected:  02/24/20 1359    Lab Status:  Final result Specimen:  Blood from Arm, Right Updated:  02/24/20 1432     LACTIC ACID 0 9 mmol/L     Narrative:       Result may be elevated if tourniquet was used during collection      Troponin I [[id number]]  (Normal) Collected:  02/24/20 1328    Lab Status:  Final result Specimen:  Blood from Arm, Right Updated:  02/24/20 1352     Troponin I <0 02 ng/mL     Comprehensive metabolic panel [[id number]]  (Abnormal) Collected:  02/24/20 1328    Lab Status:  Final result Specimen:  Blood from Arm, Right Updated:  02/24/20 1349     Sodium 138 mmol/L      Potassium 4 0 mmol/L      Chloride 98 mmol/L      CO2 29 mmol/L      ANION GAP 11 mmol/L      BUN 21 mg/dL      Creatinine 0 77 mg/dL      Glucose 95 mg/dL      Calcium 8 8 mg/dL      AST 23 U/L      ALT 26 U/L      Alkaline Phosphatase 62 U/L      Total Protein 8 0 g/dL      Albumin 3 6 g/dL      Total Bilirubin 2 20 mg/dL      eGFR 94 ml/min/1 73sq m     Narrative:       National Kidney Disease Foundation guidelines for Chronic Kidney Disease (CKD):     Stage 1 with normal or high GFR (GFR > 90 mL/min/1 73 square meters)    Stage 2 Mild CKD (GFR = 60-89 mL/min/1 73 square meters)    Stage 3A Moderate CKD (GFR = 45-59 mL/min/1 73 square meters)    Stage 3B Moderate CKD (GFR = 30-44 mL/min/1 73 square meters)    Stage 4 Severe CKD (GFR = 15-29 mL/min/1 73 square meters)    Stage 5 End Stage CKD (GFR <15 mL/min/1 73 square meters)  Note: GFR calculation is accurate only with a steady state creatinine    CBC and differential [[id number]]  (Abnormal) Collected:  02/24/20 1328    Lab Status:  Final result Specimen:  Blood from Arm, Right Updated:  02/24/20 1333     WBC 14 92 Thousand/uL      RBC 4 90 Million/uL      Hemoglobin 14 4 g/dL      Hematocrit 45 5 %      MCV 93 fL      MCH 29 4 pg      MCHC 31 6 g/dL      RDW 13 8 %      MPV 9 4 fL      Platelets 298 Thousands/uL      nRBC 0 /100 WBCs      Neutrophils Relative 71 %      Immat GRANS % 1 %      Lymphocytes Relative 15 %      Monocytes Relative 13 %      Eosinophils Relative 0 %      Basophils Relative 0 %      Neutrophils Absolute 10 65 Thousands/µL      Immature Grans Absolute 0 11 Thousand/uL      Lymphocytes Absolute 2 16 Thousands/µL      Monocytes Absolute 1 96 Thousand/µL      Eosinophils Absolute 0 01 Thousand/µL      Basophils Absolute 0 03 Thousands/µL                  XR chest 2 views   ED Interpretation by Isak Joy PA-C (02/24 1346)   R posterior linear atelectasis v infiltrate                 Procedures  Procedures         ED Course  ED Course as of Feb 24 1626   Mon Feb 24, 2020   1351 Glucose, Random: 95                   Initial Sepsis Screening     Row Name 02/24/20 1621                Is the patient's history suggestive of a new or worsening infection?   (!) Yes (Proceed)  -SB        Suspected source of infection  pneumonia  -SB        Are two or more of the following signs & symptoms of infection both present and new to the patient? No  -SB        Indicate SIRS criteria  Leukocytosis (WBC > [address] IJL)  -SB        If the answer is yes to both questions, suspicion of sepsis is present          If severe sepsis is present AND tissue hypoperfusion perists in the hour after fluid resuscitation or lactate > 4, the patient meets criteria for SEPTIC SHOCK          Are any of the following organ dysfunction criteria present within 6 hours of suspected infection and SIRS criteria that are NOT considered to be chronic conditions? No  -SB        Organ dysfunction          Date of presentation of severe sepsis          Time of presentation of severe sepsis          Tissue hypoperfusion persists in the hour after crystalloid fluid administration, evidenced, by either:          Was hypotension present within one hour of the conclusion of crystalloid fluid administration?         Date of presentation of septic shock          Time of presentation of septic shock            User Key  (r) = Recorded By, (t) = Taken By, (c) = Cosigned By    Initials Name Provider Type    LLUVIA Brunner PA-C Physician Assistant                  MDM  Number of Diagnoses or Management Options  COPD exacerbation [facility]):   Pneumonia:   Diagnosis management comments: DDX includes but not ltd to:   Patient with likely COPD exacerbation  Consider flu  Consider pneumonia  Consider bronchitis  Does not seem consistent with ACS, no chest pains  Symptoms seem to be predominantly respiratory and infectious in nature      Plan is to obtain:  CBC to check for anemia, leukocytosis, hydration status  Chemistry panel to check for lyte abnormalities, organ function   BNP to check for CHF/ congestion  EKG/trop to check for ischemic changes   CXR to check for congestive changes, active pulmonary disease     Based on results:  EKG with no acute ischemic changes  Upright T-waves, normal intervals  Has heart rate 98, normal sinus rhythm  Has   Has no significant change compared to prior in May of 2019 other than rate has decreased to normal rate  Patient has area of linear atelectasis versus pneumonia and a right middle lobe on his x-ray  Given white count concern for active infection with concomitant COPD exacerbation  He failed an ambulatory challenge and was hypoxic with walking  He will require admission  Is stable at rest on his home 3 L of oxygen  Will give Solu-Medrol, DuoNebs  Disposition  Final diagnoses:   Pneumonia   COPD exacerbation ([facility] [address] )     Time reflects when diagnosis was documented in both MDM as applicable and the Disposition within this note     Time User Action Codes Description Comment    2/24/2020  2:53 PM Dorlene Sandhoff Add [J18 9] Pneumonia     2/24/2020  2:53 PM Kae Mendoza Add [J44 1] COPD exacerbation [facility])       ED Disposition     ED Disposition Condition Date/Time Comment    Admit Stable Mon Feb 24, 2020  2:53 PM Case was discussed with Paola Berkowitz and the patient's admission status was agreed to be Admission Status: inpatient status to the service of Dr Alice Mirza   Follow-up Information    None         Patient's Medications   Discharge Prescriptions    No medications on file     No discharge procedures on file      PDMP Review     None          ED Provider  Electronically Signed by           Bud Dawn PA-C  02/24/20 4041

## 2020-02-24 NOTE — ED NOTES
Patient ambulated around unit  Patients heart rate was 99 and Oxygen saturation of 96  [place] through ambulation patients HR went to 102 with Oxygen Sat  Of 90  When arrived back in room: HR was 105 with Oxygen of 84  Provider made aware of these findings  Patient was left in room sitting on bed with oxygen at 4 liters        Rosalva Singh  02/24/20 3658

## 2020-02-25 ENCOUNTER — APPOINTMENT (INPATIENT)
Dept: CT IMAGING | Facility: HOSPITAL | Age: 70
DRG: 871 | End: 2020-02-25
Payer: MEDICARE

## 2020-02-25 PROBLEM — A41.9 SEPSIS (HCC): Status: ACTIVE | Noted: 2020-02-24

## 2020-02-25 LAB
ALBUMIN SERPL BCP-MCNC: 3.1 G/DL (ref 3.5–5)
ALP SERPL-CCNC: 54 U/L (ref 46–116)
ALT SERPL W P-5'-P-CCNC: 25 U/L (ref 12–78)
ANION GAP SERPL CALCULATED.3IONS-SCNC: 10 MMOL/L (ref 4–13)
AST SERPL W P-5'-P-CCNC: 18 U/L (ref 5–45)
ATRIAL RATE: 98 BPM
BILIRUB SERPL-MCNC: 1.4 MG/DL (ref 0.2–1)
BUN SERPL-MCNC: 26 MG/DL (ref 5–25)
CALCIUM SERPL-MCNC: 8.8 MG/DL (ref 8.3–10.1)
CHLORIDE SERPL-SCNC: 99 MMOL/L (ref 100–108)
CO2 SERPL-SCNC: 28 MMOL/L (ref 21–32)
CREAT SERPL-MCNC: 0.72 MG/DL (ref 0.6–1.3)
ERYTHROCYTE [DISTWIDTH] IN BLOOD BY AUTOMATED COUNT: 13.6 % (ref 11.6–15.1)
GFR SERPL CREATININE-BSD FRML MDRD: 97 ML/MIN/1.73SQ M
GLUCOSE P FAST SERPL-MCNC: 140 MG/DL (ref 65–99)
GLUCOSE SERPL-MCNC: 140 MG/DL (ref 65–140)
HCT VFR BLD AUTO: 42.2 % (ref 36.5–49.3)
HGB BLD-MCNC: 13.5 G/DL (ref 12–17)
L PNEUMO1 AG UR QL IA.RAPID: NEGATIVE
MCH RBC QN AUTO: 29.7 PG (ref 26.8–34.3)
MCHC RBC AUTO-ENTMCNC: 32 G/DL (ref 31.4–37.4)
MCV RBC AUTO: 93 FL (ref 82–98)
P AXIS: 79 DEGREES
PLATELET # BLD AUTO: 199 THOUSANDS/UL (ref 149–390)
PMV BLD AUTO: 9.6 FL (ref 8.9–12.7)
POTASSIUM SERPL-SCNC: 4.1 MMOL/L (ref 3.5–5.3)
PR INTERVAL: 160 MS
PROCALCITONIN SERPL-MCNC: 0.09 NG/ML
PROT SERPL-MCNC: 7.7 G/DL (ref 6.4–8.2)
QRS AXIS: 133 DEGREES
QRSD INTERVAL: 106 MS
QT INTERVAL: 356 MS
QTC INTERVAL: 454 MS
RBC # BLD AUTO: 4.54 MILLION/UL (ref 3.88–5.62)
S PNEUM AG UR QL: NEGATIVE
SODIUM SERPL-SCNC: 137 MMOL/L (ref 136–145)
T WAVE AXIS: 69 DEGREES
VENTRICULAR RATE: 98 BPM
WBC # BLD AUTO: 12.28 THOUSAND/UL (ref 4.31–10.16)

## 2020-02-25 PROCEDURE — 94660 CPAP INITIATION&MGMT: CPT

## 2020-02-25 PROCEDURE — 85027 COMPLETE CBC AUTOMATED: CPT | Performed by: INTERNAL MEDICINE

## 2020-02-25 PROCEDURE — 99221 1ST HOSP IP/OBS SF/LOW 40: CPT | Performed by: PHYSICIAN ASSISTANT

## 2020-02-25 PROCEDURE — 97165 OT EVAL LOW COMPLEX 30 MIN: CPT

## 2020-02-25 PROCEDURE — 99232 SBSQ HOSP IP/OBS MODERATE 35: CPT | Performed by: GENERAL PRACTICE

## 2020-02-25 PROCEDURE — 80053 COMPREHEN METABOLIC PANEL: CPT | Performed by: INTERNAL MEDICINE

## 2020-02-25 PROCEDURE — 93010 ELECTROCARDIOGRAM REPORT: CPT | Performed by: INTERNAL MEDICINE

## 2020-02-25 PROCEDURE — 94760 N-INVAS EAR/PLS OXIMETRY 1: CPT

## 2020-02-25 PROCEDURE — 97162 PT EVAL MOD COMPLEX 30 MIN: CPT

## 2020-02-25 PROCEDURE — 71250 CT THORAX DX C-: CPT

## 2020-02-25 PROCEDURE — 87040 BLOOD CULTURE FOR BACTERIA: CPT | Performed by: INTERNAL MEDICINE

## 2020-02-25 PROCEDURE — 84145 PROCALCITONIN (PCT): CPT | Performed by: INTERNAL MEDICINE

## 2020-02-25 RX ORDER — ACETAMINOPHEN 325 MG/1
650 TABLET ORAL EVERY 6 HOURS PRN
Status: DISCONTINUED | OUTPATIENT
Start: 2020-02-25 | End: 2020-02-27 | Stop reason: HOSPADM

## 2020-02-25 RX ORDER — METHYLPREDNISOLONE SODIUM SUCCINATE 40 MG/ML
40 INJECTION, POWDER, LYOPHILIZED, FOR SOLUTION INTRAMUSCULAR; INTRAVENOUS EVERY 12 HOURS SCHEDULED
Status: DISCONTINUED | OUTPATIENT
Start: 2020-02-25 | End: 2020-02-26

## 2020-02-25 RX ADMIN — SPIRONOLACTONE 25 MG: 25 TABLET ORAL at 08:28

## 2020-02-25 RX ADMIN — METHYLPREDNISOLONE SODIUM SUCCINATE 40 MG: 40 INJECTION, POWDER, FOR SOLUTION INTRAMUSCULAR; INTRAVENOUS at 06:22

## 2020-02-25 RX ADMIN — METHYLPREDNISOLONE SODIUM SUCCINATE 40 MG: 40 INJECTION, POWDER, FOR SOLUTION INTRAMUSCULAR; INTRAVENOUS at 14:15

## 2020-02-25 RX ADMIN — GUAIFENESIN 600 MG: 600 TABLET, EXTENDED RELEASE ORAL at 21:25

## 2020-02-25 RX ADMIN — FLUTICASONE FUROATE AND VILANTEROL TRIFENATATE 1 PUFF: 100; 25 POWDER RESPIRATORY (INHALATION) at 08:29

## 2020-02-25 RX ADMIN — CEFTRIAXONE SODIUM 1000 MG: 10 INJECTION, POWDER, FOR SOLUTION INTRAVENOUS at 17:05

## 2020-02-25 RX ADMIN — SERTRALINE HYDROCHLORIDE 100 MG: 100 TABLET ORAL at 08:34

## 2020-02-25 RX ADMIN — AZITHROMYCIN MONOHYDRATE 500 MG: 250 TABLET ORAL at 17:01

## 2020-02-25 RX ADMIN — CHOLESTYRAMINE 4 G: 4 POWDER, FOR SUSPENSION ORAL at 18:47

## 2020-02-25 RX ADMIN — TAMSULOSIN HYDROCHLORIDE 0.4 MG: 0.4 CAPSULE ORAL at 17:01

## 2020-02-25 RX ADMIN — MONTELUKAST SODIUM 10 MG: 10 TABLET, FILM COATED ORAL at 21:25

## 2020-02-25 RX ADMIN — METHYLPREDNISOLONE SODIUM SUCCINATE 40 MG: 40 INJECTION, POWDER, FOR SOLUTION INTRAMUSCULAR; INTRAVENOUS at 21:25

## 2020-02-25 RX ADMIN — CHOLESTYRAMINE 4 G: 4 POWDER, FOR SUSPENSION ORAL at 08:29

## 2020-02-25 RX ADMIN — GUAIFENESIN 600 MG: 600 TABLET, EXTENDED RELEASE ORAL at 08:28

## 2020-02-25 RX ADMIN — LORATADINE 10 MG: 10 TABLET ORAL at 08:28

## 2020-02-25 RX ADMIN — ENOXAPARIN SODIUM 40 MG: 40 INJECTION SUBCUTANEOUS at 08:28

## 2020-02-25 NOTE — PROGRESS NOTES
Progress Note - Kevin Paget 1950, 81 y o  male MRN: [id number]    Unit/Bed#: [id number] Encounter: [id number]    Primary Care Provider: Shon Owens MD   Date and time admitted to hospital: 2020 12:44 PM        * CAP (community acquired pneumonia)  Assessment & Plan  Sputum cultures  Urine strep and Legionella neg  Rocephin/Zithromax - per pulm can d/c on Levaquin if stable on Thursday    Will also order respiratory protocol as well as duo nebulizers and IV steroids  Procals neg x 2 - but CT chest appears to show PNA    Sepsis ([facility] )  Assessment & Plan  POA  secondary to pneumonia   Although white cell count is likely in the setting of chronic steroid use  B Cx pending        Acute exacerbation of chronic obstructive pulmonary disease (COPD) ([facility] )  Assessment & Plan  IV steroids    Respiratory protocol  Pulm appreciated - will wean steroids daily w/ plan to d/c Thursday  Likely  PNA    VTE Pharmacologic Prophylaxis:   Pharmacologic: Enoxaparin (Lovenox)  Mechanical VTE Prophylaxis in Place: Yes    Patient Centered Rounds: I have performed bedside rounds with nursing staff today  Discussions with Specialists or Other Care Team Provider: pulm    Education and Discussions with Family / Patient: pt    Time Spent for Care: 30 minutes  More than 50% of total time spent on counseling and coordination of care as described above      Current Length of Stay: 0 day(s)    Current Patient Status: Inpatient   Certification Statement: The patient, admitted on an observation basis, will now require > 2 midnight hospital stay due to need to iv abx and iv steroids    Discharge Plan: possibly in next 24-48h if doing well    Code Status: Level 1 - Full Code      Subjective:   Feeling better    Objective:     Vitals:   Temp (24hrs), Av 9 °F (36 6 °C), Min:97 6 °F (36 4 °C), Max:98 5 °F (36 9 °C)    Temp:  [97 6 °F (36 4 °C)-98 5 °F (36 9 °C)] 97 6 °F (36 4 °C)  HR:  [75-91] 82  Resp:  [18-28] 18  BP: (106-131)/(57-79) 130/67  SpO2:  [94 %-98 %] 98 %  Body mass index is 26 69 kg/m²  Input and Output Summary (last 24 hours): Intake/Output Summary (Last 24 hours) at 2/25/2020 1709  Last data filed at 2/25/2020 1300  Gross per 24 hour   Intake 910 ml   Output    Net 910 ml       Physical Exam:     Physical Exam   Constitutional: He is oriented to person, place, and time  No distress  HENT:   Head: Normocephalic and atraumatic  Eyes: Conjunctivae and EOM are normal    Neck: Normal range of motion  Neck supple  Cardiovascular: Normal rate and regular rhythm  Pulmonary/Chest: Effort normal    Decreased BS   Abdominal: Soft  Bowel sounds are normal  He exhibits no distension  There is no tenderness  Musculoskeletal: Normal range of motion  He exhibits no edema  Neurological: He is alert and oriented to person, place, and time  Skin: Skin is warm and dry  He is not diaphoretic  Additional Data:     Labs:    Results from last 7 days   Lab Units 02/25/20  0543 02/24/20  1328   WBC Thousand/uL 12 28* 14 92*   HEMOGLOBIN g/dL 13 5 14 4   HEMATOCRIT % 42 2 45 5   PLATELETS Thousands/uL 199 244   NEUTROS PCT %  --  71   LYMPHS PCT %  --  15   MONOS PCT %  --  13*   EOS PCT %  --  0     Results from last 7 days   Lab Units 02/25/20  0543   POTASSIUM mmol/L 4 1   CHLORIDE mmol/L 99*   CO2 mmol/L 28   BUN mg/dL 26*   CREATININE mg/dL 0 72   CALCIUM mg/dL 8 8   ALK PHOS U/L 54   ALT U/L 25   AST U/L 18           * I Have Reviewed All Lab Data Listed Above  * Additional Pertinent Lab Tests Reviewed: [facility]        Recent Cultures (last 7 days):     Results from last 7 days   Lab Units 02/25/20  0543 02/24/20  1802 02/24/20  1712   BLOOD CULTURE  Received in Microbiology Lab  Culture in Progress  --  Received in Microbiology Lab  Culture in Progress     LEGIONELLA URINARY ANTIGEN   --  Negative  --        Last 24 Hours Medication List:     Current Facility-Administered Medications:  acetaminophen 650 mg Oral Q6H PRN Zeynep Block DO    azithromycin 500 mg Oral Q24H Elsa Dozier MD    benzonatate 100 mg Oral TID PRN Elsa Dozier MD    ceftriaxone 1,000 mg Intravenous Q24H Elsa Dozier MD Last Rate: 1,000 mg (02/25/20 1705)   cholestyramine sugar free 4 g Oral BID Elsa Dozier MD    enoxaparin 40 mg Subcutaneous Daily Elsa Dozier MD    ergocalciferol 50,000 Units Oral Weekly Elsa Dozier MD    fluticasone 1 spray Each Nare Daily Elsa Dozier MD    fluticasone-vilanterol 1 puff Inhalation Daily Elsa Dozier MD    guaiFENesin 600 mg Oral Q12H [address] Elsa Dozier MD    ipratropium 0 5 mg Nebulization Q6H PRN Elsa Dozier MD    levalbuterol 1 25 mg Nebulization Q6H PRN Elsa Dozier MD    loratadine 10 mg Oral Daily Elsa Dozier MD    methylPREDNISolone sodium succinate 40 mg Intravenous Q12H [address] Chuy Flanagan PA-C    montelukast 10 mg Oral HS Elsa Dozier MD    sertraline 100 mg Oral Daily Elsa Dozier MD    spironolactone 25 mg Oral Daily Elsa Dozier MD    tamsulosin 0 4 mg Oral Daily With Hola Vincent MD         Today, Patient Was Seen By: Zeynep Block DO    ** Please Note: Dictation voice to text software may have been used in the creation of this document   **

## 2020-02-25 NOTE — H&P
H&P- Jeanine Copper 1950, 81 y o  male MRN: [id number]    Unit/Bed#: -01 Encounter: [id number]    Primary Care Provider: Clair Flores MD   Date and time admitted to hospital: 2/24/2020 12:44 PM        SIRS (systemic inflammatory response syndrome) ([facility] 75 )  Assessment & Plan  Could be sepsis secondary to pneumonia/URTI  However white cell count is likely in the setting of chronic steroid use  Nevertheless will check lactic acid and continue to monitor closely  Antibiotics given in the ED Levaquin we will continue with these  Acute exacerbation of chronic obstructive pulmonary disease (COPD) ([facility] )  Assessment & Plan  IV steroids  Respiratory protocol  May need pulmonary consult if no improvement by tomorrow or day after  I anticipate hospital stay of approximately 48 hours  Shortness of breath on exertion  Assessment & Plan  Patient reports worsening shortness of breath over the last 2 days  Associated with productive cough yellow sputum  Likely COPD exacerbation with underlying infectious etiology  * CAP (community acquired pneumonia)  Assessment & Plan  Will give Levaquin  Sputum cultures  Urine strep and Legionella  Will also order respiratory protocol as well as duo nebulizers and IV steroids  Will check procalcitonin  VTE Prophylaxis: Heparin  / sequential compression device   Code Status: Full Code  POLST: There is no POLST form on file for this patient (pre-hospital)  Discussion with family: Discussed with wife on  Anticipated Length of Stay:  Patient will be admitted on an Observation basis with an anticipated length of stay of  Less than 2 midnights  Justification for Hospital Stay: CAP with background of COPD  Total Time for Visit, including Counseling / Coordination of Care: 45 minutes  Greater than 50% of this total time spent on direct patient counseling and coordination of care  Chief Complaint:     Shortness of breah  History of Present Illness:    Karmne Kulkarni is a 81 y o  male with COPD presenting with SIRS criteria and shortness of breath worsening over the last couple of days  He also reports decreased exercise tolerance and has chest x-ray findings consistent with pneumonia  He is on chronic O2, 3 L at home, and uses CPAP at night for MARIA ELENA  Today he started coughing up dark yellow sputum, but he denies any fevers or chills  The patient specifically denies any chest pain or pleuritic chest pain  He has been admitted to the ICU in the past for COPD exacerbations  Currently in the emergency department he is feeling slightly better than when he came in after breathing treatments Solu-Medrol and 1 dose of antibiotics  Review of Systems:    Review of Systems   Constitutional: Negative  HENT: Negative  Eyes: Negative  Respiratory: Positive for cough, shortness of breath and wheezing  Negative for apnea, choking, chest tightness and stridor  Cardiovascular: Negative  Gastrointestinal: Negative  Endocrine: Negative  Genitourinary: Negative  Musculoskeletal: Negative  Skin: Negative  Neurological: Negative  Hematological: Negative  Psychiatric/Behavioral: Negative          Past Medical and Surgical History:     Past Medical History:   Diagnosis Date    Allergic rhinitis     BiPAP (biphasic positive airway pressure) dependence     Centrilobular emphysema (HCC)     COPD (chronic obstructive pulmonary disease) (HCC)     Enlarged prostate     GERD (gastroesophageal reflux disease)     HL (hearing loss)     Hypoglycemia     On home oxygen therapy     Pneumonia     Pulmonary hypertension (HCC)     Respiratory failure (HCC)     SOB (shortness of breath)     Stroke (HCC)     Mini       Past Surgical History:   Procedure Laterality Date    CATARACT EXTRACTION      CHOLECYSTECTOMY      hernia    EYE SURGERY      MASS EXCISION N/A 7/26/2019    Procedure: BACK SKIN LESION EXCISION BIOPSY;  Surgeon: Allison Malone MD;  Location: [facility];  Service: General    ID ESOPHAGOGASTRODUODENOSCOPY TRANSORAL DIAGNOSTIC N/A 9/19/2018    Procedure: ESOPHAGOGASTRODUODENOSCOPY (EGD); Surgeon: Rocky Giraldo MD;  Location: [facility]; Service: Gastroenterology    ID EXC SKIN BENIG 2 1-3 CM REMAINDR BODY N/A 7/26/2019    Procedure: SCALP LESION EXCISION BIOPSY;  Surgeon: Allison Malone MD;  Location: [facility];  Service: General    ID REPAIR [address] HERNIA,5+Y/O,REDUCIBL Left 5/23/2018    Procedure: OPEN INGUINAL HERNIA REPAIR WITH MESH;  Surgeon: Allison Malone MD;  Location: [facility];  Service: General       Meds/Allergies:    Prior to Admission medications    Medication Sig Start Date End Date Taking?  Authorizing Provider   azithromycin (ZITHROMAX) 250 mg tablet TAKE 1 TABLET BY MOUTH  MONDAY,WEDNESDAY AND FRIDAY 2/7/20 5/28/20 Yes Karmen Garner PA-C   benzonatate (TESSALON PERLES) 100 mg capsule Take 1 capsule (100 mg total) by mouth 3 (three) times a day as needed for cough 2/13/20  Yes Tricia Branham PA-C   BREO ELLIPTA 100-25 MCG/INH inhaler inhale 1 puff by mouth and INTO THE LUNGS once daily Rinse mouth after use 2/10/20  Yes Karmen Garner PA-C   cholestyramine Romelle Mando) 4 g packet  2/8/20  Yes Historical Provider, MD   ergocalciferol (VITAMIN D2) 50,000 units Take 1 capsule (50,000 Units total) by mouth once a week 11/7/19  Yes Helen Martinez PA-C   guaiFENesin (MUCINEX) 600 mg 12 hr tablet Take 1 tablet (600 mg total) by mouth every 12 (twelve) hours 4/18/19  Yes Ben Matamoros MD   ipratropium (ATROVENT) 0 02 % nebulizer solution Take 1 vial (0 5 mg total) by nebulization every 6 (six) hours as needed for wheezing or shortness of breath 4/22/19  Yes Karmen Garner PA-C   levalbuterol [facility]) 45 mcg/act inhaler Inhale 1-2 puffs every 4 (four) hours as needed for wheezing or shortness of breath 5/2/19  Yes Tricia Branham PA-C   levalbuterol (XOPENEX) 1 25 mg/3 mL nebulizer solution Take 1 vial (1 25 mg total) by nebulization every 6 (six) hours as needed for wheezing or shortness of breath 9/13/19  Yes Laura Chaudhry PA-C   montelukast (SINGULAIR) 10 mg tablet take 1 tablet by mouth at bedtime 9/3/19  Yes Kanika Funk PA-C   predniSONE 10 mg tablet Take 1 tablet (10 mg total) by mouth daily 1/14/20  Yes Miguel Gilmore MD   sertraline (ZOLOFT) 50 mg tablet take 2 tablets by mouth once daily 2/6/20  Yes Miguel Gilmore MD   spironolactone (ALDACTONE) 25 mg tablet Take 1 tablet (25 mg total) by mouth daily 4/9/19  Yes Stacy Hobbs MD   tamsulosin (FLOMAX) 0 4 mg Take 1 capsule (0 4 mg total) by mouth daily with dinner 12/16/19  Yes Janine Sterling PA-C   fluticasone (FLONASE) 50 mcg/act nasal spray instill 1 spray into each nostril once daily  Patient not taking: Reported on 2/24/2020 12/17/19   Kanika Funk PA-C   loratadine (CLARITIN) 10 mg tablet Take 10 mg by mouth daily    Historical Provider, MD   ranitidine (ZANTAC) 150 mg tablet take 1 tablet by mouth at bedtime  Patient not taking: Reported on 2/17/2020 9/2/19   Ignacio Paulino PA-C     I have reviewed home medications with patient personally  Allergies: Allergies   Allergen Reactions    Codeine Dizziness    Penicillins Hives     Passed out       Social History:     Marital Status: /Civil Union   Occupation: retired  Patient Pre-hospital Living Situation: lives with wife   Patient Pre-hospital Level of Mobility: fully  Patient Pre-hospital Diet Restrictions: none     Substance Use History:   Social History     Substance and Sexual Activity   Alcohol Use Yes    Alcohol/week: 1 0 standard drinks    Types: 1 Glasses of wine per week    Frequency: Monthly or less    Comment: socialy     Social History     Tobacco Use   Smoking Status Former Smoker    Packs/day: 0 00    Years: 50 00    Pack years: 0 00    Types: Cigarettes    Last attempt to quit: 2/27/2013    Years since quittin 9   Smokeless Tobacco Never Used     Social History     Substance and Sexual Activity   Drug Use Never       Family History:    Family History   Problem Relation Age of Onset    Diabetes Mother     Hypertension Mother     Hyperlipidemia Mother        Physical Exam:     Vitals:   Blood Pressure: 112/69 (20)  Pulse: 75 (20)  Temperature: 97 8 °F (36 6 °C) (20)  Temp Source: Oral (20 1900)  Respirations: 18 (20)  Height: 5' 10" (177 8 cm) (20)  Weight - Scale: 84 4 kg (186 lb) (20)  SpO2: 95 % (20)    Physical Exam   Constitutional: He is oriented to person, place, and time  No distress  HENT:   Head: Normocephalic  Mouth/Throat: No oropharyngeal exudate  Eyes: Right eye exhibits no discharge  Left eye exhibits no discharge  No scleral icterus  Neck: Normal range of motion  No JVD present  No tracheal deviation present  No thyromegaly present  Cardiovascular: Normal rate  Exam reveals no gallop and no friction rub  No murmur heard  Pulmonary/Chest: No stridor  He is in respiratory distress (mild)  He has wheezes  He has no rales  He exhibits no tenderness  Increased work of breathing  Abdominal: Soft  He exhibits no distension  There is no tenderness  There is no rebound and no guarding  No hernia  Musculoskeletal: He exhibits no edema, tenderness or deformity  Lymphadenopathy:     He has no cervical adenopathy  Neurological: He is alert and oriented to person, place, and time  He displays normal reflexes  No cranial nerve deficit or sensory deficit  He exhibits normal muscle tone  Skin: Capillary refill takes less than 2 seconds  No rash noted  He is not diaphoretic  There is pallor  Psychiatric: He has a normal mood and affect  Additional Data:     Lab Results: I have personally reviewed pertinent reports        Results from last 7 days   Lab Units 20  0543 20  9622 WBC Thousand/uL 12 28* 14 92*   HEMOGLOBIN g/dL 13 5 14 4   HEMATOCRIT % 42 2 45 5   PLATELETS Thousands/uL 199 244   NEUTROS PCT %  --  71   LYMPHS PCT %  --  15   MONOS PCT %  --  13*   EOS PCT %  --  0     Results from last 7 days   Lab Units 02/25/20  0543   SODIUM mmol/L 137   POTASSIUM mmol/L 4 1   CHLORIDE mmol/L 99*   CO2 mmol/L 28   BUN mg/dL 26*   CREATININE mg/dL 0 72   ANION GAP mmol/L 10   CALCIUM mg/dL 8 8   ALBUMIN g/dL 3 1*   TOTAL BILIRUBIN mg/dL 1 40*   ALK PHOS U/L 54   ALT U/L 25   AST U/L 18   GLUCOSE RANDOM mg/dL 140                 Results from last 7 days   Lab Units 02/24/20  1712 02/24/20  1359   LACTIC ACID mmol/L  --  0 9   PROCALCITONIN ng/ml 0 16  --        Imaging: I have personally reviewed pertinent reports  XR chest 2 views   ED Interpretation by Saul Price PA-C (02/24 1346)   R posterior linear atelectasis v infiltrate      Final Result by Caitlin Vargas MD (02/24 1630)      Severe emphysema  Mild subsegmental atelectasis and or scarring in the right lung base  Small amount of fluid in a large right bulla  Workstation performed: [id number]             EKG, Pathology, and Other Studies Reviewed on Admission:   · EKG: no EKG on chart  Allscripts / Epic Records Reviewed: Yes     ** Please Note: This note has been constructed using a voice recognition system   **

## 2020-02-25 NOTE — PLAN OF CARE
Problem: PHYSICAL THERAPY ADULT  Goal: Performs mobility at highest level of function for planned discharge setting  See evaluation for individualized goals  Description  Treatment/Interventions: LE strengthening/ROM, Elevations, Therapeutic exercise, Endurance training, Patient/family training, Gait training, Spoke to nursing, Bed mobility, OT          See flowsheet documentation for full assessment, interventions and recommendations  Note:   Prognosis: Good  Problem List: Decreased endurance, Decreased mobility  Assessment: Pt is 81 y o  male seen for PT evaluation s/p admit to [facility] on 2/24/2020 w/ CAP (community acquired pneumonia)  PT consulted to assess pt's functional mobility and d/c needs  Order placed for PT eval and tx, w/ up and OOB as tolerated order  Comorbidities affecting pt's physical performance at time of assessment include: SOB on exertion, Acute exacerbation of chronic obstructive pulmonary disease, systemic inflammatory response syndrome, h/o pulmonary emphysema, h/o anxiety, h/o pulmonary HTN, h/o chronic diastolic heart failure, and heart failure w/ preserved ejection fraction  PTA, pt was independent w/ all functional mobility, independent w/ all ADLs and IADLs, ambulating community distances and elevations w/o AD, living w/ family in 2 level house w/ 2 ALIS and retired  Personal factors affecting pt at time of IE include: llives in two story house, stairs to enter home, inability to navigate community distances, unable to perform dynamic tasks in community and inability to perform IADLs  Please find objective findings from PT assessment regarding body systems outlined above with impairments and limitations including decreased endurance, gait deviations, decreased activity tolerance, decreased functional mobility tolerance and SOB upon exertion   The following objective measures performed on IE also reveal limitations: Barthel Index: 75/100 and Modified Borger: 2 (slight disability)  Pt's clinical presentation is currently evolving seen in pt's presentation of MORALES / fatigue impacting pt's overall mobility status and ongoing medical management/monitoring  Pt to benefit from continued PT tx to address deficits as defined above and maximize level of functional independent mobility and consistency  From PT/mobility standpoint, recommendation at time of d/c would be OP PT w/ pulmonary emphasis pending progress in order to facilitate safe return to PLOF  Barriers to Discharge: None     Recommendation: Outpatient PT, Home with family support(with pulmonary emphasis)     PT - OK to Discharge: Yes(when medically cleared)    See flowsheet documentation for full assessment

## 2020-02-25 NOTE — CONSULTS
Consultation - Pulmonary Medicine   Zayda Mejia 81 y o  male MRN: [id number]  Unit/Bed#: -01 Encounter: [id number]      Assessment:  Shortness of breath  Chronic hypoxemic and hypercapnic respiratory failure  Abnormal CT of the chest  Very severe COPD with acute exacerbation, improving    Plan:   Patient is saturating well on 3 L which is his baseline  At home, he uses trilogy noninvasive ventilation  CT of the chest again showing patient's severe emphysema with large bullae in the right lung, 1 containing a small amount of fluid  Nodular consolidation left lower lobe and mild consolidation dependent portion of right upper lobe  Patient was meeting SIRS criteria upon initial presentation, given dose of Levaquin last night  Today, he is receiving ceftriaxone and azithromycin  Will continue antibiotics despite normal procalcitonin  Follow-up on sputum culture results  Patient with very severe COPD, FEV1 25% and DLCO 25%  In the future, will likely pursue endobronchial valve placement  He is not a good candidate for lung transplant  Will decrease Solu-Medrol 40 mg to q 12 hours  At baseline, patient takes azithromycin Monday Wednesday Friday and prednisone 10 mg daily  He is unable to tolerate Daliresp    Continued outpatient follow-up with our office including follow-up imaging    Discussed case with primary      History of Present Illness   Physician Requesting Consult: Nguyen Munguia DO  Reason for Consult / Principal Problem:  Pneumonia with COPD exacerbation  Hx and PE limited by:  None  HPI: Zayda Mejia is a 81y o  year old male former smoker with past medical history including end-stage COPD, chronic hypoxemic and hypercapnic respiratory failure , waxing waning infiltrates, pulmonary hypertension who presents with chief complaint of worsening shortness of breath and cough x3 days  Patient states the cough is productive for yellow sputum  He denies any fevers or chills  No chest pain  He did not have any increased oxygen requirements  Today, he states that he is feeling markedly improved  He feels that his breathing is actually getting pretty close to baseline  He is coughing much less  From a pulmonary standpoint, patient is very well known to us  He has end-stage/very severe COPD  He requires prednisone 10 mg daily in azithromycin Monday Wednesday Friday  He was unable to tolerate Daliresp  He uses trilogy noninvasive ventilation nocturnally  He is currently being evaluated for endobronchial valve placement  Consults    Review of Systems   Constitutional: Positive for fatigue  Negative for chills and fever  HENT: Positive for congestion  Respiratory: Positive for cough, chest tightness, shortness of breath and wheezing  Cardiovascular: Negative for chest pain and leg swelling  Neurological: Negative for syncope  All other systems reviewed and are negative  Historical Information   Past Medical History:   Diagnosis Date    Allergic rhinitis     BiPAP (biphasic positive airway pressure) dependence     Centrilobular emphysema (HCC)     COPD (chronic obstructive pulmonary disease) (HCC)     Enlarged prostate     GERD (gastroesophageal reflux disease)     HL (hearing loss)     Hypoglycemia     On home oxygen therapy     Pneumonia     Pulmonary hypertension (HCC)     Respiratory failure (HCC)     SOB (shortness of breath)     Stroke (HCC)     Mini     Past Surgical History:   Procedure Laterality Date    CATARACT EXTRACTION      CHOLECYSTECTOMY      hernia    EYE SURGERY      MASS EXCISION N/A 7/26/2019    Procedure: BACK SKIN LESION EXCISION BIOPSY;  Surgeon: Jamie Muniz MD;  [facility];  Service: General    LA ESOPHAGOGASTRODUODENOSCOPY TRANSORAL DIAGNOSTIC N/A 9/19/2018    Procedure: ESOPHAGOGASTRODUODENOSCOPY (EGD); Surgeon: Salvador Ramires MD;  Location: [facility];   Service: Gastroenterology    LA EXC SKIN BENIG 2 1-3 CM REMAINDR BODY N/A 2019    Procedure: SCALP LESION EXCISION BIOPSY;  Surgeon: Boris De Los Santos MD;  [facility];  Service: General    ID REPAIR Roula Gonzales 60 HERNIA,5+Y/O,REDUCIBL Left 2018    Procedure: OPEN INGUINAL HERNIA REPAIR WITH MESH;  Surgeon: Boris De Los Santos MD;  [facility];  Service: General     Social History   Social History     Substance and Sexual Activity   Alcohol Use Yes    Alcohol/week: 1 0 standard drinks    Types: 1 Glasses of wine per week    Frequency: Monthly or less    Comment: socialy     Social History     Substance and Sexual Activity   Drug Use Never     E-Cigarette/Vaping     E-Cigarette/Vaping Substances     Social History     Tobacco Use   Smoking Status Former Smoker    Packs/day: 0 00    Years: 50 00    Pack years: 0 00    Types: Cigarettes    Last attempt to quit: 2013    Years since quittin 9   Smokeless Tobacco Never Used     Occupational History:     Family History:   Family History   Problem Relation Age of Onset    Diabetes Mother     Hypertension Mother     Hyperlipidemia Mother        Meds/Allergies   all current active meds have been reviewed    Allergies   Allergen Reactions    Codeine Dizziness    Penicillins Hives     Passed out       Objective   Vitals: Blood pressure 112/69, pulse 75, temperature 97 8 °F (36 6 °C), resp  rate 18, height 5' 10" (1 778 m), weight 84 4 kg (186 lb), SpO2 95 %  ,Body mass index is 26 69 kg/m²  Intake/Output Summary (Last 24 hours) at 2020 1540  Last data filed at 2020 1300  Gross per 24 hour   Intake 1060 ml   Output    Net 1060 ml     Invasive Devices     Peripheral Intravenous Line            Peripheral IV 20 Right Antecubital 1 day                Physical Exam   Constitutional: He is oriented to person, place, and time  He appears well-developed  No distress  Somewhat cachectic   HENT:   Head: Normocephalic and atraumatic     Right Ear: External ear normal  Left Ear: External ear normal    Nose: Nose normal    Eyes: Conjunctivae and EOM are normal  Right eye exhibits no discharge  Left eye exhibits no discharge  No scleral icterus  Neck: Normal range of motion  Neck supple  No tracheal deviation present  Cardiovascular: Normal rate, regular rhythm and normal heart sounds  Exam reveals no gallop and no friction rub  No murmur heard  Pulmonary/Chest: Effort normal    Respirations unlabored  Saturating well on his baseline oxygen requirement  Severely diminished breath sounds at the apices  No wheezing or rhonchi appreciated  Abdominal: He exhibits no distension  There is no guarding  Musculoskeletal: Normal range of motion  He exhibits no edema, tenderness or deformity  Neurological: He is alert and oriented to person, place, and time  No cranial nerve deficit  He exhibits normal muscle tone  Skin: Skin is warm and dry  No rash noted  He is not diaphoretic  No erythema  No pallor  Psychiatric: He has a normal mood and affect  His behavior is normal  Judgment and thought content normal    Nursing note and vitals reviewed  Lab Results: I have personally reviewed pertinent lab results  Imaging Studies: I have personally reviewed pertinent reports  EKG, Pathology, and Other Studies: I have personally reviewed pertinent reports      VTE Prophylaxis: Enoxaparin (Lovenox)    Code Status: Level 1 - Full Code  Advance Directive and Living Will:      Power of :    POLST:

## 2020-02-25 NOTE — SOCIAL WORK
LOS ADMITTED TODAY UNDER INPATIENT  PATIENT WAS ORIGINALLY ADMITTED UNDER OBS  PATIENT IS NOT A READMISSION  PATIENT IS NOT A BUNDLE  CM met with patient at bedside to complete CM open and review discharge planning  Patient reported that he lives in ranch style home with his spouse  Patient reported that he has about 2 ALIS  Patient reported that he is not using any DME other than O2 3L at baseline through [facility]  Patient denied hx of VNA, STR, and denied interest in any at this time  Patient reported that he uses [facility] for medication  Patient denied hx opf MH and D and A  Patient denied Hx of dialysis  Patient reported spouse is POA  Patient is retired and drives  Patient reported spouse will transport home  Patient see Dr Hector Kahn from [facility] and would be interested in PCP appointment prior to discharge  CM reviewed d/c planning process including the following: identifying help at home, patient preference for d/c planning needs,  availability of treatment team to discuss questions or concerns patient and/or family may have regarding understanding medications and recognizing signs and symptoms once discharged  CM also encouraged patient to follow up with all recommended appointments after discharge  Patient advised of importance for patient and family to participate in managing patients medical well being  No referrals placed and Cm department will need to follow up with scheduling PCP prior to discharge  CM department will follow through discharge

## 2020-02-25 NOTE — ASSESSMENT & PLAN NOTE
Sputum cultures  Urine strep and Legionella neg  Rocephin/Zithromax - per pulm can d/c on Levaquin if stable on Thursday    Will also order respiratory protocol as well as duo nebulizers and IV steroids    Procals neg x 2 - but CT chest appears to show PNA

## 2020-02-25 NOTE — ASSESSMENT & PLAN NOTE
POA  secondary to pneumonia   Although white cell count is likely in the setting of chronic steroid use    B Cx pending

## 2020-02-25 NOTE — RESPIRATORY THERAPY NOTE
RT Protocol Note  Cher Rae 81 y o  male MRN: [id number]  Unit/Bed#: -01 Encounter: [id number]    Assessment    Principal Problem:    CAP (community acquired pneumonia)  Active Problems:    Shortness of breath on exertion    Acute exacerbation of chronic obstructive pulmonary disease (COPD) (HCC)    SIRS (systemic inflammatory response syndrome) ([facility])      Home Pulmonary Medications:  Prn nebs       Past Medical History:   Diagnosis Date    Allergic rhinitis     BiPAP (biphasic positive airway pressure) dependence     Centrilobular emphysema (HCC)     COPD (chronic obstructive pulmonary disease) ([facility])     Enlarged prostate     GERD (gastroesophageal reflux disease)     HL (hearing loss)     Hypoglycemia     On home oxygen therapy     Pneumonia     Pulmonary hypertension (HCC)     Respiratory failure (HCC)     SOB (shortness of breath)     Stroke ([facility])     Mini     Social History     Socioeconomic History    Marital status: /Civil Union     Spouse name: janet     Number of children: 5    Years of education: None    Highest education level: None   Occupational History    Occupation: retired    Social Needs    Financial resource strain: None    Food insecurity:     Worry: None     Inability: None    Transportation needs:     Medical: None     Non-medical: None   Tobacco Use    Smoking status: Former Smoker     Packs/day: 0 00     Years: 50 00     Pack years: 0 00     Types: Cigarettes     Last attempt to quit: 2013     Years since quittin 9    Smokeless tobacco: Never Used   Substance and Sexual Activity    Alcohol use:  Yes     Alcohol/week: 1 0 standard drinks     Types: 1 Glasses of wine per week     Comment: socialy    Drug use: Never    Sexual activity: Never   Lifestyle    Physical activity:     Days per week: None     Minutes per session: None    Stress: None   Relationships    Social connections:     Talks on phone: None     Gets together: None Attends Denominational service: None     Active member of club or organization: None     Attends meetings of clubs or organizations: None     Relationship status: None    Intimate partner violence:     Fear of current or ex partner: None     Emotionally abused: None     Physically abused: None     Forced sexual activity: None   Other Topics Concern    None   Social History Narrative        Retired       Subjective         Objective    Physical Exam:        Vitals:  Blood pressure 131/71, pulse 82, temperature 97 9 °F (36 6 °C), temperature source Oral, resp  rate 18, SpO2 94 %  Imaging and other studies: I have personally reviewed pertinent reports              Plan    Respiratory Plan: Mild Distress pathway

## 2020-02-25 NOTE — UTILIZATION REVIEW
Initial Clinical Review    OBS order 2/24 1454 converted to IP on 2/25 @ 1135     Admitting Physician Daljit Harris    Level of Care Med Surg    Estimated length of stay More than 2 Midnights    Certification I certify that inpatient services are medically necessary for this patient for a duration of greater than two midnights  See H&P and MD Progress Notes for additional information about the patient's course of treatment  ED Arrival Information     Expected Arrival Acuity Means of Arrival Escorted By Service Admission Type    - 2/24/2020 11:47 Emergent Wheelchair Spouse Hospitalist Emergency    Arrival Complaint    SOB- ON OXYGEN        Chief Complaint   Patient presents with    Shortness of Breath     pt presents to ed for sob that started last night  pt has hx of copd, use 3l at home  - chest pain     Assessment/Plan: Arabella Niño is a 81 y o  male with COPD presenting with SIRS criteria and shortness of breath worsening over the last couple of days  He also reports decreased exercise tolerance and has chest x-ray findings consistent with pneumonia      He is on chronic O2, 3 L at home, and uses CPAP at night for MARIA ELENA  SIRS (systemic inflammatory response syndrome) (HCC)  Assessment & Plan  Could be sepsis secondary to pneumonia/URTI  However white cell count is likely in the setting of chronic steroid use      Nevertheless will check lactic acid and continue to monitor closely  Antibiotics given in the ED Levaquin we will continue with these            Acute exacerbation of chronic obstructive pulmonary disease (COPD) (HCC)  Assessment & Plan  IV steroids  Respiratory protocol  May need pulmonary consult if no improvement by tomorrow or day after  I anticipate hospital stay of approximately 48 hours      Shortness of breath on exertion  Assessment & Plan  Patient reports worsening shortness of breath over the last 2 days  Associated with productive cough yellow sputum    Likely COPD exacerbation with underlying infectious etiology      * CAP (community acquired pneumonia)  Assessment & Plan  Will give Levaquin  Sputum cultures  Urine strep and Legionella      Will also order respiratory protocol as well as duo nebulizers and IV steroids  Will check procalcitonin      VTE Prophylaxis: Heparin  / sequential compression device   Code Status: Full Code  POLST: There is no POLST form on file for this patient (pre-hospital)  Discussion with family: Discussed with wife on       Anticipated Length of Stay:  Patient will be admitted on an Observation basis with an anticipated length of stay of  Less than 2 midnights     Justification for Hospital Stay: CAP with background of COPD       ED Triage Vitals [02/24/20 1200]   Temperature Pulse Respirations Blood Pressure SpO2   97 9 °F (36 6 °C) 101 20 129/71 95 %      Temp Source Heart Rate Source Patient Position - Orthostatic VS BP Location FiO2 (%)   Oral Monitor Sitting Left arm --      Pain Score       No Pain        Wt Readings from Last 1 Encounters:   02/24/20 84 4 kg (186 lb)     Additional Vital Signs:   02/25/20 07:20:36  97 8 °F (36 6 °C)  75  18  112/69  83  95 %       02/24/20 22:47:05  98 5 °F (36 9 °C)  81  21  119/74  89  96 %       02/24/20 2129      22             02/24/20 2111  97 6 °F (36 4 °C)  83  28Abnormal   130/79  96  96 %       02/24/20 2030    82    131/71  93  94 %       02/24/20 1900  97 9 °F (36 6 °C)  90    119/65  87  95 %       02/24/20 1830    91  18  106/57  76  95 %       02/24/20 1730    76  18  113/65  83  95 %       02/24/20 1700    87  20  118/68  88  95 %       02/24/20 1600    98  18  114/72  88  94 %       02/24/20 1530    96  20  116/71  89  95 %       02/24/20 1515    94        93 %       02/24/20 1430    93    130/69  91  92 %       02/24/20 1400    94  20  134/68    93 %       02/24/20 1300              Nasal cannula     02/24/20 1259    93 23Abnormal   135/74    93 %  Nasal cannula         Pertinent Labs/Diagnostic Test Results:   2/24 CXR - Severe emphysema  Mild subsegmental atelectasis and or scarring in the right lung base    Small amount of fluid in a large right bulla      Results from last 7 days   Lab Units 02/25/20  0543 02/24/20  1328   WBC Thousand/uL 12 28* 14 92*   HEMOGLOBIN g/dL 13 5 14 4   HEMATOCRIT % 42 2 45 5   PLATELETS Thousands/uL 199 244   NEUTROS ABS Thousands/µL  --  10 65*     Results from last 7 days   Lab Units 02/25/20  0543 02/24/20  1328   SODIUM mmol/L 137 138   POTASSIUM mmol/L 4 1 4 0   CHLORIDE mmol/L 99* 98*   CO2 mmol/L 28 29   ANION GAP mmol/L 10 11   BUN mg/dL 26* 21   CREATININE mg/dL 0 72 0 77   EGFR ml/min/1 73sq m 97 94   CALCIUM mg/dL 8 8 8 8     Results from last 7 days   Lab Units 02/25/20  0543 02/24/20  1328   AST U/L 18 23   ALT U/L 25 26   ALK PHOS U/L 54 62   TOTAL PROTEIN g/dL 7 7 8 0   ALBUMIN g/dL 3 1* 3 6   TOTAL BILIRUBIN mg/dL 1 40* 2 20*     Results from last 7 days   Lab Units 02/25/20  0543 02/24/20  1328   GLUCOSE RANDOM mg/dL 140 95     Results from last 7 days   Lab Units 02/24/20  1328   TROPONIN I ng/mL <0 02     Results from last 7 days   Lab Units 02/24/20  1712   PROCALCITONIN ng/ml 0 16     Results from last 7 days   Lab Units 02/24/20  1359   LACTIC ACID mmol/L 0 9     Results from last 7 days   Lab Units 02/24/20  1359   NT-PRO BNP pg/mL 126*     Results from last 7 days   Lab Units 02/24/20  1802   CLARITY UA  Clear   COLOR UA  Yellow   SPEC GRAV UA  1 020   PH UA  6 0   GLUCOSE UA mg/dl 100 (1/10%)*   KETONES UA mg/dl Trace*   BLOOD UA  Negative   PROTEIN UA mg/dl Trace*   NITRITE UA  Negative   BILIRUBIN UA  Negative   UROBILINOGEN UA E U /dl 0 2   LEUKOCYTES UA  Negative   WBC UA /hpf None Seen   RBC UA /hpf None Seen   BACTERIA UA /hpf Occasional   EPITHELIAL CELLS WET PREP /hpf Occasional     Results from last 7 days   Lab Units 02/24/20  1802 02/24/20  1356   STREP PNEUMONIAE ANTIGEN, URINE  Negative  --    LEGIONELLA URINARY ANTIGEN  Negative  --    INFLUENZA A PCR   --  None Detected   INFLUENZA B PCR   --  None Detected   RSV PCR   --  None Detected     Results from last 7 days   Lab Units 02/24/20  1712   BLOOD CULTURE  Received in Microbiology Lab  Culture in Progress       ED Treatment:   Medication Administration from 02/24/2020 1147 to 02/24/2020 2055       Date/Time Order Dose Route Action     02/24/2020 1400 methylPREDNISolone sodium succinate (Solu-MEDROL) injection 125 mg 125 mg Intravenous Given     02/24/2020 1400 ipratropium-albuterol (DUO-NEB) 0 5-2 5 mg/3 mL inhalation solution 3 mL 3 mL Nebulization Given     02/24/2020 1513 levofloxacin (LEVAQUIN) IVPB (premix) 750 mg 750 mg Intravenous New Bag     02/24/2020 1706 ipratropium-albuterol (DUO-NEB) 0 5-2 5 mg/3 mL inhalation solution 3 mL 3 mL Nebulization Given     02/24/2020 1707 tamsulosin (FLOMAX) capsule 0 4 mg 0 4 mg Oral Given     02/24/2020 1716 ceftriaxone (ROCEPHIN) 1 g/50 mL in dextrose IVPB 1,000 mg Intravenous New Bag     02/24/2020 1716 azithromycin (ZITHROMAX) tablet 500 mg 500 mg Oral Given        Past Medical History:   Diagnosis Date    Allergic rhinitis     BiPAP (biphasic positive airway pressure) dependence     Centrilobular emphysema (HCC)     COPD (chronic obstructive pulmonary disease) (HCC)     Enlarged prostate     GERD (gastroesophageal reflux disease)     HL (hearing loss)     Hypoglycemia     On home oxygen therapy     Pneumonia     Pulmonary hypertension (HCC)     Respiratory failure (HCC)     SOB (shortness of breath)     Stroke (HCC)     Mini     Present on Admission:   Acute exacerbation of chronic obstructive pulmonary disease (COPD) (HCC)   Shortness of breath on exertion      Admitting Diagnosis: Pneumonia [J18 9]  SOB (shortness of breath) [R06 02]  COPD exacerbation (HCC) [J44 1]  Age/Sex: 81 y o  male  Admission Orders:  Scheduled Medications:    Medications:  azithromycin 500 mg Oral Q24H   ceftriaxone 1,000 mg Intravenous Q24H   cholestyramine sugar free 4 g Oral BID   enoxaparin 40 mg Subcutaneous Daily   ergocalciferol 50,000 Units Oral Weekly   fluticasone 1 spray Each Nare Daily   fluticasone-vilanterol 1 puff Inhalation Daily   guaiFENesin 600 mg Oral Q12H NII   loratadine 10 mg Oral Daily   methylPREDNISolone sodium succinate 40 mg Intravenous Q8H [address]   montelukast 10 mg Oral HS   sertraline 100 mg Oral Daily   spironolactone 25 mg Oral Daily   tamsulosin 0 4 mg Oral Daily With Dinner     Continuous IV Infusions:     PRN Meds:    benzonatate 100 mg Oral TID PRN   ipratropium 0 5 mg Nebulization Q6H PRN   levalbuterol 1 25 mg Nebulization Q6H PRN     Tele   Aspiration precautions  OT PT eval   Elevate HOB     Network Utilization Review Department  [email or web address]  ATTENTION: Please call with any questions or concerns to [phone] and carefully listen to the prompts so that you are directed to the right person  All voicemails are confidential   Stephanie Bocanegra all requests for admission clinical reviews, approved or denied determinations and any other requests to dedicated fax number below belonging to the campus where the patient is receiving treatment   List of dedicated fax numbers for the Facilities:  [address] DENIALS (Administrative/Medical Necessity) [phone]   [address] (Maternity/NICU/Pediatrics) [phone]   Derick Courser [phone]   Jaun Mcclain [phone]   John daniel [phone]   [facility] [phone]   [address] [place] [address] [phone]   [facility]  [phone]   [address] [facility]  [address] [facility] [address] [facility] [phone]

## 2020-02-25 NOTE — PHYSICAL THERAPY NOTE
Physical Therapy Evaluation     Patient's Name: Wily Gutierrez    Admitting Diagnosis  Pneumonia [J18 9]  SOB (shortness of breath) [R06 02]  COPD exacerbation (HCC) [J44 1]    Problem List  Patient Active Problem List   Diagnosis    Pulmonary emphysema ([facility] )    Gastroesophageal reflux disease without esophagitis    Anxiety    Simple chronic bronchitis (HCC)    Shortness of breath on exertion    Abnormal ECG    PVCs (premature ventricular contractions)    Allergic rhinitis    Functional diarrhea    Diastolic dysfunction    Pulmonary hypertension (HCC)    Acute exacerbation of chronic obstructive pulmonary disease (COPD) ([facility] )    Chronic diastolic heart failure (HCC)    History of colon polyps    Renal mass, right    (HFpEF) heart failure with preserved ejection fraction (HCC)    Scalp lesion    Skin lesion    CAP (community acquired pneumonia)    S[facility]ic [facility] response syndrome) [facility] )       Past Medical History  Past Medical History:   Diagnosis Date    Allergic rhinitis     BiPAP (biphasic positive airway pressure) dependence     Centrilobular emphysema (HCC)     COPD (chronic obstructive pulmonary disease) (HCC)     Enlarged prostate     GERD (gastroesophageal reflux disease)     HL (hearing loss)     Hypoglycemia     On home oxygen therapy     Pneumonia     [facility] hypertension [facility]     Respiratory failure (HCC)     SOB (shortness of breath)     Stroke ([facility] )     Mini       Past Surgical History  Past Surgical History:   Procedure Laterality Date    CATARACT EXTRACTION      CHOLECYSTECTOMY      hernia    EYE SURGERY      MASS EXCISION N/A 7/26/2019    Procedure: BACK SKIN LESION EXCISION BIOPSY;  Surgeon: Migue Stanford MD;  [facility];  Service: General    WV ESOPHAGOGASTRODUODENOSCOPY TRANSORAL DIAGNOSTIC N/A 9/19/2018    Procedure: ESOPHAGOGASTRODUODENOSCOPY (EGD); Surgeon: Patel Roberto MD;  Location: [facility];   Service: Gastroenterology    NC EXC SKIN BENIG 2 1-3 CM REMAINDR BODY N/A 2019    Procedure: SCALP LESION EXCISION BIOPSY;  Surgeon: Anant Graff MD;  [facility];  Service: General    NC REPAIR ING HERNIA,5+Y/O,REDUCIBL Left 2018    Procedure: OPEN INGUINAL HERNIA REPAIR WITH MESH;  Surgeon: Anant Graff MD;  [facility];  Service: General            20 0936   Note Type   Note type Eval only   Pain Assessment   Pain Assessment No/denies pain   Pain Score No Pain   Home Living   Type of [facility] Two level;Stairs to enter with rails;1/2 bath on main level  (2 ALIS; bedroom on 1st floor; shower on 2nd floor- 10 stairs)   Bathroom Shower/Tub Walk-in shower   Bathroom Toilet Standard   Bathroom Equipment Other (Comment)  (none)   [address] Other (Comment)  (none at baseline)   Additional Comments pt's preferred language is [place]; however, pt conversational in [address] [place] [address] Drive upon arrival   Prior Function   Level of [place] Independent with ADLs and functional mobility   Lives With Family;Daughter;Spouse   Receives Help From \[facility]\"" Doctor [place], [address] in the last 6 months 0   Vocational Retired  ()   Comments pt drives   Restrictions/Precautions   [place] Bearing Precautions Per Order No   Braces or Orthoses Other (Comment)  (none at baseline)   Other Precautions O2;Multiple lines  (3L O2 via NC; on home oxygen at baseline)   General   Family/Caregiver Present No   Cognition   Overall Cognitive Status WFL   Arousal/Participation Cooperative   Attention Within functional limits   Orientation Level Oriented X4   Memory Within functional limits   Following Commands Follows all commands and directions without difficulty   Comments pt agreeable to therapy eval; pt ID verified via first/last name and    RUE Assessment   RUE Assessment WFL  (not formally tested; grossly assessed w/ functional mobility)   LUE Assessment   LUE Assessment WFL  (not formally tested; grossly assessed w/ functional mobility)   RLE Assessment   RLE Assessment WFL   Strength RLE   RLE Overall Strength 4/5  (not formally tested; grossly assessed w/ functional mobility)   LLE Assessment   LLE Assessment WFL   Strength LLE   LLE Overall Strength 4/5  (not formally tested; grossly assessed w/ functional mobility)   Coordination   Movements are Fluid and Coordinated 1   Sensation WFL   Light Touch   RLE Light Touch Grossly intact   LLE Light Touch Grossly intact   Bed Mobility   Additional Comments received patient sitting at edge of bed, reported walking to/ from bathroom on his own  Transfers   Sit to Stand 7  Independent   Stand to Sit 7  Independent   Additional Comments noted SpO2 desaturation to 86% on 3L O2 NC with activity; pt requiring seated rest break between ambulation trial and stair navigation; SpO2 returned to >90% on 3L O2 NC provided VC's for pursed lip breathing technique   Ambulation/Elevation   Gait pattern Decreased foot clearance; Inconsistent luís   Gait Assistance 5  Supervision   Additional items Verbal cues; Assist x 1   Assistive Device None   Distance 70' x 2   Stair Management Assistance 5  Supervision   Additional items Assist x 1;Verbal cues; Increased time required   Stair Management Technique One rail R;Step to pattern; [address] [facility]   Number of Stairs 6  (3, 6" stairs x2 trials VC's provided for breathing technique)   Balance   Static Sitting Normal   Dynamic Sitting Normal   Static Standing Normal   Dynamic Standing Good   Ambulatory Good   Endurance Deficit   Endurance Deficit Yes   Endurance Deficit Description MORALES   Activity Tolerance   Activity Tolerance Patient limited by fatigue   Medical Staff Made Aware PT GABRIEL Mejia   Nurse Made Aware TRAMAINE Greenwood Later confirmed pt appropriate for therapy eval; post session pt sitting at EOB w/ all needs w/in reach   Assessment   Prognosis Good   Problem List Decreased endurance;Decreased mobility   Assessment Pt is 81 y o  male seen for PT evaluation s/p admit to [facility] on 2/24/2020 w/ CAP (community acquired pneumonia)  PT consulted to assess pt's functional mobility and d/c needs  Order placed for PT eval and tx, w/ up and OOB as tolerated order  Comorbidities affecting pt's physical performance at time of assessment include: SOB on exertion, Acute exacerbation of chronic obstructive pulmonary disease, systemic inflammatory response syndrome, h/o pulmonary emphysema, h/o anxiety, h/o pulmonary HTN, h/o chronic diastolic heart failure, and heart failure w/ preserved ejection fraction  PTA, pt was independent w/ all functional mobility, independent w/ all ADLs and IADLs, ambulating community distances and elevations w/o AD, living w/ family in 2 level house w/ 2 ALIS and retired  Personal factors affecting pt at time of IE include: llives in two story house, stairs to enter home, inability to navigate community distances, unable to perform dynamic tasks in community and inability to perform IADLs  Please find objective findings from PT assessment regarding body systems outlined above with impairments and limitations including decreased endurance, gait deviations, decreased activity tolerance, decreased functional mobility tolerance and SOB upon exertion  The following objective measures performed on IE also reveal limitations: Barthel Index: 75/100 and Modified [place]: 2 (slight disability)  Pt's clinical presentation is currently evolving seen in pt's presentation of MORALES / fatigue impacting pt's overall mobility status and ongoing medical management/monitoring  Pt to benefit from continued PT tx to address deficits as defined above and maximize level of functional independent mobility and consistency  From PT/mobility standpoint, recommendation at time of d/c would be OP PT w/ pulmonary emphasis pending progress in order to facilitate safe return to PLOF  Barriers to Discharge None   Goals   Patient Goals to return home   STG Expiration Date 03/03/20   Short Term Goal #1 In 7 days: 1  Ambulate 150' independently w/o AD and maintaining SpO2 > 90% 100% of the time, 2  Ascend/descend 10 stairs with unilateral handrail mod (I) to allow pt to safely return to home environment and access 2nd floor shower, 3  Increase B LE strength to 4+/5 to improve pt's ability to perform physical mobility and activity, 4  Perform all bed mobility tasks independently to decrease caregiver burden and 5  Complete exercise program independently   Plan   Treatment/Interventions LE strengthening/ROM; Elevations; Therapeutic exercise; Endurance training;Patient/family training;Gait training;Spoke to nursing;Bed mobility;OT   PT Frequency 1-2x/wk   Recommendation   Recommendation Outpatient PT; Home with family support  (with pulmonary emphasis)   PT - OK to Discharge Yes  (when medically cleared)   Modified [place] Scale   Modified Home Scale 2   Barthel Index   Feeding 10   Bathing 0   Grooming Score 5   Dressing Score 10   Bladder Score 10   Bowels Score 10   Toilet Use Score 10   Transfers (Bed/Chair) Score 15   Mobility (Level Surface) Score 0   Stairs Score 5   Barthel Index Score 75       Judy Mendenhall, SPT

## 2020-02-25 NOTE — ED NOTES
SBAR  AA&Ox4  IV saline lock  No isolation  Call Sakina BURKETT in ER with questions     Amy Rinda Skiff, RN  02/24/20 2048

## 2020-02-25 NOTE — OCCUPATIONAL THERAPY NOTE
Occupational Therapy Evaluation        Patient Name: Pete Rai  [id number] Date: 2/25/2020 02/25/20 2207   Note Type   Note type Eval only   Restrictions/Precautions   Weight Bearing Precautions Per Order No   Braces or Orthoses Other (Comment)  (none at base;ine)   Other Precautions O2  (has Home O2)   Pain Assessment   Pain Assessment No/denies pain   Pain Score No Pain   Home Living   Type of Home House   Home Layout Two level;Stairs to enter with rails;1/2 bath on main level  (2 ALIS, shower on second floor)   Bathroom Shower/Tub Walk-in shower   Bathroom Toilet Standard   Bathroom Equipment Other (Comment)  (no DME at baseline)   [facility] Other (Comment)  (none at baseline)   Prior Function   Level of [place] Independent with ADLs and functional mobility   Lives With Family;Daughter;Spouse   Receives Help From Family   ADL Assistance Independent   IADLs Independent   Falls in the last 6 months 0   Vocational Retired  (retired )   Comments patient drives   Lifestyle   Autonomy Patient reported independnet with ADLs/ IADLs, ambulates with no AD, lives with spouse and family in a 2 story house, 2 ALIS, 1st floor set up with shower on second floor  Patient drives and manages his affairs independently  Patient drives     Reciprocal Relationships Supportive family   Service to Others Retired "[address]"   [address] enjoys TV and "doing nothing"   Psychosocial   Psychosocial (WDL) WDL   ADL   Eating Assistance 7  Independent   Grooming Assistance 7  Independent   UB [address] 6  Modified Independent   LB [address] 6  [address] 6  Modified independent   [address] 6  Modified independent   [address]  6  Modified independent   [address] 6  Modified independent   Additional Comments patient requires to take rest periods to compensate for SOB, patient was educated on deep breathing technique and how to use energy conservation techniques   Bed Mobility   Additional Comments received patient sitting at edge of bed, reported walking to/ from bathroom on his own  Transfers   Sit to Stand 7  Independent   Stand to Sit 7  Independent   Functional Mobility   Functional Mobility 6  Modified independent   Additional Comments verbal cues for pacing self   Additional items   (No AD)   Balance   Static Sitting Normal   Dynamic Sitting Normal   Static Standing Normal   Dynamic Standing Normal   Activity Tolerance   Activity Tolerance Patient limited by fatigue   Nurse Made Aware RN verbalized patient appropriate for therapy   RUE Assessment   RUE Assessment WNL   LUE Assessment   LUE Assessment WNL   Hand Function   Gross Motor Coordination Functional   Fine Motor Coordination Functional   Sensation   Light Touch No apparent deficits  (BUEs)   Proprioception   Proprioception No apparent deficits  (BUEs)   Vision-Basic Assessment   Current Vision Does not wear glasses   Patient Visual Report Other (Comment)  (No significant changes reported)   Cognition   Overall Cognitive Status WFL   Arousal/Participation Alert; Responsive; Cooperative   Attention Within functional limits   Orientation Level Oriented X4   Memory Within functional limits   Following Commands Follows all commands and directions without difficulty   Assessment   Assessment Patient is a 81 y o  male seen for OT evaluation s/p admit to [id number] [place] [address] on 2/24/2020 w/CAP (community acquired pneumonia)  Commorbidities affecting patient's functional performance at time of assessment include:COPD, h/o of stroke, presented to ED with SOB  Orders placed for OT evaluation and treatment  Performed at least two patient identifiers during session including name and wristband   Prior to admission, Patient reported independnet with ADLs/ IADLs, ambulates with no AD, lives with spouse and family in a 2 story house, 2 ALIS, 1st floor set up with shower on second floor  Patient drives and manages his affairs independently  Upon evaluation, patient requires modified independent assist for UB ADLs, modified independent assist for LB ADLs, transfers and functional ambulation in room and bathroom with modified independent assist, with the use of verbal cues for energy conservation  Presents with functional use of BUEs, with intact prehension, coordination and symmetrical muscle strength  Patient was educated on deep breathing and energy conservation techniques, patient verbalized understanding and demonstrated recommended plan correctly  Patient appears to be functioning at baseline  No further acute OT needs identified at this time to warrant continuation of services  D/C OT services  From OT standpoint, recommendation at time of d/c would be Home with family support       Goals   Patient Goals to return home   Recommendation   OT Discharge Recommendation Home with family support   Barthel Index   Feeding 10   Bathing 5   Grooming Score 5   Dressing Score 10   Bladder Score 10   Bowels Score 10   Toilet Use Score 10   Transfers (Bed/Chair) Score 15   Mobility (Level Surface) Score 10   Stairs Score 5   Barthel Index Score 90   Modified [place] Scale   Modified [place] Scale 1

## 2020-02-25 NOTE — PLAN OF CARE
Problem: Potential for Falls  Goal: Patient will remain free of falls  Description  INTERVENTIONS:  - Assess patient frequently for physical needs  -  Identify cognitive and physical deficits and behaviors that affect risk of falls    -  [place] fall precautions as indicated by assessment   - Educate patient/family on patient safety including physical limitations  - Instruct patient to call for assistance with activity based on assessment  - Modify environment to reduce risk of injury  - Consider OT/PT consult to assist with strengthening/mobility  Outcome: Progressing     Problem: PAIN - ADULT  Goal: Verbalizes/displays adequate comfort level or baseline comfort level  Description  Interventions:  - Encourage patient to monitor pain and request assistance  - Assess pain using appropriate pain scale  - Administer analgesics based on type and severity of pain and evaluate response  - Implement non-pharmacological measures as appropriate and evaluate response  - Consider cultural and social influences on pain and pain management  - Notify physician/advanced practitioner if interventions unsuccessful or patient reports new pain  Outcome: Progressing     Problem: INFECTION - ADULT  Goal: Absence or prevention of progression during hospitalization  Description  INTERVENTIONS:  - Assess and monitor for signs and symptoms of infection  - Monitor lab/diagnostic results  - Monitor all insertion sites, i e  indwelling lines, tubes, and drains  - Monitor endotracheal if appropriate and nasal secretions for changes in amount and color  - [place] appropriate cooling/warming therapies per order  - Administer medications as ordered  - Instruct and encourage patient and family to use good hand hygiene technique  - Identify and instruct in appropriate isolation precautions for identified infection/condition  Outcome: Progressing  Goal: Absence of fever/infection during neutropenic period  Description  INTERVENTIONS:  - Monitor WBC    Outcome: Progressing     Problem: SAFETY ADULT  Goal: Patient will remain free of falls  Description  INTERVENTIONS:  - Assess patient frequently for physical needs  -  Identify cognitive and physical deficits and behaviors that affect risk of falls    -  [place] fall precautions as indicated by assessment   - Educate patient/family on patient safety including physical limitations  - Instruct patient to call for assistance with activity based on assessment  - Modify environment to reduce risk of injury  - Consider OT/PT consult to assist with strengthening/mobility  Outcome: Progressing  Goal: Maintain or return to baseline ADL function  Description  INTERVENTIONS:  -  Assess patient's ability to carry out ADLs; assess patient's baseline for ADL function and identify physical deficits which impact ability to perform ADLs (bathing, care of mouth/teeth, toileting, grooming, dressing, etc )  - Assess/evaluate cause of self-care deficits   - Assess range of motion  - Assess patient's mobility; develop plan if impaired  - Assess patient's need for assistive devices and provide as appropriate  - Encourage maximum independence but intervene and supervise when necessary  - Involve family in performance of ADLs  - Assess for home care needs following discharge   - Consider OT consult to assist with ADL evaluation and planning for discharge  - Provide patient education as appropriate  Outcome: Progressing  Goal: Maintain or return mobility status to optimal level  Description  INTERVENTIONS:  - Assess patient's baseline mobility status (ambulation, transfers, stairs, etc )    - Identify cognitive and physical deficits and behaviors that affect mobility  - Identify mobility aids required to assist with transfers and/or ambulation (gait belt, sit-to-stand, lift, walker, cane, etc )  - [place] fall precautions as indicated by assessment  - Record patient progress and toleration of activity level on Mobility SBAR; progress patient to next Phase/Stage  - Instruct patient to call for assistance with activity based on assessment  - Consider rehabilitation consult to assist with strengthening/weightbearing, etc   Outcome: Progressing     Problem: DISCHARGE PLANNING  Goal: Discharge to home or other facility with appropriate resources  Description  INTERVENTIONS:  - Identify barriers to discharge w/patient and caregiver  - Arrange for needed discharge resources and transportation as appropriate  - Identify discharge learning needs (meds, wound care, etc )  - Arrange for interpretive services to assist at discharge as needed  - Refer to Case Management Department for coordinating discharge planning if the patient needs post-hospital services based on physician/advanced practitioner order or complex needs related to functional status, cognitive ability, or social support system  Outcome: Progressing     Problem: Knowledge Deficit  Goal: Patient/family/caregiver demonstrates understanding of disease process, treatment plan, medications, and discharge instructions  Description  Complete learning assessment and assess knowledge base    Interventions:  - Provide teaching at level of understanding  - Provide teaching via preferred learning methods  Outcome: Progressing     Problem: RESPIRATORY - ADULT  Goal: Achieves optimal ventilation and oxygenation  Description  INTERVENTIONS:  - Assess for changes in respiratory status  - Assess for changes in mentation and behavior  - Position to facilitate oxygenation and minimize respiratory effort  - Oxygen administered by appropriate delivery if ordered  - Initiate smoking cessation education as indicated  - Encourage broncho-pulmonary hygiene including cough, deep breathe, Incentive Spirometry  - Assess the need for suctioning and aspirate as needed  - Assess and instruct to report SOB or any respiratory difficulty  - Respiratory Therapy support as indicated  Outcome: Progressing

## 2020-02-25 NOTE — ASSESSMENT & PLAN NOTE
IV steroids    Respiratory protocol      Pulm appreciated - will wean steroids daily w/ plan to d/c Thursday  Likely 2/2 PNA

## 2020-02-26 LAB
ANION GAP SERPL CALCULATED.3IONS-SCNC: 7 MMOL/L (ref 4–13)
BUN SERPL-MCNC: 26 MG/DL (ref 5–25)
CALCIUM SERPL-MCNC: 8.5 MG/DL (ref 8.3–10.1)
CHLORIDE SERPL-SCNC: 103 MMOL/L (ref 100–108)
CO2 SERPL-SCNC: 30 MMOL/L (ref 21–32)
CREAT SERPL-MCNC: 0.67 MG/DL (ref 0.6–1.3)
ERYTHROCYTE [DISTWIDTH] IN BLOOD BY AUTOMATED COUNT: 13.5 % (ref 11.6–15.1)
GFR SERPL CREATININE-BSD FRML MDRD: 99 ML/MIN/1.73SQ M
GLUCOSE SERPL-MCNC: 135 MG/DL (ref 65–140)
HCT VFR BLD AUTO: 39 % (ref 36.5–49.3)
HGB BLD-MCNC: 12.6 G/DL (ref 12–17)
MAGNESIUM SERPL-MCNC: 2.5 MG/DL (ref 1.6–2.6)
MCH RBC QN AUTO: 30 PG (ref 26.8–34.3)
MCHC RBC AUTO-ENTMCNC: 32.3 G/DL (ref 31.4–37.4)
MCV RBC AUTO: 93 FL (ref 82–98)
PHOSPHATE SERPL-MCNC: 2.9 MG/DL (ref 2.3–4.1)
PLATELET # BLD AUTO: 228 THOUSANDS/UL (ref 149–390)
PMV BLD AUTO: 10 FL (ref 8.9–12.7)
POTASSIUM SERPL-SCNC: 4.2 MMOL/L (ref 3.5–5.3)
PROCALCITONIN SERPL-MCNC: <0.05 NG/ML
RBC # BLD AUTO: 4.2 MILLION/UL (ref 3.88–5.62)
SODIUM SERPL-SCNC: 140 MMOL/L (ref 136–145)
WBC # BLD AUTO: 16.14 THOUSAND/UL (ref 4.31–10.16)

## 2020-02-26 PROCEDURE — 99231 SBSQ HOSP IP/OBS SF/LOW 25: CPT | Performed by: PHYSICIAN ASSISTANT

## 2020-02-26 PROCEDURE — 80048 BASIC METABOLIC PNL TOTAL CA: CPT | Performed by: GENERAL PRACTICE

## 2020-02-26 PROCEDURE — 85027 COMPLETE CBC AUTOMATED: CPT | Performed by: GENERAL PRACTICE

## 2020-02-26 PROCEDURE — 84145 PROCALCITONIN (PCT): CPT | Performed by: GENERAL PRACTICE

## 2020-02-26 PROCEDURE — 94760 N-INVAS EAR/PLS OXIMETRY 1: CPT

## 2020-02-26 PROCEDURE — 99232 SBSQ HOSP IP/OBS MODERATE 35: CPT | Performed by: GENERAL PRACTICE

## 2020-02-26 PROCEDURE — 84100 ASSAY OF PHOSPHORUS: CPT | Performed by: GENERAL PRACTICE

## 2020-02-26 PROCEDURE — 83735 ASSAY OF MAGNESIUM: CPT | Performed by: GENERAL PRACTICE

## 2020-02-26 PROCEDURE — 94660 CPAP INITIATION&MGMT: CPT

## 2020-02-26 PROCEDURE — 94640 AIRWAY INHALATION TREATMENT: CPT

## 2020-02-26 RX ORDER — LEVALBUTEROL 1.25 MG/.5ML
1.25 SOLUTION, CONCENTRATE RESPIRATORY (INHALATION)
Status: DISCONTINUED | OUTPATIENT
Start: 2020-02-26 | End: 2020-02-27 | Stop reason: HOSPADM

## 2020-02-26 RX ORDER — IPRATROPIUM BROMIDE AND ALBUTEROL SULFATE 2.5; .5 MG/3ML; MG/3ML
3 SOLUTION RESPIRATORY (INHALATION) EVERY 4 HOURS PRN
Status: DISCONTINUED | OUTPATIENT
Start: 2020-02-26 | End: 2020-02-27 | Stop reason: HOSPADM

## 2020-02-26 RX ORDER — LEVALBUTEROL 1.25 MG/.5ML
1.25 SOLUTION, CONCENTRATE RESPIRATORY (INHALATION)
Status: DISCONTINUED | OUTPATIENT
Start: 2020-02-26 | End: 2020-02-26

## 2020-02-26 RX ORDER — METHYLPREDNISOLONE SODIUM SUCCINATE 40 MG/ML
20 INJECTION, POWDER, LYOPHILIZED, FOR SOLUTION INTRAMUSCULAR; INTRAVENOUS EVERY 12 HOURS SCHEDULED
Status: DISCONTINUED | OUTPATIENT
Start: 2020-02-26 | End: 2020-02-27 | Stop reason: HOSPADM

## 2020-02-26 RX ORDER — ECHINACEA PURPUREA EXTRACT 125 MG
1 TABLET ORAL EVERY 2 HOUR PRN
Status: DISCONTINUED | OUTPATIENT
Start: 2020-02-26 | End: 2020-02-27 | Stop reason: HOSPADM

## 2020-02-26 RX ADMIN — LEVALBUTEROL HYDROCHLORIDE 1.25 MG: 1.25 SOLUTION, CONCENTRATE RESPIRATORY (INHALATION) at 20:37

## 2020-02-26 RX ADMIN — SERTRALINE HYDROCHLORIDE 100 MG: 100 TABLET ORAL at 08:24

## 2020-02-26 RX ADMIN — GUAIFENESIN 600 MG: 600 TABLET, EXTENDED RELEASE ORAL at 21:07

## 2020-02-26 RX ADMIN — IPRATROPIUM BROMIDE 0.5 MG: 0.5 SOLUTION RESPIRATORY (INHALATION) at 20:37

## 2020-02-26 RX ADMIN — METHYLPREDNISOLONE SODIUM SUCCINATE 20 MG: 40 INJECTION, POWDER, FOR SOLUTION INTRAMUSCULAR; INTRAVENOUS at 21:05

## 2020-02-26 RX ADMIN — LORATADINE 10 MG: 10 TABLET ORAL at 08:25

## 2020-02-26 RX ADMIN — AZITHROMYCIN MONOHYDRATE 500 MG: 250 TABLET ORAL at 17:12

## 2020-02-26 RX ADMIN — METHYLPREDNISOLONE SODIUM SUCCINATE 40 MG: 40 INJECTION, POWDER, FOR SOLUTION INTRAMUSCULAR; INTRAVENOUS at 08:25

## 2020-02-26 RX ADMIN — SPIRONOLACTONE 25 MG: 25 TABLET ORAL at 08:24

## 2020-02-26 RX ADMIN — CHOLESTYRAMINE 4 G: 4 POWDER, FOR SUSPENSION ORAL at 17:13

## 2020-02-26 RX ADMIN — ENOXAPARIN SODIUM 40 MG: 40 INJECTION SUBCUTANEOUS at 08:25

## 2020-02-26 RX ADMIN — GUAIFENESIN 600 MG: 600 TABLET, EXTENDED RELEASE ORAL at 08:25

## 2020-02-26 RX ADMIN — MONTELUKAST SODIUM 10 MG: 10 TABLET, FILM COATED ORAL at 21:07

## 2020-02-26 RX ADMIN — LEVALBUTEROL HYDROCHLORIDE 1.25 MG: 1.25 SOLUTION, CONCENTRATE RESPIRATORY (INHALATION) at 14:56

## 2020-02-26 RX ADMIN — IPRATROPIUM BROMIDE 0.5 MG: 0.5 SOLUTION RESPIRATORY (INHALATION) at 14:56

## 2020-02-26 RX ADMIN — TAMSULOSIN HYDROCHLORIDE 0.4 MG: 0.4 CAPSULE ORAL at 17:27

## 2020-02-26 RX ADMIN — CHOLESTYRAMINE 4 G: 4 POWDER, FOR SUSPENSION ORAL at 08:23

## 2020-02-26 RX ADMIN — CEFTRIAXONE SODIUM 1000 MG: 10 INJECTION, POWDER, FOR SOLUTION INTRAVENOUS at 17:14

## 2020-02-26 RX ADMIN — SALINE NASAL SPRAY 1 SPRAY: 1.5 SOLUTION NASAL at 13:12

## 2020-02-26 RX ADMIN — FLUTICASONE FUROATE AND VILANTEROL TRIFENATATE 1 PUFF: 100; 25 POWDER RESPIRATORY (INHALATION) at 08:23

## 2020-02-26 NOTE — PROGRESS NOTES
Progress Note - Zac Krishnamurthy 1950, 81 y o  male MRN: [id number]    Unit/Bed#: -[facility] Encounter: [id number]    Primary Care Provider: Raj Jeter MD   Date and time admitted to hospital: 2020 12:44 PM        * CAP (community acquired pneumonia)  Assessment & Plan  Urine strep and Legionella neg  Rocephin/Zithromax - per pulm can d/c on Levaquin if stable on Thursday    Will also order respiratory protocol as well as duo nebulizers and IV steroids  Procals neg x 2 - but CT chest appears to show PNA    Sepsis ([facility] )  Assessment & Plan  POA  secondary to pneumonia   Although white cell count is likely in the setting of chronic steroid use  B Cx neg        Acute exacerbation of chronic obstructive pulmonary disease (COPD) ([facility] )  Assessment & Plan  IV steroids    Respiratory protocol  Pulm appreciated - wean steroids daily w/ plan to d/c Thursday  Likely 2/ PNA    VTE Pharmacologic Prophylaxis:   Pharmacologic: Enoxaparin (Lovenox)  Mechanical VTE Prophylaxis in Place: Yes    Patient Centered Rounds: I have performed bedside rounds with nursing staff today  Discussions with Specialists or Other Care Team Provider: no    Education and Discussions with Family / Patient: pt    Time Spent for Care: 30 minutes  More than 50% of total time spent on counseling and coordination of care as described above  Current Length of Stay: 1 day(s)    Current Patient Status: Inpatient   Certification Statement: The patient will continue to require additional inpatient hospital stay due to need for iv steroids    Discharge Plan: likely tomorrow    Code Status: Level 1 - Full Code      Subjective:   No acute complaints    Objective:     Vitals:   Temp (24hrs), Av °F (36 7 °C), Min:98 °F (36 7 °C), Max:98 °F (36 7 °C)    Temp:  [98 °F (36 7 °C)] 98 °F (36 7 °C)  HR:  [71-77] 71  Resp:  [20] 20  BP: (119-120)/(71-72) 119/72  SpO2:  [90 %-96 %] 96 %  Body mass index is 26 69 kg/m²       Input and Output Summary (last 24 hours): Intake/Output Summary (Last 24 hours) at 2/26/2020 1916  Last data filed at 2/26/2020 1700  Gross per 24 hour   Intake 470 ml   Output 1300 ml   Net -830 ml       Physical Exam:     Physical Exam   Constitutional: He is oriented to person, place, and time  No distress  HENT:   Head: Normocephalic and atraumatic  Eyes: Conjunctivae and EOM are normal    Neck: Normal range of motion  Neck supple  Cardiovascular: Normal rate and regular rhythm  Pulmonary/Chest: Effort normal and breath sounds normal  He has no wheezes  He has no rales  Abdominal: Soft  Bowel sounds are normal  He exhibits no distension  There is no tenderness  Musculoskeletal: Normal range of motion  He exhibits no edema  Neurological: He is alert and oriented to person, place, and time  Skin: Skin is warm and dry  He is not diaphoretic  Additional Data:     Labs:    Results from last 7 days   Lab Units 02/26/20  0517  02/24/20  1328   WBC Thousand/uL 16 14*   < > 14 92*   HEMOGLOBIN g/dL 12 6   < > 14 4   HEMATOCRIT % 39 0   < > 45 5   PLATELETS Thousands/uL 228   < > 244   NEUTROS PCT %  --   --  71   LYMPHS PCT %  --   --  15   MONOS PCT %  --   --  13*   EOS PCT %  --   --  0    < > = values in this interval not displayed  Results from last 7 days   Lab Units 02/26/20  0517 02/25/20  0543   POTASSIUM mmol/L 4 2 4 1   CHLORIDE mmol/L 103 99*   CO2 mmol/L 30 28   BUN mg/dL 26* 26*   CREATININE mg/dL 0 67 0 72   CALCIUM mg/dL 8 5 8 8   ALK PHOS U/L  --  54   ALT U/L  --  25   AST U/L  --  18           * I Have Reviewed All Lab Data Listed Above  * Additional Pertinent Lab Tests Reviewed: [facility] Admission Reviewed        Recent Cultures (last 7 days):     Results from last 7 days   Lab Units 02/25/20  0543 02/24/20  1802 02/24/20  1712   BLOOD CULTURE  No Growth at 24 hrs   --  No Growth at 24 hrs     LEGIONELLA URINARY ANTIGEN   --  Negative  --        Last 24 Hours Medication List:     Current Facility-Administered Medications:  acetaminophen 650 mg Oral Q6H PRN Harris Hernández DO    azithromycin 500 mg Oral Q24H Josiah Navarrete MD    benzonatate 100 mg Oral TID PRN Josiah Navarrete MD    ceftriaxone 1,000 mg Intravenous Q24H Josiah Navarrete MD Last Rate: 1,000 mg (02/26/20 1714)   cholestyramine sugar free 4 g Oral BID Josiah Navarrete MD    enoxaparin 40 mg Subcutaneous Daily Josiah Navarrete MD    ergocalciferol 50,000 Units Oral Weekly Josiah Navarrete MD    fluticasone 1 spray Each Nare Daily Josiah Navarrete MD    fluticasone-vilanterol 1 puff Inhalation Daily Josiah Navarrete MD    guaiFENesin 600 mg Oral Q12H [facility] Josiah Navarrete MD    ipratropium 0 5 mg Nebulization 4x Daily Harris Hernández,     ipratropium-albuterol 3 mL Nebulization Q4H PRN Harris Hernández DO    levalbuterol 1 25 mg Nebulization 4x Daily Harris Hernández,     loratadine 10 mg Oral Daily Josiah Navarrete MD    methylPREDNISolone sodium succinate 20 mg Intravenous Q12H [facility] Kanika Funk PA-C    montelukast 10 mg Oral HS Josiah Navarrete MD    sertraline 100 mg Oral Daily Josiah Navarrete MD    sodium chloride 1 spray Each Nare Q2H PRN Harris Hernández DO    spironolactone 25 mg Oral Daily Josiah Navarrete MD    tamsulosin 0 4 mg Oral Daily With Pam Bocanegra MD         Today, Patient Was Seen By: Harris Hernández DO    ** Please Note: Dictation voice to text software may have been used in the creation of this document   **

## 2020-02-26 NOTE — SOCIAL WORK
Pt recommended for OP Pulmonary rehab  CM met with pt at bedside to discuss same  Pt states he is not in agreement with same  Pt also declines OP PT  He denies any further discharge needs  Pt reports his daughter will transport him home on discharge  She will bring portable oxygen tank for transport  Anticipate discharge in the next 24 hours  [facility] PCP appointment made with Dr Ender Yip  CM to follow

## 2020-02-26 NOTE — RESPIRATORY THERAPY NOTE
02/26/20 0015   Non-Invasive Information   Resp Comments called to room by Rn stating sats low, arrived pt asleep torin cpap well sats 87% o2 titrated to mask at 3l/m sats increased to 92% pt did not wake rn made aware

## 2020-02-26 NOTE — PROGRESS NOTES
Progress Note - Pulmonary   Lizet Morales 81 y o  male MRN: [id number]  Unit/Bed#: -01 Encounter: [id number]    Assessment:  Shortness of breath  Chronic hypoxemic and hypercapnic respiratory failure  Abnormal CT of the chest  Very severe COPD with acute exacerbation, improving    Plan:  Patient continues to saturate well on his baseline 3 L  He uses trilogy noninvasive ventilation at home  CT of the chest again showing patient's severe emphysema with large bullae in the right lung, 1 containing a small amount of fluid  Nodular consolidation left lower lobe and mild consolidation dependent portion of right upper lobe  Patient was meeting SIRS criteria upon initial presentation  Currently receiving ceftriaxone and azithromycin  Can likely transition to oral antibiotics tomorrow  Follow-up on sputum culture results  Patient with very severe COPD, FEV1 25% and DLCO 25%  In the future, will likely pursue endobronchial valve placement  He is not a good candidate for lung transplant  Will decrease Solu-Medrol to 20 mg q 12 hours  At baseline, patient takes azithromycin Monday Wednesday Friday and prednisone 10 mg daily  He is unable to tolerate Daliresp     Continued outpatient follow-up with our office including follow-up imaging    Anticipate discharge tomorrow    Subjective:   Feeling much better  Breathing is pretty close to his baseline  Still coughing now the phlegm is clear rather than yellow in color  12 point review of systems otherwise negative  Objective:     Vitals: Blood pressure 119/72, pulse 71, temperature 98 °F (36 7 °C), resp  rate 20, height 5' 10" (1 778 m), weight 84 4 kg (186 lb), SpO2 96 %  ,Body mass index is 26 69 kg/m²        Intake/Output Summary (Last 24 hours) at 2/26/2020 1322  Last data filed at 2/26/2020 0829  Gross per 24 hour   Intake 120 ml   Output 700 ml   Net -580 ml       Invasive Devices     Peripheral Intravenous Line            Peripheral IV 02/24/20 Right Antecubital 2 days                Physical Exam:   General appearance: alert and oriented, in no acute distress   Head: Normocephalic, without obvious abnormality, atraumatic  Eyes: EOMI  No discharge bilaterally  No scleral icterus  Neck: supple, symmetrical, trachea midline  Lungs:  Saturating well on his baseline oxygen requirement  Decreased breath sounds at the apices  Overall there is improved air movement  Heart: regular rate and rhythm, S1, S2 normal, no murmur, click, rub or gallop  Abdomen: soft, non-tender  Extremities: No edema or tenderness  Skin: No lesions or pallor noted  No rash  Neurologic: Grossly normal     Labs: I have personally reviewed pertinent lab results  Imaging and other studies: I have personally reviewed pertinent reports

## 2020-02-26 NOTE — PLAN OF CARE
Problem: Potential for Falls  Goal: Patient will remain free of falls  Description  INTERVENTIONS:  - Assess patient frequently for physical needs  -  Identify cognitive and physical deficits and behaviors that affect risk of falls    -  [place] fall precautions as indicated by assessment   - Educate patient/family on patient safety including physical limitations  - Instruct patient to call for assistance with activity based on assessment  - Modify environment to reduce risk of injury  - Consider OT/PT consult to assist with strengthening/mobility  Outcome: Progressing     Problem: PAIN - ADULT  Goal: Verbalizes/displays adequate comfort level or baseline comfort level  Description  Interventions:  - Encourage patient to monitor pain and request assistance  - Assess pain using appropriate pain scale  - Administer analgesics based on type and severity of pain and evaluate response  - Implement non-pharmacological measures as appropriate and evaluate response  - Consider cultural and social influences on pain and pain management  - Notify physician/advanced practitioner if interventions unsuccessful or patient reports new pain  Outcome: Progressing     Problem: INFECTION - ADULT  Goal: Absence or prevention of progression during hospitalization  Description  INTERVENTIONS:  - Assess and monitor for signs and symptoms of infection  - Monitor lab/diagnostic results  - Monitor all insertion sites, i e  indwelling lines, tubes, and drains  - Monitor endotracheal if appropriate and nasal secretions for changes in amount and color  - [place] appropriate cooling/warming therapies per order  - Administer medications as ordered  - Instruct and encourage patient and family to use good hand hygiene technique  - Identify and instruct in appropriate isolation precautions for identified infection/condition  Outcome: Progressing  Goal: Absence of fever/infection during neutropenic period  Description  INTERVENTIONS:  - Monitor WBC    Outcome: Progressing     Problem: SAFETY ADULT  Goal: Patient will remain free of falls  Description  INTERVENTIONS:  - Assess patient frequently for physical needs  -  Identify cognitive and physical deficits and behaviors that affect risk of falls    -  [place] fall precautions as indicated by assessment   - Educate patient/family on patient safety including physical limitations  - Instruct patient to call for assistance with activity based on assessment  - Modify environment to reduce risk of injury  - Consider OT/PT consult to assist with strengthening/mobility  Outcome: Progressing  Goal: Maintain or return to baseline ADL function  Description  INTERVENTIONS:  -  Assess patient's ability to carry out ADLs; assess patient's baseline for ADL function and identify physical deficits which impact ability to perform ADLs (bathing, care of mouth/teeth, toileting, grooming, dressing, etc )  - Assess/evaluate cause of self-care deficits   - Assess range of motion  - Assess patient's mobility; develop plan if impaired  - Assess patient's need for assistive devices and provide as appropriate  - Encourage maximum independence but intervene and supervise when necessary  - Involve family in performance of ADLs  - Assess for home care needs following discharge   - Consider OT consult to assist with ADL evaluation and planning for discharge  - Provide patient education as appropriate  Outcome: Progressing  Goal: Maintain or return mobility status to optimal level  Description  INTERVENTIONS:  - Assess patient's baseline mobility status (ambulation, transfers, stairs, etc )    - Identify cognitive and physical deficits and behaviors that affect mobility  - Identify mobility aids required to assist with transfers and/or ambulation (gait belt, sit-to-stand, lift, walker, cane, etc )  - [place] fall precautions as indicated by assessment  - Record patient progress and toleration of activity level on Mobility SBAR; progress patient to next Phase/Stage  - Instruct patient to call for assistance with activity based on assessment  - Consider rehabilitation consult to assist with strengthening/weightbearing, etc   Outcome: Progressing     Problem: DISCHARGE PLANNING  Goal: Discharge to home or other facility with appropriate resources  Description  INTERVENTIONS:  - Identify barriers to discharge w/patient and caregiver  - Arrange for needed discharge resources and transportation as appropriate  - Identify discharge learning needs (meds, wound care, etc )  - Arrange for interpretive services to assist at discharge as needed  - Refer to Case Management Department for coordinating discharge planning if the patient needs post-hospital services based on physician/advanced practitioner order or complex needs related to functional status, cognitive ability, or social support system  Outcome: Progressing     Problem: Knowledge Deficit  Goal: Patient/family/caregiver demonstrates understanding of disease process, treatment plan, medications, and discharge instructions  Description  Complete learning assessment and assess knowledge base    Interventions:  - Provide teaching at level of understanding  - Provide teaching via preferred learning methods  Outcome: Progressing     Problem: RESPIRATORY - ADULT  Goal: Achieves optimal ventilation and oxygenation  Description  INTERVENTIONS:  - Assess for changes in respiratory status  - Assess for changes in mentation and behavior  - Position to facilitate oxygenation and minimize respiratory effort  - Oxygen administered by appropriate delivery if ordered  - Initiate smoking cessation education as indicated  - Encourage broncho-pulmonary hygiene including cough, deep breathe, Incentive Spirometry  - Assess the need for suctioning and aspirate as needed  - Assess and instruct to report SOB or any respiratory difficulty  - Respiratory Therapy support as indicated  Outcome: Progressing

## 2020-02-27 ENCOUNTER — TELEPHONE (OUTPATIENT)
Dept: PULMONOLOGY | Facility: CLINIC | Age: 70
End: 2020-02-27

## 2020-02-27 VITALS
TEMPERATURE: 97.4 F | HEIGHT: 70 IN | BODY MASS INDEX: 26.63 KG/M2 | OXYGEN SATURATION: 98 % | SYSTOLIC BLOOD PRESSURE: 121 MMHG | HEART RATE: 72 BPM | WEIGHT: 186 LBS | DIASTOLIC BLOOD PRESSURE: 58 MMHG | RESPIRATION RATE: 17 BRPM

## 2020-02-27 PROBLEM — A41.9 SEPSIS (HCC): Status: RESOLVED | Noted: 2020-02-24 | Resolved: 2020-02-27

## 2020-02-27 PROCEDURE — 94640 AIRWAY INHALATION TREATMENT: CPT

## 2020-02-27 PROCEDURE — 99239 HOSP IP/OBS DSCHRG MGMT >30: CPT | Performed by: GENERAL PRACTICE

## 2020-02-27 PROCEDURE — 94760 N-INVAS EAR/PLS OXIMETRY 1: CPT

## 2020-02-27 PROCEDURE — 99231 SBSQ HOSP IP/OBS SF/LOW 25: CPT | Performed by: PHYSICIAN ASSISTANT

## 2020-02-27 RX ORDER — PREDNISONE 10 MG/1
TABLET ORAL
Qty: 27 TABLET | Refills: 0 | Status: SHIPPED | OUTPATIENT
Start: 2020-02-27 | End: 2020-03-07

## 2020-02-27 RX ORDER — LEVOFLOXACIN 750 MG/1
750 TABLET ORAL EVERY 24 HOURS
Qty: 3 TABLET | Refills: 0 | Status: SHIPPED | OUTPATIENT
Start: 2020-02-28 | End: 2020-03-02

## 2020-02-27 RX ORDER — AZITHROMYCIN 250 MG/1
500 TABLET, FILM COATED ORAL ONCE
Status: COMPLETED | OUTPATIENT
Start: 2020-02-27 | End: 2020-02-27

## 2020-02-27 RX ADMIN — GUAIFENESIN 600 MG: 600 TABLET, EXTENDED RELEASE ORAL at 08:45

## 2020-02-27 RX ADMIN — IPRATROPIUM BROMIDE 0.5 MG: 0.5 SOLUTION RESPIRATORY (INHALATION) at 07:22

## 2020-02-27 RX ADMIN — LEVALBUTEROL HYDROCHLORIDE 1.25 MG: 1.25 SOLUTION, CONCENTRATE RESPIRATORY (INHALATION) at 07:22

## 2020-02-27 RX ADMIN — CEFTRIAXONE SODIUM 1000 MG: 10 INJECTION, POWDER, FOR SOLUTION INTRAVENOUS at 13:35

## 2020-02-27 RX ADMIN — SERTRALINE HYDROCHLORIDE 100 MG: 100 TABLET ORAL at 08:42

## 2020-02-27 RX ADMIN — LORATADINE 10 MG: 10 TABLET ORAL at 08:42

## 2020-02-27 RX ADMIN — CHOLESTYRAMINE 4 G: 4 POWDER, FOR SUSPENSION ORAL at 08:41

## 2020-02-27 RX ADMIN — FLUTICASONE FUROATE AND VILANTEROL TRIFENATATE 1 PUFF: 100; 25 POWDER RESPIRATORY (INHALATION) at 08:41

## 2020-02-27 RX ADMIN — IPRATROPIUM BROMIDE 0.5 MG: 0.5 SOLUTION RESPIRATORY (INHALATION) at 11:02

## 2020-02-27 RX ADMIN — LEVALBUTEROL HYDROCHLORIDE 1.25 MG: 1.25 SOLUTION, CONCENTRATE RESPIRATORY (INHALATION) at 11:02

## 2020-02-27 RX ADMIN — AZITHROMYCIN MONOHYDRATE 500 MG: 250 TABLET ORAL at 13:18

## 2020-02-27 RX ADMIN — ENOXAPARIN SODIUM 40 MG: 40 INJECTION SUBCUTANEOUS at 08:41

## 2020-02-27 RX ADMIN — METHYLPREDNISOLONE SODIUM SUCCINATE 20 MG: 40 INJECTION, POWDER, FOR SOLUTION INTRAMUSCULAR; INTRAVENOUS at 08:44

## 2020-02-27 RX ADMIN — SPIRONOLACTONE 25 MG: 25 TABLET ORAL at 08:43

## 2020-02-27 NOTE — ASSESSMENT & PLAN NOTE
IV steroids    Respiratory protocol      Pulm appreciated - wean steroids daily w/ plan to d/c Thursday  Likely 2/2 PNA

## 2020-02-27 NOTE — SOCIAL WORK
LOS: 2, Pt is not a re-admit  Pt for discharge home today per Dr Carter Degree  Pt continues to decline OP Pulm rehab and OP PT  Follow up PCP appointment was made  Pt denies any other dischggre needs at this time  Family will transport

## 2020-02-27 NOTE — ASSESSMENT & PLAN NOTE
Urine strep and Legionella neg  Rocephin/Zithromax - per pulm can d/c on Levaquin if stable on Thursday    Will also order respiratory protocol as well as duo nebulizers and IV steroids    Procals neg x 2 - but CT chest appears to show PNA

## 2020-02-27 NOTE — TELEPHONE ENCOUNTER
----- Message from Tricia Branham PA-C sent at 2/27/2020  2:08 PM EST -----  Please arrange for hosp f/u for this pt, thank you

## 2020-02-27 NOTE — PROGRESS NOTES
Progress Note - Pulmonary   Antonio Chavez 81 y o  male MRN: [id number]  Unit/Bed#: -01 Encounter: [id number]    Assessment:  Shortness of breath  Chronic hypoxemic and hypercapnic respiratory failure  Abnormal CT of the chest  Very severe COPD with acute exacerbation    Plan:  Patient continues to saturate well on his baseline 3 L  He uses trilogy noninvasive ventilation at home  CT of the chest again showing patient's severe emphysema with large bullae in the right lung, 1 containing a small amount of fluid   Nodular consolidation left lower lobe and mild consolidation dependent portion of right upper lobe   Patient was meeting SIRS criteria upon initial presentation  he has been receiving ceftriaxone and azithromycin while admitted  Patient will be discharged today with 3 days of Levaquin to complete a total course of 7 days of antibiotics for the pneumonia  Follow-up on sputum culture results  Patient with very severe COPD, FEV1 25% and DLCO 25%   In the future, will likely pursue endobronchial valve placement   He is not a good candidate for lung transplant  Transition to prednisone taper starting tomorrow with 40 mg tapering down to his baseline 10 mg daily  At baseline, patient takes azithromycin Monday Wednesday Friday  Manjit Cloud is unable to tolerate Daliresp     Continued outpatient follow-up with our office including follow-up imaging     Discussed with patient's wife at bedside, primary team    Subjective:   Feeling much better  Breathing is basically at baseline  There is no chest pain  The cough is significantly improved  Would like to go home  12 point review of systems otherwise negative  Objective:     Vitals: Blood pressure 121/58, pulse 72, temperature (!) 97 4 °F (36 3 °C), temperature source Axillary, resp  rate 17, height 5' 10" (1 778 m), weight 84 4 kg (186 lb), SpO2 98 %  ,Body mass index is 26 69 kg/m²        Intake/Output Summary (Last 24 hours) at 2/27/2020 [employer] filed at 2/27/2020 0900  Gross per 24 hour   Intake 650 ml   Output 950 ml   Net -300 ml       Invasive Devices     Peripheral Intravenous Line            Peripheral IV 02/24/20 Right Antecubital 2 days                Physical Exam:   General appearance: alert and oriented, in no acute distress  Head: Normocephalic, without obvious abnormality, atraumatic  Eyes: EOMI  No discharge bilaterally  No scleral icterus  Neck: supple, symmetrical, trachea midline  Lungs:  Respirations unlabored  Saturating well on baseline oxygen requirement  Improved air movement  No wheezing or rhonchi  Chronically decreased breath sounds at the apices  Heart: regular rate and rhythm, S1, S2 normal, no murmur, click, rub or gallop  Abdomen: soft, non-tender  Extremities: No edema or tenderness  Skin: No lesions or pallor noted  No rash  Neurologic: Grossly normal     Labs: I have personally reviewed pertinent lab results  Imaging and other studies: I have personally reviewed pertinent reports

## 2020-02-27 NOTE — ASSESSMENT & PLAN NOTE
POA  secondary to pneumonia   Although white cell count is likely in the setting of chronic steroid use    B Cx neg

## 2020-02-27 NOTE — DISCHARGE INSTRUCTIONS
Restart Zithromax Monday    La BPCO (broncopneumopatia cronica ostruttiva)   ASSISTENZA AMBULATORIALE:   La BPCO (broncopneumopatia cronica ostruttiva)  è penelope malattia polmonare debbie rende difficile la respirazione  Normalmente è il risultato di melissa polmonari causati da ifeanyi di irritazione e infiammazione  La BPCO limita il flusso di [place] Abdoulaye  Fumo, inquinamento, genetica o un'anamnesi di infezioni polmonari possono aumentare il rischio di BPCO  I sintomi comuni includono:   · Respiro affannoso     · Tosse secca     · Accessi di tosse debbie fanno risalire il muco indu polmoni  · Respiro sibilante e sensazione di costrizione al petto  Chiamare il 911 se:   · Si avverte un senso di stordimento, si ha il respiro affannoso e dolore al petto  Rivolgersi immediatamente a un medico in [address] condizioni:   · Si è confusi, si hanno le vertigini o ci si sente svenire  · Si sentono il braccio o la gamba caldi, sensibili e doloranti  L'aspetto può diventare gonfio e rose  · Si tossisce con espettorato contenente tracce di sangue  Chiamare il medico se:   · Si ha il respiro più affannoso del solito  · È necessario assumere più farmaco del solito per controllare i sintomi  · Si tossisce o si ha il respiro affannoso più del solito  · Si espettora più muco o muco di un colore diverso o con un odore diverso  · Si aumenta di peso di più di 3 libbre [place]  · Si ha la febbre, naso gocciolante o congestionato e mal di gola o altri sintomi del raffreddore o dell'influenza  · La pelle, le labbra o le unghie iniziano a diventare lora  · Si nota gonfiore caren gambe o caren caviglie  · Si è molto stanchi o deboli per [employer] giorno  · Si notano cambiamenti di umore o cambiamenti andie capacità di pensare o concentrarsi  · Si hanno domande o dubbi sulla propria patologia o sulla jose eduardo    Il trattamento dayron BPCO  può includere farmaci debbie aiutino a ridurre il gonfiore e l'infiammazione nei polmoni  I farmaci possono anche aiutare ad aprire le vie respiratorie o a curare l'infezione  Potrebbe essere necessaria la riabilitazione polmonare per aiutare a gestire i sintomi e migliorare la [employer]  Può essere necessaria la somministrazione di ossigeno per [facility]  Facilitare la respirazione:   · Usare la respirazione a labbra arricciate ogni volta debbie si ha la sensazione debbie manca il respiro  inalare profondamente attraverso il naso  Espirare lentamente attraverso la bocca con le labbra socchiuse per un tempo due volte superiore a quello impiegato per l'inalazione  Si può praticare questo metodo di respirazione anche mentre si eseguono piegamenti, sollevamenti, mentre si salgono le scale o si pratica esercizio fisico  Questo rallenta la respirazione e Lorre Pillion a far entrare e uscire più aria nei e indu polmoni  · Non fumare ed evitare altre persone debbie fumano  La nicotina e altre sostanze possono causare melissa o irritazione ai polmoni e rendere più difficile la respirazione  Non usare sigarette elettroniche o tabacco senza fumo  Anche questi prodotti contengono nicotina  Chiedere informazioni al medico se si fuma e si ha bisogno di aiuto per [employer]  Per assistenza e ulteriori informazioni:  [place]  gov  Phone: 1- 700 - [phone]  Web Address: www smokefree  gov      · Fare attenzione a, ad evitare, tutto ciò debbie peggiora i sintomi  Evitare le altitudini [employer] e i posti con elevata umidità  Rimanere in un luogo chiuso o coprirsi la bocca e il naso con penelope sciarpa quando si esce e fa freddo  Jermaine in [address],[place]  [address] presenza di amos larissa'aria sono elevati  Non usare bombolette spray come deodoranti, insetticidi e lacche per [facility]  [employer] BPCO e prevenzione dei peggioramenti:  BPCO è penelope condizione grave debbie letitia tempo peggiora   Il peggioramento significa debbie i sintomi dayron BPCO diventano improvvisamente più gravi  È importante prevenire i peggioramenti  Il peggioramento può causare ulteriori melissa ai polmoni  La BPCO non può essere curata, ma è possibile fare qualcosa per sentirsi meglio ed evitare il peggioramento:  · Proteggersi indu germi  I germi possono penetrare nei polmoni e causare penelope infezione  L'infezione ai polmoni può provocare penelope maggiore produzione di muco e rendere più difficile la respirazione  Un'infezione può provocare anche gonfiore caren vie respiratorie, impedendo l'ingresso dell'aria  Attenendosi a quanto riportato di seguito è possibile ridurre il rischio di infezioni:     ¨ Lavare spesso le elizabeth con acqua e sapone  Portare con sé un gel germicida da usare per [employer] quando non si può disporre di acqua e sapone  ¨ Non toccare gli occhi, il naso o la bocca senza prima lavarsi le elizabeth  ¨ Coprire sempre la bocca quando si tossisce  Tossire in un [facility] in [address] [place] non diffondere germi con le elizabeth  ¨ Cercare di [employer] persone debbie hanno il raffreddore o l'influenza  Se si è malati, stare il più possibile lontano dagli altri  · Evelyn più liquidi  Celia Bouquet a lubrificare le vie respiratorie e a tossire espellendo muco  Chiedere qual è la quantità di liquidi da assumere ogni giorno e quali sono i tipi di liquidi più adatti  · Fare esercizio fisico tutti i giorni:  Svolgere attività fisiche per [employer] 20 minuti al giorno per [place] ad [employer] proprie energie e a ridurre il respiro affannoso  stabilire con il proprio medico un programma di esercizio fisico idoneo  · Chiedere informazioni steve vaccini  Il medico può consigliare di fare il normale vaccino antinfluenzale o contro la polmonite  Per Jonne Gong persona debbie soffre di BPCO la polmonite può diventare letale  Informarsi sugli altri vaccini di demetrice si potrebbe avere bisogno   Chiedere al medico informazioni steve vaccini antinfluenzali e contro la polmonite  Tutti gli adulti dovrebbero vaccinarsi contro l'influenza ogni anno non appena il vaccino è disponibile  La vaccinazione contro la polmonite viene eseguita johansen persone adulte a partire indu 67 ifeanyi per prevenire le malattie pneumococciche, come la polmonite  Anche gli adulti di età compresa tra 19 e 64 ifeanyi ad elevato rischio di malattia pneumococcica dovrebbero sottoporsi a questa vaccinazione  Può essere necessario ripetere la vaccinazione dopo 1 o 5 ifeanyi  Riabilitazione polmonare:  il medico può consigliare un programma per favorire la gestione dei sintomi e migliorare la [employer]  Il programma può includere counseling nutrizionale e attività fisica, come passeggiate, per rafforzare i polmoni  [place] B[place]um circa le opzioni per un futuro trattamento:  chiedere informazioni sulla possibilità di redigere dichiarazioni di trattamento anticipate e testamenti biologici  Lithoniadenis Veloze documenti aiutano a decidere e indicare per [employer] proprie scelte relative al trattamento e caren cure di fine [address] [facility]  È consigliabile compilarli quando ci si sente bene e si può pensare con chiarezza a ciò debbie si desidera fare  Le informazioni contenute si possono poi conservare per un uso futuro in [employer] ricovero ospedaliero o di malattia grave  Presentarsi caren visite di controllo robert proprio medico, come consigliato:  potrebbe essere necessario sottoporsi ad ulteriori esami  Il medico può consigliare penelope visita con [employer]  Trascrivere eventuali domande in [employer] ricmary jo henna le visite  © 2017 [address] Information is for End User's use only and may not be sold, redistributed or otherwise used for commercial purposes  All illustrations and images included in CareNotes® are the copyrighted property of A D A M , Inc  or Harry Hernandez  The above information is an  only   It is not intended as medical advice for individual conditions or treatments  Talk to your doctor, nurse or pharmacist before following any medical regimen to see if it is safe and effective for you

## 2020-02-28 ENCOUNTER — TRANSITIONAL CARE MANAGEMENT (OUTPATIENT)
Dept: INTERNAL MEDICINE CLINIC | Facility: CLINIC | Age: 70
End: 2020-02-28

## 2020-02-28 NOTE — ASSESSMENT & PLAN NOTE
Urine strep and Legionella neg  Rocephin/Zithromax - per pulm can d/c on Levaquin to complete 7 day course  Procals neg x 2 - but CT chest appears to show PNA

## 2020-02-28 NOTE — DISCHARGE SUMMARY
Discharge- Chris Vega 1950, 81 y o  male MRN: [id number]    Unit/Bed#: -01 Encounter: [id number]    Primary Care Provider: Bryanna Chairez MD   Date and time admitted to hospital: 2/24/2020 12:44 PM        * CAP (community acquired pneumonia)  Assessment & Plan  Urine strep and Legionella neg  Rocephin/Zithromax - per pulm can d/c on Levaquin to complete 7 day course  Procals neg x 2 - but CT chest appears to show PNA    Acute exacerbation of chronic obstructive pulmonary disease (COPD) ([facility] [address] )  Assessment & Plan  IV steroids    Respiratory protocol  Pulm appreciated - weaned steroids daily and d/c today on taper with plan to continue 10 daily once taper completed  Likely 2/2 PNA    Sepsis (HCC)resolved as of 2/27/2020  Assessment & Plan  POA  secondary to pneumonia   Although white cell count is likely in the setting of chronic steroid use  B Cx neg          Discharging Physician / Practitioner: Mine Staley DO  PCP: Bryanna Chairez MD  Admission Date:   Admission Orders (From admission, onward)     Ordered        02/25/20 1135  Inpatient Admission  Once         02/24/20 1454  Place in Observation  Once                   Discharge Date: 02/27/20    Resolved Problems  Date Reviewed: 2/27/2020          Resolved    Sepsis ([facility] ) 2/27/2020     Resolved by  Mine Staley DO          Consultations During Hospital Stay:  · pulm     Procedures Performed:   · none    Significant Findings / Test Results:   · See above    Incidental Findings:   · none     Test Results Pending at Discharge (will require follow up):   · none     Outpatient Tests Requested:  · none    Complications:  none    Reason for Admission: COPD exac    Hospital Course:     Chris Vega is a 81 y o  male patient who originally presented to the hospital on 2/24/2020 due to copd exac 2/2 PNA  Tx w/ abx and steroids  D/c on prednisone taper and Levaquin          Please see above list of diagnoses and related plan for additional information  Condition at Discharge: stable     Discharge Day Visit / Exam:     Subjective:  No acute complaints  Vitals: Blood Pressure: 121/58 (02/27/20 0803)  Pulse: 72 (02/27/20 0803)  Temperature: (!) 97 4 °F (36 3 °C) (02/27/20 0803)  Temp Source: Axillary (02/27/20 0803)  Respirations: 17 (02/27/20 0803)  Height: 5' 10" (177 8 cm) (02/24/20 2111)  Weight - Scale: 84 4 kg (186 lb) (02/24/20 2111)  SpO2: 98 % (02/27/20 1101)  Exam:   Physical Exam   Constitutional: He is oriented to person, place, and time  No distress  HENT:   Head: Normocephalic and atraumatic  Eyes: Conjunctivae and EOM are normal    Neck: Normal range of motion  Neck supple  Cardiovascular: Normal rate and regular rhythm  Pulmonary/Chest: Effort normal and breath sounds normal  He has no wheezes  He has no rales  Abdominal: Soft  Bowel sounds are normal  He exhibits no distension  There is no tenderness  Musculoskeletal: Normal range of motion  He exhibits no edema  Neurological: He is alert and oriented to person, place, and time  Skin: Skin is warm and dry  He is not diaphoretic  Discussion with Family: yes    Discharge instructions/Information to patient and family:   See after visit summary for information provided to patient and family  Provisions for Follow-Up Care:  See after visit summary for information related to follow-up care and any pertinent home health orders  Disposition:     Home    For Discharges to [facility]:   · Not Applicable to this Patient - Not Applicable to this Patient    Planned Readmission: no     Discharge Statement:  I spent 35 minutes discharging the patient  This time was spent on the day of discharge  I had direct contact with the patient on the day of discharge   Greater than 50% of the total time was spent examining patient, answering all patient questions, arranging and discussing plan of care with patient as well as directly providing post-discharge instructions  Additional time then spent on discharge activities  Discharge Medications:  See after visit summary for reconciled discharge medications provided to patient and family        ** Please Note: This note has been constructed using a voice recognition system **

## 2020-02-28 NOTE — ASSESSMENT & PLAN NOTE
IV steroids    Respiratory protocol      Pulm appreciated - weaned steroids daily and d/c today on taper with plan to continue 10 daily once taper completed  Likely 2/2 PNA

## 2020-02-29 LAB — BACTERIA BLD CULT: NORMAL

## 2020-03-01 LAB — BACTERIA BLD CULT: NORMAL

## 2020-03-02 ENCOUNTER — OFFICE VISIT (OUTPATIENT)
Dept: INTERNAL MEDICINE CLINIC | Facility: CLINIC | Age: 70
End: 2020-03-02
Payer: MEDICARE

## 2020-03-02 VITALS
HEART RATE: 84 BPM | SYSTOLIC BLOOD PRESSURE: 122 MMHG | HEIGHT: 70 IN | OXYGEN SATURATION: 97 % | BODY MASS INDEX: 26.2 KG/M2 | RESPIRATION RATE: 16 BRPM | DIASTOLIC BLOOD PRESSURE: 80 MMHG | WEIGHT: 183 LBS

## 2020-03-02 DIAGNOSIS — J44.1 ACUTE EXACERBATION OF CHRONIC OBSTRUCTIVE PULMONARY DISEASE (COPD) (HCC): ICD-10-CM

## 2020-03-02 DIAGNOSIS — J18.9 COMMUNITY ACQUIRED PNEUMONIA, UNSPECIFIED LATERALITY: Primary | ICD-10-CM

## 2020-03-02 PROCEDURE — 99495 TRANSJ CARE MGMT MOD F2F 14D: CPT | Performed by: PHYSICIAN ASSISTANT

## 2020-03-02 NOTE — PATIENT INSTRUCTIONS
Finished antibiotic  Continue your prednisone taper as you understand  Restart your 10 mg prednisone daily after you finish your taper  Follow-up as scheduled, sooner as needed  No other medication changes

## 2020-03-02 NOTE — PROGRESS NOTES
Assessment/Plan:   Patient Instructions   Finished antibiotic  Continue your prednisone taper as you understand  Restart your 10 mg prednisone daily after you finish your taper  Follow-up as scheduled, sooner as needed  No other medication changes  Advised to stay on a crowds, frequent hand washing, avoid any sick people  Quality Measures:       Return in about 3 months (around 6/2/2020) for Next scheduled follow up-Dr Troy Galvin  Diagnoses and all orders for this visit:    Community acquired pneumonia, unspecified laterality    Acute exacerbation of chronic obstructive pulmonary disease (COPD) ([facility] [address] )          Subjective:      Patient ID: Pete Rai is a 81 y o  male  Hospital follow-up/RON[facility]    61-year-old white male with known severe oxygen dependence COPD, was hospitalized at [facility] from 02/24/2020 through 02/27/2020 due to an acute community-acquired pneumonia with CT exam showing right upper lobe and left lower lobe disease  Also noted severe emphysema  Patient was discharged to finish antibiotics and a steroid taper  Hospital records have been reviewed  At this time patient states he feels 200 percent better  His breathing is back to baseline, he is able to walk with use of his oxygen without feeling severely short of breath  He has not noted any significant wheezing  No chest pain or palpitations  Blood pressure remains well controlled  Medication list reviewed with patient, clarified      TCM Call (since 1/31/2020)     Date and time call was made  2/28/2020  3:25 PM    Hospital care reviewed  Records reviewed    Patient was hospitialized at  [facility]    Date of Admission  02/24/20    Date of discharge  02/27/20    Diagnosis  pneumonia    Disposition  Home    Were the patients medications reviewed and updated  No    Current Symptoms  None      TCM Call (since 1/31/2020)     Post hospital issues  None    Should patient be enrolled in antico monitoring? No    Scheduled for follow up?   Yes    Did you obtain your prescribed medications  Yes    Do you need help managing your prescriptions or medications  No    Is transportation to your appointment needed  No    I have advised the patient to call PCP with any new or worsening symptoms    liam rome    Living Arrangements  Family members; Spouse or Significiant other    Support System  Spouse    The type of support provided  Emotional; Financial; Physical    Do you have social support  Yes, as much as I need    Are you recieving any outpatient services  No    Are you recieving home care services  No    Are you using any community resources  No    Current waiver services  No    Have you fallen in the last 12 months  No    Interperter language line needed  No    Counseling  Family            ALLERGIES:  Allergies   Allergen Reactions    Codeine Dizziness    Penicillins Hives     Passed out       CURRENT MEDICATIONS:    Current Outpatient Medications:     BREO ELLIPTA 100-25 MCG/INH inhaler, inhale 1 puff by mouth and INTO THE LUNGS once daily Rinse mouth after use, Disp: 1 Inhaler, Rfl: 5    cholestyramine (QUESTRAN) 4 g packet, , Disp: , Rfl:     ergocalciferol (VITAMIN D2) 50,000 units, Take 1 capsule (50,000 Units total) by mouth once a week, Disp: 4 capsule, Rfl: 5    fluticasone (FLONASE) 50 mcg/act nasal spray, instill 1 spray into each nostril once daily, Disp: 16 g, Rfl: 3    guaiFENesin (MUCINEX) 600 mg 12 hr tablet, Take 1 tablet (600 mg total) by mouth every 12 (twelve) hours, Disp: 60 tablet, Rfl: 0    ipratropium (ATROVENT) 0 02 % nebulizer solution, Take 1 vial (0 5 mg total) by nebulization every 6 (six) hours as needed for wheezing or shortness of breath, Disp: 120 vial, Rfl: 3    levalbuterol (XOPENEX HFA) 45 mcg/act inhaler, Inhale 1-2 puffs every 4 (four) hours as needed for wheezing or shortness of breath, Disp: 1 Inhaler, Rfl: 3    levalbuterol (XOPENEX) 1 25 mg/3 mL nebulizer solution, Take 1 vial (1 25 mg total) by nebulization every 6 (six) hours as needed for wheezing or shortness of breath, Disp: 120 vial, Rfl: 3    levofloxacin (LEVAQUIN) 750 mg tablet, Take 1 tablet (750 mg total) by mouth every 24 hours for 3 days, Disp: 3 tablet, Rfl: 0    loratadine (CLARITIN) 10 mg tablet, Take 10 mg by mouth daily, Disp: , Rfl:     predniSONE 10 mg tablet, Take 4 tablets (40 mg total) by mouth daily for 3 days, THEN 3 tablets (30 mg total) daily for 3 days, THEN 2 tablets (20 mg total) daily for 3 days  , Disp: 27 tablet, Rfl: 0    sertraline (ZOLOFT) 50 mg tablet, take 2 tablets by mouth once daily, Disp: 60 tablet, Rfl: 5    spironolactone (ALDACTONE) 25 mg tablet, Take 1 tablet (25 mg total) by mouth daily, Disp: 90 tablet, Rfl: 3    tamsulosin (FLOMAX) 0 4 mg, Take 1 capsule (0 4 mg total) by mouth daily with dinner, Disp: 30 capsule, Rfl: 5    azithromycin (ZITHROMAX) 250 mg tablet, TAKE 1 TABLET BY MOUTH  MONDAY,WEDNESDAY AND FRIDAY (Patient not taking: Reported on 3/2/2020), Disp: 12 tablet, Rfl: 3    benzonatate (TESSALON PERLES) 100 mg capsule, Take 1 capsule (100 mg total) by mouth 3 (three) times a day as needed for cough (Patient not taking: Reported on 3/2/2020), Disp: 90 capsule, Rfl: 1    montelukast (SINGULAIR) 10 mg tablet, take 1 tablet by mouth at bedtime (Patient not taking: Reported on 3/2/2020), Disp: 30 tablet, Rfl: 3    predniSONE 10 mg tablet, Take 1 tablet (10 mg total) by mouth daily (Patient not taking: Reported on 3/2/2020), Disp: 30 tablet, Rfl: 1    ranitidine (ZANTAC) 150 mg tablet, take 1 tablet by mouth at bedtime (Patient not taking: Reported on 2/17/2020), Disp: 30 tablet, Rfl: 5    ACTIVE PROBLEM LIST:  Patient Active Problem List   Diagnosis    Pulmonary emphysema ([facility] [address] )    Gastroesophageal reflux disease without esophagitis    Anxiety    Simple chronic bronchitis (HCC)    Shortness of breath on exertion    Abnormal ECG    PVCs (premature ventricular contractions)    Allergic rhinitis    Functional diarrhea    Diastolic dysfunction    Pulmonary hypertension (HCC)    Acute exacerbation of chronic obstructive pulmonary disease (COPD) (HCC)    Chronic diastolic heart failure (HCC)    History of colon polyps    Renal mass, right    (HFpEF) heart failure with preserved ejection fraction (HCC)    Scalp lesion    Skin lesion    CAP (community acquired pneumonia)       PAST MEDICAL HISTORY:  Past Medical History:   Diagnosis Date    Allergic rhinitis     BiPAP (biphasic positive airway pressure) dependence     Centrilobular emphysema (HCC)     COPD (chronic obstructive pulmonary disease) (HCC)     Enlarged prostate     GERD (gastroesophageal reflux disease)     HL (hearing loss)     Hypoglycemia     On home oxygen therapy     Pneumonia     Pulmonary hypertension (HCC)     Respiratory failure (HCC)     SOB (shortness of breath)     Stroke (HCC)     Mini       PAST SURGICAL HISTORY:  Past Surgical History:   Procedure Laterality Date    CATARACT EXTRACTION      CHOLECYSTECTOMY      hernia    EYE SURGERY      MASS EXCISION N/A 7/26/2019    Procedure: BACK SKIN LESION EXCISION BIOPSY;  Surgeon: Anant Graff MD;  Location: [facility];  Service: General    GA ESOPHAGOGASTRODUODENOSCOPY TRANSORAL DIAGNOSTIC N/A 9/19/2018    Procedure: ESOPHAGOGASTRODUODENOSCOPY (EGD); Surgeon: Jose Hunter MD;  Location: [facility];   Service: Gastroenterology    GA EXC SKIN BENIG 2 1-3 CM REMAINDR BODY N/A 7/26/2019    Procedure: SCALP LESION EXCISION BIOPSY;  Surgeon: Anant Graff MD;  [facility];  Service: General    GA REPAIR ING HERNIA,5+Y/O,REDUCIBL Left 5/23/2018    Procedure: OPEN INGUINAL HERNIA REPAIR WITH MESH;  Surgeon: Anant Graff MD;  [facility]: [facility];  Service: General       FAMILY HISTORY:  Family History   Problem Relation Age of Onset    Diabetes Mother     Hypertension Mother    Nohelia Nicole Hyperlipidemia Mother        SOCIAL HISTORY:  Social History     Socioeconomic History    Marital status: /Civil Union     Spouse name: janet     Number of children: 5    Years of education: Not on file    Highest education level: Not on file   Occupational History    Occupation: retired    Social Needs    Financial resource strain: Not on file    Food insecurity:     Worry: Not on file     Inability: Not on file   [employer] needs:     Medical: Not on file     Non-medical: Not on file   Tobacco Use    Smoking status: Former Smoker     Packs/day: 0 00     Years: 50 00     Pack years: 0 00     Types: Cigarettes     Last attempt to quit: 2013     Years since quittin 0    Smokeless tobacco: Never Used   Substance and Sexual Activity    Alcohol use: Yes     Alcohol/week: 1 0 standard drinks     Types: 1 Glasses of wine per week     Frequency: Monthly or less     Comment: socialy    Drug use: Never    Sexual activity: Never   Lifestyle    Physical activity:     Days per week: Not on file     Minutes per session: Not on file    Stress: Not on file   Relationships    Social connections:     Talks on phone: Not on file     Gets together: Not on file     Attends Anabaptist service: Not on file     Active member of club or organization: Not on file     Attends meetings of clubs or organizations: Not on file     Relationship status: Not on file    Intimate partner violence:     Fear of current or ex partner: Not on file     Emotionally abused: Not on file     Physically abused: Not on file     Forced sexual activity: Not on file   Other Topics Concern    Not on file   Social History Narrative        Retired       Review of Systems   Constitutional: Negative for activity change, chills, fatigue and fever  HENT: Negative for congestion  Eyes: Negative for discharge  Respiratory: Positive for shortness of breath  Negative for cough, chest tightness and wheezing  Cardiovascular: Negative for chest pain, palpitations and leg swelling  Gastrointestinal: Negative for abdominal pain, diarrhea, nausea and vomiting  Endocrine: Negative for polydipsia, polyphagia and polyuria  Genitourinary: Negative for difficulty urinating  Musculoskeletal: Negative for arthralgias and myalgias  Skin: Negative for rash  Allergic/Immunologic: Negative for immunocompromised state  Neurological: Negative for dizziness, syncope, weakness, light-headedness and headaches  Hematological: Negative for adenopathy  Does not bruise/bleed easily  Psychiatric/Behavioral: Negative for dysphoric mood and sleep disturbance  The patient is not nervous/anxious  Objective:  Vitals:    03/02/20 1420   BP: 122/80   BP Location: Left arm   Patient Position: Sitting   Cuff Size: Standard   Pulse: 84   Resp: 16   SpO2: 97%   Weight: 83 kg (183 lb)   Height: 5' 10" (1 778 m)     Body mass index is 26 26 kg/m²  Physical Exam   Constitutional: He is oriented to person, place, and time  He appears well-developed and well-nourished  No distress  Thin, somewhat chronically ill-appearing male   HENT:   Head: Normocephalic and atraumatic  Neck: Neck supple  No JVD present  Cardiovascular: Normal rate, regular rhythm and normal heart sounds  Distant heart tones but regular   Pulmonary/Chest: Effort normal  No respiratory distress  He has no wheezes  He exhibits no tenderness  Decreased breath sounds throughout, more on the right in the upper base region  Better aeration in the left lung fields  Prolonged expiratory phase  Increased AP diameter of chest    Musculoskeletal: He exhibits no edema  Lymphadenopathy:     He has no cervical adenopathy  Neurological: He is alert and oriented to person, place, and time  Skin: Skin is warm and dry  No rash noted  Psychiatric: He has a normal mood and affect  His behavior is normal    Nursing note and vitals reviewed  RESULTS:    Recent Results (from the past 1008 hour(s))   ECG 12 lead    Collection Time: 02/24/20 12:06 PM   Result Value Ref Range    Ventricular Rate 98 BPM    Atrial Rate 98 BPM    CO Interval 160 ms    QRSD Interval 106 ms    QT Interval 356 ms    QTC Interval 454 ms    P Axis 79 degrees    QRS Axis 133 degrees    T Wave Axis 69 degrees   CBC and differential    Collection Time: 02/24/20  1:28 PM   Result Value Ref Range    WBC 14 92 (H) 4 31 - 10 16 Thousand/uL    RBC 4 90 3 88 - 5 62 Million/uL    Hemoglobin 14 4 12 0 - 17 0 g/dL    Hematocrit 45 5 36 5 - 49 3 %    MCV 93 82 - 98 fL    MCH 29 4 26 8 - 34 3 pg    MCHC 31 6 31 4 - 37 4 g/dL    RDW 13 8 11 6 - 15 1 %    MPV 9 4 8 9 - 12 7 fL    Platelets 845 769 - 078 Thousands/uL    nRBC 0 /100 WBCs    Neutrophils Relative 71 43 - 75 %    Immat GRANS % 1 0 - 2 %    Lymphocytes Relative 15 14 - 44 %    Monocytes Relative 13 (H) 4 - 12 %    Eosinophils Relative 0 0 - 6 %    Basophils Relative 0 0 - 1 %    Neutrophils Absolute 10 65 (H) 1 85 - 7 62 Thousands/µL    Immature Grans Absolute 0 11 0 00 - 0 20 Thousand/uL    Lymphocytes Absolute 2 16 0 60 - 4 47 Thousands/µL    Monocytes Absolute 1 96 (H) 0 17 - 1 22 Thousand/µL    Eosinophils Absolute 0 01 0 00 - 0 61 Thousand/µL    Basophils Absolute 0 03 0 00 - 0 10 Thousands/µL   Comprehensive metabolic panel    Collection Time: 02/24/20  1:28 PM   Result Value Ref Range    Sodium 138 136 - 145 mmol/L    Potassium 4 0 3 5 - 5 3 mmol/L    Chloride 98 (L) 100 - 108 mmol/L    CO2 29 21 - 32 mmol/L    ANION GAP 11 4 - 13 mmol/L    BUN 21 5 - 25 mg/dL    Creatinine 0 77 0 60 - 1 30 mg/dL    Glucose 95 65 - 140 mg/dL    Calcium 8 8 8 3 - 10 1 mg/dL    AST 23 5 - 45 U/L    ALT 26 12 - 78 U/L    Alkaline Phosphatase 62 46 - 116 U/L    Total Protein 8 0 6 4 - 8 2 g/dL    Albumin 3 6 3 5 - 5 0 g/dL    Total Bilirubin 2 20 (H) 0 20 - 1 00 mg/dL    eGFR 94 ml/min/1 73sq m   Troponin I    Collection Time: 02/24/20  1:28 PM Result Value Ref Range    Troponin I <0 02 <=0 04 ng/mL   Influenza A/B and RSV PCR    Collection Time: 02/24/20  1:56 PM   Result Value Ref Range    INFLUENZA A PCR None Detected None Detected    INFLUENZA B PCR None Detected None Detected    RSV PCR None Detected None Detected   NT-BNP PRO    Collection Time: 02/24/20  1:59 PM   Result Value Ref Range    NT-proBNP 126 (H) <125 pg/mL   Lactic acid, plasma    Collection Time: 02/24/20  1:59 PM   Result Value Ref Range    LACTIC ACID 0 9 0 5 - 2 0 mmol/L   Blood culture    Collection Time: 02/24/20  5:12 PM   Result Value Ref Range    Blood Culture No Growth After 5 Days      Procalcitonin    Collection Time: 02/24/20  5:12 PM   Result Value Ref Range    Procalcitonin 0 16 <=0 25 ng/ml   UA w Reflex to Microscopic w Reflex to Culture    Collection Time: 02/24/20  6:02 PM   Result Value Ref Range    Color, UA Yellow     Clarity, UA Clear     Specific Gravity, UA 1 020 1 003 - 1 030    pH, UA 6 0 4 5, 5 0, 5 5, 6 0, 6 5, 7 0, 7 5, 8 0    Leukocytes, UA Negative Negative    Nitrite, UA Negative Negative    Protein, UA Trace (A) Negative mg/dl    Glucose,  (1/10%) (A) Negative mg/dl    Ketones, UA Trace (A) Negative mg/dl    Urobilinogen, UA 0 2 0 2, 1 0 E U /dl E U /dl    Bilirubin, UA Negative Negative    Blood, UA Negative Negative   Strep Pneumoniae, Urine    Collection Time: 02/24/20  6:02 PM   Result Value Ref Range    Strep pneumoniae antigen, urine Negative Negative   Legionella antigen, urine    Collection Time: 02/24/20  6:02 PM   Result Value Ref Range    Legionella Urinary Antigen Negative Negative   Urine Microscopic    Collection Time: 02/24/20  6:02 PM   Result Value Ref Range    RBC, UA None Seen None Seen, 0-5 /hpf    WBC, UA None Seen None Seen, 0-5, 5-55, 5-65 /hpf    Epithelial Cells Occasional None Seen, Occasional /hpf    Bacteria, UA Occasional None Seen, Occasional /hpf   Blood culture    Collection Time: 02/25/20  5:43 AM   Result Value Ref Range    Blood Culture No Growth After 5 Days      Procalcitonin AM Draw    Collection Time: 02/25/20  5:43 AM   Result Value Ref Range    Procalcitonin 0 09 <=0 25 ng/ml   Comprehensive metabolic panel    Collection Time: 02/25/20  5:43 AM   Result Value Ref Range    Sodium 137 136 - 145 mmol/L    Potassium 4 1 3 5 - 5 3 mmol/L    Chloride 99 (L) 100 - 108 mmol/L    CO2 28 21 - 32 mmol/L    ANION GAP 10 4 - 13 mmol/L    BUN 26 (H) 5 - 25 mg/dL    Creatinine 0 72 0 60 - 1 30 mg/dL    Glucose 140 65 - 140 mg/dL    Glucose, Fasting 140 (H) 65 - 99 mg/dL    Calcium 8 8 8 3 - 10 1 mg/dL    AST 18 5 - 45 U/L    ALT 25 12 - 78 U/L    Alkaline Phosphatase 54 46 - 116 U/L    Total Protein 7 7 6 4 - 8 2 g/dL    Albumin 3 1 (L) 3 5 - 5 0 g/dL    Total Bilirubin 1 40 (H) 0 20 - 1 00 mg/dL    eGFR 97 ml/min/1 73sq m   CBC (With Platelets)    Collection Time: 02/25/20  5:43 AM   Result Value Ref Range    WBC 12 28 (H) 4 31 - 10 16 Thousand/uL    RBC 4 54 3 88 - 5 62 Million/uL    Hemoglobin 13 5 12 0 - 17 0 g/dL    Hematocrit 42 2 36 5 - 49 3 %    MCV 93 82 - 98 fL    MCH 29 7 26 8 - 34 3 pg    MCHC 32 0 31 4 - 37 4 g/dL    RDW 13 6 11 6 - 15 1 %    Platelets 989 819 - 302 Thousands/uL    MPV 9 6 8 9 - 12 7 fL   Procalcitonin    Collection Time: 02/26/20  5:17 AM   Result Value Ref Range    Procalcitonin <0 05 <=0 25 ng/ml   CBC    Collection Time: 02/26/20  5:17 AM   Result Value Ref Range    WBC 16 14 (H) 4 31 - 10 16 Thousand/uL    RBC 4 20 3 88 - 5 62 Million/uL    Hemoglobin 12 6 12 0 - 17 0 g/dL    Hematocrit 39 0 36 5 - 49 3 %    MCV 93 82 - 98 fL    MCH 30 0 26 8 - 34 3 pg    MCHC 32 3 31 4 - 37 4 g/dL    RDW 13 5 11 6 - 15 1 %    Platelets 170 896 - 608 Thousands/uL    MPV 10 0 8 9 - 12 7 fL   Basic metabolic panel    Collection Time: 02/26/20  5:17 AM   Result Value Ref Range    Sodium 140 136 - 145 mmol/L    Potassium 4 2 3 5 - 5 3 mmol/L    Chloride 103 100 - 108 mmol/L    CO2 30 21 - 32 mmol/L    ANION GAP 7 4 - 13 mmol/L    BUN 26 (H) 5 - 25 mg/dL    Creatinine 0 67 0 60 - 1 30 mg/dL    Glucose 135 65 - 140 mg/dL    Calcium 8 5 8 3 - 10 1 mg/dL    eGFR 99 ml/min/1 73sq m   Magnesium    Collection Time: 02/26/20  5:17 AM   Result Value Ref Range    Magnesium 2 5 1 6 - 2 6 mg/dL   Phosphorus    Collection Time: 02/26/20  5:17 AM   Result Value Ref Range    Phosphorus 2 9 2 3 - 4 1 mg/dL       This note was created with voice recognition software  Phonic, grammatical and spelling errors may be present within the note as a result

## 2020-03-09 ENCOUNTER — PROCEDURE VISIT (OUTPATIENT)
Dept: UROLOGY | Facility: CLINIC | Age: 70
End: 2020-03-09
Payer: MEDICARE

## 2020-03-09 ENCOUNTER — TELEPHONE (OUTPATIENT)
Dept: GASTROENTEROLOGY | Facility: CLINIC | Age: 70
End: 2020-03-09

## 2020-03-09 VITALS
SYSTOLIC BLOOD PRESSURE: 110 MMHG | HEIGHT: 70 IN | HEART RATE: 62 BPM | DIASTOLIC BLOOD PRESSURE: 68 MMHG | BODY MASS INDEX: 26.2 KG/M2 | WEIGHT: 183 LBS

## 2020-03-09 DIAGNOSIS — N28.1 RENAL CYST: ICD-10-CM

## 2020-03-09 DIAGNOSIS — R35.0 URINARY FREQUENCY: Primary | ICD-10-CM

## 2020-03-09 DIAGNOSIS — N28.89 RENAL MASS, RIGHT: ICD-10-CM

## 2020-03-09 DIAGNOSIS — R39.11 URINARY HESITANCY: ICD-10-CM

## 2020-03-09 LAB
SL AMB  POCT GLUCOSE, UA: NORMAL
SL AMB LEUKOCYTE ESTERASE,UA: NORMAL
SL AMB POCT BLOOD,UA: NORMAL
SL AMB POCT CLARITY,UA: CLEAR
SL AMB POCT COLOR,UA: YELLOW
SL AMB POCT KETONES,UA: NORMAL
SL AMB POCT NITRITE,UA: NORMAL
SL AMB POCT PH,UA: 5
SL AMB POCT SPECIFIC GRAVITY,UA: 1.02
SL AMB POCT URINE PROTEIN: NORMAL

## 2020-03-09 PROCEDURE — 1160F RVW MEDS BY RX/DR IN RCRD: CPT | Performed by: UROLOGY

## 2020-03-09 PROCEDURE — 81002 URINALYSIS NONAUTO W/O SCOPE: CPT | Performed by: UROLOGY

## 2020-03-09 PROCEDURE — 99214 OFFICE O/P EST MOD 30 MIN: CPT | Performed by: UROLOGY

## 2020-03-09 PROCEDURE — 52000 CYSTOURETHROSCOPY: CPT | Performed by: UROLOGY

## 2020-03-09 PROCEDURE — 76872 US TRANSRECTAL: CPT | Performed by: UROLOGY

## 2020-03-09 PROCEDURE — 4040F PNEUMOC VAC/ADMIN/RCVD: CPT | Performed by: UROLOGY

## 2020-03-09 PROCEDURE — 1111F DSCHRG MED/CURRENT MED MERGE: CPT | Performed by: UROLOGY

## 2020-03-09 PROCEDURE — 3008F BODY MASS INDEX DOCD: CPT | Performed by: UROLOGY

## 2020-03-09 PROCEDURE — 1036F TOBACCO NON-USER: CPT | Performed by: UROLOGY

## 2020-03-09 RX ORDER — FINASTERIDE 5 MG/1
5 TABLET, FILM COATED ORAL DAILY
Qty: 30 TABLET | Refills: 6 | Status: SHIPPED | OUTPATIENT
Start: 2020-03-09 | End: 2020-05-12 | Stop reason: SDUPTHER

## 2020-03-09 RX ORDER — TAMSULOSIN HYDROCHLORIDE 0.4 MG/1
0.4 CAPSULE ORAL 2 TIMES DAILY
Qty: 60 CAPSULE | Refills: 6 | Status: SHIPPED | OUTPATIENT
Start: 2020-03-09 | End: 2020-06-24 | Stop reason: SDUPTHER

## 2020-03-09 NOTE — TELEPHONE ENCOUNTER
Wife came in for paperwork, patient was just d/c from hospital with horrible copd and pneumonia, patient is going to see pulm next Tuesday and will be having some sort of lung procedure in [facility] in 3 months per wife  I advised her we could not schedule the colonoscopy until his lungs are cleared and pulm clears him  I will call her in 3 months to touch base

## 2020-03-09 NOTE — PROGRESS NOTES
UROLOGY PROCEDURE NOTE     CHIEF COMPLAINT   Regla Dorman is a 81 y o  male with a complaint of   Chief Complaint   Patient presents with    Cystoscopy    Trus       History of Present Illness:     81 y o  male  Seen in February with complaints of urinary frequency  Patient has a history Flomax use without benefit  Patient was seen initially in December of 2019  AUA symptom score the time was 24 with a bother score of 5  Patient reported incomplete emptying, urinary frequency, urgency and occasional urge based incontinence  Was scheduled for cystoscopy and transrectal ultrasound for option of surgical management  Patient missed a prior appointment in January  Presents today for cystoscopy and transrectal ultrasound  Lab Results   Component Value Date    PSA 1 6 11/06/2019       Patient presents for the appointment today with his wife  Patient is oxygen dependent  Patient reports that he and his wife are no longer sexually active  He has been using the Flomax once at dinner and has had some benefit although does not feel complete resolution of his symptoms        Past Medical History:     Past Medical History:   Diagnosis Date    Allergic rhinitis     BiPAP (biphasic positive airway pressure) dependence     Centrilobular emphysema (HCC)     COPD (chronic obstructive pulmonary disease) (HCC)     Enlarged prostate     GERD (gastroesophageal reflux disease)     HL (hearing loss)     Hypoglycemia     On home oxygen therapy     Pneumonia     Pulmonary hypertension (HCC)     Respiratory failure (HCC)     SOB (shortness of breath)     Stroke (HCC)     Mini       PAST SURGICAL HISTORY:     Past Surgical History:   Procedure Laterality Date    CATARACT EXTRACTION      CHOLECYSTECTOMY      hernia    EYE SURGERY      MASS EXCISION N/A 7/26/2019    Procedure: BACK SKIN LESION EXCISION BIOPSY;  Surgeon: Allison Malone MD;  Location: [facility];  Service: General    SD ESOPHAGOGASTRODUODENOSCOPY TRANSORAL DIAGNOSTIC N/A 9/19/2018    Procedure: ESOPHAGOGASTRODUODENOSCOPY (EGD); Surgeon: Carmelina Villa MD;  Location: [facility];   Service: Gastroenterology    IN EXC SKIN BENIG 2 1-3 CM REMAINDR BODY N/A 7/26/2019    Procedure: SCALP LESION EXCISION BIOPSY;  Surgeon: Archie Caldera MD;  [facility]: [facility];  Service: General    IN REPAIR ING HERNIA,5+Y/O,REDUCIBL Left 5/23/2018    Procedure: OPEN INGUINAL HERNIA REPAIR WITH MESH;  Surgeon: Archie Caldera MD;  Location: [facility];  Service: General       CURRENT MEDICATIONS:     Current Outpatient Medications   Medication Sig Dispense Refill    BREO ELLIPTA 100-25 MCG/INH inhaler inhale 1 puff by mouth and INTO THE LUNGS once daily Rinse mouth after use 1 Inhaler 5    cholestyramine (QUESTRAN) 4 g packet       ergocalciferol (VITAMIN D2) 50,000 units Take 1 capsule (50,000 Units total) by mouth once a week 4 capsule 5    fluticasone (FLONASE) 50 mcg/act nasal spray instill 1 spray into each nostril once daily 16 g 3    guaiFENesin (MUCINEX) 600 mg 12 hr tablet Take 1 tablet (600 mg total) by mouth every 12 (twelve) hours 60 tablet 0    ipratropium (ATROVENT) 0 02 % nebulizer solution Take 1 vial (0 5 mg total) by nebulization every 6 (six) hours as needed for wheezing or shortness of breath 120 vial 3    levalbuterol (XOPENEX HFA) 45 mcg/act inhaler Inhale 1-2 puffs every 4 (four) hours as needed for wheezing or shortness of breath 1 Inhaler 3    levalbuterol (XOPENEX) 1 25 mg/3 mL nebulizer solution Take 1 vial (1 25 mg total) by nebulization every 6 (six) hours as needed for wheezing or shortness of breath 120 vial 3    sertraline (ZOLOFT) 50 mg tablet take 2 tablets by mouth once daily 60 tablet 5    tamsulosin (FLOMAX) 0 4 mg Take 1 capsule (0 4 mg total) by mouth 2 (two) times a day 60 capsule 6    azithromycin (ZITHROMAX) 250 mg tablet TAKE 1 TABLET BY MOUTH  MONDAY,WEDNESDAY AND FRIDAY (Patient not taking: Reported on 3/2/2020) 12 tablet 3    benzonatate (TESSALON PERLES) 100 mg capsule Take 1 capsule (100 mg total) by mouth 3 (three) times a day as needed for cough (Patient not taking: Reported on 3/2/2020) 90 capsule 1    finasteride (PROSCAR) 5 mg tablet Take 1 tablet (5 mg total) by mouth daily for 30 doses 30 tablet 6    loratadine (CLARITIN) 10 mg tablet Take 10 mg by mouth daily      montelukast (SINGULAIR) 10 mg tablet take 1 tablet by mouth at bedtime (Patient not taking: Reported on 3/2/2020) 30 tablet 3    predniSONE 10 mg tablet Take 1 tablet (10 mg total) by mouth daily (Patient not taking: Reported on 3/2/2020) 30 tablet 1    ranitidine (ZANTAC) 150 mg tablet take 1 tablet by mouth at bedtime (Patient not taking: Reported on 2020) 30 tablet 5    spironolactone (ALDACTONE) 25 mg tablet Take 1 tablet (25 mg total) by mouth daily 90 tablet 3     No current facility-administered medications for this visit  ALLERGIES:     Allergies   Allergen Reactions    Codeine Dizziness    Penicillins Hives     Passed out       SOCIAL HISTORY:     Social History     Socioeconomic History    Marital status: /Civil Union     Spouse name: janet     Number of children: 11    Years of education: None    Highest education level: None   Occupational History    Occupation: retired    Social Needs    Financial resource strain: None    Food insecurity:     Worry: None     Inability: None    Transportation needs:     Medical: None     Non-medical: None   Tobacco Use    Smoking status: Former Smoker     Packs/day: 0 00     Years: 50 00     Pack years: 0 00     Types: Cigarettes     Last attempt to quit: 2013     Years since quittin 0    Smokeless tobacco: Never Used   Substance and Sexual Activity    Alcohol use:  Yes     Alcohol/week: 1 0 standard drinks     Types: 1 Glasses of wine per week     Frequency: Monthly or less     Comment: socialy    Drug use: Never    Sexual activity: Never   Lifestyle    Physical activity:     Days per week: None     Minutes per session: None    Stress: None   Relationships    Social connections:     Talks on phone: None     Gets together: None     Attends [employer] service: None     Active member of club or organization: None     Attends meetings of clubs or organizations: None     Relationship status: None    Intimate partner violence:     Fear of current or ex partner: None     Emotionally abused: None     Physically abused: None     Forced sexual activity: None   Other Topics Concern    None   Social History Narrative        Retired       SOCIAL HISTORY:     Family History   Problem Relation Age of Onset    Diabetes Mother     Hypertension Mother     Hyperlipidemia Mother        REVIEW OF SYSTEMS:     Review of Systems   Constitutional: Negative  Respiratory: Positive for shortness of breath  Cardiovascular: Negative  Negative for leg swelling  Gastrointestinal: Negative  Genitourinary: Positive for difficulty urinating, enuresis, frequency and urgency  Musculoskeletal: Negative  Skin: Negative  Psychiatric/Behavioral: Negative  PHYSICAL EXAM:     /68   Pulse 62   Ht 5' 10" (1 778 m)   Wt 83 kg (183 lb)   BMI 26 26 kg/m²     General:    Middle-age male, appears older than stated age  Nasal cannula oxygen in place  They have a normal affect  There is not appear to be any gross neurologic defects or abnormalities  HEENT:  Normocephalic, atraumatic  Neck is supple without any palpable lymphadenopathy  Cardiovascular:  Patient has normal palpable distal radial pulses  There is no significant peripheral edema  No JVD is noted  Respiratory:  Patient has labored respirations   With nasal cannula oxygen  There is no audible wheeze or rhonchi  Abdomen:  Abdomen is soft and nontender  There is no tympany  Inguinal and umbilical hernia are not appreciated      Genitourinary:  Uncircumcised phallus, reducible foreskin, orthotopic meatus, testicles smooth and descended, rectal exam is 30 g and smooth without nodules    Musculoskeletal:  Patient does not have significant CVA tenderness in the  flank with palpation or percussion  They full range of motion in all 4 extremities  Strength in all 4 extremities appears congruent  Patient is able to ambulate without assistance or difficulty  Dermatologic:  Patient has no skin abnormalities or rashes  LABS:     CBC:   Lab Results   Component Value Date    WBC 16 14 (H) 02/26/2020    HGB 12 6 02/26/2020    HCT 39 0 02/26/2020    MCV 93 02/26/2020     02/26/2020       BMP:   Lab Results   Component Value Date    CALCIUM 8 5 02/26/2020    K 4 2 02/26/2020    CO2 30 02/26/2020     02/26/2020    BUN 26 (H) 02/26/2020    CREATININE 0 67 02/26/2020       IMAGING:     3/26/19  CT ABDOMEN AND PELVIS WITH IV CONTRAST     INDICATION:   [id number] 89: Other specified disorders of kidney and ureter      COMPARISON:  February 26, 2019 previous CT from March 20, 2018     TECHNIQUE:  CT examination of the abdomen and pelvis was performed  Axial, sagittal, and coronal 2D reformatted images were created from the source data and submitted for interpretation      Radiation dose length product (DLP) for this visit:  474 mGy-cm   This examination, like all CT scans performed in the [facility], was performed utilizing techniques to minimize radiation dose exposure, including the use of iterative   reconstruction and automated exposure control      IV Contrast:  100 mL of iohexol (OMNIPAQUE)  Enteric Contrast:  Enteric contrast was not administered      FINDINGS: Only portal venous phase images are obtained    A dedicated renal mass protocol has not been performed which limits the characterization of the renal lesions     ABDOMEN     LOWER CHEST:  No clinically significant abnormality identified in the visualized lower chest      LIVER/BILIARY TREE: Unremarkable      GALLBLADDER:  Is surgically absent     SPLEEN:  Unremarkable      PANCREAS:  Unremarkable      ADRENAL GLANDS:  Mild nodularity in the left adrenal gland, stable     KIDNEYS/URETERS:  A cyst is seen in the upper pole of the right kidney which measures about 1 6 cm  Minimal nodularity is seen within this cyst   This has remained stable since the previous study from March 20, 2018     Additional too small to characterize hypodensity seen within the right kidney suggest cyst   Additional small hypodensity seen within the left kidney, too small to characterize suggest cyst     STOMACH AND BOWEL:  Unremarkable      APPENDIX:  No findings to suggest appendicitis      ABDOMINOPELVIC CAVITY:  No ascites or free intraperitoneal air  No lymphadenopathy      VESSELS:  Atherosclerotic changes are seen within the aorta     PELVIS     REPRODUCTIVE ORGANS:  Unremarkable for patient's age      URINARY BLADDER:  Unremarkable      ABDOMINAL WALL/INGUINAL REGIONS:  Unremarkable      OSSEOUS STRUCTURES:  No acute fracture or destructive osseous lesion      IMPRESSION:     There is no solid renal mass seen     A 1 6 cm cyst in the upper pole of the right kidney with minimal nodularity within it  This is a minimally complicated cyst which has been stable for one year  Follow-up CT at one year can be performed,      As per the Epic note patient claims that he was diagnosed with a slow-growing renal mass  No previous imaging is available for our comparison  It would be helpful to compare the current study with the previous studies  If previous studies are provided   for my review an appropriate comparison report can be issued  The study was marked in [facility] for significant notification      PROCEDURE:     See note    ASSESSMENT:     81 y o  male with  Complicated renal cyst and lower urinary tract symptoms    PLAN:     With regard to the patient's 1 6 cm right upper pole nodular cyst, I have recommended follow-up imaging in 6 months  This is a known lesion that has been present for several years but would recommend close follow-up  The patient has a prostate that would be amenable to UroLift implantation should medical options fail  Patient is on single dose Flomax  Given his requirement for nasal cannula oxygen, I recommended against surgery to start  I have recommended we doubled the patient's Flomax dose and add Proscar  Sexual side effects discussed with the patient is no longer sexually active  This is considered maximal medical therapy for outlet obstruction  If the patient is not improved in 6 months time, could consider alternative options  Patient and his wife were in agreement

## 2020-03-09 NOTE — PROGRESS NOTES
Cystoscopy  Date/Time: 3/9/2020 8:55 AM  Performed by: Ping Mccracken MD  Authorized by: Ping Mccracken MD     Procedure details: cystoscopy    Patient tolerance: Patient tolerated the procedure well with no immediate complications    Additional Procedure Details:   Cystoscopy procedure note:    Risk and benefits of flexible cystoscopy were discussed  Informed consent was obtained  Urine dip was adequate for cystoscopy  The patient was placed in the supine position  His genitalia was prepped with Betadine and draped in a sterile fashion  Viscous 2% lidocaine jelly was instilled into the urethra and allowed dwell time for local anesthesia  Flexible cystoscopy was then performed using a 16F scope  The distal urethra and prostatic urethra were evaluated and were normal in course and caliber  The patient had mild coaptation of the apical prostate lobes  Towards the bladder neck, there was more open prostatic urethra  There was no pedunculated median lobe  Once inside the bladder, it was carefully inspected in a 360 degree fashion  There was no evidence of mucosal abnormalities, lesions, diverticula or stones  Mild 1+ trabeculation of the bladder  Both ureteral orifices in their orthotopic location were visualized with clear efflux of urine  Retroflexion for evaluation of the bladder neck was normal      Overall this was a   Cystoscopy with only mild outlet obstruction  Patient was then turned the left lateral decubitus position  Transrectal ultrasound probe was placed per rectum    Three-dimensional measurement was undertaken the prostate measured 24 9 g

## 2020-03-10 ENCOUNTER — OFFICE VISIT (OUTPATIENT)
Dept: PULMONOLOGY | Facility: CLINIC | Age: 70
End: 2020-03-10
Payer: MEDICARE

## 2020-03-10 VITALS
BODY MASS INDEX: 26.48 KG/M2 | DIASTOLIC BLOOD PRESSURE: 80 MMHG | WEIGHT: 185 LBS | SYSTOLIC BLOOD PRESSURE: 140 MMHG | HEART RATE: 80 BPM | HEIGHT: 70 IN | OXYGEN SATURATION: 97 %

## 2020-03-10 DIAGNOSIS — J18.9 COMMUNITY ACQUIRED PNEUMONIA, UNSPECIFIED LATERALITY: Primary | ICD-10-CM

## 2020-03-10 DIAGNOSIS — J96.11 CHRONIC HYPOXEMIC RESPIRATORY FAILURE (HCC): ICD-10-CM

## 2020-03-10 DIAGNOSIS — J41.0 SIMPLE CHRONIC BRONCHITIS (HCC): ICD-10-CM

## 2020-03-10 DIAGNOSIS — J43.9 PULMONARY EMPHYSEMA, UNSPECIFIED EMPHYSEMA TYPE (HCC): ICD-10-CM

## 2020-03-10 PROCEDURE — 1111F DSCHRG MED/CURRENT MED MERGE: CPT | Performed by: PHYSICIAN ASSISTANT

## 2020-03-10 PROCEDURE — 99214 OFFICE O/P EST MOD 30 MIN: CPT | Performed by: PHYSICIAN ASSISTANT

## 2020-03-10 PROCEDURE — 1160F RVW MEDS BY RX/DR IN RCRD: CPT | Performed by: PHYSICIAN ASSISTANT

## 2020-03-10 PROCEDURE — 4040F PNEUMOC VAC/ADMIN/RCVD: CPT | Performed by: PHYSICIAN ASSISTANT

## 2020-03-10 PROCEDURE — 1036F TOBACCO NON-USER: CPT | Performed by: PHYSICIAN ASSISTANT

## 2020-03-10 PROCEDURE — 3008F BODY MASS INDEX DOCD: CPT | Performed by: PHYSICIAN ASSISTANT

## 2020-03-10 RX ORDER — PREDNISONE 10 MG/1
10 TABLET ORAL DAILY
Qty: 30 TABLET | Refills: 1
Start: 2020-03-10 | End: 2020-03-27 | Stop reason: SDUPTHER

## 2020-03-10 NOTE — PROGRESS NOTES
Assessment/Plan:   Diagnoses and all orders for this visit:    Community acquired pneumonia, unspecified laterality  -     CT chest wo contrast; Future    Simple chronic bronchitis (HCC)  -     predniSONE 10 mg tablet; Take 1 tablet (10 mg total) by mouth daily    Pulmonary emphysema, unspecified emphysema type ([address] )    Chronic hypoxemic respiratory failure [facility])     Patient here today for hospital follow-up  He was admitted with shortness of breath and cough, found to have pneumonia on CT scan  CT scan showed new nodular consolidation left lower lobe and mild consolidation in the right upper lobe, also showed severe emphysema with large bullae, 1 containing a small amount of fluid   He is currently feeling back to his baseline, denies any cough, has chronic stable dyspnea on exertion  He continues with Breo, Xopenex, Atrovent, prednisone, Claritin, azithromycin  Continues with his Trilogy at night, 3 L O2 continuous  He was undergoing workup for possible endobronchial valve , does have a follow-up with Arvind coming up in the next few weeks  Will repeat a CT scan in 6-8 weeks to assess for resolution of the pneumonia, he will follow up with us after his CT scan is done  Return in about 8 weeks (around 5/5/2020)  All questions are answered to the patient's satisfaction and understanding  He verbalizes understanding  He is encouraged to call with any further questions or concerns  Portions of the record may have been created with voice recognition software  Occasional wrong word or "sound a like" substitutions may have occurred due to the inherent limitations of voice recognition software  Read the chart carefully and recognize, using context, where substitutions have occurred      Electronically Signed by Ladi Staton PA-C    ______________________________________________________________________    Chief Complaint:   Chief Complaint   Patient presents with    Follow-up Patient ID: Sai Mobley is a 81 y o  y o  male has a past medical history of Allergic rhinitis, BiPAP (biphasic positive airway pressure) dependence, Centrilobular emphysema (HCC), COPD (chronic obstructive pulmonary disease) ([facility] [address] ), Enlarged prostate, GERD (gastroesophageal reflux disease), HL (hearing loss), Hypoglycemia, On home oxygen therapy, Pneumonia, Pulmonary hypertension ([facility] [address] ), Respiratory failure ([address] ), SOB (shortness of breath), and Stroke ([facility] [address] )  3/10/2020  Patient presents today for follow-up visit  Patient is a 80 yo male former smoker with greater than 50 pack year smoking history, quit over 5 years ago  He has PMH of chronic respiratory failure on home O2 and Trilogy noninvasive ventilator, very severe COPD, pulmonary HTN, CHF  He is here today for hospital follow up  He was hospitalized for community-acquired pneumonia and COPD exacerbation  Overall feeling significantly improved, denies any cough, has chronic stable dyspnea on exertion  He continues with Breo, Xopenex, Atrovent, azithromycin MWF, prednisone 10 mg daily, Claritin, Singulair, 3 L O2, Trilogy at night  Review of Systems   Constitutional: Negative  HENT: Negative  Respiratory: Positive for shortness of breath  Cardiovascular: Negative  Gastrointestinal: Negative  Genitourinary: Negative  Musculoskeletal: Negative  Skin: Negative  Allergic/Immunologic: Negative  Neurological: Negative  Psychiatric/Behavioral: Negative  Smoking history: He reports that he quit smoking about 7 years ago  His smoking use included cigarettes  He smoked 0 00 packs per day for 50 00 years   He has never used smokeless tobacco     The following portions of the patient's history were reviewed and updated as appropriate: allergies, current medications, past family history, past medical history, past social history, past surgical history and problem list     Immunization History   Administered Date(s) Administered    INFLUENZA 01/02/2019    Influenza, high dose seasonal 0 5 mL 01/02/2019, 11/04/2019    Pneumococcal Conjugate 13-Valent 02/13/2019    Pneumococcal Polysaccharide PPV23 02/17/2020     Current Outpatient Medications   Medication Sig Dispense Refill    BREO ELLIPTA 100-25 MCG/INH inhaler inhale 1 puff by mouth and INTO THE LUNGS once daily Rinse mouth after use 1 Inhaler 5    cholestyramine (QUESTRAN) 4 g packet       ergocalciferol (VITAMIN D2) 50,000 units Take 1 capsule (50,000 Units total) by mouth once a week 4 capsule 5    finasteride (PROSCAR) 5 mg tablet Take 1 tablet (5 mg total) by mouth daily for 30 doses 30 tablet 6    fluticasone (FLONASE) 50 mcg/act nasal spray instill 1 spray into each nostril once daily 16 g 3    guaiFENesin (MUCINEX) 600 mg 12 hr tablet Take 1 tablet (600 mg total) by mouth every 12 (twelve) hours 60 tablet 0    ipratropium (ATROVENT) 0 02 % nebulizer solution Take 1 vial (0 5 mg total) by nebulization every 6 (six) hours as needed for wheezing or shortness of breath 120 vial 3    levalbuterol (XOPENEX HFA) 45 mcg/act inhaler Inhale 1-2 puffs every 4 (four) hours as needed for wheezing or shortness of breath 1 Inhaler 3    levalbuterol (XOPENEX) 1 25 mg/3 mL nebulizer solution Take 1 vial (1 25 mg total) by nebulization every 6 (six) hours as needed for wheezing or shortness of breath 120 vial 3    loratadine (CLARITIN) 10 mg tablet Take 10 mg by mouth daily      sertraline (ZOLOFT) 50 mg tablet take 2 tablets by mouth once daily 60 tablet 5    spironolactone (ALDACTONE) 25 mg tablet Take 1 tablet (25 mg total) by mouth daily 90 tablet 3    tamsulosin (FLOMAX) 0 4 mg Take 1 capsule (0 4 mg total) by mouth 2 (two) times a day 60 capsule 6    azithromycin (ZITHROMAX) 250 mg tablet TAKE 1 TABLET BY MOUTH  MONDAY,WEDNESDAY AND FRIDAY (Patient not taking: Reported on 3/2/2020) 12 tablet 3    benzonatate (TESSALON PERLES) 100 mg capsule Take 1 capsule (100 mg total) by mouth 3 (three) times a day as needed for cough (Patient not taking: Reported on 3/2/2020) 90 capsule 1    montelukast (SINGULAIR) 10 mg tablet take 1 tablet by mouth at bedtime (Patient not taking: Reported on 3/2/2020) 30 tablet 3    predniSONE 10 mg tablet Take 1 tablet (10 mg total) by mouth daily 30 tablet 1    ranitidine (ZANTAC) 150 mg tablet take 1 tablet by mouth at bedtime (Patient not taking: Reported on 2/17/2020) 30 tablet 5     No current facility-administered medications for this visit  Allergies: Codeine and Penicillins    Objective:  Vitals:    03/10/20 1108   BP: 140/80   Pulse: 80   SpO2: 97%   Weight: 83 9 kg (185 lb)   Height: 5' 10" (1 778 m)   Oxygen Therapy  SpO2: 97 %(with 3 lts of oxygen)    Wt Readings from Last 3 Encounters:   03/10/20 83 9 kg (185 lb)   03/09/20 83 kg (183 lb)   03/02/20 83 kg (183 lb)     Body mass index is 26 54 kg/m²  Physical Exam   Constitutional: He is oriented to person, place, and time  He appears well-developed and well-nourished  No distress  HENT:   Mouth/Throat: Oropharynx is clear and moist    Eyes: Pupils are equal, round, and reactive to light  Cardiovascular: Normal rate, regular rhythm and normal heart sounds  No murmur heard  Pulmonary/Chest: Effort normal  No accessory muscle usage  No respiratory distress  He has decreased breath sounds  He has no wheezes  He has no rhonchi  He has no rales  On 3 L nasal cannula   Abdominal: Soft  There is no tenderness  Musculoskeletal: Normal range of motion  Neurological: He is alert and oriented to person, place, and time  Skin: Skin is warm and dry  No rash noted  Psychiatric: He has a normal mood and affect         Lab Review:   Lab Results   Component Value Date    K 4 2 02/26/2020     02/26/2020    CO2 30 02/26/2020    BUN 26 (H) 02/26/2020    CREATININE 0 67 02/26/2020    CALCIUM 8 5 02/26/2020     Lab Results   Component Value Date    WBC 16 14 (H) 02/26/2020 HGB 12 6 02/26/2020    HCT 39 0 02/26/2020    MCV 93 02/26/2020     02/26/2020       Diagnostics:  I have personally reviewed pertinent reports  Reviewed chest CT  Office Spirometry Results:     ESS:    Xr Chest 2 Views    Result Date: 2/24/2020  Narrative: CHEST INDICATION:   Chest Pain  COMPARISON:  Chest radiograph from May 21, 2019  CT of the chest from October 7, 2019  EXAM PERFORMED/VIEWS:  XR CHEST PA & LATERAL  The frontal view was performed utilizing dual energy radiographic technique  FINDINGS: Cardiomediastinal silhouette appears unremarkable  Lungs hyperinflated and hyperlucent, typical of emphysema  Areas of subsegmental atelectasis and/or scarring in the mid to lower right lung  Small amount of fluid layering dependently in the posterior portion of the large right upper lobe bulla  No consolidation  Small amount of right basilar pleural fluid or thickening  No evidence of pneumothorax  Osseous structures appear within normal limits for patient age  Impression: Severe emphysema  Mild subsegmental atelectasis and or scarring in the right lung base  Small amount of fluid in a large right bulla  Workstation performed: [id number]     Ct Chest Wo Contrast    Result Date: 2/25/2020  Narrative: CT CHEST WITHOUT IV CONTRAST INDICATION:   eval right bullae  History of COPD presenting with symptoms criteria shortness of breath worsening over several days productive suspect community-acquired  COMPARISON:  Chest radiograph from 2/24/2020 and chest CT from 10/7/2019  TECHNIQUE: CT examination of the chest was performed without intravenous contrast   Axial, sagittal, and coronal 2D reformatted images were created from the source data and submitted for interpretation  Radiation dose length product (DLP) for this visit:  242 mGy-cm     This examination, like all CT scans performed in the [facility], was performed utilizing techniques to minimize radiation dose exposure, including the use of iterative reconstruction and automated exposure control  FINDINGS: LUNGS:  Severe bullous emphysema with small amount of fluid in the right lower lobe bulla corresponding with the CT  New nodular consolidation in the lateral basal left lower lobe (3/100)  New mild consolidation in the posterior segment of the right upper lobe abutting the pleura (3/72)  Small amount of mucus in the trachea with mild diffuse bronchial wall thickening  PLEURA:  Unremarkable  HEART/GREAT VESSELS:  Mild coronary artery calcification indicating atherosclerotic heart disease  MEDIASTINUM AND MYRA:  Unremarkable  CHEST WALL AND LOWER NECK:   Unremarkable  VISUALIZED STRUCTURES IN THE UPPER ABDOMEN:  Unremarkable  OSSEOUS STRUCTURES:  Mild degenerative disease in the spine  Impression: Severe emphysema with large bullae in the right lung, one containing a small amount of fluid which could indicate superinfection  New nodular consolidation in the left lower lobe and mild consolidation in the dependent portion of the right upper lobe compatible with pneumonia  Minimal coronary artery calcification indicating atherosclerotic heart disease   Workstation performed: [id number]

## 2020-03-18 ENCOUNTER — TELEPHONE (OUTPATIENT)
Dept: PULMONOLOGY | Facility: CLINIC | Age: 70
End: 2020-03-18

## 2020-03-18 DIAGNOSIS — J41.0 SIMPLE CHRONIC BRONCHITIS (HCC): ICD-10-CM

## 2020-03-18 RX ORDER — LEVALBUTEROL INHALATION SOLUTION 1.25 MG/3ML
1.25 SOLUTION RESPIRATORY (INHALATION) EVERY 6 HOURS PRN
Qty: 120 VIAL | Refills: 3 | Status: SHIPPED | OUTPATIENT
Start: 2020-03-18 | End: 2020-07-02

## 2020-03-27 DIAGNOSIS — J41.0 SIMPLE CHRONIC BRONCHITIS (HCC): ICD-10-CM

## 2020-03-27 RX ORDER — PREDNISONE 10 MG/1
10 TABLET ORAL DAILY
Qty: 30 TABLET | Refills: 1
Start: 2020-03-27 | End: 2020-04-09 | Stop reason: SDUPTHER

## 2020-03-27 RX ORDER — AZITHROMYCIN 250 MG/1
TABLET, FILM COATED ORAL
Qty: 12 TABLET | Refills: 3 | Status: SHIPPED | OUTPATIENT
Start: 2020-03-27 | End: 2020-07-16

## 2020-04-02 ENCOUNTER — TELEMEDICINE (OUTPATIENT)
Dept: CARDIOLOGY CLINIC | Facility: CLINIC | Age: 70
End: 2020-04-02
Payer: MEDICARE

## 2020-04-02 VITALS — BODY MASS INDEX: 26.69 KG/M2 | WEIGHT: 186 LBS

## 2020-04-02 DIAGNOSIS — I51.89 DIASTOLIC DYSFUNCTION: ICD-10-CM

## 2020-04-02 DIAGNOSIS — I27.20 PULMONARY HYPERTENSION (HCC): ICD-10-CM

## 2020-04-02 DIAGNOSIS — I49.3 PVCS (PREMATURE VENTRICULAR CONTRACTIONS): ICD-10-CM

## 2020-04-02 DIAGNOSIS — R06.02 SHORTNESS OF BREATH ON EXERTION: Primary | ICD-10-CM

## 2020-04-02 DIAGNOSIS — J43.9 PULMONARY EMPHYSEMA, UNSPECIFIED EMPHYSEMA TYPE (HCC): ICD-10-CM

## 2020-04-02 PROCEDURE — 99214 OFFICE O/P EST MOD 30 MIN: CPT | Performed by: INTERNAL MEDICINE

## 2020-04-03 ENCOUNTER — TELEMEDICINE (OUTPATIENT)
Dept: INTERNAL MEDICINE CLINIC | Facility: CLINIC | Age: 70
End: 2020-04-03
Payer: MEDICARE

## 2020-04-03 DIAGNOSIS — J44.1 ACUTE EXACERBATION OF CHRONIC OBSTRUCTIVE PULMONARY DISEASE (COPD) (HCC): Primary | ICD-10-CM

## 2020-04-03 DIAGNOSIS — F41.9 ANXIETY: ICD-10-CM

## 2020-04-03 PROCEDURE — 99213 OFFICE O/P EST LOW 20 MIN: CPT | Performed by: INTERNAL MEDICINE

## 2020-04-03 RX ORDER — LEVOFLOXACIN 750 MG/1
750 TABLET ORAL EVERY 24 HOURS
Qty: 10 TABLET | Refills: 0 | Status: SHIPPED | OUTPATIENT
Start: 2020-04-03 | End: 2020-04-13

## 2020-04-03 RX ORDER — SERTRALINE HYDROCHLORIDE 100 MG/1
100 TABLET, FILM COATED ORAL DAILY
Qty: 90 TABLET | Refills: 1 | Status: SHIPPED | OUTPATIENT
Start: 2020-04-03

## 2020-04-09 ENCOUNTER — TELEPHONE (OUTPATIENT)
Dept: INTERNAL MEDICINE CLINIC | Facility: CLINIC | Age: 70
End: 2020-04-09

## 2020-04-09 DIAGNOSIS — I50.32 CHRONIC HEART FAILURE WITH PRESERVED EJECTION FRACTION (HCC): ICD-10-CM

## 2020-04-09 DIAGNOSIS — J41.0 SIMPLE CHRONIC BRONCHITIS (HCC): ICD-10-CM

## 2020-04-09 RX ORDER — SPIRONOLACTONE 25 MG/1
25 TABLET ORAL DAILY
Qty: 90 TABLET | Refills: 3 | Status: SHIPPED | OUTPATIENT
Start: 2020-04-09 | End: 2020-04-29 | Stop reason: SDUPTHER

## 2020-04-09 RX ORDER — PREDNISONE 10 MG/1
10 TABLET ORAL DAILY
Qty: 90 TABLET | Refills: 0
Start: 2020-04-09 | End: 2020-04-13 | Stop reason: SDUPTHER

## 2020-04-13 ENCOUNTER — TELEPHONE (OUTPATIENT)
Dept: INTERNAL MEDICINE CLINIC | Facility: CLINIC | Age: 70
End: 2020-04-13

## 2020-04-13 DIAGNOSIS — J41.0 SIMPLE CHRONIC BRONCHITIS (HCC): ICD-10-CM

## 2020-04-13 RX ORDER — PREDNISONE 10 MG/1
10 TABLET ORAL DAILY
Qty: 90 TABLET | Refills: 0 | Status: SHIPPED | OUTPATIENT
Start: 2020-04-13 | End: 2020-06-25 | Stop reason: SDUPTHER

## 2020-04-29 ENCOUNTER — TELEPHONE (OUTPATIENT)
Dept: CARDIOLOGY CLINIC | Facility: CLINIC | Age: 70
End: 2020-04-29

## 2020-04-29 DIAGNOSIS — I50.32 CHRONIC HEART FAILURE WITH PRESERVED EJECTION FRACTION (HCC): ICD-10-CM

## 2020-04-29 RX ORDER — SPIRONOLACTONE 25 MG/1
25 TABLET ORAL DAILY
Qty: 90 TABLET | Refills: 3 | Status: SHIPPED | OUTPATIENT
Start: 2020-04-29 | End: 2021-05-27

## 2020-04-30 ENCOUNTER — TELEPHONE (OUTPATIENT)
Dept: GASTROENTEROLOGY | Facility: CLINIC | Age: 70
End: 2020-04-30

## 2020-04-30 DIAGNOSIS — K58.0 IRRITABLE BOWEL SYNDROME WITH DIARRHEA: Primary | ICD-10-CM

## 2020-05-01 ENCOUNTER — TELEPHONE (OUTPATIENT)
Dept: GASTROENTEROLOGY | Facility: CLINIC | Age: 70
End: 2020-05-01

## 2020-05-01 RX ORDER — MONTELUKAST SODIUM 4 MG/1
1 TABLET, CHEWABLE ORAL 2 TIMES DAILY
Qty: 180 TABLET | Refills: 2 | Status: SHIPPED | OUTPATIENT
Start: 2020-05-01 | End: 2022-05-17

## 2020-05-01 RX ORDER — CHOLESTYRAMINE 4 G/9G
1 POWDER, FOR SUSPENSION ORAL 2 TIMES DAILY WITH MEALS
Qty: 180 PACKET | Refills: 3 | Status: SHIPPED | OUTPATIENT
Start: 2020-05-01 | End: 2021-05-03

## 2020-05-12 DIAGNOSIS — R39.11 URINARY HESITANCY: ICD-10-CM

## 2020-05-12 RX ORDER — FINASTERIDE 5 MG/1
5 TABLET, FILM COATED ORAL DAILY
Qty: 90 TABLET | Refills: 1 | Status: SHIPPED | OUTPATIENT
Start: 2020-05-12 | End: 2020-12-03

## 2020-05-16 ENCOUNTER — NURSE TRIAGE (OUTPATIENT)
Dept: OTHER | Facility: OTHER | Age: 70
End: 2020-05-16

## 2020-05-16 DIAGNOSIS — J44.1 ACUTE EXACERBATION OF CHRONIC OBSTRUCTIVE PULMONARY DISEASE (COPD) (HCC): Primary | ICD-10-CM

## 2020-05-16 RX ORDER — LEVOFLOXACIN 750 MG/1
750 TABLET ORAL EVERY 24 HOURS
Qty: 7 TABLET | Refills: 0 | Status: SHIPPED | OUTPATIENT
Start: 2020-05-16 | End: 2020-05-23

## 2020-05-21 ENCOUNTER — TELEPHONE (OUTPATIENT)
Dept: INTERNAL MEDICINE CLINIC | Facility: CLINIC | Age: 70
End: 2020-05-21

## 2020-06-08 ENCOUNTER — OFFICE VISIT (OUTPATIENT)
Dept: INTERNAL MEDICINE CLINIC | Facility: CLINIC | Age: 70
End: 2020-06-08
Payer: MEDICARE

## 2020-06-08 VITALS
BODY MASS INDEX: 26.48 KG/M2 | WEIGHT: 185 LBS | HEIGHT: 70 IN | TEMPERATURE: 98.6 F | RESPIRATION RATE: 16 BRPM | HEART RATE: 97 BPM | SYSTOLIC BLOOD PRESSURE: 126 MMHG | DIASTOLIC BLOOD PRESSURE: 80 MMHG | OXYGEN SATURATION: 97 %

## 2020-06-08 DIAGNOSIS — R21 RASH: ICD-10-CM

## 2020-06-08 DIAGNOSIS — K63.5 POLYP OF COLON, UNSPECIFIED PART OF COLON, UNSPECIFIED TYPE: ICD-10-CM

## 2020-06-08 DIAGNOSIS — J43.9 PULMONARY EMPHYSEMA, UNSPECIFIED EMPHYSEMA TYPE (HCC): Primary | ICD-10-CM

## 2020-06-08 PROBLEM — J18.9 CAP (COMMUNITY ACQUIRED PNEUMONIA): Status: RESOLVED | Noted: 2020-02-24 | Resolved: 2020-06-08

## 2020-06-08 PROCEDURE — 1160F RVW MEDS BY RX/DR IN RCRD: CPT | Performed by: INTERNAL MEDICINE

## 2020-06-08 PROCEDURE — 3008F BODY MASS INDEX DOCD: CPT | Performed by: INTERNAL MEDICINE

## 2020-06-08 PROCEDURE — 99214 OFFICE O/P EST MOD 30 MIN: CPT | Performed by: INTERNAL MEDICINE

## 2020-06-08 PROCEDURE — 1036F TOBACCO NON-USER: CPT | Performed by: INTERNAL MEDICINE

## 2020-06-08 PROCEDURE — 4040F PNEUMOC VAC/ADMIN/RCVD: CPT | Performed by: INTERNAL MEDICINE

## 2020-06-22 DIAGNOSIS — J41.0 SIMPLE CHRONIC BRONCHITIS (HCC): ICD-10-CM

## 2020-06-22 RX ORDER — PREDNISONE 10 MG/1
TABLET ORAL
Qty: 90 TABLET | Refills: 0 | OUTPATIENT
Start: 2020-06-22

## 2020-06-24 DIAGNOSIS — J41.0 SIMPLE CHRONIC BRONCHITIS (HCC): ICD-10-CM

## 2020-06-24 DIAGNOSIS — R39.11 URINARY HESITANCY: ICD-10-CM

## 2020-06-24 RX ORDER — PREDNISONE 10 MG/1
TABLET ORAL
Qty: 90 TABLET | Refills: 0 | OUTPATIENT
Start: 2020-06-24

## 2020-06-24 RX ORDER — TAMSULOSIN HYDROCHLORIDE 0.4 MG/1
0.4 CAPSULE ORAL 2 TIMES DAILY
Qty: 180 CAPSULE | Refills: 6 | Status: SHIPPED | OUTPATIENT
Start: 2020-06-24

## 2020-06-25 DIAGNOSIS — Z20.822 ENCOUNTER FOR LABORATORY TESTING FOR COVID-19 VIRUS: ICD-10-CM

## 2020-06-25 DIAGNOSIS — J41.0 SIMPLE CHRONIC BRONCHITIS (HCC): ICD-10-CM

## 2020-06-25 PROCEDURE — U0003 INFECTIOUS AGENT DETECTION BY NUCLEIC ACID (DNA OR RNA); SEVERE ACUTE RESPIRATORY SYNDROME CORONAVIRUS 2 (SARS-COV-2) (CORONAVIRUS DISEASE [COVID-19]), AMPLIFIED PROBE TECHNIQUE, MAKING USE OF HIGH THROUGHPUT TECHNOLOGIES AS DESCRIBED BY CMS-2020-01-R: HCPCS

## 2020-06-25 RX ORDER — PREDNISONE 10 MG/1
10 TABLET ORAL DAILY
Qty: 90 TABLET | Refills: 1 | Status: SHIPPED | OUTPATIENT
Start: 2020-06-25 | End: 2020-09-16 | Stop reason: SDUPTHER

## 2020-06-26 ENCOUNTER — TELEPHONE (OUTPATIENT)
Dept: GASTROENTEROLOGY | Facility: CLINIC | Age: 70
End: 2020-06-26

## 2020-06-26 NOTE — TELEPHONE ENCOUNTER
Patient scheduled for a Colonoscopy on 06/29  [place] His wife would like to know his covid test results     Please phone [phone]

## 2020-06-27 LAB — SARS-COV-2 RNA SPEC QL NAA+PROBE: NOT DETECTED

## 2020-06-29 ENCOUNTER — ANESTHESIA EVENT (OUTPATIENT)
Dept: GASTROENTEROLOGY | Facility: HOSPITAL | Age: 70
End: 2020-06-29

## 2020-06-29 ENCOUNTER — ANESTHESIA (OUTPATIENT)
Dept: GASTROENTEROLOGY | Facility: HOSPITAL | Age: 70
End: 2020-06-29

## 2020-06-29 ENCOUNTER — HOSPITAL ENCOUNTER (OUTPATIENT)
Dept: GASTROENTEROLOGY | Facility: HOSPITAL | Age: 70
Setting detail: OUTPATIENT SURGERY
Discharge: HOME | End: 2020-06-29
Attending: INTERNAL MEDICINE
Payer: MEDICARE

## 2020-06-29 VITALS
HEIGHT: 70 IN | SYSTOLIC BLOOD PRESSURE: 146 MMHG | TEMPERATURE: 97.8 F | WEIGHT: 196 LBS | DIASTOLIC BLOOD PRESSURE: 75 MMHG | HEART RATE: 76 BPM | RESPIRATION RATE: 19 BRPM | OXYGEN SATURATION: 99 % | BODY MASS INDEX: 28.06 KG/M2

## 2020-06-29 DIAGNOSIS — Z86.010 HISTORY OF COLON POLYPS: ICD-10-CM

## 2020-06-29 PROCEDURE — 45380 COLONOSCOPY AND BIOPSY: CPT | Performed by: INTERNAL MEDICINE

## 2020-06-29 PROCEDURE — 88305 TISSUE EXAM BY PATHOLOGIST: CPT | Performed by: PATHOLOGY

## 2020-06-29 RX ORDER — PROPOFOL 10 MG/ML
INJECTION, EMULSION INTRAVENOUS AS NEEDED
Status: DISCONTINUED | OUTPATIENT
Start: 2020-06-29 | End: 2020-06-29 | Stop reason: SURG

## 2020-06-29 RX ORDER — SODIUM CHLORIDE, SODIUM LACTATE, POTASSIUM CHLORIDE, CALCIUM CHLORIDE 600; 310; 30; 20 MG/100ML; MG/100ML; MG/100ML; MG/100ML
INJECTION, SOLUTION INTRAVENOUS CONTINUOUS PRN
Status: DISCONTINUED | OUTPATIENT
Start: 2020-06-29 | End: 2020-06-29 | Stop reason: SURG

## 2020-06-29 RX ORDER — KETAMINE HCL IN NACL, ISO-OSM 100MG/10ML
SYRINGE (ML) INJECTION AS NEEDED
Status: DISCONTINUED | OUTPATIENT
Start: 2020-06-29 | End: 2020-06-29 | Stop reason: SURG

## 2020-06-29 RX ADMIN — PROPOFOL 30 MG: 10 INJECTION, EMULSION INTRAVENOUS at 08:51

## 2020-06-29 RX ADMIN — PROPOFOL 40 MG: 10 INJECTION, EMULSION INTRAVENOUS at 08:55

## 2020-06-29 RX ADMIN — SODIUM CHLORIDE, SODIUM LACTATE, POTASSIUM CHLORIDE, AND CALCIUM CHLORIDE: .6; .31; .03; .02 INJECTION, SOLUTION INTRAVENOUS at 08:20

## 2020-06-29 RX ADMIN — Medication 15 MG: at 08:42

## 2020-06-29 RX ADMIN — Medication 10 MG: at 08:51

## 2020-06-29 RX ADMIN — PROPOFOL 20 MG: 10 INJECTION, EMULSION INTRAVENOUS at 08:59

## 2020-06-29 NOTE — H&P
History and Physical - [facility]  Luiza Chairez 78 y o  male MRN: [id number]                  HPI: Luiza Chairez is a 78y o  year old male who presents for colonoscopy for history of colon polyps and diarrhea      REVIEW OF SYSTEMS: Per the HPI, and otherwise unremarkable  Historical Information   Past Medical History:   Diagnosis Date    Allergic rhinitis     Asthma     BiPAP (biphasic positive airway pressure) dependence     Centrilobular emphysema (HCC)     Colon polyp     COPD (chronic obstructive pulmonary disease) (HCC)     Enlarged prostate     GERD (gastroesophageal reflux disease)     HL (hearing loss)     Hypoglycemia     On home oxygen therapy     Pneumonia     Pulmonary hypertension (HCC)     Respiratory failure (HCC)     SOB (shortness of breath)     Stroke (HCC)     Mini     Past Surgical History:   Procedure Laterality Date    CATARACT EXTRACTION      CHOLECYSTECTOMY      hernia    EYE SURGERY      HERNIA REPAIR      MASS EXCISION N/A 7/26/2019    Procedure: BACK SKIN LESION EXCISION BIOPSY;  Surgeon: Alicia Pina MD;  Location: [facility];  Service: General    MN ESOPHAGOGASTRODUODENOSCOPY TRANSORAL DIAGNOSTIC N/A 9/19/2018    Procedure: ESOPHAGOGASTRODUODENOSCOPY (EGD); Surgeon: Ashwini García MD;  Location: [facility];   Service: Gastroenterology    MN EXC SKIN BENIG 2 1-3 CM REMAINDR BODY N/A 7/26/2019    Procedure: SCALP LESION EXCISION BIOPSY;  Surgeon: Alicia Pina MD;  [facility]: [facility];  Service: General    MN REPAIR [address] HERNIA,5+Y/O,REDUCIBL Left 5/23/2018    Procedure: OPEN INGUINAL HERNIA REPAIR WITH MESH;  Surgeon: Alicia Pina MD;  [facility]: [facility];  Service: General     Social History   Social History     Substance and Sexual Activity   Alcohol Use Yes    Alcohol/week: 1 0 standard drinks    Types: 1 Glasses of wine per week    Frequency: Monthly or less    Comment: socialy     Social History Substance and Sexual Activity   Drug Use Never     Social History     Tobacco Use   Smoking Status Former Smoker    Packs/day: 0 00    Years: 50 00    Pack years: 0 00    Types: Cigarettes    Last attempt to quit: 2013    Years since quittin 3   Smokeless Tobacco Never Used     Family History   Problem Relation Age of Onset    Diabetes Mother     Hypertension Mother     Hyperlipidemia Mother        Meds/Allergies       (Not in a hospital admission)    Allergies   Allergen Reactions    Codeine Dizziness    Penicillins Hives     Passed out       Objective     Blood pressure 138/55, pulse 94, temperature 97 6 °F (36 4 °C), temperature source Temporal, resp  rate 22, height 5' 10" (1 778 m), weight 88 9 kg (196 lb), SpO2 96 %  PHYSICAL EXAM    /55   Pulse 94   Temp 97 6 °F (36 4 °C) (Temporal)   Resp 22   Ht 5' 10" (1 778 m)   Wt 88 9 kg (196 lb)   SpO2 96%   BMI 28 12 kg/m²       Gen: NAD  CV: RRR  CHEST: Clear  ABD: soft, NT/ND  EXT: no edema      ASSESSMENT/PLAN:  This is a 78y o  year old male here for colonoscopy, and he is stable and optimized for his procedure

## 2020-06-29 NOTE — DISCHARGE INSTRUCTIONS
Colonoscopy   WHAT YOU NEED TO KNOW:   A colonoscopy is a procedure to examine the inside of your colon (intestine) with a scope  Polyps or tissue growths may have been removed during your colonoscopy  It is normal to feel bloated and to have some abdominal discomfort  You should be passing gas  If you have hemorrhoids or you had polyps removed, you may have a small amount of bleeding  DISCHARGE INSTRUCTIONS:   Seek care immediately if:   · You have a large amount of bright red blood in your bowel movements  · Your abdomen is hard and firm and you have severe pain  · You have sudden trouble breathing  Contact your healthcare provider if:   · You develop a rash or hives  · You have a fever within 24 hours of your procedure  · You have not had a bowel movement for 3 days after your procedure  · You have questions or concerns about your condition or care  Activity:   · Do not lift, strain, or run  for 3 days after your procedure  · Rest after your procedure  You have been given medicine to relax you  Do not  drive or make important decisions until the day after your procedure  Return to your normal activity as directed  · Relieve gas and discomfort from bloating  by lying on your right side with a heating pad on your abdomen  You may need to take short walks to help the gas move out  Eat small meals until bloating is relieved  If you had polyps removed: For 7 days after your procedure:  · Do not  take aspirin  · Do not  go on long car rides  Help prevent constipation:   · Eat a variety of healthy foods  Healthy foods include fruit, vegetables, whole-grain breads, low-fat dairy products, beans, lean meat, and fish  Ask if you need to be on a special diet  Your healthcare provider may recommend that you eat high-fiber foods such as cooked beans  Fiber helps you have regular bowel movements  · Drink liquids as directed    Adults should drink between 9 and 13 eight-ounce cups of liquid every day  Ask what amount is best for you  For most people, good liquids to drink are water, juice, and milk  · Exercise as directed  Talk to your healthcare provider about the best exercise plan for you  Exercise can help prevent constipation, decrease your blood pressure and improve your health  Follow up with your healthcare provider as directed:  Write down your questions so you remember to ask them during your visits  © 2017 [facility] Information is for End User's use only and may not be sold, redistributed or otherwise used for commercial purposes  All illustrations and images included in CareNotes® are the copyrighted property of [employer]  or [employer] Hernandez  The above information is an  only  It is not intended as medical advice for individual conditions or treatments  Talk to your doctor, nurse or pharmacist before following any medical regimen to see if it is safe and effective for you

## 2020-06-30 ENCOUNTER — HOSPITAL ENCOUNTER (OUTPATIENT)
Dept: CT IMAGING | Facility: HOSPITAL | Age: 70
Discharge: HOME | End: 2020-06-30
Payer: MEDICARE

## 2020-06-30 ENCOUNTER — TELEPHONE (OUTPATIENT)
Dept: PULMONOLOGY | Facility: CLINIC | Age: 70
End: 2020-06-30

## 2020-06-30 DIAGNOSIS — J18.9 COMMUNITY ACQUIRED PNEUMONIA, UNSPECIFIED LATERALITY: ICD-10-CM

## 2020-06-30 PROCEDURE — 71250 CT THORAX DX C-: CPT

## 2020-07-02 DIAGNOSIS — J41.0 SIMPLE CHRONIC BRONCHITIS (HCC): ICD-10-CM

## 2020-07-02 RX ORDER — LEVALBUTEROL INHALATION SOLUTION 1.25 MG/3ML
SOLUTION RESPIRATORY (INHALATION)
Qty: 1050 ML | Refills: 2 | Status: SHIPPED | OUTPATIENT
Start: 2020-07-02

## 2020-07-13 DIAGNOSIS — J41.0 SIMPLE CHRONIC BRONCHITIS (HCC): Primary | ICD-10-CM

## 2020-07-13 RX ORDER — LEVOFLOXACIN 500 MG/1
500 TABLET, FILM COATED ORAL EVERY 24 HOURS
Qty: 10 TABLET | Refills: 0 | Status: SHIPPED | OUTPATIENT
Start: 2020-07-13 | End: 2020-07-23

## 2020-07-13 NOTE — PROGRESS NOTES
Wife called that there back in [place] with her daughter  They are both having another flare up  They are usually given Levaquin and increase their steroids

## 2020-07-29 DIAGNOSIS — R09.81 NASAL CONGESTION: ICD-10-CM

## 2020-07-29 RX ORDER — FLUTICASONE PROPIONATE 50 MCG
SPRAY, SUSPENSION (ML) NASAL
Qty: 16 G | Refills: 3 | Status: SHIPPED | OUTPATIENT
Start: 2020-07-29 | End: 2021-07-29

## 2020-08-22 ENCOUNTER — NURSE TRIAGE (OUTPATIENT)
Dept: OTHER | Facility: OTHER | Age: 70
End: 2020-08-22

## 2020-08-22 DIAGNOSIS — B37.0 THRUSH: Primary | ICD-10-CM

## 2020-08-22 DIAGNOSIS — J41.0 SIMPLE CHRONIC BRONCHITIS (HCC): ICD-10-CM

## 2020-08-22 RX ORDER — CLOTRIMAZOLE 10 MG/1
10 LOZENGE ORAL; TOPICAL
Qty: 50 TABLET | Refills: 0 | Status: SHIPPED | OUTPATIENT
Start: 2020-08-22

## 2020-08-22 RX ORDER — LEVOFLOXACIN 500 MG/1
500 TABLET, FILM COATED ORAL EVERY 24 HOURS
Qty: 7 TABLET | Refills: 0 | Status: SHIPPED | OUTPATIENT
Start: 2020-08-22 | End: 2020-08-29

## 2020-08-22 NOTE — TELEPHONE ENCOUNTER
Regarding: thrush on tongue  ----- Message from [facility] sent at 8/22/2020 11:10 AM EDT -----  Patient has thrush on his tongue

## 2020-08-22 NOTE — TELEPHONE ENCOUNTER
Spoke with wife  Wife stated, "My  is having another flare up of his COPD   He needs Levoquin, steroids, and something for the thrush in his mouth "    TT sent to Willis Cobb PA-C, waiting for call back

## 2020-08-22 NOTE — TELEPHONE ENCOUNTER
Porsha Turner PA-C called back and we discussed  Lillie Porras did give me TO for patient for Mycelex for thrush, Levaquin (1) 500mg PO daily for 7 days, and to start a prednisone taper  Patient normally takes prednisone 10mg PO daily  Lillie Porras stated that wife should start a taper dosage    Patient is take (4) 10mg prednisone once daily for 2 days, then take (3) 10mg prednisone for 2 days, then (2) 10mg prednisone daily, and the go back down to (1) 10mg daily

## 2020-08-22 NOTE — TELEPHONE ENCOUNTER
Reason for Disposition   All other mouth symptoms (Exceptions: dry mouth from not drinking enough liquids, chapped lips)    Answer Assessment - Initial Assessment Questions  1  SYMPTOM: "What's the main symptom you're concerned about?" (e g , dry mouth  chapped lips, lump)      Thrush on the tongue  2  ONSET: "When did the symptoms  start?"      Noticed 2 days ago  3  PAIN: "Is there any pain?" If so, ask: "How bad is it?" (Scale: 1-10; mild, moderate, severe)      Tongue is sensitive  4  CAUSE: "What do you think is causing the symptoms?"      From all his inhalers    5  OTHER SYMPTOMS: "Do you have any other symptoms?" (e g , fever, sore throat, toothache, swelling)     Denies    Protocols used: [facility]

## 2020-08-26 ENCOUNTER — TELEPHONE (OUTPATIENT)
Dept: PULMONOLOGY | Facility: CLINIC | Age: 70
End: 2020-08-26

## 2020-08-26 NOTE — TELEPHONE ENCOUNTER
Jesenia Doing his wife called stating he feels that he no longer needs the BIPAP machine, but would like to remain on the oxygen  She would like to speak to a provider in regards to this issue    Please call [phone]

## 2020-09-01 NOTE — TELEPHONE ENCOUNTER
I spoke with patient's wife about the Trilogy and explained its importance with his severe lung disease  Did explain the risks of retained CO2 overnight  She was going to have him try sleeping without it for a few nights and see how he felt - I did tell her we would recommend he continue using it every night

## 2020-09-14 ENCOUNTER — HOSPITAL ENCOUNTER (OUTPATIENT)
Dept: CT IMAGING | Facility: HOSPITAL | Age: 70
Discharge: HOME | End: 2020-09-14
Attending: UROLOGY
Payer: MEDICARE

## 2020-09-14 DIAGNOSIS — N28.1 RENAL CYST: ICD-10-CM

## 2020-09-14 PROCEDURE — 74170 CT ABD WO CNTRST FLWD CNTRST: CPT

## 2020-09-14 RX ADMIN — IOHEXOL 100 ML: 350 INJECTION, SOLUTION INTRAVENOUS at 10:28

## 2020-09-16 ENCOUNTER — TELEPHONE (OUTPATIENT)
Dept: PULMONOLOGY | Facility: CLINIC | Age: 70
End: 2020-09-16

## 2020-09-16 DIAGNOSIS — J41.0 SIMPLE CHRONIC BRONCHITIS (HCC): ICD-10-CM

## 2020-09-16 RX ORDER — PREDNISONE 10 MG/1
10 TABLET ORAL DAILY
Qty: 90 TABLET | Refills: 1 | Status: SHIPPED | OUTPATIENT
Start: 2020-09-16 | End: 2020-10-15 | Stop reason: SDUPTHER

## 2020-09-16 NOTE — TELEPHONE ENCOUNTER
Patient wants a 90day supply of prednisone sent to the pharmacy in [place], however he does not have prednisone on his med list

## 2020-09-17 ENCOUNTER — TELEPHONE (OUTPATIENT)
Dept: UROLOGY | Facility: CLINIC | Age: 70
End: 2020-09-17

## 2020-09-17 DIAGNOSIS — N28.1 RENAL CYST: Primary | ICD-10-CM

## 2020-09-17 NOTE — TELEPHONE ENCOUNTER
----- Message from Elena Stern MD sent at 9/17/2020 12:14 PM EDT -----  Please let the patient know that radiology is requesting a follow-up MRI which I would like to have before I see him  MRI has been ordered  Can we reschedule his visit for after the MRI is completed?

## 2020-09-17 NOTE — TELEPHONE ENCOUNTER
Patient wife called back states mri is scheduled for 09/21/20  Wife requesting appointment for patient offered first opening in November and wife declined  Wife requesting sooner opening  Unable to find reasonable time frame please advise

## 2020-09-17 NOTE — TELEPHONE ENCOUNTER
Called and spoke to emergency contact Arely Dacosta  Made her aware that radiology is requesting a follow-up MRI  Made her aware that this has been ordered by Sissy Yanez  She is aware that appointment for Tuesday has been canceled  She was given phone number for central scheduling to schedule MRI  She is aware to let us know when he is getting MRI done so that we can reschedule follow up with Sissy Yanez for after MRI  She verbalized understanding and denies any other questions or concerns

## 2020-09-18 NOTE — TELEPHONE ENCOUNTER
Erick Castellon called back to say on 10/08/20 per previous note is not good for her  She wants to keep appointment for 9/22/20 and get a call with MRI results if possible  Scheduled MRI for 9/21 at 9:15am  Please advise  She stated he is having issues with urinary retention and wants to discuss medications

## 2020-09-19 ENCOUNTER — APPOINTMENT (OUTPATIENT)
Dept: LAB | Facility: HOSPITAL | Age: 70
End: 2020-09-19
Attending: INTERNAL MEDICINE
Payer: MEDICARE

## 2020-09-19 DIAGNOSIS — N28.1 RENAL CYST: ICD-10-CM

## 2020-09-19 DIAGNOSIS — J43.9 PULMONARY EMPHYSEMA, UNSPECIFIED EMPHYSEMA TYPE (HCC): ICD-10-CM

## 2020-09-19 LAB
ALBUMIN SERPL BCP-MCNC: 4 G/DL (ref 3.5–5)
ALP SERPL-CCNC: 67 U/L (ref 46–116)
ALT SERPL W P-5'-P-CCNC: 49 U/L (ref 12–78)
ANION GAP SERPL CALCULATED.3IONS-SCNC: 7 MMOL/L (ref 4–13)
AST SERPL W P-5'-P-CCNC: 24 U/L (ref 5–45)
BASOPHILS # BLD AUTO: 0.04 THOUSANDS/ΜL (ref 0–0.1)
BASOPHILS NFR BLD AUTO: 1 % (ref 0–1)
BILIRUB SERPL-MCNC: 1.1 MG/DL (ref 0.2–1)
BUN SERPL-MCNC: 27 MG/DL (ref 5–25)
CALCIUM SERPL-MCNC: 9.3 MG/DL (ref 8.3–10.1)
CHLORIDE SERPL-SCNC: 104 MMOL/L (ref 100–108)
CO2 SERPL-SCNC: 30 MMOL/L (ref 21–32)
CREAT SERPL-MCNC: 0.73 MG/DL (ref 0.6–1.3)
EOSINOPHIL # BLD AUTO: 0.32 THOUSAND/ΜL (ref 0–0.61)
EOSINOPHIL NFR BLD AUTO: 4 % (ref 0–6)
ERYTHROCYTE [DISTWIDTH] IN BLOOD BY AUTOMATED COUNT: 14.4 % (ref 11.6–15.1)
GFR SERPL CREATININE-BSD FRML MDRD: 95 ML/MIN/1.73SQ M
GLUCOSE P FAST SERPL-MCNC: 97 MG/DL (ref 65–99)
HCT VFR BLD AUTO: 44 % (ref 36.5–49.3)
HGB BLD-MCNC: 14 G/DL (ref 12–17)
IMM GRANULOCYTES # BLD AUTO: 0.06 THOUSAND/UL (ref 0–0.2)
IMM GRANULOCYTES NFR BLD AUTO: 1 % (ref 0–2)
LYMPHOCYTES # BLD AUTO: 2.73 THOUSANDS/ΜL (ref 0.6–4.47)
LYMPHOCYTES NFR BLD AUTO: 34 % (ref 14–44)
MCH RBC QN AUTO: 30 PG (ref 26.8–34.3)
MCHC RBC AUTO-ENTMCNC: 31.8 G/DL (ref 31.4–37.4)
MCV RBC AUTO: 94 FL (ref 82–98)
MONOCYTES # BLD AUTO: 0.79 THOUSAND/ΜL (ref 0.17–1.22)
MONOCYTES NFR BLD AUTO: 10 % (ref 4–12)
NEUTROPHILS # BLD AUTO: 4.07 THOUSANDS/ΜL (ref 1.85–7.62)
NEUTS SEG NFR BLD AUTO: 50 % (ref 43–75)
NRBC BLD AUTO-RTO: 0 /100 WBCS
PLATELET # BLD AUTO: 202 THOUSANDS/UL (ref 149–390)
PMV BLD AUTO: 8.9 FL (ref 8.9–12.7)
POTASSIUM SERPL-SCNC: 3.9 MMOL/L (ref 3.5–5.3)
PROT SERPL-MCNC: 7.7 G/DL (ref 6.4–8.2)
RBC # BLD AUTO: 4.67 MILLION/UL (ref 3.88–5.62)
SODIUM SERPL-SCNC: 141 MMOL/L (ref 136–145)
WBC # BLD AUTO: 8.01 THOUSAND/UL (ref 4.31–10.16)

## 2020-09-19 PROCEDURE — 85025 COMPLETE CBC W/AUTO DIFF WBC: CPT

## 2020-09-19 PROCEDURE — 80053 COMPREHEN METABOLIC PANEL: CPT

## 2020-09-19 PROCEDURE — 36415 COLL VENOUS BLD VENIPUNCTURE: CPT

## 2020-09-21 ENCOUNTER — HOSPITAL ENCOUNTER (OUTPATIENT)
Dept: MRI IMAGING | Facility: CLINIC | Age: 70
Discharge: HOME | End: 2020-09-21
Payer: MEDICARE

## 2020-09-21 DIAGNOSIS — N28.1 RENAL CYST: ICD-10-CM

## 2020-09-21 PROCEDURE — G1004 CDSM NDSC: HCPCS

## 2020-09-21 PROCEDURE — A9585 GADOBUTROL INJECTION: HCPCS | Performed by: UROLOGY

## 2020-09-21 PROCEDURE — 74183 MRI ABD W/O CNTR FLWD CNTR: CPT

## 2020-09-21 RX ADMIN — GADOBUTROL 9 ML: 604.72 INJECTION INTRAVENOUS at 10:20

## 2020-09-22 ENCOUNTER — OFFICE VISIT (OUTPATIENT)
Dept: PULMONOLOGY | Facility: CLINIC | Age: 70
End: 2020-09-22
Payer: MEDICARE

## 2020-09-22 VITALS
SYSTOLIC BLOOD PRESSURE: 124 MMHG | HEIGHT: 70 IN | WEIGHT: 199 LBS | HEART RATE: 88 BPM | DIASTOLIC BLOOD PRESSURE: 84 MMHG | BODY MASS INDEX: 28.49 KG/M2 | TEMPERATURE: 97.9 F | OXYGEN SATURATION: 95 %

## 2020-09-22 DIAGNOSIS — J43.9 PULMONARY EMPHYSEMA, UNSPECIFIED EMPHYSEMA TYPE (HCC): Primary | ICD-10-CM

## 2020-09-22 DIAGNOSIS — J41.0 SIMPLE CHRONIC BRONCHITIS (HCC): ICD-10-CM

## 2020-09-22 DIAGNOSIS — J96.11 CHRONIC HYPOXEMIC RESPIRATORY FAILURE (HCC): ICD-10-CM

## 2020-09-22 PROCEDURE — 99214 OFFICE O/P EST MOD 30 MIN: CPT | Performed by: PHYSICIAN ASSISTANT

## 2020-09-22 NOTE — PROGRESS NOTES
Assessment/Plan:   Diagnoses and all orders for this visit:    Pulmonary emphysema, unspecified emphysema type ([address] )    Simple chronic bronchitis ([facility] )    Chronic hypoxemic respiratory failure ([facility] )     Patient is here today for follow-up  He is overall stable with his breathing, has chronic dyspnea on exertion without much recent change  He is noting wheezing in the mornings when he wakes up but also complaining of increased postnasal drip  No wheezing on lung exam today  Will try using a saline nasal spray at night, he already takes an antihistamine  He will continue with his Breo, Xopenex, Atrovent , prednisone 10 mg daily, Claritin, Zithromax MWF, 3 L O2 continuous,Trilogy at night  Recent CT scan done in June shows stable severe bullous emphysema  Left lower lobe and right upper lobe consolidations have resolved  No indication for bronchoscopy at this time  Will refer him to Dr Christel Miller for evaluation for endobronchial valves  He will follow up with us in 4 months or sooner of necessary  No follow-ups on file  All questions are answered to the patient's satisfaction and understanding  He verbalizes understanding  He is encouraged to call with any further questions or concerns  Portions of the record may have been created with voice recognition software  Occasional wrong word or "sound a like" substitutions may have occurred due to the inherent limitations of voice recognition software  Read the chart carefully and recognize, using context, where substitutions have occurred  Electronically Signed by Jeremiah Guillaume PA-C    ______________________________________________________________________    Chief Complaint: No chief complaint on file        Patient ID: Ester Ralph is a 78 y o  y o  male has a past medical history of Allergic rhinitis, Asthma, BiPAP (biphasic positive airway pressure) dependence, Centrilobular emphysema ([facility] ), Colon polyp, COPD (chronic obstructive pulmonary disease) ([facility] ), Enlarged prostate, GERD (gastroesophageal reflux disease), HL (hearing loss), Hypoglycemia, On home oxygen therapy, Pneumonia, Pulmonary hypertension ([facility] [facility] ), Respiratory failure ([facility] [address] ), SOB (shortness of breath), and Stroke ([facility] )  9/22/2020  Patient presents today for follow-up visit  Patient is a 77 yo male former smoker with greater than 50 pack year smoking history, quit over 5 years ago  He has PMH of chronic respiratory failure on home O2 and Trilogy noninvasive ventilator, very severe COPD, pulmonary HTN, CHF  He is here today for follow up  He is overall stable with his breathing though has noted wheezing in the mornings when he wakes up, also has increased postnasal drip  He continues with Breo, Xopenex, Atrovent, azithromycin MWF, prednisone 10 mg daily, Claritin, Singulair, 3 L O2, Trilogy at night  Review of Systems   Constitutional: Negative  HENT: Positive for postnasal drip  Respiratory: Positive for cough, shortness of breath and wheezing  Cardiovascular: Negative  Gastrointestinal: Negative  Genitourinary: Negative  Musculoskeletal: Negative  Skin: Negative  Allergic/Immunologic: Negative  Neurological: Negative  Psychiatric/Behavioral: Negative  Smoking history: He reports that he quit smoking about 7 years ago  His smoking use included cigarettes  He smoked 0 00 packs per day for 50 00 years   He has never used smokeless tobacco     The following portions of the patient's history were reviewed and updated as appropriate: allergies, current medications, past family history, past medical history, past social history, past surgical history and problem list     Immunization History   Administered Date(s) Administered    INFLUENZA 01/02/2019    Influenza, high dose seasonal 0 7 mL 01/02/2019, 11/04/2019    Pneumococcal Conjugate 13-Valent 02/13/2019    Pneumococcal Polysaccharide PPV23 02/17/2020     Current Outpatient Medications   Medication Sig Dispense Refill    BREO ELLIPTA 100-25 MCG/INH inhaler inhale 1 puff by mouth and INTO THE LUNGS once daily Rinse mouth after use 1 Inhaler 5    cholestyramine (QUESTRAN) 4 g packet Take 1 packet (4 g total) by mouth 2 (two) times a day with meals 180 packet 3    clotrimazole (MYCELEX) 10 mg bassem Take 1 tablet (10 mg total) by mouth 5 (five) times a day 50 tablet 0    colestipol (COLESTID) 1 g tablet Take 1 tablet (1 g total) by mouth 2 (two) times a day 180 tablet 2    ergocalciferol (VITAMIN D2) 50,000 units Take 1 capsule (50,000 Units total) by mouth once a week 4 capsule 5    finasteride (PROSCAR) 5 mg tablet Take 1 tablet (5 mg total) by mouth daily 90 tablet 1    fluticasone (FLONASE) 50 mcg/act nasal spray instill 1 spray into each nostril once daily 16 g 3    guaiFENesin (MUCINEX) 600 mg 12 hr tablet Take 1 tablet (600 mg total) by mouth every 12 (twelve) hours 60 tablet 0    ipratropium (ATROVENT) 0 02 % nebulizer solution USE 1 VIAL VIA NEBULIZER EVERY 6 HOURS AS NEEDED FOR WHEEZING OR SHORTNESS OF BREATH 937 5 mL 2    levalbuterol (XOPENEX HFA) 45 mcg/act inhaler Inhale 1-2 puffs every 4 (four) hours as needed for wheezing or shortness of breath 1 Inhaler 3    levalbuterol (XOPENEX) 1 25 mg/3 mL nebulizer solution USE 1 VIAL VIA NEBULIZER EVERY 6 HOURS AS NEEDED FOR WHEEZING OR SHORTNESS OF BREATH 1050 mL 2    loratadine (CLARITIN) 10 mg tablet Take 10 mg by mouth daily      predniSONE 10 mg tablet Take 1 tablet (10 mg total) by mouth daily 90 tablet 1    sertraline (ZOLOFT) 100 mg tablet Take 1 tablet (100 mg total) by mouth daily 90 tablet 1    spironolactone (ALDACTONE) 25 mg tablet Take 1 tablet (25 mg total) by mouth daily 90 tablet 3    tamsulosin (FLOMAX) 0 4 mg Take 1 capsule (0 4 mg total) by mouth 2 (two) times a day 180 capsule 6    azithromycin (ZITHROMAX) 250 mg tablet TAKE 1 TABLET BY MOUTH  MONDAY,WEDNESDAY AND FRIDAY 12 tablet 3     No current facility-administered medications for this visit  Allergies: Codeine and Penicillins    Objective:  Vitals:    09/22/20 1431   BP: 124/84   Pulse: 88   Temp: 97 9 °F (36 6 °C)   SpO2: 95%   Weight: 90 3 kg (199 lb)   Height: 5' 10" (1 778 m)   Oxygen Therapy  SpO2: 95 %(with 3 lts of oxygen)    Wt Readings from Last 3 Encounters:   09/22/20 90 3 kg (199 lb)   09/21/20 90 7 kg (200 lb)   06/29/20 88 9 kg (196 lb)     Body mass index is 28 55 kg/m²  Physical Exam  Constitutional:       General: He is not in acute distress  Appearance: He is well-developed  HENT:      Head: Normocephalic and atraumatic  Eyes:      Pupils: Pupils are equal, round, and reactive to light  Neck:      Musculoskeletal: Normal range of motion  Cardiovascular:      Rate and Rhythm: Normal rate and regular rhythm  Heart sounds: Normal heart sounds  No murmur  Pulmonary:      Effort: Pulmonary effort is normal  No accessory muscle usage or respiratory distress  Breath sounds: Decreased breath sounds present  No wheezing, rhonchi or rales  Abdominal:      Palpations: Abdomen is soft  Tenderness: There is no abdominal tenderness  Musculoskeletal: Normal range of motion  Skin:     General: Skin is warm and dry  Findings: No rash  Neurological:      Mental Status: He is alert and oriented to person, place, and time  Psychiatric:         Mood and Affect: Mood normal          Behavior: Behavior normal          Lab Review:   Lab Results   Component Value Date    K 3 9 09/19/2020     09/19/2020    CO2 30 09/19/2020    BUN 27 (H) 09/19/2020    CREATININE 0 73 09/19/2020    CALCIUM 9 3 09/19/2020     Lab Results   Component Value Date    WBC 8 01 09/19/2020    HGB 14 0 09/19/2020    HCT 44 0 09/19/2020    MCV 94 09/19/2020     09/19/2020       Diagnostics:  I have personally reviewed pertinent reports     and I have personally reviewed pertinent films in PACS  Reviewed recent CT scan  Office Spirometry Results:     ESS:    Ct Abdomen W Wo Contrast    Result Date: 9/17/2020  Narrative: CT - ABDOMEN WITHOUT AND WITH IV CONTRAST INDICATION:   N28 1: Cyst of kidney, acquired  COMPARISON: March 26, 2019, previous, previous study from February 26, 2019 TECHNIQUE: CT examination of the abdomen was performed  Contrast was injected one time intravenously without immediate complication  Scanning through the abdomen was performed in arterial, venous and delayed phases according a protocol spefically designed to evaluate upper abdominal viscera  Reformatted images were created in axial, sagittal, and coronal planes  Radiation dose length product (DLP) for this visit:  956 mGy-cm   This examination, like all CT scans performed in the [facility], was performed utilizing techniques to minimize radiation dose exposure, including the use of iterative reconstruction and automated exposure control  IV Contrast:  100 mL of iohexol (OMNIPAQUE) Enteric Contrast: FINDINGS: ABDOMEN LOWER CHEST:  Emphysema seen at the lung base Right basilar density seen compatible with atelectasis LIVER/BILIARY TREE:  Unremarkable  GALLBLADDER:  Cherre Pates is surgically absent SPLEEN:  Unremarkable  PANCREAS:  Unremarkable  ADRENAL GLANDS:  Unremarkable  KIDNEYS/URETERS:  Left renal cyst seen in the lower pole of the left kidney  A small cyst seen in the lower pole of the left kidney in image 104 series 400  These a Bosniak 2 cyst  Right kidney; again noted is a cyst in the upper pole of the right kidney with demonstrates internal septation with session of mild nodularity  This measures about 2 cm x 2 2 X1 6 cm  On the previous study of March 26, 2019 this cyst was measuring 1 8 x 1 5 cm x 1 1    There has been mild enlargement of this cyst   The internal septation and nodularity is seen in image 133 series 401 A small lucency seen in the upper pole of the right kidney in image 39, with internal minimal septation, Bosniak 2 VISUALIZED STOMACH AND BOWEL:  No abnormal dilation of the bowel loops seen VISUALIZED ABDOMINAL CAVITY:  No pathologically enlarged periportal, mesenteric or upper retroperitoneal lymph nodes  No ascites or free intraperitoneal air  No fluid collection  VISUALIZED BODY WALL:  Unremarkable  VISUALIZED ABDOMINAL VESSELS: Atherosclerotic changes seen within the aorta OSSEOUS STRUCTURES:  No acute compression collapse of the vertebra     Impression: Again noted is a mildly septated the cyst with the suggestion of mild internal nodularity  This has shown interval growth since previous study of March 26, 2019  This measures 2 x 2 2 x 1 6 cm  On the previous study this was measuring 1 8 x 1 5 x 1 1 cm  This  is  at least a Bosniak 2F cyst,   Would suggest MRI of the abdomen for further evaluation of internal nodularity The study was marked in EPIC for significant notification   Workstation performed: [id number]

## 2020-09-22 NOTE — TELEPHONE ENCOUNTER
Rossy called back to see if she could come in for today's appointment  Patient wife states he is having other issues that need to be address now not at later date    Wife requesting a call back to discuss

## 2020-09-23 NOTE — TELEPHONE ENCOUNTER
Spoke to Arely Dacosta  Reviewed recent CT scan findings and subsequent MRI which do not demonstrate any concerning renal lesion  She is thrilled with the results  Patient continues to be plagued by urinary symptoms despite doubling of his Flomax and addition of Proscar at our last visit in March  Unfortunately, the patient is significantly comorbid and likely not a good surgical candidate  We had discussed UroLift previously  They do have some questions about this procedure which I have done my best to answer  Would like to have the patient arrange for a visit so repeat urinary symptom score and postvoid residual can be obtained  This can be at the patient and his wife's discretion, next available, non urgent

## 2020-10-11 DIAGNOSIS — J44.9 CHRONIC OBSTRUCTIVE PULMONARY DISEASE, UNSPECIFIED COPD TYPE (HCC): ICD-10-CM

## 2020-10-12 ENCOUNTER — TELEPHONE (OUTPATIENT)
Dept: PULMONOLOGY | Facility: CLINIC | Age: 70
End: 2020-10-12

## 2020-10-12 RX ORDER — FLUTICASONE FUROATE AND VILANTEROL TRIFENATATE 100; 25 UG/1; UG/1
POWDER RESPIRATORY (INHALATION)
Qty: 1 INHALER | Refills: 3 | Status: SHIPPED | OUTPATIENT
Start: 2020-10-12

## 2020-10-13 ENCOUNTER — OFFICE VISIT (OUTPATIENT)
Dept: PULMONOLOGY | Facility: CLINIC | Age: 70
End: 2020-10-13
Payer: MEDICARE

## 2020-10-13 VITALS
DIASTOLIC BLOOD PRESSURE: 70 MMHG | HEIGHT: 70 IN | HEART RATE: 92 BPM | RESPIRATION RATE: 18 BRPM | OXYGEN SATURATION: 94 % | WEIGHT: 200 LBS | TEMPERATURE: 98.7 F | SYSTOLIC BLOOD PRESSURE: 126 MMHG | BODY MASS INDEX: 28.63 KG/M2

## 2020-10-13 DIAGNOSIS — J44.9 COPD, VERY SEVERE (HCC): ICD-10-CM

## 2020-10-13 DIAGNOSIS — J43.9 PULMONARY EMPHYSEMA, UNSPECIFIED EMPHYSEMA TYPE (HCC): Primary | ICD-10-CM

## 2020-10-13 DIAGNOSIS — I27.20 PULMONARY HYPERTENSION (HCC): ICD-10-CM

## 2020-10-13 DIAGNOSIS — J96.11 CHRONIC RESPIRATORY FAILURE WITH HYPOXIA AND HYPERCAPNIA (HCC): ICD-10-CM

## 2020-10-13 DIAGNOSIS — I50.32 CHRONIC HEART FAILURE WITH PRESERVED EJECTION FRACTION (HCC): ICD-10-CM

## 2020-10-13 DIAGNOSIS — J96.12 CHRONIC RESPIRATORY FAILURE WITH HYPOXIA AND HYPERCAPNIA (HCC): ICD-10-CM

## 2020-10-13 PROBLEM — J44.1 ACUTE EXACERBATION OF CHRONIC OBSTRUCTIVE PULMONARY DISEASE (COPD) (HCC): Status: RESOLVED | Noted: 2018-08-14 | Resolved: 2020-10-13

## 2020-10-13 PROCEDURE — 99215 OFFICE O/P EST HI 40 MIN: CPT | Performed by: INTERNAL MEDICINE

## 2020-10-15 ENCOUNTER — TELEPHONE (OUTPATIENT)
Dept: INTERNAL MEDICINE CLINIC | Facility: CLINIC | Age: 70
End: 2020-10-15

## 2020-10-15 DIAGNOSIS — J41.0 SIMPLE CHRONIC BRONCHITIS (HCC): ICD-10-CM

## 2020-10-15 RX ORDER — PREDNISONE 10 MG/1
10 TABLET ORAL DAILY
Qty: 20 TABLET | Refills: 0 | Status: SHIPPED | OUTPATIENT
Start: 2020-10-15

## 2020-10-15 RX ORDER — LEVOFLOXACIN 500 MG/1
500 TABLET, FILM COATED ORAL EVERY 24 HOURS
Qty: 10 TABLET | Refills: 0 | Status: SHIPPED | OUTPATIENT
Start: 2020-10-15 | End: 2020-10-25

## 2020-10-27 ENCOUNTER — TELEPHONE (OUTPATIENT)
Dept: INTERNAL MEDICINE CLINIC | Facility: CLINIC | Age: 70
End: 2020-10-27

## 2020-11-20 ENCOUNTER — TELEPHONE (OUTPATIENT)
Dept: UROLOGY | Facility: MEDICAL CENTER | Age: 70
End: 2020-11-20

## 2020-11-27 ENCOUNTER — TELEPHONE (OUTPATIENT)
Dept: OTHER | Facility: OTHER | Age: 70
End: 2020-11-27

## 2020-11-27 DIAGNOSIS — J44.9 COPD, VERY SEVERE (HCC): Primary | ICD-10-CM

## 2020-11-27 RX ORDER — DOXYCYCLINE HYCLATE 100 MG/1
100 CAPSULE ORAL EVERY 12 HOURS SCHEDULED
Qty: 10 CAPSULE | Refills: 0 | Status: SHIPPED | OUTPATIENT
Start: 2020-11-27 | End: 2020-12-02

## 2020-12-02 DIAGNOSIS — R39.11 URINARY HESITANCY: ICD-10-CM

## 2020-12-03 RX ORDER — FINASTERIDE 5 MG/1
TABLET, FILM COATED ORAL
Qty: 90 TABLET | Refills: 1 | Status: SHIPPED | OUTPATIENT
Start: 2020-12-03 | End: 2021-06-02

## 2021-01-11 ENCOUNTER — TELEPHONE (OUTPATIENT)
Dept: OTHER | Facility: OTHER | Age: 71
End: 2021-01-11

## 2021-01-11 NOTE — TELEPHONE ENCOUNTER
Wife is calling to cancel pt's 11:20am appointment for tomorrow  Pt has pneumonia  Wife would like a call back to reschedule

## 2021-02-08 DIAGNOSIS — J41.0 SIMPLE CHRONIC BRONCHITIS (HCC): ICD-10-CM

## 2021-02-12 ENCOUNTER — TELEPHONE (OUTPATIENT)
Dept: PULMONOLOGY | Facility: CLINIC | Age: 71
End: 2021-02-12

## 2021-02-23 DIAGNOSIS — J41.0 SIMPLE CHRONIC BRONCHITIS (HCC): ICD-10-CM

## 2021-02-24 ENCOUNTER — TELEPHONE (OUTPATIENT)
Dept: PULMONOLOGY | Facility: CLINIC | Age: 71
End: 2021-02-24

## 2021-05-03 DIAGNOSIS — K58.0 IRRITABLE BOWEL SYNDROME WITH DIARRHEA: ICD-10-CM

## 2021-05-03 RX ORDER — CHOLESTYRAMINE 4 G/9G
POWDER, FOR SUSPENSION ORAL
Qty: 180 EACH | Refills: 1 | Status: SHIPPED | OUTPATIENT
Start: 2021-05-03 | End: 2022-01-25

## 2021-05-06 DIAGNOSIS — J41.0 SIMPLE CHRONIC BRONCHITIS (HCC): ICD-10-CM

## 2021-05-27 DIAGNOSIS — I50.32 CHRONIC HEART FAILURE WITH PRESERVED EJECTION FRACTION (HCC): ICD-10-CM

## 2021-05-27 RX ORDER — SPIRONOLACTONE 25 MG/1
TABLET ORAL
Qty: 90 TABLET | Refills: 3 | Status: SHIPPED | OUTPATIENT
Start: 2021-05-27

## 2021-06-02 DIAGNOSIS — R39.11 URINARY HESITANCY: ICD-10-CM

## 2021-06-02 RX ORDER — FINASTERIDE 5 MG/1
TABLET, FILM COATED ORAL
Qty: 90 TABLET | Refills: 1 | Status: SHIPPED | OUTPATIENT
Start: 2021-06-02

## 2021-07-27 DIAGNOSIS — R09.81 NASAL CONGESTION: ICD-10-CM

## 2021-07-29 RX ORDER — FLUTICASONE PROPIONATE 50 MCG
SPRAY, SUSPENSION (ML) NASAL
Qty: 16 G | Refills: 3 | Status: SHIPPED | OUTPATIENT
Start: 2021-07-29

## 2021-09-13 ENCOUNTER — TELEPHONE (OUTPATIENT)
Dept: UROLOGY | Facility: AMBULATORY SURGERY CENTER | Age: 71
End: 2021-09-13

## 2021-09-13 NOTE — TELEPHONE ENCOUNTER
Patient wife requested a medical release form to be mailed to home address  Mailed form as requested

## 2021-11-15 ENCOUNTER — TELEPHONE (OUTPATIENT)
Dept: UROLOGY | Facility: AMBULATORY SURGERY CENTER | Age: 71
End: 2021-11-15

## 2021-11-15 NOTE — TELEPHONE ENCOUNTER
Patient wife is calling to get medical records sent to Dr Roger Dobbins [phone] phone number and fax number is [phone]  Will stop over to office and sign a release of records in [facility]  64

## 2021-11-25 DIAGNOSIS — R39.11 URINARY HESITANCY: ICD-10-CM

## 2021-11-26 RX ORDER — FINASTERIDE 5 MG/1
TABLET, FILM COATED ORAL
Qty: 90 TABLET | Refills: 1 | OUTPATIENT
Start: 2021-11-26

## 2021-12-02 ENCOUNTER — TELEPHONE (OUTPATIENT)
Dept: UROLOGY | Facility: CLINIC | Age: 71
End: 2021-12-02

## 2022-01-25 DIAGNOSIS — K58.0 IRRITABLE BOWEL SYNDROME WITH DIARRHEA: ICD-10-CM

## 2022-01-25 RX ORDER — CHOLESTYRAMINE 4 G/9G
POWDER, FOR SUSPENSION ORAL
Qty: 180 EACH | Refills: 0 | Status: SHIPPED | OUTPATIENT
Start: 2022-01-25 | End: 2022-05-17 | Stop reason: SDUPTHER

## 2022-01-25 NOTE — TELEPHONE ENCOUNTER
Spoke to patient's wife     They have moved to [place]     Patient will establish with a GI specialist in [place]  Brett Murillo

## 2022-05-11 ENCOUNTER — TELEPHONE (OUTPATIENT)
Dept: GASTROENTEROLOGY | Facility: CLINIC | Age: 72
End: 2022-05-11

## 2022-05-11 NOTE — TELEPHONE ENCOUNTER
Huy patient - Patient wanted to reschedule appt from 5/17 w/Dr Lavon Gan  LMOM, No appt until August if they need to change appt RTRN call to [phone], option 1    Thx

## 2022-05-16 RX ORDER — PANTOPRAZOLE SODIUM 40 MG/1
40 TABLET, DELAYED RELEASE ORAL DAILY
COMMUNITY
Start: 2022-03-23

## 2022-05-16 RX ORDER — LEVOFLOXACIN 750 MG/1
TABLET ORAL
COMMUNITY
Start: 2022-04-13

## 2022-05-16 RX ORDER — BENZONATATE 100 MG/1
100 CAPSULE ORAL 3 TIMES DAILY PRN
COMMUNITY

## 2022-05-16 RX ORDER — OMEPRAZOLE 20 MG/1
1 CAPSULE, DELAYED RELEASE ORAL
COMMUNITY
End: 2022-05-17

## 2022-05-16 RX ORDER — PREDNISONE 1 MG/1
TABLET ORAL
COMMUNITY
Start: 2022-02-10

## 2022-05-16 RX ORDER — ALPRAZOLAM 0.25 MG/1
TABLET ORAL
COMMUNITY
Start: 2022-04-25

## 2022-05-16 RX ORDER — FLUTICASONE FUROATE, UMECLIDINIUM BROMIDE AND VILANTEROL TRIFENATATE 200; 62.5; 25 UG/1; UG/1; UG/1
POWDER RESPIRATORY (INHALATION)
COMMUNITY
Start: 2022-04-26

## 2022-05-16 RX ORDER — TIOTROPIUM BROMIDE AND OLODATEROL 3.124; 2.736 UG/1; UG/1
SPRAY, METERED RESPIRATORY (INHALATION)
COMMUNITY
Start: 2021-12-09

## 2022-05-16 RX ORDER — ALBUTEROL SULFATE 90 UG/1
2 AEROSOL, METERED RESPIRATORY (INHALATION)
COMMUNITY

## 2022-05-16 RX ORDER — PREDNISONE 20 MG/1
40 TABLET ORAL DAILY
COMMUNITY
Start: 2022-03-23

## 2022-05-17 ENCOUNTER — OFFICE VISIT (OUTPATIENT)
Dept: GASTROENTEROLOGY | Facility: CLINIC | Age: 72
End: 2022-05-17
Payer: MEDICARE

## 2022-05-17 VITALS
HEART RATE: 81 BPM | DIASTOLIC BLOOD PRESSURE: 70 MMHG | HEIGHT: 70 IN | BODY MASS INDEX: 27.63 KG/M2 | SYSTOLIC BLOOD PRESSURE: 126 MMHG | WEIGHT: 193 LBS

## 2022-05-17 DIAGNOSIS — K21.9 GASTROESOPHAGEAL REFLUX DISEASE WITHOUT ESOPHAGITIS: Primary | ICD-10-CM

## 2022-05-17 DIAGNOSIS — K59.1 FUNCTIONAL DIARRHEA: ICD-10-CM

## 2022-05-17 DIAGNOSIS — K58.0 IRRITABLE BOWEL SYNDROME WITH DIARRHEA: ICD-10-CM

## 2022-05-17 PROCEDURE — 99214 OFFICE O/P EST MOD 30 MIN: CPT | Performed by: INTERNAL MEDICINE

## 2022-05-17 RX ORDER — CHOLESTYRAMINE 4 G/9G
1 POWDER, FOR SUSPENSION ORAL 2 TIMES DAILY WITH MEALS
Qty: 180 EACH | Refills: 4 | Status: SHIPPED | OUTPATIENT
Start: 2022-05-17

## 2022-05-17 NOTE — PROGRESS NOTES
Follow-up Note -  [facility]  Ke Michele 1950 73 y o  male     ASSESSMENT @ PLAN:   He is a 24-year-old male with functional diarrhea and GERD that is responding to omeprazole and to cholestyramine  He had endoscopy with me with mild gastritis in September 2018 and a colonoscopy in June of 2020 with no polyps and a normal colon  His main problem is is oxygen dependent COPD     1 I told him to stay on the omeprazole daily    2 I told him to stay on the cholestyramine 1 packet p o  b i d  Reason:  GERD and diarrhea    HPI:  He is a 24-year-old male with GERD and diarrhea who is stable and doing well  He is basically here for medication refill pillar and he is beginning omeprazole from the [employer] and taking it every day  He has no heartburn regurgitation or dysphagia  He had endoscopy with me in September 2018  He had mild gastritis  He had a colonoscopy in 6th June 2020 that showed no polyps and a normal colon  He has a functional diarrhea that response to cholestyramine  He is doing 1 packet p o  b i d   It does form his stools  He is eating well  His weight is stable  He is advanced COPD and is oxygen dependent needs trying to stay active but he gets winded  Has no melena or hematochezia  REVIEW OF SYSTEMS:     CONSTITUTIONAL: Denies any fever, chills, or rigors  Good appetite, and no recent weight loss  HEENT: No earache or tinnitus  Denies hearing loss or visual disturbances  CARDIOVASCULAR: No chest pain or palpitations  RESPIRATORY: Denies any cough, hemoptysis, shortness of breath or dyspnea on exertion  GASTROINTESTINAL: As noted in the History of Present Illness  GENITOURINARY: No problems with urination  Denies any hematuria or dysuria  NEUROLOGIC: No dizziness or vertigo, denies headaches  MUSCULOSKELETAL: Denies any muscle or joint pain  SKIN: Denies skin rashes or itching     ENDOCRINE: Denies excessive thirst  Denies intolerance to heat or cold  PSYCHOSOCIAL: Denies depression or anxiety  Denies any recent memory loss  Past Medical History:   Diagnosis Date    Allergic rhinitis     Asthma     BiPAP (biphasic positive airway pressure) dependence     Centrilobular emphysema (HCC)     Colon polyp     COPD (chronic obstructive pulmonary disease) (HCC)     Enlarged prostate     GERD (gastroesophageal reflux disease)     HL (hearing loss)     Hypoglycemia     On home oxygen therapy     Pneumonia     Pulmonary hypertension (HCC)     Respiratory failure (HCC)     SOB (shortness of breath)     Stroke (HCC)     Mini      Past Surgical History:   Procedure Laterality Date    CATARACT EXTRACTION      CHOLECYSTECTOMY      hernia    EYE SURGERY      HERNIA REPAIR      MASS EXCISION N/A 2019    Procedure: BACK SKIN LESION EXCISION BIOPSY;  Surgeon: Edna Liz MD;  [facility]: [facility];  Service: General    WA ESOPHAGOGASTRODUODENOSCOPY TRANSORAL DIAGNOSTIC N/A 2018    Procedure: ESOPHAGOGASTRODUODENOSCOPY (EGD); Surgeon: Edilson Meehan MD;  Location: [facility];   Service: Gastroenterology    WA EXC SKIN BENIG 2 1-3 CM REMAINDR BODY N/A 2019    Procedure: SCALP LESION EXCISION BIOPSY;  Surgeon: Edna Liz MD;  [facility];  Service: General    WA REPAIR ING HERNIA,5+Y/O,REDUCIBL Left 2018    Procedure: OPEN INGUINAL HERNIA REPAIR WITH MESH;  Surgeon: Edna Liz MD;  [facility]: [facility];  Service: General     Social History     Socioeconomic History    Marital status: /Civil Union     Spouse name: janet     Number of children: 5    Years of education: Not on file    Highest education level: Not on file   Occupational History    Occupation: retired    Tobacco Use    Smoking status: Former Smoker     Packs/day: 0 00     Years: 50 00     Pack years: 0 00     Types: Cigarettes     Quit date: 2013     Years since quittin 2    Smokeless tobacco: Never Used   Vaping Use    Vaping Use: Never used   Substance and Sexual Activity    Alcohol use: Not Currently     Alcohol/week: 1 0 standard drink     Types: 1 Glasses of wine per week     Comment: socialy    Drug use: Never    Sexual activity: Never   Other Topics Concern    Not on file   Social History Narrative        Retired     Social Determinants of Health     Financial Resource Strain: Not on file   Food Insecurity: Not on file   Transportation Needs: Not on file   Physical Activity: Not on file   Stress: Not on file   Social Connections: Not on file   Intimate Partner Violence: Not on file   Housing Stability: Not on file     Family History   Problem Relation Age of Onset    Diabetes Mother     Hypertension Mother     Hyperlipidemia Mother      Codeine, Penicillins, and Terbinafine  Current Outpatient Medications   Medication Sig Dispense Refill    albuterol (PROVENTIL HFA,VENTOLIN HFA) 90 mcg/act inhaler Inhale 2 puffs      ALPRAZolam (XANAX) 0 25 mg tablet TAKE 1 TABLET BY MOUTH DAILY FOR 7 DAYS THEN AS NEEDED EVERY DAY FOR 7 DAYS      benzonatate (TESSALON PERLES) 100 mg capsule Take 100 mg by mouth as needed in the morning and 100 mg as needed at noon and 100 mg as needed in the evening   Breo Ellipta 100-25 MCG/INH inhaler inhale 1 puff by mouth and INTO THE LUNGS once daily Rinse mouth after use 1 Inhaler 3    cholestyramine (QUESTRAN) 4 g packet Take 1 packet (4 g total) by mouth in the morning and 1 packet (4 g total) in the evening  Take with meals   180 each 4    clotrimazole (MYCELEX) 10 mg bassem Take 1 tablet (10 mg total) by mouth 5 (five) times a day 50 tablet 0    ergocalciferol (VITAMIN D2) 50,000 units Take 1 capsule (50,000 Units total) by mouth once a week 4 capsule 5    finasteride (PROSCAR) 5 mg tablet TAKE 1 TABLET BY MOUTH DAILY 90 tablet 1    fluticasone (FLONASE) 50 mcg/act nasal spray USE 1 SPRAY IN EACH NOSTRIL ONCE DAILY 16 g 3    guaiFENesin (MUCINEX) 600 mg 12 hr tablet Take 1 tablet (600 mg total) by mouth every 12 (twelve) hours 60 tablet 0    ipratropium (ATROVENT) 0 02 % nebulizer solution USE 1 VIAL VIA NEBULIZER EVERY 6 HOURS AS NEEDED FOR WHEEZING OR SHORTNESS OF BREATH 875 mL 3    levalbuterol (XOPENEX HFA) 45 mcg/act inhaler Inhale 1-2 puffs every 4 (four) hours as needed for wheezing or shortness of breath 1 Inhaler 3    levalbuterol (XOPENEX) 1 25 mg/3 mL nebulizer solution USE 1 VIAL VIA NEBULIZER EVERY 6 HOURS AS NEEDED FOR WHEEZING OR SHORTNESS OF BREATH 1050 mL 2    levofloxacin (LEVAQUIN) 750 mg tablet TAKE 1 TABLET BY MOUTH DAILY FOR 7 DAYS      loratadine (CLARITIN) 10 mg tablet Take 10 mg by mouth daily      pantoprazole (PROTONIX) 40 mg tablet Take 40 mg by mouth in the morning   predniSONE 10 mg tablet Take 1 tablet (10 mg total) by mouth daily 20 tablet 0    predniSONE 20 mg tablet Take 40 mg by mouth in the morning   predniSONE 5 mg tablet       sertraline (ZOLOFT) 100 mg tablet Take 1 tablet (100 mg total) by mouth daily 90 tablet 1    spironolactone (ALDACTONE) 25 mg tablet TAKE 1 TABLET(25 MG) BY MOUTH DAILY 90 tablet 3    tamsulosin (FLOMAX) 0 4 mg Take 1 capsule (0 4 mg total) by mouth 2 (two) times a day 180 capsule 6    tiotropium-olodaterol (Stiolto Respimat) 2 5-2 5 MCG/ACT inhaler INHALE 2 INHALATIONS INTO THE LUNGS DAILY      Trelegy Ellipta 200-62 5-25 MCG/INH AEPB inhaler INHALE 1 PUFF INTO THE LUNGS DAILY      azithromycin (ZITHROMAX) 250 mg tablet TAKE 1 TABLET BY MOUTH  MONDAY,WEDNESDAY AND FRIDAY 12 tablet 3     No current facility-administered medications for this visit  Blood pressure 126/70, pulse 81, height 5' 10" (1 778 m), weight 87 5 kg (193 lb)      PHYSICAL EXAM:     General Appearance:   Alert, cooperative, no distress, appears stated age    HEENT:   Normocephalic, atraumatic, anicteric      Neck:  Supple, symmetrical, trachea midline, no adenopathy;    thyroid: no enlargement/tenderness/nodules; no carotid  bruit or JVD    Lungs:   Clear to auscultation bilaterally; no rales, rhonchi or wheezing; respirations unlabored    Heart[de-identified]   S1 and S2 normal; regular rate and rhythm; no murmur, rub, or gallop     Abdomen:   Soft, non-tender, non-distended; normal bowel sounds; no masses, no organomegaly    Genitalia:   Deferred    Rectal:   Deferred    Extremities:  No cyanosis, clubbing or edema    Pulses:  2+ and symmetric all extremities    Skin:  Skin color, texture, turgor normal, no rashes or lesions    Lymph nodes:  No palpable cervical, axillary or inguinal lymphadenopathy        Lab Results   Component Value Date    WBC 8 01 09/19/2020    HGB 14 0 09/19/2020    HCT 44 0 09/19/2020    MCV 94 09/19/2020     09/19/2020     Lab Results   Component Value Date    CALCIUM 9 3 09/19/2020    K 3 9 09/19/2020    CO2 30 09/19/2020     09/19/2020    BUN 27 (H) 09/19/2020    CREATININE 0 73 09/19/2020     Lab Results   Component Value Date    ALT 49 09/19/2020    AST 24 09/19/2020    ALKPHOS 67 09/19/2020     Lab Results   Component Value Date    INR 1 07 04/15/2019    INR 1 01 04/13/2019    INR 0 94 04/03/2019    PROTIME 13 8 04/15/2019    PROTIME 13 2 04/13/2019    PROTIME 12 5 04/03/2019

## 2022-11-10 ENCOUNTER — TELEPHONE (OUTPATIENT)
Dept: PULMONOLOGY | Facility: CLINIC | Age: 72
End: 2022-11-10

## 2022-11-10 NOTE — TELEPHONE ENCOUNTER
Patient's wife calling, her and her  were a patient of Dr Sneed's 2-3 years ago but moved to [facility]  They see a pulmonologist there who wants to put pt on morphine, she is asking for Michael's personal opinion on if she thinks it's okay  I advised her she may not be able to touch on it because it has been so long since he's been seen but she insisted on getting the message passed along   Please advise [phone]

## 2023-09-06 ENCOUNTER — TELEPHONE (OUTPATIENT)
Dept: PULMONOLOGY | Facility: CLINIC | Age: 73
End: 2023-09-06

## 2023-09-06 NOTE — TELEPHONE ENCOUNTER
Unsure as patient has moved [place] and does not follow with us anymore. There are no orders from us.

## 2023-09-06 NOTE — TELEPHONE ENCOUNTER
Alverto Tesfaye from [facility] calling with peer to peer information for continuation of pt's non invasive ventilator    If you wish to accept the peer to peer they need a response back by 9/8/2023 at 12:00 CST.      Phone number: [phone]

## 2023-09-07 NOTE — TELEPHONE ENCOUNTER
Correct I have not received anything I  regards to this matter. On our end no orders were placed so provider who did will have to address.

## 2024-04-09 NOTE — TELEPHONE ENCOUNTER
----- Message from Martha Euceda MD sent at 8/3/2018 12:35 PM EDT -----  Please fax his Alicia Medina and budesonide prescriptions [address] If he is able to find either ozempic or trulicity at his pharmacy we can consider switching to one of those. The other option is to try an oral option and can discuss at next appointment.   Thank you,  Electronically signed by Arnol Acevedo MD on 4/9/2024 at 7:55 AM

## 2025-04-15 ENCOUNTER — TELEPHONE (OUTPATIENT)
Dept: GASTROENTEROLOGY | Facility: CLINIC | Age: 75
End: 2025-04-15

## 2025-04-15 NOTE — TELEPHONE ENCOUNTER
Spoke with patients wife and she said they moved to [place] and has established with a provider there. She was advised that he is due in June for his repeat colonoscopy.

## (undated) DEVICE — PAD GROUNDING ADULT

## (undated) DEVICE — INTENDED FOR TISSUE SEPARATION, AND OTHER PROCEDURES THAT REQUIRE A SHARP SURGICAL BLADE TO PUNCTURE OR CUT.: Brand: BARD-PARKER SAFETY BLADES SIZE 15, STERILE

## (undated) DEVICE — GAUZE SPONGES,8 PLY: Brand: CURITY

## (undated) DEVICE — 3M™ STERI-STRIP™ REINFORCED ADHESIVE SKIN CLOSURES, R1546, 1/4 IN X 4 IN (6 MM X 100 MM), 10 STRIPS/ENVELOPE: Brand: 3M™ STERI-STRIP™

## (undated) DEVICE — NEEDLE 25G X 5/8

## (undated) DEVICE — 3M™ TEGADERM™ TRANSPARENT FILM DRESSING FRAME STYLE, 1624W, 2-3/8 IN X 2-3/4 IN (6 CM X 7 CM), 100/CT 4CT/CASE: Brand: 3M™ TEGADERM™

## (undated) DEVICE — CHLORAPREP HI-LITE 26ML ORANGE

## (undated) DEVICE — GLOVE INDICATOR PI UNDERGLOVE SZ 7.5 BLUE

## (undated) DEVICE — BETHLEHEM UNIVERSAL MINOR GEN: Brand: CARDINAL HEALTH

## (undated) DEVICE — DRAPE EQUIPMENT RF WAND

## (undated) DEVICE — LIGHT HANDLE COVER SLEEVE DISP BLUE STELLAR

## (undated) DEVICE — CHLORAPREP HI-LITE 10.5ML ORANGE

## (undated) DEVICE — SUT VICRYL 4-0 PS-2 27 IN J426H

## (undated) DEVICE — 3M™ STERI-STRIP™ REINFORCED ADHESIVE SKIN CLOSURES, R1541, 1/4 IN X 3 IN (6 MM X 75 MM), 3 STRIPS/ENVELOPE: Brand: 3M™ STERI-STRIP™

## (undated) DEVICE — GLOVE SRG BIOGEL ECLIPSE 7.5

## (undated) DEVICE — GAUZE SPONGES,16 PLY: Brand: CURITY

## (undated) DEVICE — POOLE SUCTION HANDLE: Brand: CARDINAL HEALTH

## (undated) DEVICE — 3M™ TEGADERM™ TRANSPARENT FILM DRESSING FRAME STYLE, 1626W, 4 IN X 4-3/4 IN (10 CM X 12 CM), 50/CT 4CT/CASE: Brand: 3M™ TEGADERM™

## (undated) DEVICE — PACK UNIVERSAL NECK

## (undated) DEVICE — SUT VICRYL 3-0 SH 27 IN J416H

## (undated) DEVICE — TUBING SUCTION 5MM X 12 FT

## (undated) DEVICE — POV-IOD SWAB STICKS

## (undated) DEVICE — STRL UNIVERSAL MINOR GENERAL: Brand: CARDINAL HEALTH

## (undated) DEVICE — PLUMEPEN PRO 10FT

## (undated) DEVICE — SCD SEQUENTIAL COMPRESSION COMFORT SLEEVE MEDIUM KNEE LENGTH: Brand: KENDALL SCD

## (undated) DEVICE — ELECTRODE NEEDLE MOD E-Z CLEAN 2.75IN 7CM -0013M

## (undated) DEVICE — NEEDLE 25G X 1 1/2

## (undated) DEVICE — SUT VICRYL 2-0 SH 27 IN UNDYED J417H

## (undated) DEVICE — REM POLYHESIVE ADULT PATIENT RETURN ELECTRODE: Brand: VALLEYLAB